# Patient Record
Sex: FEMALE | Race: WHITE | Employment: UNEMPLOYED | URBAN - METROPOLITAN AREA
[De-identification: names, ages, dates, MRNs, and addresses within clinical notes are randomized per-mention and may not be internally consistent; named-entity substitution may affect disease eponyms.]

---

## 2017-01-02 ENCOUNTER — APPOINTMENT (OUTPATIENT)
Dept: GENERAL RADIOLOGY | Age: 57
DRG: 492 | End: 2017-01-02
Attending: PHYSICIAN ASSISTANT
Payer: COMMERCIAL

## 2017-01-02 ENCOUNTER — APPOINTMENT (OUTPATIENT)
Dept: GENERAL RADIOLOGY | Age: 57
DRG: 492 | End: 2017-01-02
Attending: NURSE PRACTITIONER
Payer: COMMERCIAL

## 2017-01-02 ENCOUNTER — ANESTHESIA EVENT (OUTPATIENT)
Dept: SURGERY | Age: 57
DRG: 492 | End: 2017-01-02
Payer: COMMERCIAL

## 2017-01-02 ENCOUNTER — HOSPITAL ENCOUNTER (INPATIENT)
Age: 57
LOS: 22 days | Discharge: HOME HEALTH | DRG: 492 | End: 2017-01-24
Attending: EMERGENCY MEDICINE | Admitting: ORTHOPAEDIC SURGERY
Payer: COMMERCIAL

## 2017-01-02 ENCOUNTER — ANESTHESIA (OUTPATIENT)
Dept: SURGERY | Age: 57
DRG: 492 | End: 2017-01-02
Payer: COMMERCIAL

## 2017-01-02 DIAGNOSIS — S82.851A TRIMALLEOLAR FRACTURE, RIGHT, CLOSED, INITIAL ENCOUNTER: Primary | ICD-10-CM

## 2017-01-02 LAB
ALBUMIN SERPL BCP-MCNC: 3.5 G/DL (ref 3.5–5)
ALBUMIN/GLOB SERPL: 1.2 {RATIO} (ref 1.1–2.2)
ALP SERPL-CCNC: 92 U/L (ref 45–117)
ALT SERPL-CCNC: 23 U/L (ref 12–78)
ANION GAP BLD CALC-SCNC: 8 MMOL/L (ref 5–15)
AST SERPL W P-5'-P-CCNC: 13 U/L (ref 15–37)
BASOPHILS # BLD AUTO: 0 K/UL (ref 0–0.1)
BASOPHILS # BLD: 0 % (ref 0–1)
BILIRUB SERPL-MCNC: 1 MG/DL (ref 0.2–1)
BUN SERPL-MCNC: 7 MG/DL (ref 6–20)
BUN/CREAT SERPL: 9 (ref 12–20)
CALCIUM SERPL-MCNC: 8.2 MG/DL (ref 8.5–10.1)
CHLORIDE SERPL-SCNC: 108 MMOL/L (ref 97–108)
CO2 SERPL-SCNC: 26 MMOL/L (ref 21–32)
CREAT SERPL-MCNC: 0.74 MG/DL (ref 0.55–1.02)
EOSINOPHIL # BLD: 0 K/UL (ref 0–0.4)
EOSINOPHIL NFR BLD: 0 % (ref 0–7)
ERYTHROCYTE [DISTWIDTH] IN BLOOD BY AUTOMATED COUNT: 12.9 % (ref 11.5–14.5)
GLOBULIN SER CALC-MCNC: 2.9 G/DL (ref 2–4)
GLUCOSE SERPL-MCNC: 112 MG/DL (ref 65–100)
HCT VFR BLD AUTO: 39.3 % (ref 35–47)
HGB BLD-MCNC: 13.2 G/DL (ref 11.5–16)
INR PPP: 1 (ref 0.9–1.1)
LYMPHOCYTES # BLD AUTO: 18 % (ref 12–49)
LYMPHOCYTES # BLD: 1.3 K/UL (ref 0.8–3.5)
MCH RBC QN AUTO: 30.8 PG (ref 26–34)
MCHC RBC AUTO-ENTMCNC: 33.6 G/DL (ref 30–36.5)
MCV RBC AUTO: 91.6 FL (ref 80–99)
MONOCYTES # BLD: 0.4 K/UL (ref 0–1)
MONOCYTES NFR BLD AUTO: 6 % (ref 5–13)
NEUTS SEG # BLD: 5.6 K/UL (ref 1.8–8)
NEUTS SEG NFR BLD AUTO: 76 % (ref 32–75)
PLATELET # BLD AUTO: 234 K/UL (ref 150–400)
POTASSIUM SERPL-SCNC: 3.8 MMOL/L (ref 3.5–5.1)
PROT SERPL-MCNC: 6.4 G/DL (ref 6.4–8.2)
PROTHROMBIN TIME: 10.4 SEC (ref 9–11.1)
RBC # BLD AUTO: 4.29 M/UL (ref 3.8–5.2)
SODIUM SERPL-SCNC: 142 MMOL/L (ref 136–145)
WBC # BLD AUTO: 7.3 K/UL (ref 3.6–11)

## 2017-01-02 PROCEDURE — 0QSJXZZ REPOSITION RIGHT FIBULA, EXTERNAL APPROACH: ICD-10-PCS | Performed by: EMERGENCY MEDICINE

## 2017-01-02 PROCEDURE — 96374 THER/PROPH/DIAG INJ IV PUSH: CPT

## 2017-01-02 PROCEDURE — 0QSJXZZ REPOSITION RIGHT FIBULA, EXTERNAL APPROACH: ICD-10-PCS | Performed by: ORTHOPAEDIC SURGERY

## 2017-01-02 PROCEDURE — 77030020753 HC CUF TRNQT 1BLA STRY -B: Performed by: ORTHOPAEDIC SURGERY

## 2017-01-02 PROCEDURE — 99285 EMERGENCY DEPT VISIT HI MDM: CPT

## 2017-01-02 PROCEDURE — 74011250636 HC RX REV CODE- 250/636: Performed by: EMERGENCY MEDICINE

## 2017-01-02 PROCEDURE — 77030020061 HC IV BLD WRMR ADMIN SET 3M -B: Performed by: ANESTHESIOLOGY

## 2017-01-02 PROCEDURE — 76210000006 HC OR PH I REC 0.5 TO 1 HR: Performed by: ORTHOPAEDIC SURGERY

## 2017-01-02 PROCEDURE — 74011250636 HC RX REV CODE- 250/636: Performed by: ANESTHESIOLOGY

## 2017-01-02 PROCEDURE — 99152 MOD SED SAME PHYS/QHP 5/>YRS: CPT

## 2017-01-02 PROCEDURE — 0QSGXZZ REPOSITION RIGHT TIBIA, EXTERNAL APPROACH: ICD-10-PCS | Performed by: EMERGENCY MEDICINE

## 2017-01-02 PROCEDURE — 96376 TX/PRO/DX INJ SAME DRUG ADON: CPT

## 2017-01-02 PROCEDURE — 36415 COLL VENOUS BLD VENIPUNCTURE: CPT | Performed by: NURSE PRACTITIONER

## 2017-01-02 PROCEDURE — 74011250636 HC RX REV CODE- 250/636

## 2017-01-02 PROCEDURE — 77030016920 HC CLMP EXT FIX4 SYNT -F: Performed by: ORTHOPAEDIC SURGERY

## 2017-01-02 PROCEDURE — 71010 XR CHEST SNGL V: CPT

## 2017-01-02 PROCEDURE — 77030013079 HC BLNKT BAIR HGGR 3M -A: Performed by: ANESTHESIOLOGY

## 2017-01-02 PROCEDURE — 85025 COMPLETE CBC W/AUTO DIFF WBC: CPT | Performed by: NURSE PRACTITIONER

## 2017-01-02 PROCEDURE — 77030026438 HC STYL ET INTUB CARD -A: Performed by: ANESTHESIOLOGY

## 2017-01-02 PROCEDURE — 74011000250 HC RX REV CODE- 250

## 2017-01-02 PROCEDURE — 77030020274 HC MISC IMPL ORTHOPEDIC: Performed by: ORTHOPAEDIC SURGERY

## 2017-01-02 PROCEDURE — 73600 X-RAY EXAM OF ANKLE: CPT

## 2017-01-02 PROCEDURE — 76010000138 HC OR TIME 0.5 TO 1 HR: Performed by: ORTHOPAEDIC SURGERY

## 2017-01-02 PROCEDURE — 76060000032 HC ANESTHESIA 0.5 TO 1 HR: Performed by: ORTHOPAEDIC SURGERY

## 2017-01-02 PROCEDURE — 77030011640 HC PAD GRND REM COVD -A: Performed by: ORTHOPAEDIC SURGERY

## 2017-01-02 PROCEDURE — 77030025245 HC ROD SPN EXT FIX 1 SYNT -D: Performed by: ORTHOPAEDIC SURGERY

## 2017-01-02 PROCEDURE — 80053 COMPREHEN METABOLIC PANEL: CPT | Performed by: NURSE PRACTITIONER

## 2017-01-02 PROCEDURE — 77030032490 HC SLV COMPR SCD KNE COVD -B

## 2017-01-02 PROCEDURE — 77030000027 HC PIN TRNSFX SYNT -C: Performed by: ORTHOPAEDIC SURGERY

## 2017-01-02 PROCEDURE — 74011250636 HC RX REV CODE- 250/636: Performed by: PHYSICIAN ASSISTANT

## 2017-01-02 PROCEDURE — 77030000024 HC CLMP EXT FIX SYNT -F: Performed by: ORTHOPAEDIC SURGERY

## 2017-01-02 PROCEDURE — 77030008684 HC TU ET CUF COVD -B: Performed by: ANESTHESIOLOGY

## 2017-01-02 PROCEDURE — 85610 PROTHROMBIN TIME: CPT | Performed by: NURSE PRACTITIONER

## 2017-01-02 PROCEDURE — 2W6QX0Z TRACTION OF RIGHT LOWER LEG USING TRACTION APPARATUS: ICD-10-PCS | Performed by: ORTHOPAEDIC SURGERY

## 2017-01-02 PROCEDURE — 77030019908 HC STETH ESOPH SIMS -A: Performed by: ANESTHESIOLOGY

## 2017-01-02 PROCEDURE — 74011250637 HC RX REV CODE- 250/637: Performed by: NURSE PRACTITIONER

## 2017-01-02 PROCEDURE — 65270000029 HC RM PRIVATE

## 2017-01-02 PROCEDURE — 75810000301 HC ER LEVEL 1 CLOSED TREATMNT FRACTURE/DISLOCATION

## 2017-01-02 PROCEDURE — 86900 BLOOD TYPING SEROLOGIC ABO: CPT | Performed by: NURSE PRACTITIONER

## 2017-01-02 PROCEDURE — 73610 X-RAY EXAM OF ANKLE: CPT

## 2017-01-02 PROCEDURE — C1713 ANCHOR/SCREW BN/BN,TIS/BN: HCPCS | Performed by: ORTHOPAEDIC SURGERY

## 2017-01-02 PROCEDURE — 93005 ELECTROCARDIOGRAM TRACING: CPT

## 2017-01-02 PROCEDURE — 77010033678 HC OXYGEN DAILY

## 2017-01-02 DEVICE — SCREW EXT FIX L150MM DIA5MM THRD L150MM S STL SELF DRL MR
Type: IMPLANTABLE DEVICE | Site: ANKLE | Status: NON-FUNCTIONAL
Removed: 2017-01-03

## 2017-01-02 DEVICE — PIN FIX L225MM DIA6MM S STL FOR L EXT FIX SET
Type: IMPLANTABLE DEVICE | Site: ANKLE | Status: NON-FUNCTIONAL
Removed: 2017-01-03

## 2017-01-02 RX ORDER — ATORVASTATIN CALCIUM 10 MG/1
TABLET, FILM COATED ORAL DAILY
COMMUNITY
End: 2017-05-08 | Stop reason: SDUPTHER

## 2017-01-02 RX ORDER — SODIUM CHLORIDE 9 MG/ML
100 INJECTION, SOLUTION INTRAVENOUS CONTINUOUS
Status: DISCONTINUED | OUTPATIENT
Start: 2017-01-02 | End: 2017-01-03

## 2017-01-02 RX ORDER — SODIUM CHLORIDE 0.9 % (FLUSH) 0.9 %
5-10 SYRINGE (ML) INJECTION AS NEEDED
Status: DISCONTINUED | OUTPATIENT
Start: 2017-01-02 | End: 2017-01-02 | Stop reason: HOSPADM

## 2017-01-02 RX ORDER — MORPHINE SULFATE 10 MG/ML
2 INJECTION, SOLUTION INTRAMUSCULAR; INTRAVENOUS
Status: DISCONTINUED | OUTPATIENT
Start: 2017-01-02 | End: 2017-01-02 | Stop reason: HOSPADM

## 2017-01-02 RX ORDER — DIPHENHYDRAMINE HYDROCHLORIDE 50 MG/ML
12.5 INJECTION, SOLUTION INTRAMUSCULAR; INTRAVENOUS AS NEEDED
Status: DISCONTINUED | OUTPATIENT
Start: 2017-01-02 | End: 2017-01-02 | Stop reason: HOSPADM

## 2017-01-02 RX ORDER — FERROUS SULFATE, DRIED 160(50) MG
1 TABLET, EXTENDED RELEASE ORAL 2 TIMES DAILY WITH MEALS
COMMUNITY
End: 2017-05-08

## 2017-01-02 RX ORDER — PROPOFOL 10 MG/ML
200 INJECTION, EMULSION INTRAVENOUS
Status: COMPLETED | OUTPATIENT
Start: 2017-01-02 | End: 2017-01-02

## 2017-01-02 RX ORDER — NALOXONE HYDROCHLORIDE 0.4 MG/ML
0.4 INJECTION, SOLUTION INTRAMUSCULAR; INTRAVENOUS; SUBCUTANEOUS AS NEEDED
Status: DISCONTINUED | OUTPATIENT
Start: 2017-01-02 | End: 2017-01-03

## 2017-01-02 RX ORDER — LIDOCAINE HYDROCHLORIDE 20 MG/ML
INJECTION, SOLUTION EPIDURAL; INFILTRATION; INTRACAUDAL; PERINEURAL AS NEEDED
Status: DISCONTINUED | OUTPATIENT
Start: 2017-01-02 | End: 2017-01-02 | Stop reason: HOSPADM

## 2017-01-02 RX ORDER — MIDAZOLAM HYDROCHLORIDE 1 MG/ML
0.5 INJECTION, SOLUTION INTRAMUSCULAR; INTRAVENOUS
Status: DISCONTINUED | OUTPATIENT
Start: 2017-01-02 | End: 2017-01-02 | Stop reason: HOSPADM

## 2017-01-02 RX ORDER — CLINDAMYCIN PHOSPHATE 600 MG/50ML
INJECTION INTRAVENOUS AS NEEDED
Status: DISCONTINUED | OUTPATIENT
Start: 2017-01-02 | End: 2017-01-02 | Stop reason: HOSPADM

## 2017-01-02 RX ORDER — ASPIRIN 81 MG/1
81 TABLET ORAL DAILY
COMMUNITY
End: 2017-01-24

## 2017-01-02 RX ORDER — PROPOFOL 10 MG/ML
100 INJECTION, EMULSION INTRAVENOUS
Status: COMPLETED | OUTPATIENT
Start: 2017-01-02 | End: 2017-01-02

## 2017-01-02 RX ORDER — ONDANSETRON 2 MG/ML
4 INJECTION INTRAMUSCULAR; INTRAVENOUS
Status: DISCONTINUED | OUTPATIENT
Start: 2017-01-02 | End: 2017-01-03

## 2017-01-02 RX ORDER — SODIUM CHLORIDE 0.9 % (FLUSH) 0.9 %
5-10 SYRINGE (ML) INJECTION AS NEEDED
Status: DISCONTINUED | OUTPATIENT
Start: 2017-01-02 | End: 2017-01-03

## 2017-01-02 RX ORDER — OXYCODONE HYDROCHLORIDE 5 MG/1
10 TABLET ORAL
Status: DISCONTINUED | OUTPATIENT
Start: 2017-01-02 | End: 2017-01-03

## 2017-01-02 RX ORDER — SUCCINYLCHOLINE CHLORIDE 20 MG/ML
INJECTION INTRAMUSCULAR; INTRAVENOUS AS NEEDED
Status: DISCONTINUED | OUTPATIENT
Start: 2017-01-02 | End: 2017-01-02 | Stop reason: HOSPADM

## 2017-01-02 RX ORDER — DIPHENHYDRAMINE HCL 25 MG
25 CAPSULE ORAL
Status: DISCONTINUED | OUTPATIENT
Start: 2017-01-02 | End: 2017-01-03

## 2017-01-02 RX ORDER — HYDROMORPHONE HYDROCHLORIDE 2 MG/1
4 TABLET ORAL
Status: DISCONTINUED | OUTPATIENT
Start: 2017-01-02 | End: 2017-01-03

## 2017-01-02 RX ORDER — GLYCOPYRROLATE 0.2 MG/ML
INJECTION INTRAMUSCULAR; INTRAVENOUS AS NEEDED
Status: DISCONTINUED | OUTPATIENT
Start: 2017-01-02 | End: 2017-01-02 | Stop reason: HOSPADM

## 2017-01-02 RX ORDER — ROCURONIUM BROMIDE 10 MG/ML
INJECTION, SOLUTION INTRAVENOUS AS NEEDED
Status: DISCONTINUED | OUTPATIENT
Start: 2017-01-02 | End: 2017-01-02 | Stop reason: HOSPADM

## 2017-01-02 RX ORDER — ONDANSETRON 2 MG/ML
INJECTION INTRAMUSCULAR; INTRAVENOUS AS NEEDED
Status: DISCONTINUED | OUTPATIENT
Start: 2017-01-02 | End: 2017-01-02 | Stop reason: HOSPADM

## 2017-01-02 RX ORDER — FENTANYL CITRATE 50 UG/ML
25 INJECTION, SOLUTION INTRAMUSCULAR; INTRAVENOUS
Status: DISCONTINUED | OUTPATIENT
Start: 2017-01-02 | End: 2017-01-02 | Stop reason: HOSPADM

## 2017-01-02 RX ORDER — OXYCODONE AND ACETAMINOPHEN 5; 325 MG/1; MG/1
2 TABLET ORAL
Status: DISCONTINUED | OUTPATIENT
Start: 2017-01-02 | End: 2017-01-03

## 2017-01-02 RX ORDER — SODIUM CHLORIDE, SODIUM LACTATE, POTASSIUM CHLORIDE, CALCIUM CHLORIDE 600; 310; 30; 20 MG/100ML; MG/100ML; MG/100ML; MG/100ML
INJECTION, SOLUTION INTRAVENOUS
Status: DISCONTINUED | OUTPATIENT
Start: 2017-01-02 | End: 2017-01-02 | Stop reason: HOSPADM

## 2017-01-02 RX ORDER — LIDOCAINE HYDROCHLORIDE 10 MG/ML
0.1 INJECTION, SOLUTION EPIDURAL; INFILTRATION; INTRACAUDAL; PERINEURAL AS NEEDED
Status: DISCONTINUED | OUTPATIENT
Start: 2017-01-02 | End: 2017-01-02 | Stop reason: HOSPADM

## 2017-01-02 RX ORDER — PROPOFOL 10 MG/ML
INJECTION, EMULSION INTRAVENOUS AS NEEDED
Status: DISCONTINUED | OUTPATIENT
Start: 2017-01-02 | End: 2017-01-02 | Stop reason: HOSPADM

## 2017-01-02 RX ORDER — HYDROMORPHONE HYDROCHLORIDE 1 MG/ML
1 INJECTION, SOLUTION INTRAMUSCULAR; INTRAVENOUS; SUBCUTANEOUS
Status: DISCONTINUED | OUTPATIENT
Start: 2017-01-02 | End: 2017-01-03

## 2017-01-02 RX ORDER — ADHESIVE BANDAGE
30 BANDAGE TOPICAL DAILY PRN
Status: DISCONTINUED | OUTPATIENT
Start: 2017-01-02 | End: 2017-01-03

## 2017-01-02 RX ORDER — DEXTROAMPHETAMINE SACCHARATE, AMPHETAMINE ASPARTATE, DEXTROAMPHETAMINE SULFATE AND AMPHETAMINE SULFATE 2.5; 2.5; 2.5; 2.5 MG/1; MG/1; MG/1; MG/1
10 TABLET ORAL
COMMUNITY
End: 2017-01-24

## 2017-01-02 RX ORDER — MIDAZOLAM HYDROCHLORIDE 1 MG/ML
INJECTION, SOLUTION INTRAMUSCULAR; INTRAVENOUS AS NEEDED
Status: DISCONTINUED | OUTPATIENT
Start: 2017-01-02 | End: 2017-01-02 | Stop reason: HOSPADM

## 2017-01-02 RX ORDER — ATORVASTATIN CALCIUM 10 MG/1
10 TABLET, FILM COATED ORAL DAILY
Status: DISCONTINUED | OUTPATIENT
Start: 2017-01-03 | End: 2017-01-03

## 2017-01-02 RX ORDER — AMOXICILLIN 250 MG
2 CAPSULE ORAL 2 TIMES DAILY
Status: DISCONTINUED | OUTPATIENT
Start: 2017-01-02 | End: 2017-01-03

## 2017-01-02 RX ORDER — PHENYLEPHRINE HCL IN 0.9% NACL 0.4MG/10ML
SYRINGE (ML) INTRAVENOUS AS NEEDED
Status: DISCONTINUED | OUTPATIENT
Start: 2017-01-02 | End: 2017-01-02 | Stop reason: HOSPADM

## 2017-01-02 RX ORDER — DESVENLAFAXINE 100 MG/1
50 TABLET, EXTENDED RELEASE ORAL
COMMUNITY
End: 2017-05-08 | Stop reason: SDUPTHER

## 2017-01-02 RX ORDER — OXYCODONE AND ACETAMINOPHEN 5; 325 MG/1; MG/1
1 TABLET ORAL
Status: DISCONTINUED | OUTPATIENT
Start: 2017-01-02 | End: 2017-01-03

## 2017-01-02 RX ORDER — HYDROMORPHONE HYDROCHLORIDE 1 MG/ML
1 INJECTION, SOLUTION INTRAMUSCULAR; INTRAVENOUS; SUBCUTANEOUS
Status: COMPLETED | OUTPATIENT
Start: 2017-01-02 | End: 2017-01-02

## 2017-01-02 RX ORDER — ACETAMINOPHEN 325 MG/1
650 TABLET ORAL
Status: DISCONTINUED | OUTPATIENT
Start: 2017-01-02 | End: 2017-01-03

## 2017-01-02 RX ORDER — HYDROMORPHONE HYDROCHLORIDE 1 MG/ML
0.5 INJECTION, SOLUTION INTRAMUSCULAR; INTRAVENOUS; SUBCUTANEOUS
Status: DISCONTINUED | OUTPATIENT
Start: 2017-01-02 | End: 2017-01-03

## 2017-01-02 RX ORDER — FENTANYL CITRATE 50 UG/ML
50 INJECTION, SOLUTION INTRAMUSCULAR; INTRAVENOUS AS NEEDED
Status: DISCONTINUED | OUTPATIENT
Start: 2017-01-02 | End: 2017-01-02 | Stop reason: HOSPADM

## 2017-01-02 RX ORDER — OXYCODONE HYDROCHLORIDE 5 MG/1
5 TABLET ORAL
Status: DISCONTINUED | OUTPATIENT
Start: 2017-01-02 | End: 2017-01-03

## 2017-01-02 RX ORDER — DESVENLAFAXINE SUCCINATE 50 MG/1
100 TABLET, EXTENDED RELEASE ORAL DAILY
Status: DISCONTINUED | OUTPATIENT
Start: 2017-01-03 | End: 2017-01-03

## 2017-01-02 RX ORDER — DEXTROAMPHETAMINE SACCHARATE, AMPHETAMINE ASPARTATE, DEXTROAMPHETAMINE SULFATE AND AMPHETAMINE SULFATE 2.5; 2.5; 2.5; 2.5 MG/1; MG/1; MG/1; MG/1
10 TABLET ORAL DAILY
Status: DISCONTINUED | OUTPATIENT
Start: 2017-01-03 | End: 2017-01-03 | Stop reason: SDUPTHER

## 2017-01-02 RX ORDER — SODIUM CHLORIDE, SODIUM LACTATE, POTASSIUM CHLORIDE, CALCIUM CHLORIDE 600; 310; 30; 20 MG/100ML; MG/100ML; MG/100ML; MG/100ML
25 INJECTION, SOLUTION INTRAVENOUS CONTINUOUS
Status: DISCONTINUED | OUTPATIENT
Start: 2017-01-02 | End: 2017-01-02 | Stop reason: HOSPADM

## 2017-01-02 RX ORDER — NEOSTIGMINE METHYLSULFATE 1 MG/ML
INJECTION INTRAVENOUS AS NEEDED
Status: DISCONTINUED | OUTPATIENT
Start: 2017-01-02 | End: 2017-01-02 | Stop reason: HOSPADM

## 2017-01-02 RX ORDER — SODIUM CHLORIDE 0.9 % (FLUSH) 0.9 %
5-10 SYRINGE (ML) INJECTION EVERY 8 HOURS
Status: DISCONTINUED | OUTPATIENT
Start: 2017-01-02 | End: 2017-01-03

## 2017-01-02 RX ORDER — ACETAMINOPHEN 325 MG/1
650 TABLET ORAL EVERY 6 HOURS
Status: DISCONTINUED | OUTPATIENT
Start: 2017-01-02 | End: 2017-01-03

## 2017-01-02 RX ORDER — DEXAMETHASONE SODIUM PHOSPHATE 4 MG/ML
INJECTION, SOLUTION INTRA-ARTICULAR; INTRALESIONAL; INTRAMUSCULAR; INTRAVENOUS; SOFT TISSUE AS NEEDED
Status: DISCONTINUED | OUTPATIENT
Start: 2017-01-02 | End: 2017-01-02 | Stop reason: HOSPADM

## 2017-01-02 RX ORDER — HYDROMORPHONE HYDROCHLORIDE 1 MG/ML
.2-.5 INJECTION, SOLUTION INTRAMUSCULAR; INTRAVENOUS; SUBCUTANEOUS
Status: DISCONTINUED | OUTPATIENT
Start: 2017-01-02 | End: 2017-01-02 | Stop reason: HOSPADM

## 2017-01-02 RX ORDER — FENTANYL CITRATE 50 UG/ML
INJECTION, SOLUTION INTRAMUSCULAR; INTRAVENOUS AS NEEDED
Status: DISCONTINUED | OUTPATIENT
Start: 2017-01-02 | End: 2017-01-02 | Stop reason: HOSPADM

## 2017-01-02 RX ADMIN — ACETAMINOPHEN 650 MG: 325 TABLET, FILM COATED ORAL at 23:21

## 2017-01-02 RX ADMIN — PROPOFOL 120 MG: 10 INJECTION, EMULSION INTRAVENOUS at 15:42

## 2017-01-02 RX ADMIN — HYDROMORPHONE HYDROCHLORIDE 0.5 MG: 1 INJECTION, SOLUTION INTRAMUSCULAR; INTRAVENOUS; SUBCUTANEOUS at 21:50

## 2017-01-02 RX ADMIN — SODIUM CHLORIDE, SODIUM LACTATE, POTASSIUM CHLORIDE, CALCIUM CHLORIDE: 600; 310; 30; 20 INJECTION, SOLUTION INTRAVENOUS at 20:05

## 2017-01-02 RX ADMIN — HYDROMORPHONE HYDROCHLORIDE 1 MG: 1 INJECTION, SOLUTION INTRAMUSCULAR; INTRAVENOUS; SUBCUTANEOUS at 14:11

## 2017-01-02 RX ADMIN — FENTANYL CITRATE 25 MCG: 50 INJECTION, SOLUTION INTRAMUSCULAR; INTRAVENOUS at 21:50

## 2017-01-02 RX ADMIN — ONDANSETRON 4 MG: 2 INJECTION INTRAMUSCULAR; INTRAVENOUS at 20:36

## 2017-01-02 RX ADMIN — SUCCINYLCHOLINE CHLORIDE 140 MG: 20 INJECTION INTRAMUSCULAR; INTRAVENOUS at 20:15

## 2017-01-02 RX ADMIN — PROPOFOL 120 MG: 10 INJECTION, EMULSION INTRAVENOUS at 14:25

## 2017-01-02 RX ADMIN — Medication 40 MCG: at 20:37

## 2017-01-02 RX ADMIN — PROPOFOL 170 MG: 10 INJECTION, EMULSION INTRAVENOUS at 20:15

## 2017-01-02 RX ADMIN — SODIUM CHLORIDE, SODIUM LACTATE, POTASSIUM CHLORIDE, CALCIUM CHLORIDE: 600; 310; 30; 20 INJECTION, SOLUTION INTRAVENOUS at 20:57

## 2017-01-02 RX ADMIN — SODIUM CHLORIDE 100 ML/HR: 900 INJECTION, SOLUTION INTRAVENOUS at 21:20

## 2017-01-02 RX ADMIN — OXYCODONE HYDROCHLORIDE 10 MG: 5 TABLET ORAL at 17:41

## 2017-01-02 RX ADMIN — NEOSTIGMINE METHYLSULFATE 3 MG: 1 INJECTION INTRAVENOUS at 20:51

## 2017-01-02 RX ADMIN — FENTANYL CITRATE 25 MCG: 50 INJECTION, SOLUTION INTRAMUSCULAR; INTRAVENOUS at 21:35

## 2017-01-02 RX ADMIN — ROCURONIUM BROMIDE 10 MG: 10 INJECTION, SOLUTION INTRAVENOUS at 20:15

## 2017-01-02 RX ADMIN — HYDROMORPHONE HYDROCHLORIDE 1 MG: 1 INJECTION, SOLUTION INTRAMUSCULAR; INTRAVENOUS; SUBCUTANEOUS at 13:02

## 2017-01-02 RX ADMIN — MIDAZOLAM HYDROCHLORIDE 5 MG: 1 INJECTION, SOLUTION INTRAMUSCULAR; INTRAVENOUS at 21:05

## 2017-01-02 RX ADMIN — Medication 10 ML: at 21:21

## 2017-01-02 RX ADMIN — FENTANYL CITRATE 25 MCG: 50 INJECTION, SOLUTION INTRAMUSCULAR; INTRAVENOUS at 21:40

## 2017-01-02 RX ADMIN — FENTANYL CITRATE 25 MCG: 50 INJECTION, SOLUTION INTRAMUSCULAR; INTRAVENOUS at 21:45

## 2017-01-02 RX ADMIN — CLINDAMYCIN PHOSPHATE 600 MG: 600 INJECTION INTRAVENOUS at 20:23

## 2017-01-02 RX ADMIN — DEXAMETHASONE SODIUM PHOSPHATE 8 MG: 4 INJECTION, SOLUTION INTRA-ARTICULAR; INTRALESIONAL; INTRAMUSCULAR; INTRAVENOUS; SOFT TISSUE at 20:34

## 2017-01-02 RX ADMIN — OXYCODONE HYDROCHLORIDE 10 MG: 5 TABLET ORAL at 23:20

## 2017-01-02 RX ADMIN — PROPOFOL 150 MG: 10 INJECTION, EMULSION INTRAVENOUS at 18:00

## 2017-01-02 RX ADMIN — FENTANYL CITRATE 100 MCG: 50 INJECTION, SOLUTION INTRAMUSCULAR; INTRAVENOUS at 20:14

## 2017-01-02 RX ADMIN — FENTANYL CITRATE 50 MCG: 50 INJECTION, SOLUTION INTRAMUSCULAR; INTRAVENOUS at 20:09

## 2017-01-02 RX ADMIN — GLYCOPYRROLATE 0.5 MG: 0.2 INJECTION INTRAMUSCULAR; INTRAVENOUS at 20:51

## 2017-01-02 RX ADMIN — LIDOCAINE HYDROCHLORIDE 80 MG: 20 INJECTION, SOLUTION EPIDURAL; INFILTRATION; INTRACAUDAL; PERINEURAL at 20:15

## 2017-01-02 RX ADMIN — HYDROMORPHONE HYDROCHLORIDE 0.5 MG: 1 INJECTION, SOLUTION INTRAMUSCULAR; INTRAVENOUS; SUBCUTANEOUS at 21:35

## 2017-01-02 RX ADMIN — ROCURONIUM BROMIDE 30 MG: 10 INJECTION, SOLUTION INTRAVENOUS at 20:23

## 2017-01-02 RX ADMIN — MIDAZOLAM HYDROCHLORIDE 2 MG: 1 INJECTION, SOLUTION INTRAMUSCULAR; INTRAVENOUS at 20:07

## 2017-01-02 NOTE — ED NOTES
Bedside and Verbal shift change report received from Milagros Collier RN (offgoing nurse) given to Curt Richard RN (oncoming nurse). Report included the following information SBAR, Kardex, ED Summary, [facility] and Recent Results.

## 2017-01-02 NOTE — IP AVS SNAPSHOT
[address] [facility] 83. [phone] Patient: Mayank Askew MRN: [id number] WLA:[id number] You are allergic to the following Allergen Reactions Pcn (Penicillins) Rash Recent Documentation Height Weight BMI OB Status Smoking Status 1.626 m 78.9 kg 29.86 kg/m2 Postmenopausal Current Every Day Smoker Emergency Contacts  (Rel.) Home Phone Work Phone Mobile Phone Ray,Duane (Spouse) -- -- [phone] About your hospitalization You were admitted on:  January 2, 2017 You last received care in the:  [facility] 1 MEDICAL ONCOLOGY You were discharged on:  January 24, 2017 Why you were hospitalized Your primary diagnosis was:  Not on File Your diagnoses also included:  Closed Trimalleolar Fracture Of Right Ankle, Closed Right Trimalleolar Fracture Providers Seen During Your Hospitalizations Provider Role Specialty Primary office phone Kannan Griffiths MD Attending Provider Emergency Medicine [phone] Bhumika Gupta MD Attending Provider Orthopedic Surgery [phone] Edi Alicia MD Attending Provider Orthopedic Surgery [phone] Kei Devries MD Attending Provider Internal Medicine [phone] Barbra Mcrae MD Attending Provider Internal Medicine [phone] Your Primary Care Physician (PCP) Primary Care Physician Office Phone Office Fax OTHER, PHYS ** None ** ** None ** Follow-up Information Follow up With Details Comments Contact Info Edi Alicia MD Schedule an appointment as soon as possible for a visit in 1 month Call as soon as you can for postoperative appointment, cast removal and X-rays [phone] [facility] [address] [place] [address] 83. [phone] Current Discharge Medication List  
  
ASK your doctor about these medications Dose & Instructions Dispensing Information Comments Morning Noon Evening Bedtime ADDERALL 10 mg tablet Generic drug:  dextroamphetamine-amphetamine Your next dose is: Today, Tomorrow Other:  _________ Dose:  10 mg Take 10 mg by mouth. Refills:  0  
     
   
   
   
  
 aspirin delayed-release 81 mg tablet Your next dose is: Today, Tomorrow Other:  _________ Dose:  81 mg Take 81 mg by mouth daily. Refills:  0  
     
   
   
   
  
 calcium-vitamin D 500 mg(1,250mg) -200 unit per tablet Commonly known as:  OYSTER SHELL Your next dose is: Today, Tomorrow Other:  _________ Dose:  1 Tab Take 1 Tab by mouth two (2) times daily (with meals). Refills:  0 LIPITOR 10 mg tablet Generic drug:  atorvastatin Your next dose is: Today, Tomorrow Other:  _________ Take  by mouth daily. Refills:  0 PRISTIQ 100 mg Tb24 Generic drug:  Desvenlafaxine Your next dose is: Today, Tomorrow Other:  _________ Dose:  50 mg Take 50 mg by mouth. Refills:  0 Discharge Instructions ACTIVITY: Absolutely No weight bearing on Right foot. Use crutches, walker or knee scooter for mobilization. FOLLOW UP: Follow up with Dr. Sanjay Herrera on or near February 21, 2017 for cast removal, X-rays and exam. 
 
Discharge Orders None Introducing hospitals & HEALTH SERVICES! Qasim Sanchez introduces [employer] patient portal. Now you can access parts of your medical record, email your doctor's office, and request medication refills online. 1. In your internet browser, go to [email or web address] 2. Click on the First Time User? Click Here link in the Sign In box. You will see the New Member Sign Up page. 3. Enter your [employer] Access Code exactly as it appears below.  You will not need to use this code after youve completed the sign-up process. If you do not sign up before the expiration date, you must request a new code. · [employer] Access Code: [email or web address] Expires: 4/2/2017  2:22 PM 
 
4. Enter the last four digits of your Social Security Number (xxxx) and Date of Birth (mm/dd/yyyy) as indicated and click Submit. You will be taken to the next sign-up page. 5. Create a [employer]Table ID. This will be your [employer] login ID and cannot be changed, so think of one that is secure and easy to remember. 6. Create a [employer]Table password. You can change your password at any time. 7. Enter your Password Reset Question and Answer. This can be used at a later time if you forget your password. 8. Enter your e-mail address. You will receive e-mail notification when new information is available in [address] 9. Click Sign Up. You can now view and download portions of your medical record. 10. Click the Download Summary menu link to download a portable copy of your medical information. If you have questions, please visit the Frequently Asked Questions section of the LightTable website. Remember, LightTable is NOT to be used for urgent needs. For medical emergencies, dial 911. Now available from your iPhone and Android! General Information Please provide this summary of care documentation to your next provider. Patient Signature:  ____________________________________________________________ Date:  ____________________________________________________________  
  
Becky Alena Provider Signature:  ____________________________________________________________ Date:  ____________________________________________________________

## 2017-01-02 NOTE — PROGRESS NOTES
Ortho  Cannot keep ankle reduced, will plan on Ex Fix tonight before definitive fixation later.   [facility]

## 2017-01-02 NOTE — ED NOTES
EDWARD Jensen confirmed displacement by xray still; surgeon will be in to assist with relocation procedure again.

## 2017-01-02 NOTE — ED NOTES
Consent obtained for procedure and placed on chart. MD Dianna Peterson and EDWARD Jensen at bedside to assist with procedure. Airway cart, code cart at bedside. Pt placed on O2 via NC 2L with CO2 monitor. Pre procedure documentation complete.

## 2017-01-02 NOTE — ED NOTES
Procedure started, MD Silvestre Ulises to give an additional 40 mg Diprovan IV, as patient still partially awake.

## 2017-01-02 NOTE — ED NOTES
Bedside shift change report given to Lissa Ordonez and Marley Griffin  (oncoming nurse) by Kirsten Uriarte (offgoing nurse). Report included the following information ED Summary and MAR.

## 2017-01-02 NOTE — ED NOTES
Placed in room 29 dilaudid dose given; positioned for comfort with leg elevated and ice bags. Warm blanket provided.

## 2017-01-02 NOTE — ED PROVIDER NOTES
HPI Comments: Tj Dave is a 49 y.o. female who presents ambulatory to [facility] ED with cc of right ankle pain s/p a fall earlier today. Pt reports that she was moving objects with her  when she missed the 2nd step from the bottom causing her to fall. She describes that if felt like her body rolled on her ankle. She denies any significant PMHx. She denies any sx of LOC and has no other complaints at this time. States that she didn't take anything for the pain and her pain is better with immobilization and worse with ROM. PCP: No primary care provider on file. There are no other complaints, changes or physical findings at this time. Written by RAND Cruz, as dictated by Chari Shields MD.            The history is provided by the patient. No  was used. No past medical history on file. No past surgical history on file. No family history on file. Social History     Social History    Marital status: N/A     Spouse name: N/A    Number of children: N/A    Years of education: N/A     Occupational History    Not on file. Social History Main Topics    Smoking status: Not on file    Smokeless tobacco: Not on file    Alcohol use Not on file    Drug use: Not on file    Sexual activity: Not on file     Other Topics Concern    Not on file     Social History Narrative         ALLERGIES: Pcn [penicillins]    Review of Systems   Constitutional: Negative for chills and fever. HENT: Negative for congestion, rhinorrhea, sneezing and sore throat. Eyes: Negative for redness and visual disturbance. Respiratory: Negative for shortness of breath. Cardiovascular: Negative for leg swelling. Gastrointestinal: Negative for abdominal pain, nausea and vomiting. Genitourinary: Negative for difficulty urinating and frequency. Musculoskeletal: Positive for arthralgias. Negative for back pain, myalgias and neck stiffness.    Skin: Negative for rash.   Neurological: Negative for dizziness, syncope, weakness and headaches. Hematological: Negative for adenopathy. All other systems reviewed and are negative. Patient Vitals for the past 12 hrs:   Temp Pulse Resp BP SpO2   01/02/17 1605 - 80 16 113/71 99 %   01/02/17 1600 - 76 12 107/69 99 %   01/02/17 1555 - 78 11 107/73 99 %   01/02/17 1550 98.9 °F (37.2 °C) 88 13 103/69 98 %   01/02/17 1545 98.5 °F (36.9 °C) 87 18 104/73 99 %   01/02/17 1530 98.9 °F (37.2 °C) 84 14 118/70 97 %   01/02/17 1515 - 82 16 119/69 98 %   01/02/17 1500 - 78 14 121/86 96 %   01/02/17 1447 - 86 15 128/73 97 %   01/02/17 1430 - 86 17 114/64 98 %   01/02/17 1429 - 81 8 - 98 %   01/02/17 1425 - 90 24 103/77 96 %   01/02/17 1420 - 84 12 101/71 94 %   01/02/17 1417 - 85 12 - 91 %   01/02/17 1415 - 88 14 132/68 99 %   01/02/17 1413 - 87 11 136/87 99 %   01/02/17 1408 - 89 17 119/66 98 %   01/02/17 1309 - - - 133/87 97 %     Physical Exam   Constitutional: She is oriented to person, place, and time. She appears well-developed and well-nourished. HENT:   Head: Normocephalic and atraumatic. Mouth/Throat: Oropharynx is clear and moist.   Eyes: Conjunctivae and EOM are normal.   Neck: Normal range of motion and full passive range of motion without pain. Neck supple. Cardiovascular: Regular rhythm, S1 normal, S2 normal, normal heart sounds, intact distal pulses and normal pulses. Tachycardia present. No murmur heard. 1+ pedal pulses at the right ankle    Pulmonary/Chest: Effort normal and breath sounds normal. No respiratory distress. She has no wheezes. Abdominal: Soft. Normal appearance and bowel sounds are normal. She exhibits no distension. There is no tenderness. There is no rebound. Musculoskeletal: Normal range of motion. Obvious deformity to the RLE at her ankle  TTP to the right ankle  Normal ROM of the right toes     Neurological: She is alert and oriented to person, place, and time. She has normal strength. Sensation is intact    Skin: Skin is warm, dry and intact. No rash noted. Psychiatric: She has a normal mood and affect. Her speech is normal and behavior is normal. Judgment and thought content normal.   Nursing note and vitals reviewed. MDM  Number of Diagnoses or Management Options  Trimalleolar fracture, right, closed, initial encounter:   Diagnosis management comments: DDx: sprain, strain, fracture, dislocation        Amount and/or Complexity of Data Reviewed  Clinical lab tests: ordered and reviewed  Tests in the radiology section of CPT®: ordered and reviewed  Tests in the medicine section of CPT®: ordered and reviewed  Review and summarize past medical records: yes  Discuss the patient with other providers: yes (Orthopedics )  Independent visualization of images, tracings, or specimens: yes    Patient Progress  Patient progress: stable    ED Course       Procedures  CONSULT NOTE:  1:29 PM  Jorden Camp MD spoke with Farideh Uribe NP,  Specialty: Orthopedics   Discussed pt's hx, disposition, and available diagnostic and imaging results. Reviewed care plans. Consultant agrees to evaluate the pt, and states she will come by around 1400. Written by Libby Rivera ED Scribalen, as dictated by Jorden Camp MD.    Procedure Note - Procedural Sedation:   2:05 PM  Procedure performed by: Jorden Camp MD  Assistants: Farideh Uribe NP  Procedural sedation has been advised for this patient associated with right ankle fracture/ dislocation. PLAN FOR SEDATION:  The patients sedation plan includes a total of 120mg propofol/DIPRIVAN and 1mg hydromorphone/DILAUDID. Reversal agents and resuscitation equipment will be at the bedside should they be needed. The risks, benefits, and alternatives have been explained to the patient and She consents to the sedation. The ASA score is ASA 2 - Mild systemic disease.  The patient is having all vital signs monitored by an attending RN as well as pulse oximetry. Mallampati Score Reference: The patient has a patent airway with a Mallampati Classification Score of I (soft palate, uvula, fauces, tonsillar pillars visible). IMMEDIATE REASSESSMENT PRIOR TO PROCEDURE:  TIME: 1410   Immediately prior to the procedure, the patient was re-evaluated and found suitable for the planned procedure and any planned medications. ____________________________________________________________________________________________________________________________________________    Post Procedure Anaesthesia/Sedation Assessment #1  TIME: 1838  Post Procedure assessment performed by: Elizabeth Conroy MD  Assistants: Nona Wolfe NP  The patient was sedated with a total of 120mg propofol/DIPRIVAN and 1mg hydromorphone/DILAUDID. Reversal agents and resuscitation equipment were at the bedside. The right ankle reduction was done and the patient tolerated the procedure well with no complications. Reversal agents were not used. HR:84     Rhythm:Normal Sinus   RR: 12                 SpO2:94 %  Airway patent?: yes  BP:101/ 71  Hydration Status:with normal limits   Mental Status:awake and alert  Pain Level:2/10    Intraprocedure/postprocedure Complications: none  EBL: none  The procedure took 1-15 minutes, and pt tolerated well. During this post sedation period, in addition to the recovering RN, there has been at least one other staff member in the unit. This additional staff member was able to directly hear a call for assistance in the case of an emergency. Written by RAND Clark, as dictated by Elizabeth Conroy MD.   ---------------------------------------------------------------------------------------------------------------------      Procedure Note - Reduction:    2:31 PM  Performed by Nona Wolfe NP.       Immediately prior to the procedure, the patient was reevaluated and found suitable for the planned procedure and any planned medications. Immediately prior to the procedure a time out was called to verify the correct patient, procedure, equipment, staff, and marking as appropriate. Prior to the procedure, neurovascular exam was intact. Analgesia was obtained with procedural sedation - see record. To achieve reduction of the patient's right ankle joint, logitudinal traction manipulation was utilized. The joint was successfully reduced. Neurovascular exam was intact following the procedure. Post reduction x-ray ordered. The procedure took 1-15 minutes, and pt tolerated well. Written by Neftali Munoz ED Scribe, as dictated by Abbey Jimenez MD.       PROGRESS NOTE:  3:11 PM  Ortho agrees to admit the pt to the hospital. Ankle still dislocated and ortho would like to sedate her again to attempt to better reduce the fracture. Pt agrees for repeat sedation. Will re-sedate and attempt better reduction. Written by RAND Lopezibalen, as dictated by Abbey Jimenez MD.    Procedure Note - Procedural Sedation:   3:20 PM  Procedure performed by: Abbey Jimenez MD  Assistants: Chelsey Guerrero NP  Procedural sedation has been advised for this patient associated with right ankle fracture/ dislocation. PLAN FOR SEDATION:  The patients sedation plan includes a total of 120mg propofol/DIPRIVAN. Reversal agents and resuscitation equipment will be at the bedside should they be needed. The risks, benefits, and alternatives have been explained to the patient and She consents to the sedation. The ASA score is ASA 2 - Mild systemic disease. The patient is having all vital signs monitored by an attending RN as well as pulse oximetry. Mallampati Score Reference: The patient has a patent airway with a Mallampati Classification Score of I (soft palate, uvula, fauces, tonsillar pillars visible).     IMMEDIATE REASSESSMENT PRIOR TO PROCEDURE:  TIME: 1524 Immediately prior to the procedure, the patient was re-evaluated and found suitable for the planned procedure and any planned medications. ____________________________________________________________________________________________________________________________________________    Post Procedure Anaesthesia/Sedation Assessment #1  TIME: 3914  Post Procedure assessment performed by: Buck Matson MD  Assistants: Angelique Miranda NP  The patient was sedated with a total of 120mg propofol/DIPRIVAN and 1mg hydromorphone/DILAUDID. Reversal agents and resuscitation equipment were at the bedside. The right ankle reduction was done and the patient tolerated the procedure well with no complications. Reversal agents were not used. HR:84   Rhythm:Normal Sinus   SpO2:97 %  Airway patent?: yes  BP:118/70  Hydration Status:with normal limits   Mental Status:awake and alert  Pain Level:2/10    Intraprocedure/postprocedure Complications: none  EBL: none  The procedure took 1-15 minutes, and pt tolerated well. During this post sedation period, in addition to the recovering RN, there has been at least one other staff member in the unit. This additional staff member was able to directly hear a call for assistance in the case of an emergency. Written by Marco Antonio Baird ED Scribe, as dictated by Buck Matson MD.     Written by RAND Doshiibe, as dictated by Buck Matson MD.    Procedure Note - Reduction:    3:20 PM  Performed by Angelique Miranda NP. Immediately prior to the procedure, the patient was reevaluated and found suitable for the planned procedure and any planned medications. Immediately prior to the procedure a time out was called to verify the correct patient, procedure, equipment, staff, and marking as appropriate. Prior to the procedure, neurovascular exam was intact. Analgesia was obtained with procedural sedation - see record.    To achieve reduction of the patient's right ankle joint, logitudinal traction manipulation was utilized. The joint was successfully reduced. Neurovascular exam was intact following the procedure. Post reduction x-ray ordered. The procedure took 1-15 minutes, and pt tolerated well. Written by Justin Rubin ED Scribe, as dictated by Isaura Liu MD.       EKG interpretation: (Preliminary) 1511  Rhythm: normal sinus rhythm; and regular . Rate (approx.): 81; Axis: normal; P wave: normal; QRS interval: normal ; ST/T wave: normal;   Written by Justin Rubin ED Scribe, as dictated by Isaura Liu MD.    Review of ankle X-ray shows still disclocated (but better than before). Pt is being admitted by ortho for repair in the morning. Pt tolerating pain well at this time. LABORATORY TESTS:  Recent Results (from the past 12 hour(s))   EKG, 12 LEAD, INITIAL    Collection Time: 01/02/17  3:11 PM   Result Value Ref Range    Ventricular Rate 81 BPM    Atrial Rate 81 BPM    P-R Interval 150 ms    QRS Duration 72 ms    Q-T Interval 422 ms    QTC Calculation (Bezet) 490 ms    Calculated P Axis 74 degrees    Calculated R Axis 52 degrees    Calculated T Axis 72 degrees    Diagnosis       Normal sinus rhythm  Nonspecific ST and T wave abnormality  Prolonged QT  Abnormal ECG  No previous ECGs available     CBC WITH AUTOMATED DIFF    Collection Time: 01/02/17  3:31 PM   Result Value Ref Range    WBC 7.3 3.6 - 11.0 K/uL    RBC 4.29 3.80 - 5.20 M/uL    HGB 13.2 11.5 - 16.0 g/dL    HCT 39.3 35.0 - 47.0 %    MCV 91.6 80.0 - 99.0 FL    MCH 30.8 26.0 - 34.0 PG    MCHC 33.6 30.0 - 36.5 g/dL    RDW 12.9 11.5 - 14.5 %    PLATELET 466 852 - 469 K/uL    NEUTROPHILS 76 (H) 32 - 75 %    LYMPHOCYTES 18 12 - 49 %    MONOCYTES 6 5 - 13 %    EOSINOPHILS 0 0 - 7 %    BASOPHILS 0 0 - 1 %    ABS. NEUTROPHILS 5.6 1.8 - 8.0 K/UL    ABS. LYMPHOCYTES 1.3 0.8 - 3.5 K/UL    ABS. MONOCYTES 0.4 0.0 - 1.0 K/UL    ABS. EOSINOPHILS 0.0 0.0 - 0.4 K/UL    ABS.  BASOPHILS 0.0 0.0 - 0.1 K/UL   PROTHROMBIN TIME + INR    Collection Time: 01/02/17  3:31 PM   Result Value Ref Range    INR 1.0 0.9 - 1.1      Prothrombin time 10.4 9.0 - 11.1 sec       IMAGING RESULTS:      Study Result      EXAM: XR ANKLE RT MIN 3 V     INDICATION: fall.     COMPARISON: None.     FINDINGS: Three views of the right ankle demonstrate displaced fractures of the  distal fibula, medial malleolus, and posterior lip of the tibia, with  posterolateral dislocation of the talus.      IMPRESSION  IMPRESSION: Trimalleolar ankle fracture-dislocation.          MEDICATIONS GIVEN:  Medications   0.9% sodium chloride infusion (not administered)   sodium chloride (NS) flush 5-10 mL (not administered)   sodium chloride (NS) flush 5-10 mL (not administered)   acetaminophen (TYLENOL) tablet 650 mg (not administered)   oxyCODONE IR (ROXICODONE) tablet 10 mg (not administered)   oxyCODONE IR (ROXICODONE) tablet 5 mg (not administered)   naloxone (NARCAN) injection 0.4 mg (not administered)   ondansetron (ZOFRAN) injection 4 mg (not administered)   senna-docusate (PERICOLACE) 8.6-50 mg per tablet 2 Tab (not administered)   HYDROmorphone (PF) (DILAUDID) injection 1 mg (1 mg IntraVENous Given 1/2/17 1302)   HYDROmorphone (PF) (DILAUDID) injection 1 mg (1 mg IntraVENous Given 1/2/17 1411)   propofol (DIPRIVAN) 10 mg/mL injection 200 mg (120 mg IntraVENous Given 1/2/17 1425)       IMPRESSION:  1. Trimalleolar fracture, right, closed, initial encounter        PLAN:  1. Admit to Hospital  Admit Note:  3:12 PM  Patient is being admitted to the hospital. The results of their tests and reasons for their admission have been discussed with the patient and/or available family. They convey their agreement and understanding for the need to be admitted and for their admission diagnosis. Written by Abelardo Lynn ED Scribe, as dictated by Jerome Peres MD.    Attestation:     This is note is prepared by Abelardo Lynn, acting as Scribe for Abbey Jimenez MD.    Abbey Jimenez MD The scribe's documentation has been prepared under my direction and personally reviewed by me in its entirety. I confirm that the note above accurately reflects all work, treatment, procedures, and medical decision making performed by me.

## 2017-01-02 NOTE — CONSULTS
Pt seen in ED with closed reduction of R trimal fx as noted below. See H&P for full plan of care and assessment       PROCEDURE NOTE - FRACTURE REDUCTION: The patient was induced with propofol for procedrual sedation via the emergency department MD. After it was confirmed that appropriate sedation had been reached, a longitudinal traction in conjunction with re-creation of the injury maneuver was applied reducing the fracture. Subsequently a sugar tong splint was applied. The patient was aroused from anesthesia and tolerated the procedure well. Post-reduction plain films reviewed with confirmation of a satisfactory reduction of the fracture. The extremity was neurovascularly intact post reduction and splint placement.  X2

## 2017-01-02 NOTE — H&P
ORTHOPAEDIC CONSULT NOTE    Subjective:     Date of Consultation:  January 2, 2017      Rony Biggs is a 49 y.o. female who is being seen for R ankle pain/deformity s/p injury while moving furniture today. Work up has reveled a displaced trimal fx. Pt just moved to the area from [place], normally independent with ADLs and working. Hx of 1/2 PPD smoker, high cholesterol, restless leg syndrome. There are no active problems to display for this patient. No family history on file. Social History   Substance Use Topics    Smoking status: Not on file    Smokeless tobacco: Not on file    Alcohol use Not on file     No past medical history on file. No past surgical history on file. Prior to Admission medications    Not on File     Current Facility-Administered Medications   Medication Dose Route Frequency    HYDROmorphone (PF) (DILAUDID) injection 1 mg  1 mg IntraVENous NOW    propofol (DIPRIVAN) 10 mg/mL injection 200 mg  200 mg IntraVENous NOW     No current outpatient prescriptions on file. Allergies   Allergen Reactions    Pcn [Penicillins] Rash        Review of Systems:  A comprehensive review of systems was negative except for that written in the HPI. Mental Status: no dementia    Objective:     No data found. No data recorded. Gen: Well-developed,  in no acute distress   HEENT: Pink conjunctivae, PERRL, hearing intact to voice, moist mucous membranes   Musc: RLE - + deformity, NVI   FROM of LLE and bilat UE  Skin: No skin breakdown noted. Skin warm, pink, dry  Neuro: Cranial nerves are grossly intact, no focal motor weakness, follows commands appropriately   Psych: Good insight, oriented to person, place and time, alert    Imaging Review: images reviewed      Labs: No results found for this or any previous visit (from the past 24 hour(s)). Impression:   There are no active problems to display for this patient. Active Problems:    * No active hospital problems. *      Plan:   -  Trimal Fx of R ankle reduced in ED and admitted with plan for ORIF Tuesday evening with Dr. Dk Sibley   [facility] consult for pre op clearance   -  IVF, NPO after midnight, pain management, elevate RLE on less than 3- pillows  with ice       Dr. Holden Walton and Dr. Dk Sibley  aware and agrees with plan as above.         Luis Miguel Rosa, NP  Orthopedic Nurse Practitioner   [place]

## 2017-01-02 NOTE — ED NOTES
Procedure Note - Procedural Sedation:   6:05 PM  Procedure performed by: Los Gilbert DO  Assistants: Liliana Damon NP  Procedural sedation has been advised for this patient associated with right ankle fracture/ dislocation. Closed reduction attempted by Dr. Lynn Jenkins for trimalleolar fracture.     PLAN FOR SEDATION:  The patients sedation plan includes a total of 150mg propofol/DIPRIVAN. Reversal agents and resuscitation equipment will be at the bedside should they be needed.     The risks, benefits, and alternatives have been explained to the patient and She consents to the sedation.     The ASA score is ASA 2 - Mild systemic disease. The patient is having all vital signs monitored by an attending RN as well as pulse oximetry.     Mallampati Score Reference:                 The patient has a patent airway with a Mallampati Classification Score of I (soft palate, uvula, fauces, tonsillar pillars visible).     IMMEDIATE REASSESSMENT PRIOR TO PROCEDURE:  TIME: 1805  Immediately prior to the procedure, the patient was re-evaluated and found suitable for the planned procedure and any planned medications. ____________________________________________________________________________________________________________________________________________     Post Procedure Anaesthesia/Sedation Assessment #1  TIME: 5026  Post Procedure assessment performed by: Los Gilbert DO  Assistants: Liliana Damon NP  The patient was sedated with a total of 150mg propofol/DIPRIVAN. Reversal agents and resuscitation equipment were at the bedside. The right ankle reduction was done by Dr. Lynn Jenkins and the patient tolerated the procedure well with no complications. Reversal agents were not used.   HR:84 Rhythm:Normal Sinus   Airway patent?: yes  Hydration Status:with normal limits   Mental Status:awake and alert     Intraprocedure/postprocedure Complications: none  EBL: none  The procedure took 1-15 minutes, and pt tolerated well.     During this post sedation period, in addition to the recovering RN, there has been at least one other staff member in the unit. This additional staff member was able to directly hear a call for assistance in the case of an emergency. Written by RAND Casey, as dictated by Amanuel De DO.     Procedure Note - Reduction:   6:05 PM  Performed by Dr. Lamberto Hernandez.      Immediately prior to the procedure, the patient was reevaluated and found suitable for the planned procedure and any planned medications.     Immediately prior to the procedure a time out was called to verify the correct patient, procedure, equipment, staff, and marking as appropriate.     Prior to the procedure, neurovascular exam was intact. Analgesia was obtained with procedural sedation - see record. To achieve reduction of the patient's right ankle joint, logitudinal traction manipulation was utilized. The joint was successfully reduced. Neurovascular exam was intact following the procedure. Post reduction x-ray ordered. The procedure took 1-15 minutes, and pt tolerated well.    Written by RAND Caseye, as dictated by Amanuel De DO.      Pt stable throughout the procedure, no complications, Amanuel De DO

## 2017-01-03 ENCOUNTER — APPOINTMENT (OUTPATIENT)
Dept: GENERAL RADIOLOGY | Age: 57
DRG: 492 | End: 2017-01-03
Attending: ORTHOPAEDIC SURGERY
Payer: COMMERCIAL

## 2017-01-03 ENCOUNTER — ANESTHESIA EVENT (OUTPATIENT)
Dept: SURGERY | Age: 57
DRG: 492 | End: 2017-01-03
Payer: COMMERCIAL

## 2017-01-03 ENCOUNTER — ANESTHESIA (OUTPATIENT)
Dept: SURGERY | Age: 57
DRG: 492 | End: 2017-01-03
Payer: COMMERCIAL

## 2017-01-03 LAB
ABO + RH BLD: NORMAL
APPEARANCE UR: CLEAR
ATRIAL RATE: 81 BPM
BACTERIA URNS QL MICRO: NEGATIVE /HPF
BILIRUB UR QL: NEGATIVE
BLOOD GROUP ANTIBODIES SERPL: NORMAL
CALCULATED P AXIS, ECG09: 74 DEGREES
CALCULATED R AXIS, ECG10: 52 DEGREES
CALCULATED T AXIS, ECG11: 72 DEGREES
COLOR UR: ABNORMAL
DIAGNOSIS, 93000: NORMAL
EPITH CASTS URNS QL MICRO: ABNORMAL /LPF
GLUCOSE UR STRIP.AUTO-MCNC: 100 MG/DL
HGB UR QL STRIP: ABNORMAL
HYALINE CASTS URNS QL MICRO: ABNORMAL /LPF (ref 0–5)
KETONES UR QL STRIP.AUTO: NEGATIVE MG/DL
LEUKOCYTE ESTERASE UR QL STRIP.AUTO: NEGATIVE
NITRITE UR QL STRIP.AUTO: NEGATIVE
P-R INTERVAL, ECG05: 150 MS
PH UR STRIP: 5.5 [PH] (ref 5–8)
PROT UR STRIP-MCNC: NEGATIVE MG/DL
Q-T INTERVAL, ECG07: 422 MS
QRS DURATION, ECG06: 72 MS
QTC CALCULATION (BEZET), ECG08: 490 MS
RBC #/AREA URNS HPF: ABNORMAL /HPF (ref 0–5)
SP GR UR REFRACTOMETRY: 1.01 (ref 1–1.03)
SPECIMEN EXP DATE BLD: NORMAL
UA: UC IF INDICATED,UAUC: ABNORMAL
UROBILINOGEN UR QL STRIP.AUTO: 0.2 EU/DL (ref 0.2–1)
VENTRICULAR RATE, ECG03: 81 BPM
WBC URNS QL MICRO: ABNORMAL /HPF (ref 0–4)

## 2017-01-03 PROCEDURE — 77030008684 HC TU ET CUF COVD -B: Performed by: NURSE ANESTHETIST, CERTIFIED REGISTERED

## 2017-01-03 PROCEDURE — 36415 COLL VENOUS BLD VENIPUNCTURE: CPT | Performed by: EMERGENCY MEDICINE

## 2017-01-03 PROCEDURE — 74011250636 HC RX REV CODE- 250/636: Performed by: ANESTHESIOLOGY

## 2017-01-03 PROCEDURE — 0LSN0ZZ REPOSITION RIGHT LOWER LEG TENDON, OPEN APPROACH: ICD-10-PCS | Performed by: ORTHOPAEDIC SURGERY

## 2017-01-03 PROCEDURE — 77030032490 HC SLV COMPR SCD KNE COVD -B: Performed by: ORTHOPAEDIC SURGERY

## 2017-01-03 PROCEDURE — C1713 ANCHOR/SCREW BN/BN,TIS/BN: HCPCS | Performed by: ORTHOPAEDIC SURGERY

## 2017-01-03 PROCEDURE — 76000 FLUOROSCOPY <1 HR PHYS/QHP: CPT

## 2017-01-03 PROCEDURE — 73610 X-RAY EXAM OF ANKLE: CPT

## 2017-01-03 PROCEDURE — 77030020275 HC MISC ORTHOPEDIC: Performed by: ORTHOPAEDIC SURGERY

## 2017-01-03 PROCEDURE — 81001 URINALYSIS AUTO W/SCOPE: CPT | Performed by: NURSE PRACTITIONER

## 2017-01-03 PROCEDURE — 77030019908 HC STETH ESOPH SIMS -A: Performed by: NURSE ANESTHETIST, CERTIFIED REGISTERED

## 2017-01-03 PROCEDURE — 0QSG04Z REPOSITION RIGHT TIBIA WITH INTERNAL FIXATION DEVICE, OPEN APPROACH: ICD-10-PCS | Performed by: ORTHOPAEDIC SURGERY

## 2017-01-03 PROCEDURE — 77030027138 HC INCENT SPIROMETER -A

## 2017-01-03 PROCEDURE — 77030013079 HC BLNKT BAIR HGGR 3M -A: Performed by: NURSE ANESTHETIST, CERTIFIED REGISTERED

## 2017-01-03 PROCEDURE — 77030020061 HC IV BLD WRMR ADMIN SET 3M -B: Performed by: NURSE ANESTHETIST, CERTIFIED REGISTERED

## 2017-01-03 PROCEDURE — 77030002916 HC SUT ETHLN J&J -A: Performed by: ORTHOPAEDIC SURGERY

## 2017-01-03 PROCEDURE — 76210000016 HC OR PH I REC 1 TO 1.5 HR: Performed by: ORTHOPAEDIC SURGERY

## 2017-01-03 PROCEDURE — 65270000015 HC RM PRIVATE ONCOLOGY

## 2017-01-03 PROCEDURE — 77030002933 HC SUT MCRYL J&J -A: Performed by: ORTHOPAEDIC SURGERY

## 2017-01-03 PROCEDURE — 77030002986 HC SUT PROL J&J -A: Performed by: ORTHOPAEDIC SURGERY

## 2017-01-03 PROCEDURE — 74011250636 HC RX REV CODE- 250/636: Performed by: ORTHOPAEDIC SURGERY

## 2017-01-03 PROCEDURE — 74011000250 HC RX REV CODE- 250

## 2017-01-03 PROCEDURE — 77030011640 HC PAD GRND REM COVD -A: Performed by: ORTHOPAEDIC SURGERY

## 2017-01-03 PROCEDURE — 77030003914: Performed by: ORTHOPAEDIC SURGERY

## 2017-01-03 PROCEDURE — 76010000137 HC OR TIME 5 TO 5.5 HR: Performed by: ORTHOPAEDIC SURGERY

## 2017-01-03 PROCEDURE — 74011250636 HC RX REV CODE- 250/636

## 2017-01-03 PROCEDURE — 77030021122 HC SPLNT MAT FST BSNM -A: Performed by: ORTHOPAEDIC SURGERY

## 2017-01-03 PROCEDURE — 77030033551: Performed by: ORTHOPAEDIC SURGERY

## 2017-01-03 PROCEDURE — 77030018846 HC SOL IRR STRL H20 ICUM -A: Performed by: ORTHOPAEDIC SURGERY

## 2017-01-03 PROCEDURE — 77030015712 HC BIT DRL QC CANN DISP ZIMM -F: Performed by: ORTHOPAEDIC SURGERY

## 2017-01-03 PROCEDURE — 77030026438 HC STYL ET INTUB CARD -A: Performed by: NURSE ANESTHETIST, CERTIFIED REGISTERED

## 2017-01-03 PROCEDURE — 77030010423: Performed by: ORTHOPAEDIC SURGERY

## 2017-01-03 PROCEDURE — 74011250637 HC RX REV CODE- 250/637: Performed by: PHYSICIAN ASSISTANT

## 2017-01-03 PROCEDURE — 74011250637 HC RX REV CODE- 250/637: Performed by: NURSE PRACTITIONER

## 2017-01-03 PROCEDURE — 77030031139 HC SUT VCRL2 J&J -A: Performed by: ORTHOPAEDIC SURGERY

## 2017-01-03 PROCEDURE — 74011250636 HC RX REV CODE- 250/636: Performed by: NURSE PRACTITIONER

## 2017-01-03 PROCEDURE — 77030020753 HC CUF TRNQT 1BLA STRY -B: Performed by: ORTHOPAEDIC SURGERY

## 2017-01-03 PROCEDURE — 77030018836 HC SOL IRR NACL ICUM -A: Performed by: ORTHOPAEDIC SURGERY

## 2017-01-03 PROCEDURE — 77030003029 HC SUT VCRL J&J -B: Performed by: ORTHOPAEDIC SURGERY

## 2017-01-03 PROCEDURE — 0SPFX5Z REMOVAL OF EXTERNAL FIXATION DEVICE FROM RIGHT ANKLE JOINT, EXTERNAL APPROACH: ICD-10-PCS | Performed by: ORTHOPAEDIC SURGERY

## 2017-01-03 PROCEDURE — 77030020274 HC MISC IMPL ORTHOPEDIC: Performed by: ORTHOPAEDIC SURGERY

## 2017-01-03 PROCEDURE — 77030004156: Performed by: ORTHOPAEDIC SURGERY

## 2017-01-03 PROCEDURE — 76060000041 HC ANESTHESIA 5 TO 5.5 HR: Performed by: ORTHOPAEDIC SURGERY

## 2017-01-03 DEVICE — PLATE BNE L106MM 6 H R LAT DST PERIARTC FIBULAR S STL LOK: Type: IMPLANTABLE DEVICE | Site: ANKLE | Status: FUNCTIONAL

## 2017-01-03 DEVICE — SCREW BNE L14MM DIA3.5MM HD DIA2.7MM CORT PERIARTC S STL ST: Type: IMPLANTABLE DEVICE | Site: ANKLE | Status: FUNCTIONAL

## 2017-01-03 DEVICE — SCREW BNE L20MM DIA2.7MM HEX HD DIA2.5MM CANC BIODUR 108C: Type: IMPLANTABLE DEVICE | Site: ANKLE | Status: FUNCTIONAL

## 2017-01-03 DEVICE — SCREW BNE L12MM DIA3.5MM HD DIA2.7MM CORT PERIARTC S STL ST: Type: IMPLANTABLE DEVICE | Site: ANKLE | Status: FUNCTIONAL

## 2017-01-03 DEVICE — SCREW BNE L18MM DIA2.7MM HEX HD DIA2.5MM CANC BIODUR 108C: Type: IMPLANTABLE DEVICE | Site: ANKLE | Status: FUNCTIONAL

## 2017-01-03 RX ORDER — DIPHENHYDRAMINE HYDROCHLORIDE 50 MG/ML
12.5 INJECTION, SOLUTION INTRAMUSCULAR; INTRAVENOUS AS NEEDED
Status: DISCONTINUED | OUTPATIENT
Start: 2017-01-03 | End: 2017-01-03 | Stop reason: HOSPADM

## 2017-01-03 RX ORDER — NEOSTIGMINE METHYLSULFATE 1 MG/ML
INJECTION INTRAVENOUS AS NEEDED
Status: DISCONTINUED | OUTPATIENT
Start: 2017-01-03 | End: 2017-01-03 | Stop reason: HOSPADM

## 2017-01-03 RX ORDER — ENOXAPARIN SODIUM 100 MG/ML
40 INJECTION SUBCUTANEOUS EVERY 24 HOURS
Status: DISCONTINUED | OUTPATIENT
Start: 2017-01-04 | End: 2017-01-24 | Stop reason: HOSPADM

## 2017-01-03 RX ORDER — CLINDAMYCIN PHOSPHATE 600 MG/50ML
600 INJECTION INTRAVENOUS EVERY 8 HOURS
Status: COMPLETED | OUTPATIENT
Start: 2017-01-04 | End: 2017-01-06

## 2017-01-03 RX ORDER — SODIUM CHLORIDE 0.9 % (FLUSH) 0.9 %
5-10 SYRINGE (ML) INJECTION EVERY 8 HOURS
Status: DISCONTINUED | OUTPATIENT
Start: 2017-01-03 | End: 2017-01-03 | Stop reason: HOSPADM

## 2017-01-03 RX ORDER — GLYCOPYRROLATE 0.2 MG/ML
INJECTION INTRAMUSCULAR; INTRAVENOUS AS NEEDED
Status: DISCONTINUED | OUTPATIENT
Start: 2017-01-03 | End: 2017-01-03 | Stop reason: HOSPADM

## 2017-01-03 RX ORDER — HYDROMORPHONE HYDROCHLORIDE 1 MG/ML
0.2 INJECTION, SOLUTION INTRAMUSCULAR; INTRAVENOUS; SUBCUTANEOUS
Status: DISCONTINUED | OUTPATIENT
Start: 2017-01-03 | End: 2017-01-03 | Stop reason: HOSPADM

## 2017-01-03 RX ORDER — SODIUM CHLORIDE, SODIUM LACTATE, POTASSIUM CHLORIDE, CALCIUM CHLORIDE 600; 310; 30; 20 MG/100ML; MG/100ML; MG/100ML; MG/100ML
25 INJECTION, SOLUTION INTRAVENOUS CONTINUOUS
Status: DISCONTINUED | OUTPATIENT
Start: 2017-01-03 | End: 2017-01-03 | Stop reason: HOSPADM

## 2017-01-03 RX ORDER — CLINDAMYCIN PHOSPHATE 900 MG/50ML
900 INJECTION INTRAVENOUS
Status: COMPLETED | OUTPATIENT
Start: 2017-01-03 | End: 2017-01-03

## 2017-01-03 RX ORDER — SODIUM CHLORIDE 9 MG/ML
75 INJECTION, SOLUTION INTRAVENOUS CONTINUOUS
Status: DISCONTINUED | OUTPATIENT
Start: 2017-01-03 | End: 2017-01-06

## 2017-01-03 RX ORDER — SODIUM CHLORIDE 0.9 % (FLUSH) 0.9 %
5-10 SYRINGE (ML) INJECTION AS NEEDED
Status: DISCONTINUED | OUTPATIENT
Start: 2017-01-03 | End: 2017-01-03 | Stop reason: HOSPADM

## 2017-01-03 RX ORDER — ONDANSETRON 2 MG/ML
INJECTION INTRAMUSCULAR; INTRAVENOUS AS NEEDED
Status: DISCONTINUED | OUTPATIENT
Start: 2017-01-03 | End: 2017-01-03 | Stop reason: HOSPADM

## 2017-01-03 RX ORDER — LIDOCAINE HYDROCHLORIDE 20 MG/ML
INJECTION, SOLUTION EPIDURAL; INFILTRATION; INTRACAUDAL; PERINEURAL AS NEEDED
Status: DISCONTINUED | OUTPATIENT
Start: 2017-01-03 | End: 2017-01-03 | Stop reason: HOSPADM

## 2017-01-03 RX ORDER — MIDAZOLAM HYDROCHLORIDE 1 MG/ML
INJECTION, SOLUTION INTRAMUSCULAR; INTRAVENOUS AS NEEDED
Status: DISCONTINUED | OUTPATIENT
Start: 2017-01-03 | End: 2017-01-03 | Stop reason: HOSPADM

## 2017-01-03 RX ORDER — PROPOFOL 10 MG/ML
INJECTION, EMULSION INTRAVENOUS AS NEEDED
Status: DISCONTINUED | OUTPATIENT
Start: 2017-01-03 | End: 2017-01-03 | Stop reason: HOSPADM

## 2017-01-03 RX ORDER — SODIUM CHLORIDE 0.9 % (FLUSH) 0.9 %
5-10 SYRINGE (ML) INJECTION AS NEEDED
Status: DISCONTINUED | OUTPATIENT
Start: 2017-01-03 | End: 2017-01-24 | Stop reason: HOSPADM

## 2017-01-03 RX ORDER — PHENYLEPHRINE HCL IN 0.9% NACL 0.4MG/10ML
SYRINGE (ML) INTRAVENOUS AS NEEDED
Status: DISCONTINUED | OUTPATIENT
Start: 2017-01-03 | End: 2017-01-03 | Stop reason: HOSPADM

## 2017-01-03 RX ORDER — HYDROMORPHONE HYDROCHLORIDE 2 MG/ML
INJECTION, SOLUTION INTRAMUSCULAR; INTRAVENOUS; SUBCUTANEOUS AS NEEDED
Status: DISCONTINUED | OUTPATIENT
Start: 2017-01-03 | End: 2017-01-03 | Stop reason: HOSPADM

## 2017-01-03 RX ORDER — FENTANYL CITRATE 50 UG/ML
INJECTION, SOLUTION INTRAMUSCULAR; INTRAVENOUS AS NEEDED
Status: DISCONTINUED | OUTPATIENT
Start: 2017-01-03 | End: 2017-01-03 | Stop reason: HOSPADM

## 2017-01-03 RX ORDER — SODIUM CHLORIDE 0.9 % (FLUSH) 0.9 %
5-10 SYRINGE (ML) INJECTION EVERY 8 HOURS
Status: DISCONTINUED | OUTPATIENT
Start: 2017-01-04 | End: 2017-01-13 | Stop reason: SDUPTHER

## 2017-01-03 RX ORDER — DEXAMETHASONE SODIUM PHOSPHATE 4 MG/ML
INJECTION, SOLUTION INTRA-ARTICULAR; INTRALESIONAL; INTRAMUSCULAR; INTRAVENOUS; SOFT TISSUE AS NEEDED
Status: DISCONTINUED | OUTPATIENT
Start: 2017-01-03 | End: 2017-01-03 | Stop reason: HOSPADM

## 2017-01-03 RX ORDER — ROCURONIUM BROMIDE 10 MG/ML
INJECTION, SOLUTION INTRAVENOUS AS NEEDED
Status: DISCONTINUED | OUTPATIENT
Start: 2017-01-03 | End: 2017-01-03 | Stop reason: HOSPADM

## 2017-01-03 RX ORDER — ONDANSETRON 2 MG/ML
4 INJECTION INTRAMUSCULAR; INTRAVENOUS
Status: DISCONTINUED | OUTPATIENT
Start: 2017-01-03 | End: 2017-01-09

## 2017-01-03 RX ORDER — FENTANYL CITRATE 50 UG/ML
25 INJECTION, SOLUTION INTRAMUSCULAR; INTRAVENOUS
Status: DISCONTINUED | OUTPATIENT
Start: 2017-01-03 | End: 2017-01-03 | Stop reason: HOSPADM

## 2017-01-03 RX ORDER — HYDROMORPHONE HYDROCHLORIDE 1 MG/ML
1 INJECTION, SOLUTION INTRAMUSCULAR; INTRAVENOUS; SUBCUTANEOUS
Status: DISCONTINUED | OUTPATIENT
Start: 2017-01-03 | End: 2017-01-04

## 2017-01-03 RX ORDER — OXYCODONE HYDROCHLORIDE 5 MG/1
5-10 TABLET ORAL
Status: DISCONTINUED | OUTPATIENT
Start: 2017-01-03 | End: 2017-01-04

## 2017-01-03 RX ORDER — ACETAMINOPHEN 325 MG/1
650 TABLET ORAL
Status: DISCONTINUED | OUTPATIENT
Start: 2017-01-03 | End: 2017-01-12

## 2017-01-03 RX ORDER — SUCCINYLCHOLINE CHLORIDE 20 MG/ML
INJECTION INTRAMUSCULAR; INTRAVENOUS AS NEEDED
Status: DISCONTINUED | OUTPATIENT
Start: 2017-01-03 | End: 2017-01-03 | Stop reason: HOSPADM

## 2017-01-03 RX ORDER — DEXTROAMPHETAMINE SACCHARATE, AMPHETAMINE ASPARTATE, DEXTROAMPHETAMINE SULFATE AND AMPHETAMINE SULFATE 5; 5; 5; 5 MG/1; MG/1; MG/1; MG/1
20 TABLET ORAL DAILY
Status: DISCONTINUED | OUTPATIENT
Start: 2017-01-04 | End: 2017-01-03

## 2017-01-03 RX ORDER — DEXTROAMPHETAMINE SACCHARATE, AMPHETAMINE ASPARTATE, DEXTROAMPHETAMINE SULFATE AND AMPHETAMINE SULFATE 2.5; 2.5; 2.5; 2.5 MG/1; MG/1; MG/1; MG/1
20 TABLET ORAL DAILY
Status: DISCONTINUED | OUTPATIENT
Start: 2017-01-04 | End: 2017-01-03

## 2017-01-03 RX ORDER — LIDOCAINE HYDROCHLORIDE 10 MG/ML
0.1 INJECTION, SOLUTION EPIDURAL; INFILTRATION; INTRACAUDAL; PERINEURAL AS NEEDED
Status: DISCONTINUED | OUTPATIENT
Start: 2017-01-03 | End: 2017-01-03 | Stop reason: HOSPADM

## 2017-01-03 RX ADMIN — ACETAMINOPHEN 650 MG: 325 TABLET, FILM COATED ORAL at 09:38

## 2017-01-03 RX ADMIN — FENTANYL CITRATE 25 MCG: 50 INJECTION, SOLUTION INTRAMUSCULAR; INTRAVENOUS at 23:15

## 2017-01-03 RX ADMIN — ROCURONIUM BROMIDE 10 MG: 10 INJECTION, SOLUTION INTRAVENOUS at 19:23

## 2017-01-03 RX ADMIN — ONDANSETRON 4 MG: 2 INJECTION INTRAMUSCULAR; INTRAVENOUS at 20:57

## 2017-01-03 RX ADMIN — SODIUM CHLORIDE, POTASSIUM CHLORIDE, SODIUM LACTATE AND CALCIUM CHLORIDE: 600; 310; 30; 20 INJECTION, SOLUTION INTRAVENOUS at 16:45

## 2017-01-03 RX ADMIN — GLYCOPYRROLATE 0.4 MG: 0.2 INJECTION INTRAMUSCULAR; INTRAVENOUS at 20:58

## 2017-01-03 RX ADMIN — SODIUM CHLORIDE 100 ML/HR: 900 INJECTION, SOLUTION INTRAVENOUS at 06:12

## 2017-01-03 RX ADMIN — ROCURONIUM BROMIDE 20 MG: 10 INJECTION, SOLUTION INTRAVENOUS at 18:11

## 2017-01-03 RX ADMIN — DESVENLAFAXINE SUCCINATE 100 MG: 50 TABLET, EXTENDED RELEASE ORAL at 09:39

## 2017-01-03 RX ADMIN — HYDROMORPHONE HYDROCHLORIDE 1 MG: 1 INJECTION, SOLUTION INTRAMUSCULAR; INTRAVENOUS; SUBCUTANEOUS at 10:43

## 2017-01-03 RX ADMIN — DEXTROAMPHETAMINE SACCHARATE, AMPHETAMINE ASPARTATE MONOHYDRATE, DEXTROAMPHETAMINE SULFATE AND AMPHETAMINE SULFATE 10 MG: 2.5; 2.5; 2.5; 2.5 TABLET ORAL at 09:00

## 2017-01-03 RX ADMIN — CLINDAMYCIN PHOSPHATE 900 MG: 18 INJECTION, SOLUTION INTRAVENOUS at 17:17

## 2017-01-03 RX ADMIN — Medication 120 MCG: at 17:51

## 2017-01-03 RX ADMIN — ATORVASTATIN CALCIUM 10 MG: 10 TABLET, FILM COATED ORAL at 09:39

## 2017-01-03 RX ADMIN — LIDOCAINE HYDROCHLORIDE 70 MG: 20 INJECTION, SOLUTION EPIDURAL; INFILTRATION; INTRACAUDAL; PERINEURAL at 17:11

## 2017-01-03 RX ADMIN — FENTANYL CITRATE 25 MCG: 50 INJECTION, SOLUTION INTRAMUSCULAR; INTRAVENOUS at 23:18

## 2017-01-03 RX ADMIN — Medication 40 MCG: at 19:24

## 2017-01-03 RX ADMIN — OXYCODONE HYDROCHLORIDE 10 MG: 5 TABLET ORAL at 03:38

## 2017-01-03 RX ADMIN — OXYCODONE HYDROCHLORIDE 10 MG: 5 TABLET ORAL at 09:37

## 2017-01-03 RX ADMIN — NEOSTIGMINE METHYLSULFATE 3 MG: 1 INJECTION INTRAVENOUS at 20:58

## 2017-01-03 RX ADMIN — MIDAZOLAM HYDROCHLORIDE 2 MG: 1 INJECTION, SOLUTION INTRAMUSCULAR; INTRAVENOUS at 17:00

## 2017-01-03 RX ADMIN — ROCURONIUM BROMIDE 5 MG: 10 INJECTION, SOLUTION INTRAVENOUS at 17:11

## 2017-01-03 RX ADMIN — FENTANYL CITRATE 100 MCG: 50 INJECTION, SOLUTION INTRAMUSCULAR; INTRAVENOUS at 19:50

## 2017-01-03 RX ADMIN — SUCCINYLCHOLINE CHLORIDE 120 MG: 20 INJECTION INTRAMUSCULAR; INTRAVENOUS at 17:11

## 2017-01-03 RX ADMIN — Medication 120 MCG: at 17:22

## 2017-01-03 RX ADMIN — PROPOFOL 150 MG: 10 INJECTION, EMULSION INTRAVENOUS at 17:11

## 2017-01-03 RX ADMIN — DEXAMETHASONE SODIUM PHOSPHATE 8 MG: 4 INJECTION, SOLUTION INTRA-ARTICULAR; INTRALESIONAL; INTRAMUSCULAR; INTRAVENOUS; SOFT TISSUE at 17:27

## 2017-01-03 RX ADMIN — DOCUSATE SODIUM AND SENNOSIDES 2 TABLET: 8.6; 5 TABLET, FILM COATED ORAL at 09:38

## 2017-01-03 RX ADMIN — PROPOFOL 50 MG: 10 INJECTION, EMULSION INTRAVENOUS at 21:40

## 2017-01-03 RX ADMIN — FENTANYL CITRATE 100 MCG: 50 INJECTION, SOLUTION INTRAMUSCULAR; INTRAVENOUS at 17:11

## 2017-01-03 RX ADMIN — ROCURONIUM BROMIDE 10 MG: 10 INJECTION, SOLUTION INTRAVENOUS at 18:35

## 2017-01-03 RX ADMIN — Medication 120 MCG: at 18:17

## 2017-01-03 RX ADMIN — HYDROMORPHONE HYDROCHLORIDE 1 MG: 1 INJECTION, SOLUTION INTRAMUSCULAR; INTRAVENOUS; SUBCUTANEOUS at 06:19

## 2017-01-03 RX ADMIN — HYDROMORPHONE HYDROCHLORIDE 0.5 MG: 2 INJECTION, SOLUTION INTRAMUSCULAR; INTRAVENOUS; SUBCUTANEOUS at 18:50

## 2017-01-03 RX ADMIN — ROCURONIUM BROMIDE 10 MG: 10 INJECTION, SOLUTION INTRAVENOUS at 18:58

## 2017-01-03 RX ADMIN — PROPOFOL 50 MG: 10 INJECTION, EMULSION INTRAVENOUS at 22:01

## 2017-01-03 RX ADMIN — SODIUM CHLORIDE 75 ML/HR: 900 INJECTION, SOLUTION INTRAVENOUS at 22:49

## 2017-01-03 RX ADMIN — HYDROMORPHONE HYDROCHLORIDE 0.5 MG: 2 INJECTION, SOLUTION INTRAMUSCULAR; INTRAVENOUS; SUBCUTANEOUS at 19:42

## 2017-01-03 RX ADMIN — SODIUM CHLORIDE, POTASSIUM CHLORIDE, SODIUM LACTATE AND CALCIUM CHLORIDE: 600; 310; 30; 20 INJECTION, SOLUTION INTRAVENOUS at 18:00

## 2017-01-03 RX ADMIN — HYDROMORPHONE HYDROCHLORIDE 1 MG: 1 INJECTION, SOLUTION INTRAMUSCULAR; INTRAVENOUS; SUBCUTANEOUS at 15:45

## 2017-01-03 RX ADMIN — HYDROMORPHONE HYDROCHLORIDE 0.5 MG: 2 INJECTION, SOLUTION INTRAMUSCULAR; INTRAVENOUS; SUBCUTANEOUS at 18:47

## 2017-01-03 RX ADMIN — ROCURONIUM BROMIDE 25 MG: 10 INJECTION, SOLUTION INTRAVENOUS at 17:23

## 2017-01-03 RX ADMIN — HYDROMORPHONE HYDROCHLORIDE 0.5 MG: 2 INJECTION, SOLUTION INTRAMUSCULAR; INTRAVENOUS; SUBCUTANEOUS at 19:28

## 2017-01-03 RX ADMIN — ROCURONIUM BROMIDE 20 MG: 10 INJECTION, SOLUTION INTRAVENOUS at 19:59

## 2017-01-03 RX ADMIN — Medication 10 ML: at 05:42

## 2017-01-03 NOTE — ROUTINE PROCESS
Patient became upset today because she could not go outside and smoke and because she did not receive a cough drop. The patient has been agitated all day. I spoke with the patient to see if there was anything we could do to help the situation but she was very upset and said no and threw the CHG wipes on the floor. I notified my supervisor of the incident.

## 2017-01-03 NOTE — PROGRESS NOTES
Bedside and Verbal shift change report given to JAYLEN Alberto (oncoming nurse) by Ziyad Lee (offgoing nurse). Report included the following information SBAR, Kardex, ED Summary, OR Summary, Procedure Summary, Intake/Output, MAR, Recent Results and Med Rec Status.

## 2017-01-03 NOTE — PROGRESS NOTES
S: Patient resting comfortably.      O:  Visit Vitals    /62    Pulse 83    Temp 97.8 °F (36.6 °C)    Resp 18    Wt 74.8 kg (165 lb)    SpO2 97%       right lower extremity:  Ex-fix in place    Toes: pink with brisk cap refill    +DP    + EHL/FHL    SILT dorsum and plantar toes    Minimal soft tissue edema      A/P: R unstable trimalleolar ankle fx/dislocation  - Ok for breakfast. NPO after breakfast however  - SCDs  - hold chemical DVT ppx  - plan on definitive fixation this afternoon

## 2017-01-03 NOTE — H&P
PRE- OP History and Physical                             Subjective:     Patient is a 49 y.o. female presented with a history of R ankle pain/deformity s/p injury while moving furniture today. Pain located anterior and medial ankle, noticeable pain and swelling. Inability to bear weight due to pain. Work up in the ED has reveled a displaced trimal fx. Pt just moved to the area from [place], normally independent with ADLs and working. Hx of 1/2 PPD smoker, high cholesterol, restless leg syndrome. Multiple attempts at reduction were made without lasting success. The patient's condition has been resistant to non-operative treatment and is being admitted for surgical management of this condition. Patient Active Problem List    Diagnosis Date Noted    Closed trimalleolar fracture of right ankle 01/02/2017     History reviewed. No pertinent past medical history. History reviewed. No pertinent past surgical history. Prior to Admission medications    Medication Sig Start Date End Date Taking? Authorizing Provider   dextroamphetamine-amphetamine (ADDERALL) 10 mg tablet Take 10 mg by mouth. Yes Cece Corley MD   Desvenlafaxine (PRISTIQ) 100 mg Tb24 Take 50 mg by mouth. Yes Cece Corley MD   aspirin delayed-release 81 mg tablet Take 81 mg by mouth daily. Yes Cece Corley MD   calcium-vitamin D (OYSTER SHELL) 500 mg(1,250mg) -200 unit per tablet Take 1 Tab by mouth two (2) times daily (with meals). Yes Cece Corley MD   atorvastatin (LIPITOR) 10 mg tablet Take  by mouth daily. Yes Cece Corley MD     Allergies   Allergen Reactions    Pcn [Penicillins] Rash      Social History   Substance Use Topics    Smoking status: Not on file    Smokeless tobacco: Not on file    Alcohol use Not on file      History reviewed. No pertinent family history. Review of Systems  A comprehensive review of systems was negative except for that written in the HPI.     Objective: Patient Vitals for the past 8 hrs:   BP Temp Pulse Resp SpO2 Weight   01/02/17 1930 134/79 - 85 16 97 % -   01/02/17 1845 141/88 - 96 22 97 % -   01/02/17 1820 109/63 - 87 14 97 % 74.8 kg (165 lb)   01/02/17 1815 116/78 - 90 15 97 % -   01/02/17 1810 109/66 - 89 (!) 34 94 % -   01/02/17 1805 140/76 98.5 °F (36.9 °C) 88 (!) 33 94 % -   01/02/17 1800 140/76 98.9 °F (37.2 °C) 91 19 96 % -   01/02/17 1710 128/80 - 72 9 98 % -   01/02/17 1705 112/74 - 89 19 97 % -   01/02/17 1635 125/82 - 74 10 98 % -   01/02/17 1605 113/71 - 80 16 99 % -   01/02/17 1600 107/69 - 76 12 99 % -   01/02/17 1555 107/73 - 78 11 99 % -   01/02/17 1550 103/69 98.9 °F (37.2 °C) 88 13 98 % -   01/02/17 1545 104/73 98.5 °F (36.9 °C) 87 18 99 % -   01/02/17 1530 118/70 98.9 °F (37.2 °C) 84 14 97 % -   01/02/17 1515 119/69 - 82 16 98 % -   01/02/17 1500 121/86 - 78 14 96 % -   01/02/17 1447 128/73 - 86 15 97 % -   01/02/17 1430 114/64 - 86 17 98 % -   01/02/17 1429 - - 81 8 98 % -   01/02/17 1425 103/77 - 90 24 96 % -   01/02/17 1420 101/71 - 84 12 94 % -   01/02/17 1417 - - 85 12 91 % -   01/02/17 1415 132/68 - 88 14 99 % -   01/02/17 1413 136/87 - 87 11 99 % -   01/02/17 1408 119/66 - 89 17 98 % -   01/02/17 1309 133/87 - - - 97 % -     Visit Vitals    /79    Pulse 85    Temp 98.5 °F (36.9 °C)    Resp 16    Wt 74.8 kg (165 lb)    SpO2 97%     General:  Alert, cooperative, no distress, appears stated age. Head:  Normocephalic, without obvious abnormality, atraumatic. Eyes:  Conjunctivae/corneas clear. PERRL, EOMs intact. Ears:  Normal TMs and external ear canals both ears. Nose: Nares normal. Septum midline. Mucosa normal. No drainage or sinus tenderness. Throat: Lips, mucosa, and tongue normal. Teeth and gums normal.   Neck: Supple, symmetrical, trachea midline, no adenopathy, thyroid: no enlargement/tenderness/nodules, no carotid bruit and no JVD. Back:   Symmetric, no curvature. ROM normal. No CVA tenderness. Lungs:   Clear to auscultation bilaterally. Chest wall:  No tenderness or deformity. Heart:  Regular rate and rhythm, S1, S2, no murmur, click, rub or gallop. Abdomen:   Soft, non-tender. Bowel sounds normal. No masses,  No organomegaly. Extremities: Extremities normal except RLE - + deformity, NVI FROM of LLE and bilat UE, atraumatic, no cyanosis or edema. Pulses: 2+ and symmetric all extremities. Skin: Skin color, texture, turgor normal. No rashes or lesions   Lymph nodes: Cervical, supraclavicular, and axillary nodes normal.   Neurologic: CNII-XII intact. Neurovascular exam intact in distal extremities        Imaging Review  X-ray: Three views of the right ankle demonstrate displaced fractures of the  distal fibula, medial malleolus, and posterior lip of the tibia, with  posterolateral dislocation of the talus. Assessment:     Active Problems:    Closed trimalleolar fracture of right ankle (1/2/2017)        Plan:     Operative and non-operative treatments have been discussed with the patient including risks and benefits of each. Surgical options to include reduction tonight with external fixation device and plan for ORIF with Dr. Janie Smith tomorrow. After consideration of risks, benefits limitations to the consented procedures and alternative options for treatment, the patient has consented to surgical interventions. Questions were answered and Pre-op teaching was completed.       Carita Scheuermann, PA

## 2017-01-03 NOTE — BRIEF OP NOTE
BRIEF OPERATIVE NOTE    Date of Procedure: 1/2/2017   Preoperative Diagnosis: RIGHT ANKLE FRACTURE  Postoperative Diagnosis: RIGHT ANKLE FRACTURE    Procedure(s):  CLOSED REDUCTION EXTERNAL FIXATION RIGHT ANKLE FRACTURE  Surgeon(s) and Role:      * Staci Whitley MD - Primary        Rylan Madera PA-C - Assist    Surgical Staff:  Circ-1: Chepe Banks RN  Circ-2: Elvia Asencio RN  Scrub Tech-1: Liz Murillo  Surg Asst-1: Mervin Bolden RN  Event Time In   Incision Start 2022   Incision Close 2049     Anesthesia: General   Estimated Blood Loss: 1cc  Specimens: * No specimens in log *   Findings: see above   Complications: none  Implants: * No implants in log *

## 2017-01-03 NOTE — PROGRESS NOTES
This is a 49 yr old  female who has been admitted with a R ankle Fx who is now S/P a closed reduction external fix of the R ankle. Prior to fall patient reports independence. Will follow for therapy eval for anticipate patient will need DME and home health services. Patient stated she just moved to [address] [place] [address] from [place] and needs to set up a PCP. Kemi Rivera PCP list  Provided. Care Management Interventions  PCP Verified by CM: No (Patient just moved from [place] and needs a new PCP)  Current Support Network: Lives with Spouse  Plan discussed with Pt/Family/Caregiver:  Yes

## 2017-01-03 NOTE — ANESTHESIA POSTPROCEDURE EVALUATION
Post-Anesthesia Evaluation and Assessment    Patient: Leeann Reyes MRN: [id number]  SSN: [id number]    YOB: 1960  Age: 49 y.o. Sex: female       Cardiovascular Function/Vital Signs  Visit Vitals    /64 (BP 1 Location: Right arm, BP Patient Position: At rest)    Pulse 78    Temp 36.5 °C (97.7 °F)    Resp 20    Wt 74.8 kg (165 lb)    SpO2 96%       Patient is status post general anesthesia for Procedure(s):  CLOSED REDUCTION EXTERNAL FIXATION RIGHT ANKLE FRACTURE. Nausea/Vomiting: None    Postoperative hydration reviewed and adequate. Pain:  Pain Scale 1: Numeric (0 - 10) (01/02/17 2140)  Pain Intensity 1: 7 (01/02/17 2140)   Managed    Neurological Status:   Neuro (WDL): Within Defined Limits (01/02/17 2130)  Neuro  LUE Motor Response: Purposeful (01/02/17 2130)  LLE Motor Response: Purposeful (01/02/17 2130)  RUE Motor Response: Purposeful (01/02/17 2130)  RLE Motor Response: Purposeful (01/02/17 2130)   At baseline    Mental Status and Level of Consciousness: Arousable    Pulmonary Status:   O2 Device: Nasal cannula (01/02/17 2130)   Adequate oxygenation and airway patent    Complications related to anesthesia: None    Post-anesthesia assessment completed.  No concerns    Signed By: Imtiaz Morton MD     January 2, 2017

## 2017-01-03 NOTE — PERIOP NOTES
Handoff Report from Operating Room to PACU    Report received from RADHA Ball RN  and Thelma Palomares CRNA  regarding Carmela Latif. Surgeon(s):  Charis Jeffrey MD  And Procedure(s) (LRB):  CLOSED REDUCTION EXTERNAL FIXATION RIGHT ANKLE FRACTURE (Right)  confirmed   with allergies and dressings discussed. Anesthesia type, drugs, patient history, complications, estimated blood loss, vital signs, intake and output, and last pain medication and reversal medications were reviewed.

## 2017-01-03 NOTE — ROUTINE PROCESS
Bedside and Verbal shift change report given to Rose Marie Joseph RN (oncoming nurse) by Clara Nunez RN (offgoing nurse). Report included the following information SBAR, Kardex, Intake/Output, MAR and Med Rec Status.

## 2017-01-03 NOTE — ANESTHESIA PREPROCEDURE EVALUATION
Anesthetic History   No history of anesthetic complications            Review of Systems / Medical History  Patient summary reviewed, nursing notes reviewed and pertinent labs reviewed    Pulmonary          Smoker         Neuro/Psych   Within defined limits           Cardiovascular              Hyperlipidemia    Exercise tolerance: >4 METS     GI/Hepatic/Renal  Within defined limits              Endo/Other  Within defined limits           Other Findings   Comments: RIGHT ANKLE FRACTURE  ADD         Physical Exam    Airway  Mallampati: I    Neck ROM: normal range of motion        Cardiovascular  Regular rate and rhythm,  S1 and S2 normal,  no murmur, click, rub, or gallop             Dental    Dentition: Full lower dentures and Full upper dentures     Pulmonary  Breath sounds clear to auscultation               Abdominal  GI exam deferred       Other Findings            Anesthetic Plan    ASA: 2  Anesthesia type: general          Induction: Intravenous  Anesthetic plan and risks discussed with: Patient

## 2017-01-03 NOTE — PROGRESS NOTES
CHG wipes to Right leg and foot. Dentures removed, will pack clothes up and have them ready to move to oncology unit post-op.     Germania Saunders RN

## 2017-01-03 NOTE — PERIOP NOTES
TRANSFER - IN REPORT:    Verbal report received from JAYLEN Bishop(name) on David Yuri  being received from 3221(unit) for ordered procedure      Report consisted of patients Situation, Background, Assessment and   Recommendations(SBAR). Information from the following report(s) SBAR, Kardex and Intake/Output was reviewed with the receiving nurse. Opportunity for questions and clarification was provided.

## 2017-01-04 LAB
BUN SERPL-MCNC: 6 MG/DL (ref 6–20)
ERYTHROCYTE [DISTWIDTH] IN BLOOD BY AUTOMATED COUNT: 13.4 % (ref 11.5–14.5)
HCT VFR BLD AUTO: 33.6 % (ref 35–47)
HGB BLD-MCNC: 10.8 G/DL (ref 11.5–16)
MCH RBC QN AUTO: 31.3 PG (ref 26–34)
MCHC RBC AUTO-ENTMCNC: 32.1 G/DL (ref 30–36.5)
MCV RBC AUTO: 97.4 FL (ref 80–99)
PLATELET # BLD AUTO: 162 K/UL (ref 150–400)
RBC # BLD AUTO: 3.45 M/UL (ref 3.8–5.2)
WBC # BLD AUTO: 11.2 K/UL (ref 3.6–11)

## 2017-01-04 PROCEDURE — 84520 ASSAY OF UREA NITROGEN: CPT | Performed by: ORTHOPAEDIC SURGERY

## 2017-01-04 PROCEDURE — 74011250636 HC RX REV CODE- 250/636: Performed by: NURSE PRACTITIONER

## 2017-01-04 PROCEDURE — 74011250636 HC RX REV CODE- 250/636: Performed by: ORTHOPAEDIC SURGERY

## 2017-01-04 PROCEDURE — 97116 GAIT TRAINING THERAPY: CPT

## 2017-01-04 PROCEDURE — 74011250637 HC RX REV CODE- 250/637: Performed by: ORTHOPAEDIC SURGERY

## 2017-01-04 PROCEDURE — 65270000015 HC RM PRIVATE ONCOLOGY

## 2017-01-04 PROCEDURE — 36415 COLL VENOUS BLD VENIPUNCTURE: CPT | Performed by: ORTHOPAEDIC SURGERY

## 2017-01-04 PROCEDURE — 74011250637 HC RX REV CODE- 250/637: Performed by: NURSE PRACTITIONER

## 2017-01-04 PROCEDURE — 51798 US URINE CAPACITY MEASURE: CPT

## 2017-01-04 PROCEDURE — 97161 PT EVAL LOW COMPLEX 20 MIN: CPT

## 2017-01-04 PROCEDURE — 85027 COMPLETE CBC AUTOMATED: CPT | Performed by: ORTHOPAEDIC SURGERY

## 2017-01-04 RX ORDER — OXYCODONE HYDROCHLORIDE 5 MG/1
5-15 TABLET ORAL
Status: DISCONTINUED | OUTPATIENT
Start: 2017-01-04 | End: 2017-01-19

## 2017-01-04 RX ORDER — DESVENLAFAXINE SUCCINATE 50 MG/1
100 TABLET, EXTENDED RELEASE ORAL DAILY
Status: DISCONTINUED | OUTPATIENT
Start: 2017-01-04 | End: 2017-01-08

## 2017-01-04 RX ORDER — DEXTROAMPHETAMINE SACCHARATE, AMPHETAMINE ASPARTATE, DEXTROAMPHETAMINE SULFATE AND AMPHETAMINE SULFATE 2.5; 2.5; 2.5; 2.5 MG/1; MG/1; MG/1; MG/1
20 TABLET ORAL DAILY
Status: DISCONTINUED | OUTPATIENT
Start: 2017-01-04 | End: 2017-01-08

## 2017-01-04 RX ORDER — HYDROMORPHONE HYDROCHLORIDE 1 MG/ML
1 INJECTION, SOLUTION INTRAMUSCULAR; INTRAVENOUS; SUBCUTANEOUS
Status: DISCONTINUED | OUTPATIENT
Start: 2017-01-04 | End: 2017-01-05

## 2017-01-04 RX ORDER — DIAZEPAM 5 MG/1
10 TABLET ORAL
Status: DISCONTINUED | OUTPATIENT
Start: 2017-01-04 | End: 2017-01-05

## 2017-01-04 RX ORDER — DIAZEPAM 5 MG/1
20 TABLET ORAL
Status: DISCONTINUED | OUTPATIENT
Start: 2017-01-04 | End: 2017-01-05

## 2017-01-04 RX ADMIN — Medication 10 ML: at 00:04

## 2017-01-04 RX ADMIN — HYDROMORPHONE HYDROCHLORIDE 1 MG: 1 INJECTION, SOLUTION INTRAMUSCULAR; INTRAVENOUS; SUBCUTANEOUS at 12:10

## 2017-01-04 RX ADMIN — HYDROMORPHONE HYDROCHLORIDE 1 MG: 1 INJECTION, SOLUTION INTRAMUSCULAR; INTRAVENOUS; SUBCUTANEOUS at 20:13

## 2017-01-04 RX ADMIN — OXYCODONE HYDROCHLORIDE 10 MG: 5 TABLET ORAL at 01:17

## 2017-01-04 RX ADMIN — OXYCODONE HYDROCHLORIDE 10 MG: 5 TABLET ORAL at 08:56

## 2017-01-04 RX ADMIN — DIAZEPAM 20 MG: 5 TABLET ORAL at 12:10

## 2017-01-04 RX ADMIN — CLINDAMYCIN PHOSPHATE 600 MG: 12 INJECTION, SOLUTION INTRAVENOUS at 00:11

## 2017-01-04 RX ADMIN — CLINDAMYCIN PHOSPHATE 600 MG: 12 INJECTION, SOLUTION INTRAVENOUS at 05:14

## 2017-01-04 RX ADMIN — DESVENLAFAXINE SUCCINATE 100 MG: 50 TABLET, EXTENDED RELEASE ORAL at 12:21

## 2017-01-04 RX ADMIN — DIAZEPAM 10 MG: 5 TABLET ORAL at 22:42

## 2017-01-04 RX ADMIN — Medication 10 ML: at 13:44

## 2017-01-04 RX ADMIN — Medication 10 ML: at 05:14

## 2017-01-04 RX ADMIN — HYDROMORPHONE HYDROCHLORIDE 1 MG: 1 INJECTION, SOLUTION INTRAMUSCULAR; INTRAVENOUS; SUBCUTANEOUS at 00:05

## 2017-01-04 RX ADMIN — HYDROMORPHONE HYDROCHLORIDE 1 MG: 1 INJECTION, SOLUTION INTRAMUSCULAR; INTRAVENOUS; SUBCUTANEOUS at 03:28

## 2017-01-04 RX ADMIN — HYDROMORPHONE HYDROCHLORIDE 1 MG: 1 INJECTION, SOLUTION INTRAMUSCULAR; INTRAVENOUS; SUBCUTANEOUS at 07:57

## 2017-01-04 RX ADMIN — OXYCODONE HYDROCHLORIDE 10 MG: 5 TABLET ORAL at 15:04

## 2017-01-04 RX ADMIN — SODIUM CHLORIDE 75 ML/HR: 900 INJECTION, SOLUTION INTRAVENOUS at 12:27

## 2017-01-04 RX ADMIN — Medication 10 ML: at 20:14

## 2017-01-04 RX ADMIN — OXYCODONE HYDROCHLORIDE 10 MG: 5 TABLET ORAL at 05:14

## 2017-01-04 RX ADMIN — ENOXAPARIN SODIUM 40 MG: 40 INJECTION, SOLUTION INTRAVENOUS; SUBCUTANEOUS at 08:54

## 2017-01-04 RX ADMIN — CLINDAMYCIN PHOSPHATE 600 MG: 12 INJECTION, SOLUTION INTRAVENOUS at 13:51

## 2017-01-04 RX ADMIN — OXYCODONE HYDROCHLORIDE 10 MG: 5 TABLET ORAL at 22:37

## 2017-01-04 RX ADMIN — HYDROMORPHONE HYDROCHLORIDE 1 MG: 1 INJECTION, SOLUTION INTRAMUSCULAR; INTRAVENOUS; SUBCUTANEOUS at 16:45

## 2017-01-04 NOTE — BRIEF OP NOTE
BRIEF OPERATIVE NOTE    Date of Procedure: 1/3/2017   Preoperative Diagnosis: Right Ankle Fracture  Postoperative Diagnosis: Right Ankle Fracture    Procedure(s):  REMOVAL EXTERNAL FIXATOR AND RIGHT ANKLE OPEN REDUCTION INTERNAL FIXATION, GASTROCNEMIUS RECESSION  Surgeon(s) and Role:     * Edi Morrell MD - Primary            Surgical Staff:  Circ-1: Abby Perez RN  Circ-Relief: Eleazar Pruitt RN  Scrub Tech-1: Sarahy Miramontes  Scrub Tech-Relief: Dave Kaela  Surg Asst-1: Lowell Crutch  Surg Asst-Relief: Kee ALANIZ'Meamary  Event Time In   Incision Start 1750   Incision Close 2209     Anesthesia: General   Estimated Blood Loss: 50 cc  Specimens: * No specimens in log *   Findings: see op note   Complications: none  Implants:   Implant Name Type Inv. Item Serial No.  Lot No. LRB No. Used Action   RIA ATTACHMENT   N/A SYNTHES [place] N/A Right 1 Explanted   6 PIN MULTIPOSITION CLAMP   N/A SYNTHES [place] N/A Right 1 Explanted   COMBI CLAMPS   N/A SYNTHES [place] N/A Right 2 Explanted   11MM CARBON FIBER RODS   N/A SYNTHES [place] N/A Right 2 Explanted   PIN TRANSFXTN [id number] --  - SN/A  PIN TRANSFXTN [id number] --  N/A SYNTHES [place] N/A Right 1 Explanted   SCR SLF DRL 5X60 THRD 150MM --  - SN/A  SCR SLF DRL 5X60 THRD 150MM --  N/A SYNTHES [place] N/A Right 2 Explanted   3.5 X 22. MM CORTICAL SCREW   -022-01 [employer]  Right 2 Implanted   SMALL WASHER   -754-02 [employer]  Right 2 Implanted   3.5 X 24MM CORTICAL SCREW   -024-01 [employer]  Right 1 Implanted   SCR BNE CANC LCK PT 2.7X18MM --  - [id number]  SCR BNE CANC LCK PT 2.7X18MM --  -018-02 [employer]  Right 3 Implanted   SCR BNE CANC LCK 2.7X20MM --  - [id number]  SCR BNE CANC LCK 2.7X20MM --  -020-02 [employer]  Right 1 Implanted   SCR BNE ST PERIARTC 3.5X14MM --  - [id number]  SCR BNE ST PERIARTC 3.5X14MM --  -579-35 [facility]  Right 3 Implanted   SCR BNE ST 2.7MM HD 3.5X12MM --  - [id number] SCR BNE ST 2.7MM HD 3.5X12MM --  -737-35 [employer]  Right 1 Implanted   PLATE BNE FIB LCK 6H 106 --  - [id number]  PLATE BNE FIB LCK 6H 106 --  -971-06 [employer]  Right 1 Implanted   1/3 TUBULAR 5 HOLE PLATE   -827-95 [employer]  Right 1 Implanted   3.5 X 30 CORTICAL SCREW   -970-01 [employer]  Right 1 Implanted   3.5 X 45MM CORTICAL SCREW   -432-01 [employer]  Right 1 Implanted   3.5 X 60 FULL HEAD KAREEM SCREW   -646-66 [employer]  Right 1 Implanted   3.5 X 54 MM CORTICAL SCREW 2.7 HEAD     -195-35 [employer]   Right 1 Implanted

## 2017-01-04 NOTE — PROGRESS NOTES
Problem: Mobility Impaired (Adult and Pediatric)  Goal: *Acute Goals and Plan of Care (Insert Text)  Physical Therapy Goals  Initiated 1/4/2017  1. Patient will move from supine to sit and sit to supine in bed with independence within 7 day(s). 2. Patient will transfer from bed to chair and chair to bed with supervision/set-up using the least restrictive device within 7 day(s). 3. Patient will perform sit to stand with supervision/set-up within 7 day(s). 4. Patient will ambulate with supervision/set-up for 100 feet with the least restrictive device within 7 day(s). 5. Patient will ascend/descend 8 stairs with B handrail(s) with supervision/set-up within 7 day(s). PHYSICAL THERAPY EVALUATION  Patient: Rufino Porras (54 y.o. female)  Date: 1/4/2017  Primary Diagnosis: RIGHT ANKLE FRACTURE  Closed right trimalleolar fracture, initial encounter  Right Ankle Fracture  Procedure(s) (LRB):  REMOVAL EXTERNAL FIXATOR AND RIGHT ANKLE OPEN REDUCTION INTERNAL FIXATION, GASTROCNEMIUS RECESSION (Req Large C-Arm, Ti Mini and Small Frag and Tong Clamps) (Right) 1 Day Post-Op   Precautions:   NWB, Fall      ASSESSMENT :  Based on the objective data described below, the patient presents with decreased activity tolerance, NWB status of RLE, increased pain, impaired balance, increased impulsivity, decreased safety awareness and altered gait. PTA pt was independent with mobility and living with her . Per chart pt appeared to have a history of substance and ETOH abuse. She was received in supine and cleared by nursing to mobilize. Noted some drowsiness during session while asking subjective questions and home situation. She is very impulsive and unsafe with most mobility. Bed mobility was performed with min A to come to EOB with HOB elevated and assistance for RLE. Sit<>stand was performed with CGA to come to full stand while maintain NWB.  She was able to utilize the RW and hop to the bathroom with min A, unsafe with walk even with verbal cues provided. She is able to recall safety cues but continued to not follow. Having the walker too far in front of her of rushing through all tasks. Attempted sit and have a BM, no luck. She then ambulated into the knight and continued to be very impulsive and unsafe with RW. She was left sitting up in the chair at the end of the session. Concerned about pt attempting stair training and returning home. Will see pt BID during hospital stay and continue to assess proper placement. At this time she would need rehab at discharge due to safety concerns unless pt improve during next few sessions. Scored 8/28 on tinetti indicating high fall risk. Patient will benefit from skilled intervention to address the above impairments. Patients rehabilitation potential is considered to be Fair  Factors which may influence rehabilitation potential include:   [ ]         None noted  [ ]         Mental ability/status  [ ]         Medical condition  [X]         Home/family situation and support systems  [X]         Safety awareness  [X]         Pain tolerance/management  [X]         Other: substance abuse       PLAN :  Recommendations and Planned Interventions:  [X]           Bed Mobility Training             [ ]    Neuromuscular Re-Education  [X]           Transfer Training                   [ ]    Orthotic/Prosthetic Training  [X]           Gait Training                         [ ]    Modalities  [X]           Therapeutic Exercises           [ ]    Edema Management/Control  [X]           Therapeutic Activities            [X]    Patient and Family Training/Education  [ ]           Other (comment):     Frequency/Duration: Patient will be followed by physical therapy  twice daily to address goals. Discharge Recommendations: Rehab  Further Equipment Recommendations for Discharge: rolling walker       SUBJECTIVE:   Patient stated I live with my  and wife and. Shayna Guadarrama  pt dozing off and trailing off as attempting to has home situation      OBJECTIVE DATA SUMMARY:   HISTORY:    Past Medical History   Diagnosis Date    Hypertension      Ill-defined condition         high cholesterol    Psychiatric disorder         depression, ADHD     Past Surgical History   Procedure Laterality Date    Hx other surgical           colonoscopy    Hx gyn           D&C X2     Prior Level of Function/Home Situation: independent PTA and living with    Personal factors and/or comorbidities impacting plan of care:      Home Situation  Home Environment: Private residence  # Steps to Enter: 8  Rails to Enter: Yes  Hand Rails : Bilateral (wide)  One/Two Story Residence: Other (Comment)  # of Interior Steps: 7  Living Alone: No  Support Systems: Spouse/Significant Other/Partner  Patient Expects to be Discharged to[de-identified] Private residence  Current DME Used/Available at Home: None     EXAMINATION/PRESENTATION/DECISION MAKING:   Critical Behavior:  Neurologic State: Irritable, Restless, Agitated, Eyes open spontaneously  Orientation Level: Oriented X4 (periodic confusion overnight)  Cognition: Impulsive, Follows commands     Skin:  Ace wrap in place  Strength:    Strength:  Within functional limits                    Tone & Sensation:   Tone: Normal              Sensation: Intact               Range Of Motion:  AROM: Generally decreased, functional           PROM: Generally decreased, functional           Coordination:  Coordination: Within functional limits     Functional Mobility:  Bed Mobility:  Rolling: Minimum assistance  Supine to Sit: Minimum assistance (assist with RLE)     Scooting: Stand-by asssistance  Transfers:  Sit to Stand: Contact guard assistance  Stand to Sit: Contact guard assistance        Bed to Chair: Minimum assistance              Balance:   Sitting: Intact  Standing: Impaired  Standing - Static: Fair;Constant support  Standing - Dynamic : Fair  Ambulation/Gait Training:  Distance (ft): 50 Feet (ft)  Assistive Device: Gait belt;Walker, rolling  Ambulation - Level of Assistance: Minimal assistance  Gait Abnormalities: Antalgic;Decreased step clearance; Path deviations (hop method)  Right Side Weight Bearing: Non-weight bearing  Base of Support: Shift to left  Step Length: Left shortened                 Functional Measure:  Tinetti test:      Sitting Balance: 1  Arises: 1  Attempts to Rise: 1  Immediate Standing Balance: 0  Standing Balance: 0  Nudged: 0  Eyes Closed: 0  Turn 360 Degrees - Continuous/Discontinuous: 0  Turn 360 Degrees - Steady/Unsteady: 0  Sitting Down: 1  Balance Score: 4  Indication of Gait: 0  R Step Length/Height: 0  L Step Length/Height: 1  R Foot Clearance: 1  L Foot Clearance: 1  Step Symmetry: 0  Step Continuity: 0  Path: 1  Trunk: 0  Walking Time: 0  Gait Score: 4  Total Score: 8         Tinetti Test and G-code impairment scale:  Percentage of Impairment CH     0%    CI     1-19% CJ     20-39% CK     40-59% CL     60-79% CM     80-99% CN      100%   Tinetti  Score 0-28 28 23-27 17-22 12-16 6-11 1-5 0          Tinetti Tool Score Risk of Falls  <19 = High Fall Risk  19-24 = Moderate Fall Risk  25-28 = Low Fall Risk  Tinetti ME. Performance-Oriented Assessment of Mobility Problems in Elderly Patients. Davila 66; [id number]. (Scoring Description: PT Bulletin Feb. 10, 1993)     Older adults: Jeremiah Schwab et al, 2009; n = 1000 [facility] elderly evaluated with ABC, EVELIA, ADL, and IADL)  · Mean EVELIA score for males aged 69-68 years = 26.21(3.40)  · Mean EVELIA score for females age 69-68 years = 25.16(4.30)  · Mean EVELIA score for males over 80 years = 23.29(6.02)  · Mean EVELIA score for females over 80 years = 17.20(8.32)            G codes: In compliance with CMSs Claims Based Outcome Reporting, the following G-code set was chosen for this patient based on their primary functional limitation being treated:      The outcome measure chosen to determine the severity of the functional limitation was the tinetti with a score of 8/28 which was correlated with the impairment scale. · Mobility - Walking and Moving Around:               - CURRENT STATUS:    CL - 60%-79% impaired, limited or restricted               - GOAL STATUS:           CK - 40%-59% impaired, limited or restricted               - D/C STATUS:                       ---------------To be determined---------------      Physical Therapy Evaluation Charge Determination   History Examination Presentation Decision-Making   HIGH Complexity :3+ comorbidities / personal factors will impact the outcome/ POC  MEDIUM Complexity : 3 Standardized tests and measures addressing body structure, function, activity limitation and / or participation in recreation  LOW Complexity : Stable, uncomplicated  HIGH Complexity : FOTO score of 1- 25       Based on the above components, the patient evaluation is determined to be of the following complexity level: LOW      Pain:  Pain Scale 1: Numeric (0 - 10)  Pain Intensity 1: 7  Pain Location 1: Ankle  Pain Orientation 1: Right  Pain Description 1: Aching  Pain Intervention(s) 1: Medication (see MAR)  Activity Tolerance:   Painful, impulsive  Please refer to the flowsheet for vital signs taken during this treatment. After treatment:   [X]         Patient left in no apparent distress sitting up in chair  [ ]         Patient left in no apparent distress in bed  [X]         Call bell left within reach  [X]         Nursing notified  [ ]         Caregiver present  [X]         Bed alarm activated      COMMUNICATION/EDUCATION:   The patients plan of care was discussed with: Registered Nurse.  [X]         Fall prevention education was provided and the patient/caregiver indicated understanding. [ ]         Patient/family have participated as able in goal setting and plan of care. [X]         Patient/family agree to work toward stated goals and plan of care.   [ ]         Patient understands intent and goals of therapy, but is neutral about his/her participation. [ ]         Patient is unable to participate in goal setting and plan of care.      Thank you for this referral.  Poonam Dexter, PT, DPT   Time Calculation: 25 mins

## 2017-01-04 NOTE — OP NOTES
[address]   [facility], [address]   OP NOTE       Name:  Emani Crowe   MR#:  [id number]   :  1960   Account #:  [de-identified]    Surgery Date:  2017   Date of Adm:  2017       PREOPERATIVE DIAGNOSIS: Irreducible trimalleolar ankle fracture   dislocation with gastrocnemius spasticity. POSTOPERATIVE DIAGNOSIS: Irreducible trimalleolar ankle fracture   dislocation with gastrocnemius spasticity. PROCEDURES PERFORMED: Closed reduction and external fixation   of right trimalleolar ankle fracture. ANESTHESIA: General anesthesia. SURGEON: Roman Salgado. Cielo Mobley MD.     ASSISTANT: OSKAR Zamudio.     COMPLICATIONS: None. ESTIMATED BLOOD LOSS: None. SPECIMENS REMOVED: None. INDICATIONS: This woman fell down the steps and suffered a   trimalleolar fracture dislocation of her ankle. She underwent 3   attempted closed reductions, but had persistent posterior redislocation   on post-reduction x-ray, so she was brought into the operating room for   external fixation as temporary reduction maneuver pending surgical   repair by the foot and ankle specialist.     DESCRIPTION OF PROCEDURE: The patient was brought to the   operating theater and given general anesthesia and IV antibiotics,   clindamycin, even after anesthesia, it was noted that she had 4 beats   of clonus and gastroc spasticity, so we prepped and draped her,   elevated, exsanguinated using Esmarch, inflated tourniquet to 275   mmHg and performed a closed reduction and then placed an A-frame   by putting a calcaneal pin and 2 pins in the tibia and creating an A-  frame, tightening the bolts and holding it on AP and lateral fluoro views   in reduced fashion and applied dressings, let down the tourniquet. Took her to the recovery room in stable condition.         Luciano Natarajan MD TD / Mila Giraldo   D:  2017   08:12   T:  2017   08:31   Job #:  [id number]

## 2017-01-04 NOTE — PERIOP NOTES
TRANSFER - OUT REPORT:    Verbal report given to Hitesh Ron RN(name) on Kamar Farr  being transferred to 1115(unit) for routine progression of care       Report consisted of patients Situation, Background, Assessment and   Recommendations(SBAR). Information from the following report(s) OR Summary, Procedure Summary, Intake/Output and MAR was reviewed with the receiving nurse. Opportunity for questions and clarification was provided.       Patient transported with:   O2 @ 3 liters  Registered Nurse

## 2017-01-04 NOTE — ROUTINE PROCESS
TRANSFER - IN REPORT:    Verbal report received from LATA Jorgensen RN(name) on [employer]  being received from OR(unit) for routine post - op      Report consisted of patients Situation, Background, Assessment and   Recommendations(SBAR). Information from the following report(s) OR Summary was reviewed with the receiving nurse. Opportunity for questions and clarification was provided. Assessment completed upon patients arrival to unit and care assumed.

## 2017-01-04 NOTE — PROGRESS NOTES
12:50 PM pt yelling/talking on phone. Explained we have patients trying to recover and to please try and keep noise level to a minimum. Patient verbalized understanding. 3:43 PM Ortho CCL into check pt and orders. Pt ok.

## 2017-01-04 NOTE — PROGRESS NOTES
Oncology Nursing Communication Tool      8:00 AM  1/4/2017     Bedside and Verbal shift change report given to Ela Reid RN (incoming nurse) by James Castaneda RN (outgoing nurse) on Alon Jara a 49 y.o. female who was admitted on 1/2/2017 12:49 PM. Report included the following information SBAR, Kardex, Procedure Summary, Intake/Output, MAR, Accordion, Recent Results and Med Rec Status.          Significant changes during shift: confused post surgery, better this am      Issues for physician to address: d/c plan            Code Status: Full Code     Infections: No current active infections     Allergies: Pcn [penicillins]     Current diet: DIET REGULAR       Pain Controlled [] yes [] no   Bowel Movement [] yes [] no   Last Bowel Movement (date)     1/2/2017              Vital Signs:   Patient Vitals for the past 12 hrs:   Temp Pulse Resp BP SpO2   01/04/17 0542 98 °F (36.7 °C) 92 18 108/68 93 %   01/04/17 0118 97.6 °F (36.4 °C) 100 18 138/74 93 %   01/04/17 0035 97.5 °F (36.4 °C) 100 17 131/75 94 %   01/03/17 2353 97.4 °F (36.3 °C) (!) 102 14 130/73 93 %   01/03/17 2330 - 95 12 114/71 93 %   01/03/17 2315 98.2 °F (36.8 °C) (!) 104 17 103/79 95 %   01/03/17 2300 - 100 14 121/87 94 %   01/03/17 2245 - (!) 106 15 124/70 91 %   01/03/17 2240 - (!) 104 16 121/74 90 %   01/03/17 2235 - (!) 105 13 126/77 90 %   01/03/17 2230 - (!) 109 19 133/76 90 %   01/03/17 2225 - (!) 112 13 108/81 91 %   01/03/17 2221 98.1 °F (36.7 °C) 92 18 126/65 96 %      Intake & Output:     Intake/Output Summary (Last 24 hours) at 01/04/17 0800  Last data filed at 01/04/17 0313   Gross per 24 hour   Intake             3010 ml   Output              875 ml   Net             2135 ml      Laboratory Results:     Recent Results (from the past 12 hour(s))   BUN    Collection Time: 01/04/17  4:13 AM   Result Value Ref Range    BUN 6 6 - 20 MG/DL   CBC W/O DIFF    Collection Time: 01/04/17  4:13 AM   Result Value Ref Range    WBC 11. 2 (H) 3.6 - 11.0 K/uL    RBC 3.45 (L) 3.80 - 5.20 M/uL    HGB 10.8 (L) 11.5 - 16.0 g/dL    HCT 33.6 (L) 35.0 - 47.0 %    MCV 97.4 80.0 - 99.0 FL    MCH 31.3 26.0 - 34.0 PG    MCHC 32.1 30.0 - 36.5 g/dL    RDW 13.4 11.5 - 14.5 %    PLATELET 545 799 - 410 K/uL              Opportunity for questions and clarifications were given to the incoming nurse. Patient's bed is in low position, side rails x2, door open PRN, call bell within reach and patient not in distress.       Perla Costa RN

## 2017-01-04 NOTE — PROGRESS NOTES
Ortho/ NeuroSurgery NP Note    POD# 1  s/p REMOVAL EXTERNAL FIXATOR AND RIGHT ANKLE OPEN REDUCTION INTERNAL FIXATION, GASTROCNEMIUS RECESSION (Req Large C-Arm, Ti Mini and Small Frag and Tong Clamps)   Pt seen with Charlotte Garzon NP and Dr. Emerson Navas is on his way to assess patient. Pt resting in bed. Extreme pain. She has been maximized on her oral and IV pain meds. Patient describes tight feeling and pain in the lateral and medial malleoli. VSS Afebrile. Villarreal in place. Discussed patient's social history with her and found she is not using any pain medication or any prescription medications other than those prescribed to her. History of marijuana and \"speed\" use but these she reports are remote. Also the patient reports to consuming a bottle of wine nightly. Labs  Lab Results   Component Value Date/Time    HGB 10.8 01/04/2017 04:13 AM      Lab Results   Component Value Date/Time    INR 1.0 01/02/2017 03:31 PM     Lovenox for DVT prophylaxis. Dressing with bloody saturation in a few areas. ACE wrap removed and reapplied. This relieved the pain slightly. Calves soft and supple; No pain with passive stretch  Sensation and motor intact  SCDs for mechanical DVT proph while in bed     PLAN:  1) PT BID starting today. 2) Agitation- thought to be withdrawal from ETOH and patient did not receive her Adderall this am. Patient will start on CIWA protocol modified for every four hour dosing of diazepam based on scoring. 3) Severe pain- increased oral pain med to Oxycodone 5-15 mg and increased the frequency of IV pain med to every two hours. 4) Plan d/c home when pain controlled and patient mobilized.     Phyllis Lake NP

## 2017-01-04 NOTE — PROGRESS NOTES
Patient arrived to unit very confused,  at bedside. Bed alarm placed on bed for safety. Patient requested pain medicine and received iv dilaudid per [facility] 0007  Patient requested additional pain medicine and received Roxicodone per Mar 0100. Patient only alert to slef and frequently ask where she is.      left bedside to return home at 0200    0400 Update, patient seems to be back at baseline, has tolerated romain crackers and gingerale well

## 2017-01-04 NOTE — WOUND CARE
Wound Care consulted to see this patient for \"lesion\" on the labia minora. This was noted when the urinary catheter was inserted during her surgery today. In speaking with the patient, her GYN physician knows about this area and has been watching it. Assessment: There is a purple discolored area on the right side of the labia minora that looks like pinched skin or an abrasion. Patient showed an area on the left side that appears to be a small cyst that has been drained by her physician. None of these look infected and they are not painful to the patient. There is no swelling, ecchymosis, redness or open wounds present in this area. Recommend to follow up as scheduled with her GYN physician. This should not effect her hospital course for the ankle surgery.    Christelle Troncoso RN, BSN,CWON

## 2017-01-04 NOTE — ROUTINE PROCESS
TRANSFER - IN REPORT:    Verbal report received from Jamie(name) on Angela Marie  being received from [facility]) for routine progression of care      Report consisted of patients Situation, Background, Assessment and   Recommendations(SBAR). Information from the following report(s) SBAR, Kardex, ED Summary, OR Summary, Procedure Summary, Intake/Output, MAR, Accordion, Recent Results and Med Rec Status was reviewed with the receiving nurse. Opportunity for questions and clarification was provided. Assessment completed upon patients arrival to unit and care assumed.      Primary Nurse Manuel Mackey RN and Jennifer Sigala RN performed a dual skin assessment on this patient Impairment noted- see wound doc flow sheet  Sukhi score is 18    Surgery to right ankle

## 2017-01-04 NOTE — PROGRESS NOTES
Order received and chart reviewed. Need clarification for WB status prior to evaluation/gait training. Thank you.   Perry Santos, PT, DPT

## 2017-01-04 NOTE — PROGRESS NOTES
Pt. Anxious and restless this morning, PRN pain medications given will continue to monitor. 1045: Pt. Becoming increasingly restless, yelling out for the nurse, pt. Stated she was going to be a \"bad patient today\", pt. Kadi Flores she feels like the cast is too tight and there is swelling on the right side, MD paged, MD CORTEZ stated he is in a meeting, nurse asked to have him paged or whoever is on call paged, pt. Shifting and moving around the bed, pushing and pulling her leg from side to side, pt. Complaining of pain with little relief from the pain medications, pt. Began crying out in pain after the nurse informed her that the MD was paged and the nurse is waiting to hear back, nurse attempted to comfort the pt., pt asking for ice to place on the leg, nurse informed the pt. She would ask the MD when he returns her call about other pain relieving remedies, will continue to monitor. 1115: Ortho NP on the unit and informed about the situation, NP restarted pt. Medications and nurse called ortho unit to determine where the pt. Home medication, adderall, is, the medications was not sent down with the pt., not placed in the  or the pyxis, Dennard Drain and ALISTAIR Aviles informed about the situation, will continue to monitor. 1140: Complete CIWA scale for the pt. NP on the unit informed and placing order, Bull Muñiz RN on Ortho called to inform which room number the pt. Was transferred from [phone], she was in a room at this time and stated she would check and return the call. 1150: Nurse Keira Navarrete RN and Hyacinth Turk RN counted adderall Rx found in pt. Belongings 60 20 mg pills in the [facility] container, pt. States \"there is another bottle with only like 17 in it somewhere that is my full bottle\", pt.  Agrees to have the new bottle sent to the pharmacy for a label, nurse called pharmacy and Janneth Sullivan searched and found that the medication count on the ortho unit was accurate and he is going to call the pharmacist on the ortho unit and call the nurse back, nurse asked what she should do with the full Rx that the pt. Had in her room, Hazel Espinoza stated to have the pt.  take it home with him today, nurse returned the Rx to the pt. Bag which is out of reach of the pt. Anam Moran RN watched as the medication was returned to the pt. Belongings by Kathi Alvarez RN      8411: Ice bags applied to the pt. RLE.     1245: CIWA scale entered post valium 4, pt. adderall on the unit and counted Jania Lozano and [facility] tech 17.5 tabs on the bottle, unable to enter 17.5 in the pyxis [place] stated he would enter 17, green sheet filled out for verify count, will continue to monitor. 1430: Pt. Up in the chair with therapy less than 15 minutes before the pt. Yelled out that she needed to get back to bed, pt. Unwilling to sit in the chair any longer even after the nurse explained the importance of staying active and sitting up throughout the day. Pt. Villarreal removed at this time pt. Informed she will need to void before 8:30 pm, will continue to monitor. 1800: Pt. Stating she heard a \"dog barking\" in the computer corner, nurse asked what kind of sound she heard and determined the pt. Was hearing the  the closet being opened and slid in and out by repeating the action, pt. Stated \"yeah that's it\", pt. Attempt to urinate, pt. Denies the feeling she needs to void but asked to try, pt. Placed on the bedside commode for 10 minutes with no urine voided, pt. Abdomen soft, pt encouraged to drink plenty of water and call out when she needs to try again, will continue to monitor.                       Oncology Nursing Communication Tool      6:48 PM  1/4/2017     Bedside and Verbal shift change report given to Lynae Favre, RN (incoming nurse) by Benito Roman RN (outgoing nurse) on Christelle Arellano a 49 y.o. female who was admitted on 1/2/2017 12:49 PM. Report included the following information SBAR, Kardex, Intake/Output, MAR, Accordion, Recent Results and Med Rec Status. Significant changes during shift: Pt. Pain finally under control, CIWA scale below 8 since the first assessment this morning, small amount of red drainage visible on the bottom of her splint      Issues for physician to address: Discharge planning, Pt. Up in chair for less than 15 minutes today see note, PT            Code Status: Full Code     Infections: No current active infections     Allergies: Pcn [penicillins]     Current diet: DIET REGULAR       Pain Controlled [x] yes [] no   Bowel Movement [] yes [x] no   Last Bowel Movement (date) 1/2/17            Vital Signs:   Patient Vitals for the past 12 hrs:   Temp Pulse Resp BP SpO2   01/04/17 1331 98.3 °F (36.8 °C) 96 18 115/74 92 %      Intake & Output:     Intake/Output Summary (Last 24 hours) at 01/04/17 1848  Last data filed at 01/04/17 0313   Gross per 24 hour   Intake             1450 ml   Output              875 ml   Net              575 ml      Laboratory Results:   No results found for this or any previous visit (from the past 12 hour(s)). Opportunity for questions and clarifications were given to the incoming nurse. Patient's bed is in low position, side rails x2, door open PRN, call bell within reach and patient not in distress.       Jaycob Pedraza RN

## 2017-01-04 NOTE — ANESTHESIA POSTPROCEDURE EVALUATION
Post-Anesthesia Evaluation and Assessment    Patient: Mayank Askew MRN: [id number]  SSN: [id number]    YOB: 1960  Age: 49 y.o. Sex: female       Cardiovascular Function/Vital Signs  Visit Vitals    /79    Pulse (!) 104    Temp 36.7 °C (98.1 °F)    Resp 17    Ht 5' 4\" (1.626 m)    Wt 74.8 kg (165 lb)    SpO2 95%    BMI 28.32 kg/m2       Patient is status post general anesthesia for Procedure(s):  REMOVAL EXTERNAL FIXATOR AND RIGHT ANKLE OPEN REDUCTION INTERNAL FIXATION, GASTROCNEMIUS RECESSION (Req Large C-Arm, Ti Mini and Small Frag and Tong Clamps). Nausea/Vomiting: None    Postoperative hydration reviewed and adequate. Pain:  Pain Scale 1: Numeric (0 - 10) (01/03/17 2318)  Pain Intensity 1: 4 (01/03/17 2318)   Managed    Neurological Status:   Neuro (WDL): Exceptions to WDL (01/03/17 2221)  Neuro  Neurologic State: Confused; Eyes open spontaneously; Restless;Sleeping (01/03/17 2221)  Orientation Level: Disoriented to place; Disoriented to situation;Oriented to person (01/03/17 2221)  Cognition: Decreased command following (01/03/17 2221)  Speech: Clear (01/03/17 2221)  LUE Motor Response: Purposeful (01/03/17 2221)  LLE Motor Response: Purposeful (01/03/17 2221)  RUE Motor Response: Purposeful (01/03/17 2221)  RLE Motor Response: Purposeful;Weak (01/03/17 2221)   At baseline    Mental Status and Level of Consciousness: Arousable    Pulmonary Status:   O2 Device: Nasal cannula (01/03/17 2307)   Adequate oxygenation and airway patent    Complications related to anesthesia: None    Post-anesthesia assessment completed.  No concerns    Signed By: Rik Davis MD     January 3, 2017

## 2017-01-05 ENCOUNTER — APPOINTMENT (OUTPATIENT)
Dept: GENERAL RADIOLOGY | Age: 57
DRG: 492 | End: 2017-01-05
Attending: HOSPITALIST
Payer: COMMERCIAL

## 2017-01-05 LAB
ALBUMIN SERPL BCP-MCNC: 2.9 G/DL (ref 3.5–5)
ALBUMIN/GLOB SERPL: 1 {RATIO} (ref 1.1–2.2)
ALP SERPL-CCNC: 64 U/L (ref 45–117)
ALT SERPL-CCNC: 26 U/L (ref 12–78)
AMMONIA PLAS-SCNC: 29 UMOL/L
ANION GAP BLD CALC-SCNC: 5 MMOL/L (ref 5–15)
AST SERPL W P-5'-P-CCNC: 22 U/L (ref 15–37)
BASOPHILS # BLD AUTO: 0 K/UL (ref 0–0.1)
BASOPHILS # BLD: 0 % (ref 0–1)
BILIRUB SERPL-MCNC: 1.3 MG/DL (ref 0.2–1)
BUN SERPL-MCNC: 5 MG/DL (ref 6–20)
BUN/CREAT SERPL: 8 (ref 12–20)
CALCIUM SERPL-MCNC: 8 MG/DL (ref 8.5–10.1)
CHLORIDE SERPL-SCNC: 106 MMOL/L (ref 97–108)
CO2 SERPL-SCNC: 30 MMOL/L (ref 21–32)
CREAT SERPL-MCNC: 0.64 MG/DL (ref 0.55–1.02)
EOSINOPHIL # BLD: 0 K/UL (ref 0–0.4)
EOSINOPHIL NFR BLD: 0 % (ref 0–7)
ERYTHROCYTE [DISTWIDTH] IN BLOOD BY AUTOMATED COUNT: 13.3 % (ref 11.5–14.5)
GLOBULIN SER CALC-MCNC: 2.9 G/DL (ref 2–4)
GLUCOSE SERPL-MCNC: 101 MG/DL (ref 65–100)
HCT VFR BLD AUTO: 29.3 % (ref 35–47)
HGB BLD-MCNC: 9.6 G/DL (ref 11.5–16)
LYMPHOCYTES # BLD AUTO: 19 % (ref 12–49)
LYMPHOCYTES # BLD: 1.4 K/UL (ref 0.8–3.5)
MAGNESIUM SERPL-MCNC: 2.1 MG/DL (ref 1.6–2.4)
MCH RBC QN AUTO: 30.5 PG (ref 26–34)
MCHC RBC AUTO-ENTMCNC: 32.8 G/DL (ref 30–36.5)
MCV RBC AUTO: 93 FL (ref 80–99)
MONOCYTES # BLD: 0.9 K/UL (ref 0–1)
MONOCYTES NFR BLD AUTO: 12 % (ref 5–13)
NEUTS SEG # BLD: 5.1 K/UL (ref 1.8–8)
NEUTS SEG NFR BLD AUTO: 69 % (ref 32–75)
PHOSPHATE SERPL-MCNC: 2.4 MG/DL (ref 2.6–4.7)
PLATELET # BLD AUTO: 188 K/UL (ref 150–400)
POTASSIUM SERPL-SCNC: 3.5 MMOL/L (ref 3.5–5.1)
PROT SERPL-MCNC: 5.8 G/DL (ref 6.4–8.2)
RBC # BLD AUTO: 3.15 M/UL (ref 3.8–5.2)
SODIUM SERPL-SCNC: 141 MMOL/L (ref 136–145)
WBC # BLD AUTO: 7.4 K/UL (ref 3.6–11)

## 2017-01-05 PROCEDURE — 74011250636 HC RX REV CODE- 250/636: Performed by: NURSE PRACTITIONER

## 2017-01-05 PROCEDURE — 74011250636 HC RX REV CODE- 250/636: Performed by: HOSPITALIST

## 2017-01-05 PROCEDURE — 65660000000 HC RM CCU STEPDOWN

## 2017-01-05 PROCEDURE — HZ2ZZZZ DETOXIFICATION SERVICES FOR SUBSTANCE ABUSE TREATMENT: ICD-10-PCS | Performed by: HOSPITALIST

## 2017-01-05 PROCEDURE — 74011250637 HC RX REV CODE- 250/637: Performed by: NURSE PRACTITIONER

## 2017-01-05 PROCEDURE — 71010 XR CHEST PORT: CPT

## 2017-01-05 PROCEDURE — 80053 COMPREHEN METABOLIC PANEL: CPT | Performed by: HOSPITALIST

## 2017-01-05 PROCEDURE — 82140 ASSAY OF AMMONIA: CPT | Performed by: HOSPITALIST

## 2017-01-05 PROCEDURE — 77010033678 HC OXYGEN DAILY

## 2017-01-05 PROCEDURE — 84100 ASSAY OF PHOSPHORUS: CPT | Performed by: HOSPITALIST

## 2017-01-05 PROCEDURE — 74011250637 HC RX REV CODE- 250/637: Performed by: HOSPITALIST

## 2017-01-05 PROCEDURE — 74011000258 HC RX REV CODE- 258: Performed by: HOSPITALIST

## 2017-01-05 PROCEDURE — 83735 ASSAY OF MAGNESIUM: CPT | Performed by: HOSPITALIST

## 2017-01-05 PROCEDURE — 36415 COLL VENOUS BLD VENIPUNCTURE: CPT | Performed by: HOSPITALIST

## 2017-01-05 PROCEDURE — 74011250636 HC RX REV CODE- 250/636: Performed by: ORTHOPAEDIC SURGERY

## 2017-01-05 PROCEDURE — 85025 COMPLETE CBC W/AUTO DIFF WBC: CPT | Performed by: HOSPITALIST

## 2017-01-05 RX ORDER — DIAZEPAM 10 MG/2ML
20 INJECTION INTRAMUSCULAR
Status: DISCONTINUED | OUTPATIENT
Start: 2017-01-05 | End: 2017-01-05

## 2017-01-05 RX ORDER — FOLIC ACID 1 MG/1
1 TABLET ORAL DAILY
Status: DISCONTINUED | OUTPATIENT
Start: 2017-01-05 | End: 2017-01-06

## 2017-01-05 RX ORDER — DIAZEPAM 5 MG/1
20 TABLET ORAL
Status: DISCONTINUED | OUTPATIENT
Start: 2017-01-05 | End: 2017-01-05

## 2017-01-05 RX ORDER — DIAZEPAM 10 MG/2ML
20 INJECTION INTRAMUSCULAR
Status: DISCONTINUED | OUTPATIENT
Start: 2017-01-05 | End: 2017-01-06

## 2017-01-05 RX ORDER — CHLORDIAZEPOXIDE HYDROCHLORIDE 25 MG/1
25 CAPSULE, GELATIN COATED ORAL EVERY 6 HOURS
Status: DISCONTINUED | OUTPATIENT
Start: 2017-01-05 | End: 2017-01-06

## 2017-01-05 RX ORDER — THIAMINE HYDROCHLORIDE 100 MG/ML
100 INJECTION, SOLUTION INTRAMUSCULAR; INTRAVENOUS
Status: DISCONTINUED | OUTPATIENT
Start: 2017-01-05 | End: 2017-01-05 | Stop reason: SDUPTHER

## 2017-01-05 RX ORDER — CHLORDIAZEPOXIDE HYDROCHLORIDE 25 MG/1
25 CAPSULE, GELATIN COATED ORAL EVERY 6 HOURS
Status: DISCONTINUED | OUTPATIENT
Start: 2017-01-05 | End: 2017-01-05

## 2017-01-05 RX ORDER — DIAZEPAM 10 MG/2ML
10 INJECTION INTRAMUSCULAR
Status: DISCONTINUED | OUTPATIENT
Start: 2017-01-05 | End: 2017-01-06

## 2017-01-05 RX ORDER — DIAZEPAM 5 MG/1
10 TABLET ORAL
Status: DISCONTINUED | OUTPATIENT
Start: 2017-01-05 | End: 2017-01-05

## 2017-01-05 RX ORDER — LANOLIN ALCOHOL/MO/W.PET/CERES
100 CREAM (GRAM) TOPICAL DAILY
Status: DISCONTINUED | OUTPATIENT
Start: 2017-01-05 | End: 2017-01-06

## 2017-01-05 RX ORDER — HYDROMORPHONE HYDROCHLORIDE 1 MG/ML
1 INJECTION, SOLUTION INTRAMUSCULAR; INTRAVENOUS; SUBCUTANEOUS
Status: DISCONTINUED | OUTPATIENT
Start: 2017-01-05 | End: 2017-01-18

## 2017-01-05 RX ADMIN — DIAZEPAM 10 MG: 5 INJECTION, SOLUTION INTRAMUSCULAR; INTRAVENOUS at 23:42

## 2017-01-05 RX ADMIN — ENOXAPARIN SODIUM 40 MG: 40 INJECTION, SOLUTION INTRAVENOUS; SUBCUTANEOUS at 09:00

## 2017-01-05 RX ADMIN — DESVENLAFAXINE SUCCINATE 100 MG: 50 TABLET, EXTENDED RELEASE ORAL at 09:01

## 2017-01-05 RX ADMIN — Medication 5 ML: at 16:07

## 2017-01-05 RX ADMIN — Medication 10 ML: at 23:40

## 2017-01-05 RX ADMIN — DIAZEPAM 20 MG: 5 INJECTION, SOLUTION INTRAMUSCULAR; INTRAVENOUS at 18:32

## 2017-01-05 RX ADMIN — OXYCODONE HYDROCHLORIDE 10 MG: 5 TABLET ORAL at 06:24

## 2017-01-05 RX ADMIN — HYDROMORPHONE HYDROCHLORIDE 1 MG: 1 INJECTION, SOLUTION INTRAMUSCULAR; INTRAVENOUS; SUBCUTANEOUS at 03:03

## 2017-01-05 RX ADMIN — HYDROMORPHONE HYDROCHLORIDE 1 MG: 1 INJECTION, SOLUTION INTRAMUSCULAR; INTRAVENOUS; SUBCUTANEOUS at 09:00

## 2017-01-05 RX ADMIN — MULTIPLE VITAMINS W/ MINERALS TAB 1 TABLET: TAB at 10:21

## 2017-01-05 RX ADMIN — CHLORDIAZEPOXIDE HYDROCHLORIDE 25 MG: 25 CAPSULE ORAL at 17:43

## 2017-01-05 RX ADMIN — Medication 100 MG: at 10:21

## 2017-01-05 RX ADMIN — HYDROMORPHONE HYDROCHLORIDE 1 MG: 1 INJECTION, SOLUTION INTRAMUSCULAR; INTRAVENOUS; SUBCUTANEOUS at 12:22

## 2017-01-05 RX ADMIN — DEXTROAMPHETAMINE SACCHARATE, AMPHETAMINE ASPARTATE, DEXTROAMPHETAMINE SULFATE AND AMPHETAMINE SULFATE 20 MG: 2.5; 2.5; 2.5; 2.5 TABLET ORAL at 09:15

## 2017-01-05 RX ADMIN — DIBASIC SODIUM PHOSPHATE, MONOBASIC POTASSIUM PHOSPHATE AND MONOBASIC SODIUM PHOSPHATE 2 TABLET: 852; 155; 130 TABLET ORAL at 17:43

## 2017-01-05 RX ADMIN — HYDROMORPHONE HYDROCHLORIDE 1 MG: 1 INJECTION, SOLUTION INTRAMUSCULAR; INTRAVENOUS; SUBCUTANEOUS at 20:57

## 2017-01-05 RX ADMIN — DIAZEPAM 10 MG: 5 INJECTION, SOLUTION INTRAMUSCULAR; INTRAVENOUS at 20:14

## 2017-01-05 RX ADMIN — FOLIC ACID 1 MG: 1 TABLET ORAL at 10:21

## 2017-01-05 RX ADMIN — DIAZEPAM 10 MG: 5 INJECTION, SOLUTION INTRAMUSCULAR; INTRAVENOUS at 16:26

## 2017-01-05 RX ADMIN — CHLORDIAZEPOXIDE HYDROCHLORIDE 25 MG: 25 CAPSULE ORAL at 11:59

## 2017-01-05 RX ADMIN — DIAZEPAM 10 MG: 5 TABLET ORAL at 06:41

## 2017-01-05 RX ADMIN — SODIUM CHLORIDE 75 ML/HR: 900 INJECTION, SOLUTION INTRAVENOUS at 18:24

## 2017-01-05 RX ADMIN — DIAZEPAM 10 MG: 5 INJECTION, SOLUTION INTRAMUSCULAR; INTRAVENOUS at 11:04

## 2017-01-05 RX ADMIN — DIAZEPAM 10 MG: 5 INJECTION, SOLUTION INTRAMUSCULAR; INTRAVENOUS at 13:33

## 2017-01-05 RX ADMIN — HYDROMORPHONE HYDROCHLORIDE 1 MG: 1 INJECTION, SOLUTION INTRAMUSCULAR; INTRAVENOUS; SUBCUTANEOUS at 00:37

## 2017-01-05 RX ADMIN — THIAMINE HYDROCHLORIDE 100 MG: 100 INJECTION, SOLUTION INTRAMUSCULAR; INTRAVENOUS at 10:16

## 2017-01-05 NOTE — PROGRESS NOTES
12:06 PM pt transported to PCU with writer on monitor x3. All pt's belongings transported with pt including iphone, ipad and , purse and clothing. Pt's home medications sent with patient and given to receiving nurse. Pt's  returned call, was given pt's room number and direct phone number.

## 2017-01-05 NOTE — PROGRESS NOTES
Verbal order from Dr. Yamilet Hyatt for funk insertion if urine over 450 ml on bladder scanner. Bladder scanner showed 702 ml.  Will alert day shift

## 2017-01-05 NOTE — PROGRESS NOTES
Late Entry from 1-5-17 at 0845am. Peterson Tuttle NP for ortho in this AM and is aware of small amt of bloody pink drainage noted on back of Rt leg dsg. Peterson Tuttle stated that this is normal. No new orders at this time. Instructed the patient to keep leg on a pillow,verbalizes understanding.

## 2017-01-05 NOTE — PROGRESS NOTES
1/4 @ 2103: Villarreal taken out at 2pm. Was suppose to void by 8pm. Bladder scanned pt, only 147ml of urine present. Will continue to monitor. Educated patient on need to drink fluids and to alert me if she feels uncomfortable. 1/4 @ 2312: Bladder scanned patient again. 307 ml presently. Paged Doctor on call for Erika Wong at [phone]. A little before midnight she was straight cathed and 405ml of urine was measured. 1/5 @0610: Patient decided to take out her IV. Understand that she will need another one.

## 2017-01-05 NOTE — PROGRESS NOTES
Note pt s/p closed reduction external fix of right ankle on 1/2/17, now with alcohol withdrawal.  Con't to follow pts progress and assist with any d/c needs-? HH and/or DME?       Elizabeth Pérez, MSW  [id number]

## 2017-01-05 NOTE — ADT AUTH CERT NOTES
Musculoskeletal Surgery or Procedure [facility] - Care Day 4 (1/5/2017) by Bird Longoria RN        Review Status Review Entered       Completed 1/5/2017       Details              Care Day: 4 Care Date: 1/5/2017 Level of Care: Inpatient Floor       Guideline Day 3        Level Of Care       ( ) * Activity level acceptable       ( ) * Complete discharge planning              Clinical Status       (X) * Operative site and other wounds acceptable       (X) * Temperature status acceptable       (X) * No infection, or status acceptable       (X) * No blood loss, or problem resolved       (X) * Pain and nausea absent or adequately managed       (X) * Vascular, soft tissue, and wound status acceptable       (X) * Fracture or injury absent or status acceptable       (X) * No spinal surgery, or status acceptable       (X) * No bone harvest, or donor site acceptable       ( ) * General Discharge Criteria met              Interventions       (X) * Intake acceptable       1/5/2017 3:18 PM EST by Silvia Castelan         REGULAR DIET              ( ) * No inpatient interventions needed              1/5/2017 3:22 PM EST by Silvia Castelan       Subject: Additional Clinical Information       MEDS: PO LIBRIUM Q 6HRS, PO PRISTIQ DAILY, PO ADDERALL DAILY, INJ VALIUM 10 MG  IV Q 1 HRS AS NEEDED X1, INJ VALIUM IV 20MG Q1 HRS AS NEEDED X1, PO FOLIC ACID DAILY, INJ DILAUDID IV Q 3 HRS AS NEEDED X1, PO MULTIVITAMIN DAILY, PO ROXICOONE Q 4 HRS AS NEEDED X1, PO THIAMINE DAILY              1/5/2017 3:19 PM EST by Silvia Castelan       Subject: Additional Clinical Information       LABS: HGB 9.6, HCT 29.3, , BUN 5, BUCR 8, CA 8.0, PHOS 2.4, TBILI 1.3, TP 5.8, ALB 2.9, AGRAT 1.0              1/5/2017 3:19 PM EST by Silvia Castelan       Subject: Additional Clinical Information       CXR: No acute abnormality.              1/5/2017 3:18 PM EST by Silvia Castelan       Subject: Additional Clinical Information       VS: T 98.7, P 89, R 18, /84, SPO2 91% 2 LPM VIA NC              1/5/2017 3:17 PM EST by Chelle Caal       Subject: Additional Clinical Information                    MD  Physician  Addendum  Orthopedic Surgery  Progress Notes  Date of Service: 01/05/17 0920                      Expand All Collapse All                           Hide copied text      Hover for attribution information            Copied by Hudson Deleon NP at 01/05/17 0920 from Progress Notes by Hudson Deleon NP at 01/04/17 1130  Added by Hudson Deleon NP at 01/05/17 0920  Template added by Hudson Deleon NP at 01/05/17 0920  Added by Hudson Deleon NP at 01/05/17 1218  Template added by Hudson Deleon NP at 01/05/17 1218  Added by Gideon Sanchez. Justin Corley MD at 01/05/17 1404          Ortho/ NeuroSurgery NP Note      POD# 2   s/p REMOVAL EXTERNAL FIXATOR AND RIGHT ANKLE OPEN REDUCTION INTERNAL FIXATION, GASTROCNEMIUS RECESSION (Req Large C-Arm, Ti Mini and Small Frag and Tong Clamps)       Call received that patient's \"DT's are worsening and the nurse manager wants her transferred to higher level of care\". Went to see patient and new orders placed.       Pt restless in the bed. Moving all extremities. Dressing on the RLL frayed and unraveling. Patient reports pain but appears more comfortable than yesterday. Does not specify where pain is. She is answering questions and following simple commands but very restless and distracted. CIWA scores this am were 10 at 0730 and 11 at 0830.    VSS Afebrile. Villarreal removed yesterday. Patient is voiding.         PLAN:  1) PT deferred this morning due to mental status changes and restlessness. Patient participated yesterday and maintained her NWB status but had poor safety awareness. 2) Agitation- worsening since yesterday. Hospitalist consulted and patient will be transferred to PCU. Nurse aware and will call for a bed. Increased PRN CIWA medications to every 2 hours. Will await hospitalist input for any other changes.    3) Severe pain- improving since yesterday. Will back off slightly on the IV dilaudid in hopes that we can transition to oral pain med. 4) Plan d/c when medically stable.                                               1/5/2017 3:16 PM EST by Silvia Castelan       Subject: Additional Clinical Information             Hospitalist Consultation NoteAssessment &   Plan:          Acute delirium, not POA   c/w with alcohol withdrawal  --patient report drinking 1 bottle of alcohol daily and has hx of alcohol withdrawal.   Denies any auditory or visual hallucinations.   Per nurse, periods of agitation, talking about her cats.   On eval, patient oriented to self, place, month.   Not oriented to year or situation. This is third hospital day.   No focal motor deficit or facial droop. --thiamine 100mg IV x 1 now and then thiamine, folic acid, MVI by mouth  --agree with plan to transfer to stepdown.   Was going to load with IV valium 20mg q1h x 3 doses but patient also given dilaudid 1mg at 9pm and now seems adequately sedated.   Instead, will start scheduled librium 25mg PO q6h along with q1h prn IV valium.   --check ammonia, CBC, CMP, mag and phos.      Acute urinary retention postop, not poa  --seems to be resolving.   Bladder scan overnight with residual >400ml x 2 and in/out cath x 2.   This morning voided 650ml with residual 47ml on bladder scan.         Hx HTN  --not on antiHTN med PTA and BP currently normal.   Resume aspirin 81mg daily when ok with ortho.      Depression  ADHD  --on Pristiq and adderall      Right trimalleolar ankle fx, POA, s/p closed reduction and ORIF on 1/2/17  --management per ortho      Dr. Stella Marcano will follow patient with you.                                           * Milestone                  Musculoskeletal Surgery or Procedure GRG - Care Day 2 (1/3/2017) by Bird Longoria RN        Review Status Review Entered       Completed 1/5/2017       Details              Care Day: 2 Care Date: 1/3/2017 Level of Care: Inpatient Floor       Guideline Day 2        Clinical Status       (X) * No ICU or intermediate care needs              Interventions       (X) Inpatient interventions continue              1/5/2017 3:15 PM EST by Raymundo Umanzor       Subject: Additional Clinical Information              S: Patient resting comfortably.       O:                    Visit Vitals          â¢      BP      115/62          â¢      Pulse      83          â¢      Temp      97.8  °F (36.6  °C)          â¢      Resp      18          â¢      Wt      74.8 kg (165 lb)          â¢      SpO2      97%                  right lower extremity:  Ex-fix in place      Toes: pink with brisk cap refill      +DP      + EHL/FHL      SILT dorsum and plantar toes      Minimal soft tissue edema          A/P: R unstable trimalleolar ankle fx/dislocation  - Ok for breakfast. NPO after breakfast however  - SCDs  - hold chemical DVT ppx  - plan on definitive fixation this afternoon              1/5/2017 3:14 PM EST by Raymundo Umanzor       Subject: Additional Clinical Information              BRIEF OPERATIVE NOTE      Date of Procedure: 1/3/2017   Preoperative Diagnosis: Right Ankle Fracture  Postoperative Diagnosis: Right Ankle Fracture     Procedure(s):  REMOVAL EXTERNAL FIXATOR AND RIGHT ANKLE OPEN REDUCTION INTERNAL FIXATION, GASTROCNEMIUS RECESSION  Anesthesia: General   Estimated Blood Loss: 50 cc  Specimens: * No specimens in log *   Findings: see op note   Complications: none  INDICATIONS: This woman fell down the steps and suffered a   trimalleolar fracture dislocation of her ankle.  She underwent 3   attempted closed reductions, but had persistent posterior redislocation   on post-reduction x-ray, so she was brought into the operating room for   external fixation as temporary reduction maneuver pending surgical   repair by the foot and ankle specialist.                                          * Milestone

## 2017-01-05 NOTE — PROGRESS NOTES
Attempted to see pt for PT tx. Discussed with Sony Cazares NP and pt going through DTs and likely transfer to another unit (PCU?). NP states to hold therapy at this time. Will defer today and continue to follow when appropriate. Thank you.   Mateo Reyes, PT, DPT

## 2017-01-05 NOTE — PROGRESS NOTES
Dr Pacheco No office notified of patient's condition. S/W Derrell Call. Awaiting call back. Patient very confused , agitated with a ciwa sore of 11.

## 2017-01-05 NOTE — CONSULTS
Hospitalist Consultation Note    NAME: Tj Dave   :  1960   MRN:  [id number]     Date/Time:  2017 9:40 AM    Patient PCP: Carmelita Corley MD    I have been asked to see this patient by the attending, Dr. Chucky Woodard , for advice/opinion re: alcohol withdrawal.  My advice is:  ________________________________________________________________________   Assessment &  Plan:  Acute delirium, not POA   c/w with alcohol withdrawal  --patient report drinking 1 bottle of alcohol daily and has hx of alcohol withdrawal.  Denies any auditory or visual hallucinations. Per nurse, periods of agitation, talking about her cats. On eval, patient oriented to self, place, month. Not oriented to year or situation. This is third hospital day. No focal motor deficit or facial droop. --thiamine 100mg IV x 1 now and then thiamine, folic acid, MVI by mouth  --agree with plan to transfer to stepdown. Was going to load with IV valium 20mg q1h x 3 doses but patient also given dilaudid 1mg at 9pm and now seems adequately sedated. Instead, will start scheduled librium 25mg PO q6h along with q1h prn IV valium. --check ammonia, CBC, CMP, mag and phos. Acute urinary retention postop, not poa  --seems to be resolving. Bladder scan overnight with residual >400ml x 2 and in/out cath x 2. This morning voided 650ml with residual 47ml on bladder scan. Hx HTN  --not on antiHTN med PTA and BP currently normal.  Resume aspirin 81mg daily when ok with ortho. Depression  ADHD  --on Pristiq and adderall    Right trimalleolar ankle fx, POA, s/p closed reduction and ORIF on 17  --management per ortho    Dr. Rissa Barone will follow patient with you. Subjective:   HISTORY OF PRESENT ILLNESS:     Tj Dave is a 49 y.o.  female with hx of depression, HTN, hyperlipidemia admitted to orthopedic service on 17 for right trimalleolar ankle fracture s/p fall.   Failed several attempts at closed reduction bedside and underwent closed reduction and ORIF on 1/2 and subsequent had REMOVAL EXTERNAL FIXATOR AND RIGHT ANKLE OPEN REDUCTION INTERNAL FIXATION, GASTROCNEMIUS RECESSION on 1/3. Began to develop confusion post on early morning 1/4 when returned from surgery very confused, frequently asking where she is. Given pain med,seemed to return to baseline but later on 1/4 became agitated and restless. Started on CIWA scale. Reported to ortho NP history of marijuana and \"speed\" use remotely and drinking 1 bottle wine/day. Villarreal removed at 2pm on 1/4 and last night noted to have urinary retention with bladder scan >400ml x2. However, this morning has void 650ml when assisted to bedside commode and post void bladder scan 47ml. Increased agitation and pulling out IV on oncology unit. CIWA score 11 so hospitalist service consulted. On eval, patient awake, confused, doesn't know why she is here, what's going on but answer questions and interacts appropriately, current calm, slightly drowsy. Denies CP, SOB, headache, cough, abdominal pain, nausea, vomiting. Report some pain in right leg. Past Medical History   Diagnosis Date    Hypertension     Ill-defined condition      high cholesterol    Psychiatric disorder      depression, ADHD      Past Surgical History   Procedure Laterality Date    Hx other surgical       colonoscopy    Hx gyn       D&C X2     Social History   Substance Use Topics    Smoking status: Current Every Day Smoker     Packs/day: 0.50    Smokeless tobacco: Never Used    Alcohol use 14.4 oz/week     24 Glasses of wine per week      Family History   Problem Relation Age of Onset    Diabetes Father     Heart Disease Father      Allergies   Allergen Reactions    Pcn [Penicillins] Rash        Prior to Admission medications    Medication Sig Start Date End Date Taking? Authorizing Provider   dextroamphetamine-amphetamine (ADDERALL) 10 mg tablet Take 10 mg by mouth.    Yes Cece Corley MD   Desvenlafaxine (PRISTIQ) 100 mg Tb24 Take 50 mg by mouth. Yes Cece Corley MD   aspirin delayed-release 81 mg tablet Take 81 mg by mouth daily. Yes Cece Corley MD   calcium-vitamin D (OYSTER SHELL) 500 mg(1,250mg) -200 unit per tablet Take 1 Tab by mouth two (2) times daily (with meals). Yes Cece Corley MD   atorvastatin (LIPITOR) 10 mg tablet Take  by mouth daily.    Yes Cece Corley MD     Current Facility-Administered Medications   Medication Dose Route Frequency    HYDROmorphone (PF) (DILAUDID) injection 1 mg  1 mg IntraVENous Q3H PRN    diazePAM (VALIUM) injection 10 mg  10 mg IntraVENous Q1H PRN    diazePAM (VALIUM) injection 20 mg  20 mg IntraVENous U3P PRN    folic acid (FOLVITE) tablet 1 mg  1 mg Oral DAILY    thiamine (B-1) tablet 100 mg  100 mg Oral DAILY    multivitamin, tx-iron-ca-min (THERA-M w/ IRON) tablet 1 Tab  1 Tab Oral DAILY    thiamine (B-1) 100 mg in 0.9% sodium chloride 50 mL IVPB  100 mg IntraVENous ONCE    chlordiazePOXIDE (LIBRIUM) capsule 25 mg  25 mg Oral Q6H    dextroamphetamine-amphetamine (ADDERALL) tablet 20 mg  20 mg Oral DAILY    Desvenlafaxine SR (PRISTIQ) tablet 100 mg  100 mg Oral DAILY    oxyCODONE IR (ROXICODONE) tablet 5-15 mg  5-15 mg Oral Q4H PRN    sodium chloride (NS) flush 5-10 mL  5-10 mL IntraVENous Q8H    sodium chloride (NS) flush 5-10 mL  5-10 mL IntraVENous PRN    acetaminophen (TYLENOL) tablet 650 mg  650 mg Oral Q4H PRN    ondansetron (ZOFRAN) injection 4 mg  4 mg IntraVENous Q4H PRN    enoxaparin (LOVENOX) injection 40 mg  40 mg SubCUTAneous Q24H    0.9% sodium chloride infusion  75 mL/hr IntraVENous CONTINUOUS      REVIEW OF SYSTEMS:  BOLD = POSITIVE; negative = normal text  General:  fever, chills, sweats, weakness, weight loss/gain  Eyes: blurred vision, eye pain, loss of vision, diplopia  Ear Nose and Throat: rhinorrhea, pharyngitis, otalgia, tinnitus, speech or swallowing difficulties  Respiratory: cough, sputum production, SOB, wheezing, FUENTES, pleuritic pain  Cardiology:   chest pain, palpitations, orthopnea, PND, edema, syncope   Gastrointestinal: abdominal pain, N/V, dysphagia, change in bowel habits, bleeding  Genitourinary: frequency, urgency, dysuria, hematuria, incontinence  Muskuloskeletal : arthralgia, myalgia  Hematology:  easy bruising, bleeding, lymphadenopathy  Dermatological: rash, ulceration, mole change, new lesion  Endocrine: hot flashes or polydipsia  Neurological:  headache, dizziness, confusion, focal weakness, paresthesia, memory loss, gait disturbance  Psychological:  anxiety, depression, agitation    Objective:   VITALS:    Visit Vitals    /84    Pulse 89    Temp 98.7 °F (37.1 °C)    Resp 18    Ht 5' 4\" (1.626 m)    Wt 74.8 kg (165 lb)    SpO2 91%    BMI 28.32 kg/m2     Temp (24hrs), Av.4 °F (36.9 °C), Min:98.1 °F (36.7 °C), Max:98.7 °F (37.1 °C)    PHYSICAL EXAM:    General:    Awake, disheveled hair, confused but cooperative, no respiratory distress,appears stated age. HEENT: Atraumatic, anicteric sclerae, pink conjunctivae     No oral ulcers, mucosa moist, throat clear. Hearing intact. Neck:  Supple, symmetrical,  thyroid: non tender  Lungs:   Bilateral crackles 1/2 up. No Wheezing or Rhonchi. Chest wall:  No tenderness  No Accessory muscle use. Heart:   Regular  rhythm,  No  murmur   No gallop. No edema  Abdomen:   Soft, non-tender. Not distended. Bowel sounds normal. No masses  Extremities: No cyanosis. No clubbing. Right foot and lower leg in ACE wrap, bloody in posterior proximal calf  Skin:     Not pale Not Jaundiced  No rashes   Psych:  Good insight. Not depressed. Not anxious or agitated. Neurologic: EOMs intact. No facial asymmetry. No aphasia or slurred speech. Symmetrical strength, Alert and oriented X self, place, month. Says year is .  Confused saying she doesn't know what is going on.    ________________________________________________________________________  Care Plan discussed with:    Comments   Patient x    Family      RN x    Care Manager                    Consultant:  toby Merida Cadiz ortho NP   ________________________________________________________________________  TOTAL TIME:  40 minutes  ________________________________________________________________________  Dimitris Potts MD      Procedures: see electronic medical records for all procedures/Xrays and details which were not copied into this note but were reviewed prior to creation of Plan. LAB DATA REVIEWED:    No results found for this or any previous visit (from the past 24 hour(s)).

## 2017-01-05 NOTE — PROGRESS NOTES
Oncology Nursing Communication Tool      7:53 AM  1/5/2017     Bedside and Verbal shift change report given to Katelin Mijares RN (incoming nurse) by Ray Guzman (outgoing nurse) on Guero Mauricio a 49 y.o. female who was admitted on 1/2/2017 12:49 PM. Report included the following information SBAR, Kardex, Procedure Summary, Intake/Output, MAR and Recent Results. Significant changes during shift: Patient was retaining urine. See previous notes. Patient ended up urinating 650 at shift change. Pulled out IV. Was quite confused      Issues for physician to address:          Code Status: Full Code     Infections: No current active infections     Allergies: Pcn [penicillins]     Current diet: DIET REGULAR       Pain Controlled [x] yes [] no   Bowel Movement [] yes [x] no   Last Bowel Movement (date)              Vital Signs:   Patient Vitals for the past 12 hrs:   Temp Pulse Resp BP SpO2   01/05/17 0633 98.7 °F (37.1 °C) 89 18 139/84 91 %   01/04/17 2126 98.1 °F (36.7 °C) 95 18 109/71 93 %      Intake & Output:     Intake/Output Summary (Last 24 hours) at 01/05/17 0753  Last data filed at 01/05/17 0743   Gross per 24 hour   Intake                0 ml   Output             1100 ml   Net            -1100 ml      Laboratory Results:   No results found for this or any previous visit (from the past 12 hour(s)). Opportunity for questions and clarifications were given to the incoming nurse. Patient's bed is in low position, side rails x2, door open PRN, call bell within reach and patient not in distress.       Ray Guzman

## 2017-01-05 NOTE — PROGRESS NOTES
Ortho/ NeuroSurgery NP Note    POD# 2  s/p REMOVAL EXTERNAL FIXATOR AND RIGHT ANKLE OPEN REDUCTION INTERNAL FIXATION, GASTROCNEMIUS RECESSION (Req Large C-Arm, Ti Mini and Small Frag and Tong Clamps)     Call received that patient's \"DT's are worsening and the nurse manager wants her transferred to higher level of care\". Went to see patient and new orders placed. Pt restless in the bed. Moving all extremities. Dressing on the RLL frayed and unraveling. Patient reports pain but appears more comfortable than yesterday. Does not specify where pain is. She is answering questions and following simple commands but very restless and distracted. CIWA scores this am were 10 at 0730 and 11 at 0830. VSS Afebrile. Villarreal removed yesterday. Patient is voiding. Discussed patient's social history with her yesterday and found she is not using any pain medication or any prescription medications other than those prescribed to her. History of marijuana and \"speed\" use but these she reports are remote. Also the patient reports to consuming a bottle of wine nightly. Lovenox for DVT prophylaxis. Dressing with bloody saturation in a few areas. Dr. Leonila Chacon aware. ACE is unraveled at the top where it appears patient has been pulling on it. This was adjusted but likely she will pull on it again. Calves soft and supple; No pain with passive stretch  Sensation and motor intact  SCDs for mechanical DVT proph while in bed     PLAN:  1) PT deferred this morning due to mental status changes and restlessness. Patient participated yesterday and maintained her NWB status but had poor safety awareness. 2) Agitation- worsening since yesterday. Hospitalist consulted and patient will be transferred to PCU. Nurse aware and will call for a bed. Increased PRN CIWA medications to every 2 hours. Will await hospitalist input for any other changes. 3) Severe pain- improving since yesterday.  Will back off slightly on the IV dilaudid in hopes that we can transition to oral pain med. 4) Plan d/c when medically stable. Ivet Anna NP    Called by nursing who reports patient increasingly combative, code atlas was called. They were still awaiting transfer to PCU. The nurse was going to call hospitalist as well. No new orders given.      Ivet Anna NP     Agree with above  Juani Dominique MD

## 2017-01-06 ENCOUNTER — APPOINTMENT (OUTPATIENT)
Dept: GENERAL RADIOLOGY | Age: 57
DRG: 492 | End: 2017-01-06
Attending: INTERNAL MEDICINE
Payer: COMMERCIAL

## 2017-01-06 PROCEDURE — 74011250637 HC RX REV CODE- 250/637: Performed by: HOSPITALIST

## 2017-01-06 PROCEDURE — 74011250636 HC RX REV CODE- 250/636: Performed by: HOSPITALIST

## 2017-01-06 PROCEDURE — 74011000250 HC RX REV CODE- 250: Performed by: HOSPITALIST

## 2017-01-06 PROCEDURE — 74011000258 HC RX REV CODE- 258: Performed by: INTERNAL MEDICINE

## 2017-01-06 PROCEDURE — 74011250636 HC RX REV CODE- 250/636: Performed by: NURSE PRACTITIONER

## 2017-01-06 PROCEDURE — 77030032490 HC SLV COMPR SCD KNE COVD -B

## 2017-01-06 PROCEDURE — 71010 XR CHEST PORT: CPT

## 2017-01-06 PROCEDURE — 74011250636 HC RX REV CODE- 250/636: Performed by: ORTHOPAEDIC SURGERY

## 2017-01-06 PROCEDURE — 74011250636 HC RX REV CODE- 250/636: Performed by: INTERNAL MEDICINE

## 2017-01-06 PROCEDURE — 74011000250 HC RX REV CODE- 250: Performed by: INTERNAL MEDICINE

## 2017-01-06 PROCEDURE — 77010033678 HC OXYGEN DAILY

## 2017-01-06 PROCEDURE — 74011250636 HC RX REV CODE- 250/636: Performed by: EMERGENCY MEDICINE

## 2017-01-06 PROCEDURE — 74011250637 HC RX REV CODE- 250/637: Performed by: INTERNAL MEDICINE

## 2017-01-06 PROCEDURE — 65620000000 HC RM CCU GENERAL

## 2017-01-06 RX ORDER — LORAZEPAM 2 MG/ML
2 INJECTION INTRAMUSCULAR
Status: DISCONTINUED | OUTPATIENT
Start: 2017-01-06 | End: 2017-01-18

## 2017-01-06 RX ORDER — HALOPERIDOL 5 MG/ML
5 INJECTION INTRAMUSCULAR
Status: DISCONTINUED | OUTPATIENT
Start: 2017-01-06 | End: 2017-01-21

## 2017-01-06 RX ORDER — HALOPERIDOL 5 MG/ML
5 INJECTION INTRAMUSCULAR ONCE
Status: COMPLETED | OUTPATIENT
Start: 2017-01-06 | End: 2017-01-06

## 2017-01-06 RX ORDER — MUPIROCIN 20 MG/G
OINTMENT TOPICAL 2 TIMES DAILY
Status: COMPLETED | OUTPATIENT
Start: 2017-01-06 | End: 2017-01-11

## 2017-01-06 RX ORDER — SODIUM CHLORIDE 450 MG/100ML
100 INJECTION, SOLUTION INTRAVENOUS CONTINUOUS
Status: DISCONTINUED | OUTPATIENT
Start: 2017-01-06 | End: 2017-01-11

## 2017-01-06 RX ORDER — IBUPROFEN 200 MG
1 TABLET ORAL DAILY
Status: DISCONTINUED | OUTPATIENT
Start: 2017-01-07 | End: 2017-01-24 | Stop reason: HOSPADM

## 2017-01-06 RX ADMIN — DIAZEPAM 20 MG: 5 INJECTION, SOLUTION INTRAMUSCULAR; INTRAVENOUS at 02:11

## 2017-01-06 RX ADMIN — DIAZEPAM 20 MG: 5 INJECTION, SOLUTION INTRAMUSCULAR; INTRAVENOUS at 11:22

## 2017-01-06 RX ADMIN — DIAZEPAM 10 MG: 5 INJECTION, SOLUTION INTRAMUSCULAR; INTRAVENOUS at 03:49

## 2017-01-06 RX ADMIN — SODIUM CHLORIDE 0.5 MCG/KG/HR: 900 INJECTION, SOLUTION INTRAVENOUS at 17:38

## 2017-01-06 RX ADMIN — Medication 10 ML: at 20:37

## 2017-01-06 RX ADMIN — LORAZEPAM 10 MG/HR: 2 INJECTION INTRAMUSCULAR; INTRAVENOUS at 20:23

## 2017-01-06 RX ADMIN — SODIUM CHLORIDE 10 ML/HR: 450 INJECTION, SOLUTION INTRAVENOUS at 17:06

## 2017-01-06 RX ADMIN — DIAZEPAM 10 MG: 5 INJECTION, SOLUTION INTRAMUSCULAR; INTRAVENOUS at 12:07

## 2017-01-06 RX ADMIN — DIAZEPAM 10 MG: 5 INJECTION, SOLUTION INTRAMUSCULAR; INTRAVENOUS at 01:16

## 2017-01-06 RX ADMIN — Medication 10 ML: at 16:54

## 2017-01-06 RX ADMIN — CHLORDIAZEPOXIDE HYDROCHLORIDE 25 MG: 25 CAPSULE ORAL at 07:25

## 2017-01-06 RX ADMIN — DIAZEPAM 20 MG: 5 INJECTION, SOLUTION INTRAMUSCULAR; INTRAVENOUS at 07:24

## 2017-01-06 RX ADMIN — CHLORDIAZEPOXIDE HYDROCHLORIDE 25 MG: 25 CAPSULE ORAL at 11:22

## 2017-01-06 RX ADMIN — DIAZEPAM 20 MG: 5 INJECTION, SOLUTION INTRAMUSCULAR; INTRAVENOUS at 13:47

## 2017-01-06 RX ADMIN — HYDROMORPHONE HYDROCHLORIDE 1 MG: 1 INJECTION, SOLUTION INTRAMUSCULAR; INTRAVENOUS; SUBCUTANEOUS at 17:32

## 2017-01-06 RX ADMIN — Medication 100 MG: at 09:11

## 2017-01-06 RX ADMIN — ENOXAPARIN SODIUM 40 MG: 40 INJECTION, SOLUTION INTRAVENOUS; SUBCUTANEOUS at 09:11

## 2017-01-06 RX ADMIN — MULTIPLE VITAMINS W/ MINERALS TAB 1 TABLET: TAB at 09:11

## 2017-01-06 RX ADMIN — LORAZEPAM 2 MG: 2 INJECTION INTRAMUSCULAR; INTRAVENOUS at 16:35

## 2017-01-06 RX ADMIN — DEXTROAMPHETAMINE SACCHARATE, AMPHETAMINE ASPARTATE, DEXTROAMPHETAMINE SULFATE AND AMPHETAMINE SULFATE 20 MG: 2.5; 2.5; 2.5; 2.5 TABLET ORAL at 09:17

## 2017-01-06 RX ADMIN — FOLIC ACID 1 MG: 1 TABLET ORAL at 09:11

## 2017-01-06 RX ADMIN — HALOPERIDOL LACTATE 5 MG: 5 INJECTION, SOLUTION INTRAMUSCULAR at 02:11

## 2017-01-06 RX ADMIN — LORAZEPAM 1 MG/HR: 2 INJECTION INTRAMUSCULAR; INTRAVENOUS at 16:31

## 2017-01-06 RX ADMIN — Medication 10 ML: at 06:52

## 2017-01-06 RX ADMIN — SODIUM CHLORIDE 1 MCG/KG/HR: 900 INJECTION, SOLUTION INTRAVENOUS at 22:11

## 2017-01-06 RX ADMIN — FOLIC ACID: 5 INJECTION, SOLUTION INTRAMUSCULAR; INTRAVENOUS; SUBCUTANEOUS at 17:03

## 2017-01-06 RX ADMIN — CHLORDIAZEPOXIDE HYDROCHLORIDE 25 MG: 25 CAPSULE ORAL at 01:22

## 2017-01-06 RX ADMIN — HYDROMORPHONE HYDROCHLORIDE 1 MG: 1 INJECTION, SOLUTION INTRAMUSCULAR; INTRAVENOUS; SUBCUTANEOUS at 14:27

## 2017-01-06 RX ADMIN — DIBASIC SODIUM PHOSPHATE, MONOBASIC POTASSIUM PHOSPHATE AND MONOBASIC SODIUM PHOSPHATE 2 TABLET: 852; 155; 130 TABLET ORAL at 09:11

## 2017-01-06 RX ADMIN — MUPIROCIN: 20 OINTMENT TOPICAL at 17:13

## 2017-01-06 RX ADMIN — WATER 20 MG: 1 INJECTION INTRAMUSCULAR; INTRAVENOUS; SUBCUTANEOUS at 03:12

## 2017-01-06 RX ADMIN — HYDROMORPHONE HYDROCHLORIDE 1 MG: 1 INJECTION, SOLUTION INTRAMUSCULAR; INTRAVENOUS; SUBCUTANEOUS at 09:11

## 2017-01-06 RX ADMIN — HALOPERIDOL LACTATE 5 MG: 5 INJECTION, SOLUTION INTRAMUSCULAR at 12:31

## 2017-01-06 RX ADMIN — DIAZEPAM 10 MG: 5 INJECTION, SOLUTION INTRAMUSCULAR; INTRAVENOUS at 10:06

## 2017-01-06 NOTE — PROGRESS NOTES
Physical Therapy Note:  Attempted to see multiple times today with noted increasing agitation as the day goes on. Nsg reports that she is currently not following commands and is combative. Nsg transferring her to ICU to better control her ETOH withdrawal sx. Will follow up over the weekend.  Barrett Portillo, PT, DPT

## 2017-01-06 NOTE — PROGRESS NOTES
Bedside and Verbal shift change report given to [address] (oncoming nurse) by ZONIA Ruelas RN (offgoing nurse). Report given with SBAR, Kardex, Intake/Output, MAR and Recent Results.

## 2017-01-06 NOTE — PROGRESS NOTES
[facility]  Pulmonary, Critical Care, and Sleep Medicine    Name: Karyn Martino MRN: [id number]   : 1960 Hospital: [facility]    Date: 2017        Critical Care Initial Patient Consult    IMPRESSION:   · Encephalopathy, risk to herself due to ripping out IVs. May need wrist or mitt restraints. · Delirium Tremens; Acute alcohol withdrawal; had 100mg of valium in 24 hrs. · S/p Removal of external fixator and right ankle open reduction internal fixation, had Right Trimalleolar ankle fracture traci, gastrocnemius recession  · Smoker tobacco, THC  · ADHD on Adderall  · Typically drinks 1 bottle of wine per day  · Discussed with Dr. Criss Pozo, nurse, and Pts : Eleazar Stevenson [phone]  · Pt is critically ill, moderate to high risk of decompensation. 35 min CC, EOP. RECOMMENDATIONS:   · Thiamine, goody bag  · Ativan drip, at risk of need to be intubated if not able to maintain airway with sedation. · Monitor for any bleeding  · Will add Nicotine patch  · Pain control  · Monitor and replete Electrolytes  · DVT prophylaxis     Subjective/History: This patient has been seen and evaluated at the request of Dr. Sanjay Herrera for above. Patient is a 49 y.o. female who presented for above. While on the medical floor was noted to have acute alcohol withdrawal. Required over 100mg of valium over the last 24 hrs. The patient is critically ill and can not provide additional history due to Unable to speak. Past Medical History   Diagnosis Date    Hypertension     Ill-defined condition      high cholesterol    Psychiatric disorder      depression, ADHD      Past Surgical History   Procedure Laterality Date    Hx other surgical       colonoscopy    Hx gyn       D&C X2      Prior to Admission medications    Medication Sig Start Date End Date Taking? Authorizing Provider   dextroamphetamine-amphetamine (ADDERALL) 10 mg tablet Take 10 mg by mouth.    Yes Phys Other, MD   Desvenlafaxine (PRISTIQ) 100 mg Tb24 Take 50 mg by mouth. Yes Phys Other, MD   aspirin delayed-release 81 mg tablet Take 81 mg by mouth daily. Yes Phys Other, MD   calcium-vitamin D (OYSTER SHELL) 500 mg(1,250mg) -200 unit per tablet Take 1 Tab by mouth two (2) times daily (with meals). Yes Phys Other, MD   atorvastatin (LIPITOR) 10 mg tablet Take  by mouth daily. Yes Phys Other, MD     Current Facility-Administered Medications   Medication Dose Route Frequency    LORazepam (ATIVAN) 60 mg in 0.9% sodium chloride 60 mL infusion  0.1-4 mg/hr IntraVENous TITRATE    folic acid (FOLVITE) tablet 1 mg  1 mg Oral DAILY    thiamine (B-1) tablet 100 mg  100 mg Oral DAILY    multivitamin, tx-iron-ca-min (THERA-M w/ IRON) tablet 1 Tab  1 Tab Oral DAILY    phosphorus (K PHOS NEUTRAL) 250 mg tablet 2 Tab  2 Tab Oral BID    dextroamphetamine-amphetamine (ADDERALL) tablet 20 mg  20 mg Oral DAILY    Desvenlafaxine SR (PRISTIQ) tablet 100 mg  100 mg Oral DAILY    sodium chloride (NS) flush 5-10 mL  5-10 mL IntraVENous Q8H    enoxaparin (LOVENOX) injection 40 mg  40 mg SubCUTAneous Q24H    0.9% sodium chloride infusion  75 mL/hr IntraVENous CONTINUOUS     Allergies   Allergen Reactions    Pcn [Penicillins] Rash      Social History   Substance Use Topics    Smoking status: Current Every Day Smoker     Packs/day: 0.50    Smokeless tobacco: Never Used    Alcohol use 14.4 oz/week     24 Glasses of wine per week      Family History   Problem Relation Age of Onset    Diabetes Father     Heart Disease Father         Review of Systems:  Review of systems not obtained due to patient factors.     Objective:   Vital Signs:    Visit Vitals    /80 (BP 1 Location: Left arm, BP Patient Position: At rest)    Pulse 95    Temp 98.1 °F (36.7 °C)    Resp 20    Ht 5' 4\" (1.626 m)    Wt 74.8 kg (165 lb)    SpO2 96%    BMI 28.32 kg/m2       O2 Device: Nasal cannula   O2 Flow Rate (L/min): 1 l/min   Temp (24hrs), Av.6 °F (37 °C), Min:98.1 °F (36.7 °C), Max:99.3 °F (37.4 °C)       Intake/Output:   Last shift:      701 -  1900  In: 75 [I.V.:75]  Out: 1250 [Urine:1250]  Last 3 shifts: 1901 -  0700  In: 3981.3 [I.V.:3981.3]  Out: 1600 [Urine:1600]    Intake/Output Summary (Last 24 hours) at 17 1519  Last data filed at 17 1500   Gross per 24 hour   Intake          4056.25 ml   Output             1750 ml   Net          2306.25 ml     Physical Exam:    General:  Sleeping,  no distress, appears stated age. Head:  Normocephalic, without obvious abnormality, atraumatic. Eyes:  Conjunctivae/corneas clear. PERRL, EOMs intact. Nose: Nares normal. Septum midline. Mucosa normal. No drainage or sinus tenderness. Throat: Lips, mucosa, and tongue normal. Teeth and gums normal.   Neck: Supple, symmetrical, trachea midline, no adenopathy, thyroid: no enlargment/tenderness/nodules, no carotid bruit and no JVD. Back:   Symmetric, no curvature. ROM normal.   Lungs:   Clear to auscultation bilaterally. Chest wall:  No tenderness or deformity. Heart:  Regular rate and rhythm, S1, S2 normal, no murmur, click, rub or gallop. Abdomen:   Soft, non-tender. Bowel sounds normal. No masses,  No organomegaly. Extremities: Extremities normal, atraumatic, no cyanosis or edema. Pulses: 2+ and symmetric all extremities. Skin: Skin color, texture, turgor normal. No rashes or lesions   Lymph nodes: Cervical, supraclavicular, and axillary nodes normal.   Neurologic: Grossly nonfocal, lsleeping. Data:   No results found for this or any previous visit (from the past 24 hour(s)). Telemetry:normal sinus rhythm    Imaging:  I have personally reviewed the patients radiographs and have reviewed the reports:  17: CXR: COMPARISON: 2017.     FINDINGS:   A portable AP radiograph of the chest was obtained at 1443 hours. Lines and tubes: The patient is on a cardiac monitor.   Lungs: Lungs are clear except for a band of platelike atelectasis at the left  base. Pleura: There is no pneumothorax or pleural effusion. Mediastinum: The cardiac and mediastinal contours and pulmonary vascularity are  normal.  Bones and soft tissues: The bones and soft tissues are grossly within normal  limits.     IMPRESSION: No acute abnormality.         Total critical care time exclusive of procedures: 35 minutes  Junior Melendez MD

## 2017-01-06 NOTE — PROGRESS NOTES
Hospitalist Consult Progress Note    NAME: Shavon Lama   :  1960   MRN:  [id number]     Hospitalist: Madelyn Herman MD       Assessment / Plan:  Acute encephalopathy due to alcohol withdrawal - received ~100 mg valium in last day despite this continued to be agitated and anxious  -transferred to ICU  -started ativan drip, monitor for respiratory depression  -titrate per CIWA  -seizure precautions    Acute urinary retention post op - resolving, monitor UOP    HTN - not currently on medication  -monitor  -use IV pushes if needed    Depression  ADHD  -home meds    Right trimalleolar ankle fracture, poa now s/p closed reduction and ORIF 17  -ortho managing       Subjective:     Chief Complaint: agitation    Patient with significant agitation pulling at lines and IVs despite CIWA with valium  Transferred to ICU prior to my exam had just received pain medication so calm  But no answering any questions        Review of Systems:  Symptom Y/N Comments  Symptom Y/N Comments   Fever/Chills    Chest Pain     Poor Appetite    Edema     Cough    Abdominal Pain     Sputum    Joint Pain     SOB/FUENTES    Pruritis/Rash     Nausea/vomit    Tolerating PT/OT     Diarrhea    Tolerating Diet     Constipation    Other       Could NOT obtain due to: delirium     Objective:     VITALS:   Last 24hrs VS reviewed since prior progress note.  Most recent are:  Patient Vitals for the past 24 hrs:   Temp Pulse Resp BP SpO2   17 1506 - 95 20 127/80 -   17 1504 - 99 14 - -   17 1102 98.1 °F (36.7 °C) 99 19 (!) 132/106 96 %   17 0727 98.4 °F (36.9 °C) 82 20 (!) 146/94 98 %   17 0355 99.3 °F (37.4 °C) 80 18 116/57 93 %   17 2316 99 °F (37.2 °C) 98 16 111/72 97 %   17 - - - - 95 %   17 1941 98.9 °F (37.2 °C) 99 16 115/72 92 %   17 1539 98.1 °F (36.7 °C) 93 18 (!) 111/94 91 %       Intake/Output Summary (Last 24 hours) at 17 1531  Last data filed at 17 1500   Gross per 24 hour   Intake          4056.25 ml   Output             1750 ml   Net          2306.25 ml        PHYSICAL EXAM:  General: Wakes to voice, not agitated at time of exam  EENT:  EOMI. Anicteric sclerae. Mucous membranes moist  Resp:  CTA bilaterally, no wheezing or rales. No accessory muscle use  CV:  Regular rhythm, no edema  GI:  Soft, non distended, non tender. +Bowel sounds  Neurologic:  Altered, not answering questions, following some commands, hand  intact bilaterally  Psych:   Poor insight, not anxious  Skin:  No rashes, no jaundice    Reviewed most current lab test results and cultures  YES  Reviewed most current radiology test results   YES  Review and summation of old records today   YES  Reviewed patient's current orders and MAR    YES  PMH/SH reviewed - no change compared to H&P  ________________________________________________________________________  Care Plan discussed with:    Comments   Patient x    Family      RN x    Care Manager     Consultant  x intensivist                      Multidiciplinary team rounds were held today with , nursing, pharmacist and clinical coordinator. Patient's plan of care was discussed; medications were reviewed and discharge planning was addressed. ________________________________________________________________________  Total NON critical care TIME:  16   Minutes  ________________________________________________________________________  Win Lopes MD     Procedures: see electronic medical records for all procedures/Xrays and details which were not copied into this note but were reviewed prior to creation of Plan. LABS:  I reviewed today's most current labs and imaging studies.   Pertinent labs include:  Recent Labs      01/05/17   1030  01/04/17 0413   WBC  7.4  11.2*   HGB  9.6*  10.8*   HCT  29.3*  33.6*   PLT  188  162     Recent Labs      01/05/17   1030  01/04/17 0413   NA  141   --    K  3.5   --    CL  106   --    CO2  30   -- GLU  101*   --    BUN  5*  6   CREA  0.64   --    CA  8.0*   --    MG  2.1   --    PHOS  2.4*   --    ALB  2.9*   --    TBILI  1.3*   --    SGOT  22   --    ALT  26   --        Signed: Eve Garrido MD

## 2017-01-06 NOTE — PROGRESS NOTES
Hospitalist cross cover          Hospitalist Progress Note    NAME: German Desai   :  1960   MRN:  [id number]       Interim Hospital Summary: 49 y.o. female whom presented on 2017 with      Assessment / Plan:  Acute delirium/ encephalopathy due to severe alcohol withdrawal  Unable to control symptoms with valium alone. Valium 100 mg total in 24 hr   S/p dose of haldol IM ( pt ripped off IV)   Uncooperative and unable to reorient. --dose of Geodon 20 mg IM now to sedate. Need to establish IV.   -- low threshold for ICU transfer for ativan gt  --cont thiamine /MVT/folic acid   --cont scheduled librium + CIWA      Right trimalleolar ankle fx, POA, s/p closed reduction and ORIF on 17  --management per ortho                  Subjective:     Chief Complaint / Reason for Physician Visit: encephalopathy   Called by RN to evaluate pt with severe agitation. Pt is ripping off soft cast. Uncooperative. Discussed with RN events overnight. Review of Systems:  Symptom Y/N Comments  Symptom Y/N Comments   Fever/Chills    Chest Pain     Poor Appetite    Edema     Cough    Abdominal Pain     Sputum    Joint Pain     SOB/FUENTES    Pruritis/Rash     Nausea/vomit    Tolerating PT/OT     Diarrhea    Tolerating Diet     Constipation    Other       Could NOT obtain due to: Encephalopathy      Objective:     VITALS:   Last 24hrs VS reviewed since prior progress note.  Most recent are:  Patient Vitals for the past 24 hrs:   Temp Pulse Resp BP SpO2   17 2316 99 °F (37.2 °C) 98 16 111/72 97 %   17 - - - - 95 %   17 1941 98.9 °F (37.2 °C) 99 16 115/72 92 %   17 1539 98.1 °F (36.7 °C) 93 18 (!) 111/94 91 %   17 1336 - 71 - 138/75 94 %   17 1104 - 90 18 140/80 90 %   17 0633 98.7 °F (37.1 °C) 89 18 139/84 91 %       Intake/Output Summary (Last 24 hours) at 17 0257  Last data filed at 17 0042   Gross per 24 hour   Intake          3741.25 ml   Output 1150 ml   Net          2591.25 ml        PHYSICAL EXAM:  General: WD, WN. Alert, uncooperative, no acute distress    EENT:  EOMI. Anicteric sclerae. MMM  Resp:  CTA bilaterally, no wheezing or rales. No accessory muscle use  CV:  Regular  rhythm,  No edema  GI:  Soft, Non distended, Non tender.  +Bowel sounds  Neurologic:  Alert and oriented X 0, normal speech,   Psych:   Poor insight. Not anxious nor agitated  Skin:  No rashes. No jaundice  Extr:                 R LE soft cast - ripped off/ + blood stains     Reviewed most current lab test results and cultures  YES  Reviewed most current radiology test results   YES  Review and summation of old records today    NO  Reviewed patient's current orders and MAR    YES  PMH/SH reviewed - no change compared to H&P  ________________________________________________________________________  Care Plan discussed with:    Comments   Patient y    Family      RN y    Care Manager     Consultant                        Multidiciplinary team rounds were held today with , nursing, pharmacist and clinical coordinator. Patient's plan of care was discussed; medications were reviewed and discharge planning was addressed. ________________________________________________________________________  Total NON critical care TIME:   Minutes    Total CRITICAL CARE TIME Spent: 35 Minutes non procedure based  Patient is critically ill and at high risk for further deterioration. Complex decision making was performed which includes reviewing the patient's past medical records, current laboratory results, and actual Xray films. I was immediately available to the patient.           Comments   >50% of visit spent in counseling and coordination of care     ________________________________________________________________________  Kartik Tilley MD     Procedures: see electronic medical records for all procedures/Xrays and details which were not copied into this note but were reviewed prior to creation of Plan. LABS:  I reviewed today's most current labs and imaging studies.   Pertinent labs include:  Recent Labs      01/05/17   1030  01/04/17 0413   WBC  7.4  11.2*   HGB  9.6*  10.8*   HCT  29.3*  33.6*   PLT  188  162     Recent Labs      01/05/17   1030  01/04/17 0413   NA  141   --    K  3.5   --    CL  106   --    CO2  30   --    GLU  101*   --    BUN  5*  6   CREA  0.64   --    CA  8.0*   --    MG  2.1   --    PHOS  2.4*   --    ALB  2.9*   --    TBILI  1.3*   --    SGOT  22   --    ALT  26   --        Signed: Malika Reyes MD

## 2017-01-06 NOTE — PROGRESS NOTES
Pt not responding to medications given today, ciwa score remains high, as pt continues to be combative, hollering, pulling at lines, unable to reorient pt, brant roman, orders received to transfer pt to ccu .

## 2017-01-06 NOTE — PROGRESS NOTES
TSBAR, Kardex, ED Summary, OR Summary, Intake/Output, MAR and Cardiac Rhythm TRANSFER - IN REPORT:    Verbal report received from Malini(name) on [facility] Serve  being received from [place]) for urgent transfer      Report consisted of patients Situation, Background, Assessment and   Recommendations(SBAR). Information from the following report(nsrSBAR, Kardex, ED Summary, OR Summary, Intake/Output, MAR and Cardiac Rhythm nsr was reviewed with the receiving nurse. Opportunity for questions and clarification was provided. Assessment completed upon patients arrival to unit and care assumed. Received patient to unit via bed, assisted to bed in 2538 without difficulty. Bath with Chlorhex wipes given, gown changed. Dentures removed and mouth care given. Sitter at bedside. Cup for dentures obtained. Primary Nurse Kami Rivera RN and Jaky Ball RN performed a dual skin assessment on this patient No impairment noted  Sukhi score is 18.  1515  See assessment. [address]   in followed by Dr. Mckay Quigley. Orders noted. Numbers and pamphlet given for information to , belongings including phone and i pad given and taken home by . 1640  To insert 2nd IV site. Ativan 2mg given after starting gtt at 1mg/hr. Will titrate to safely sedate. 1700  Began kicking, screaming to go home and calling out for . Dr. Mckay Quigley came to bedside, Ativan increased. 1 dose of Diaudid given for comfort at 1732. Precedex also ordered due to yelling and requiring staff to hold patient in bed. 1758  Continues to yell and try to sit up despite ativan at 15mcs/hr and Precedex at 0.5mg.  1810  Was unable to obtain BP due to patient bending arm and uncooperative at 1800. Now resting more quietly without hands holding patient down. [address] applied to perineum for urine collection. Ativan decreased to 10mg/hour. Patient softly snoring and not fighting.   Allows movement of limbs without resistance. 1915  Report given to Opal in room.

## 2017-01-06 NOTE — PROGRESS NOTES
Ortho/ NeuroSurgery NP Note    POD# 3  s/p REMOVAL EXTERNAL FIXATOR AND RIGHT ANKLE OPEN REDUCTION INTERNAL FIXATION, GASTROCNEMIUS RECESSION (Req Large C-Arm, Ti Mini and Small Frag and Linn Landry)      [address] 2050 rounded on the patient and communicated the plan with me. He is aware of goals and plan of care. Pt restless in the bed. Moving all extremities. Dressing on the RLL has been reinforced with a new ACE. Patient reports pain but does not specify. She is answering questions and following simple commands but very restless and distracted. VSS Afebrile. Patient is incontinent of urine. Patient continues to display s/s of ETOH withdrawal.     Lovenox for DVT prophylaxis. Ace removed and fresh one applied to the RLL cast area. Calves soft and supple; No pain with passive stretch  Sensation and motor intact  SCDs for mechanical DVT proph while in bed     PLAN:  1) PT deferred today due to agitation and inability to follow commands. 2) ETOH withdrawal- Continues to worsen. Hospitalist transferring to CCU for closer management. 3) Severe pain- improved. No changes needed. 4) Plan d/c when medically stable.      Luis Eduardo Will NP        Agree with above  Cayla Victoria MD

## 2017-01-06 NOTE — PROGRESS NOTES
After receiving 60mg of iv valium in total thus far since 7am, pt continues to hallucinate, no command following, pulling at lines, attempting to get oob, and being combative, will administer 5mg im haldol and continue to monitor pt

## 2017-01-06 NOTE — PROGRESS NOTES
@ 0 - Spoke with Dr. Lorene Lewis - order received to give Haldol IM x 1 now. Order also received for Haldol 5 mg IM q4 PRN r/t agitation.

## 2017-01-06 NOTE — PROGRESS NOTES
01/06/17 0727   Vital Signs   Temp 98.4 °F (36.9 °C)   Temp Source Axillary   Pulse (Heart Rate) 82   Resp Rate 20   O2 Sat (%) 98 %   Level of Consciousness (!) Confused or Agitated   BP (!) 146/94   MAP (Monitor) 106   MEWS Score 3   Pain 1   Pain Scale 1 Numeric (0 - 10)   Pain Intensity 1 0   Patient Stated Pain Goal 0   Oxygen Therapy   Pulse via Oximetry 100 beats per minute   md aware

## 2017-01-07 LAB
ALBUMIN SERPL BCP-MCNC: 2.6 G/DL (ref 3.5–5)
ALBUMIN/GLOB SERPL: 0.8 {RATIO} (ref 1.1–2.2)
ALP SERPL-CCNC: 65 U/L (ref 45–117)
ALT SERPL-CCNC: 26 U/L (ref 12–78)
ANION GAP BLD CALC-SCNC: 10 MMOL/L (ref 5–15)
AST SERPL W P-5'-P-CCNC: 16 U/L (ref 15–37)
BASOPHILS # BLD AUTO: 0 K/UL (ref 0–0.1)
BASOPHILS # BLD: 0 % (ref 0–1)
BILIRUB SERPL-MCNC: 1.5 MG/DL (ref 0.2–1)
BUN SERPL-MCNC: 4 MG/DL (ref 6–20)
BUN/CREAT SERPL: 6 (ref 12–20)
CALCIUM SERPL-MCNC: 8.2 MG/DL (ref 8.5–10.1)
CHLORIDE SERPL-SCNC: 110 MMOL/L (ref 97–108)
CO2 SERPL-SCNC: 24 MMOL/L (ref 21–32)
CREAT SERPL-MCNC: 0.68 MG/DL (ref 0.55–1.02)
EOSINOPHIL # BLD: 0 K/UL (ref 0–0.4)
EOSINOPHIL NFR BLD: 0 % (ref 0–7)
ERYTHROCYTE [DISTWIDTH] IN BLOOD BY AUTOMATED COUNT: 13 % (ref 11.5–14.5)
GLOBULIN SER CALC-MCNC: 3.2 G/DL (ref 2–4)
GLUCOSE SERPL-MCNC: 127 MG/DL (ref 65–100)
HCT VFR BLD AUTO: 32.2 % (ref 35–47)
HGB BLD-MCNC: 10.6 G/DL (ref 11.5–16)
LYMPHOCYTES # BLD AUTO: 13 % (ref 12–49)
LYMPHOCYTES # BLD: 0.9 K/UL (ref 0.8–3.5)
MAGNESIUM SERPL-MCNC: 2.2 MG/DL (ref 1.6–2.4)
MCH RBC QN AUTO: 30.5 PG (ref 26–34)
MCHC RBC AUTO-ENTMCNC: 32.9 G/DL (ref 30–36.5)
MCV RBC AUTO: 92.5 FL (ref 80–99)
MONOCYTES # BLD: 0.8 K/UL (ref 0–1)
MONOCYTES NFR BLD AUTO: 12 % (ref 5–13)
NEUTS SEG # BLD: 5.2 K/UL (ref 1.8–8)
NEUTS SEG NFR BLD AUTO: 75 % (ref 32–75)
PHOSPHATE SERPL-MCNC: 2.5 MG/DL (ref 2.6–4.7)
PLATELET # BLD AUTO: 179 K/UL (ref 150–400)
POTASSIUM SERPL-SCNC: 3.5 MMOL/L (ref 3.5–5.1)
PROT SERPL-MCNC: 5.8 G/DL (ref 6.4–8.2)
RBC # BLD AUTO: 3.48 M/UL (ref 3.8–5.2)
SODIUM SERPL-SCNC: 144 MMOL/L (ref 136–145)
WBC # BLD AUTO: 6.9 K/UL (ref 3.6–11)

## 2017-01-07 PROCEDURE — 85025 COMPLETE CBC W/AUTO DIFF WBC: CPT | Performed by: INTERNAL MEDICINE

## 2017-01-07 PROCEDURE — 74011000258 HC RX REV CODE- 258: Performed by: INTERNAL MEDICINE

## 2017-01-07 PROCEDURE — 80053 COMPREHEN METABOLIC PANEL: CPT | Performed by: INTERNAL MEDICINE

## 2017-01-07 PROCEDURE — 83735 ASSAY OF MAGNESIUM: CPT | Performed by: INTERNAL MEDICINE

## 2017-01-07 PROCEDURE — 74011000250 HC RX REV CODE- 250: Performed by: INTERNAL MEDICINE

## 2017-01-07 PROCEDURE — 74011250636 HC RX REV CODE- 250/636: Performed by: NURSE PRACTITIONER

## 2017-01-07 PROCEDURE — 51798 US URINE CAPACITY MEASURE: CPT

## 2017-01-07 PROCEDURE — 36415 COLL VENOUS BLD VENIPUNCTURE: CPT | Performed by: INTERNAL MEDICINE

## 2017-01-07 PROCEDURE — 77010033678 HC OXYGEN DAILY

## 2017-01-07 PROCEDURE — 76937 US GUIDE VASCULAR ACCESS: CPT

## 2017-01-07 PROCEDURE — 36569 INSJ PICC 5 YR+ W/O IMAGING: CPT | Performed by: INTERNAL MEDICINE

## 2017-01-07 PROCEDURE — 77030034848

## 2017-01-07 PROCEDURE — 77030018786 HC NDL GD F/USND BARD -B

## 2017-01-07 PROCEDURE — 84100 ASSAY OF PHOSPHORUS: CPT | Performed by: INTERNAL MEDICINE

## 2017-01-07 PROCEDURE — 02HV33Z INSERTION OF INFUSION DEVICE INTO SUPERIOR VENA CAVA, PERCUTANEOUS APPROACH: ICD-10-PCS | Performed by: HOSPITALIST

## 2017-01-07 PROCEDURE — 74011250636 HC RX REV CODE- 250/636: Performed by: INTERNAL MEDICINE

## 2017-01-07 PROCEDURE — 65620000000 HC RM CCU GENERAL

## 2017-01-07 PROCEDURE — 77030018719 HC DRSG PTCH ANTIMIC J&J -A

## 2017-01-07 PROCEDURE — 74011250636 HC RX REV CODE- 250/636: Performed by: ORTHOPAEDIC SURGERY

## 2017-01-07 PROCEDURE — 74011250637 HC RX REV CODE- 250/637: Performed by: INTERNAL MEDICINE

## 2017-01-07 PROCEDURE — C1751 CATH, INF, PER/CENT/MIDLINE: HCPCS

## 2017-01-07 RX ORDER — HEPARIN 100 UNIT/ML
300 SYRINGE INTRAVENOUS AS NEEDED
Status: DISCONTINUED | OUTPATIENT
Start: 2017-01-07 | End: 2017-01-24 | Stop reason: HOSPADM

## 2017-01-07 RX ORDER — SODIUM CHLORIDE 0.9 % (FLUSH) 0.9 %
10-40 SYRINGE (ML) INJECTION EVERY 8 HOURS
Status: DISCONTINUED | OUTPATIENT
Start: 2017-01-07 | End: 2017-01-24 | Stop reason: HOSPADM

## 2017-01-07 RX ORDER — SUCCINYLCHOLINE CHLORIDE 20 MG/ML
INJECTION INTRAMUSCULAR; INTRAVENOUS
Status: DISCONTINUED
Start: 2017-01-07 | End: 2017-01-07

## 2017-01-07 RX ORDER — PROPOFOL 10 MG/ML
INJECTION, EMULSION INTRAVENOUS
Status: DISCONTINUED
Start: 2017-01-07 | End: 2017-01-07

## 2017-01-07 RX ORDER — SODIUM CHLORIDE 0.9 % (FLUSH) 0.9 %
10-30 SYRINGE (ML) INJECTION AS NEEDED
Status: DISCONTINUED | OUTPATIENT
Start: 2017-01-07 | End: 2017-01-24 | Stop reason: HOSPADM

## 2017-01-07 RX ORDER — SODIUM CHLORIDE 0.9 % (FLUSH) 0.9 %
10 SYRINGE (ML) INJECTION EVERY 24 HOURS
Status: DISCONTINUED | OUTPATIENT
Start: 2017-01-07 | End: 2017-01-24 | Stop reason: HOSPADM

## 2017-01-07 RX ORDER — SODIUM CHLORIDE 0.9 % (FLUSH) 0.9 %
10 SYRINGE (ML) INJECTION AS NEEDED
Status: DISCONTINUED | OUTPATIENT
Start: 2017-01-07 | End: 2017-01-24 | Stop reason: HOSPADM

## 2017-01-07 RX ADMIN — Medication 10 ML: at 21:35

## 2017-01-07 RX ADMIN — SODIUM CHLORIDE 100 ML/HR: 450 INJECTION, SOLUTION INTRAVENOUS at 11:42

## 2017-01-07 RX ADMIN — MUPIROCIN: 20 OINTMENT TOPICAL at 17:14

## 2017-01-07 RX ADMIN — FOLIC ACID: 5 INJECTION, SOLUTION INTRAMUSCULAR; INTRAVENOUS; SUBCUTANEOUS at 15:27

## 2017-01-07 RX ADMIN — SODIUM CHLORIDE 0.9 MCG/KG/HR: 900 INJECTION, SOLUTION INTRAVENOUS at 11:56

## 2017-01-07 RX ADMIN — Medication 10 ML: at 05:00

## 2017-01-07 RX ADMIN — HYDROMORPHONE HYDROCHLORIDE 1 MG: 1 INJECTION, SOLUTION INTRAMUSCULAR; INTRAVENOUS; SUBCUTANEOUS at 12:12

## 2017-01-07 RX ADMIN — SODIUM CHLORIDE 100 ML/HR: 450 INJECTION, SOLUTION INTRAVENOUS at 22:43

## 2017-01-07 RX ADMIN — LORAZEPAM 10 MG/HR: 2 INJECTION INTRAMUSCULAR; INTRAVENOUS at 04:55

## 2017-01-07 RX ADMIN — ENOXAPARIN SODIUM 40 MG: 40 INJECTION, SOLUTION INTRAVENOUS; SUBCUTANEOUS at 16:25

## 2017-01-07 RX ADMIN — SODIUM CHLORIDE 0.9 MCG/KG/HR: 900 INJECTION, SOLUTION INTRAVENOUS at 17:45

## 2017-01-07 RX ADMIN — LORAZEPAM 11 MG/HR: 2 INJECTION INTRAMUSCULAR; INTRAVENOUS at 17:20

## 2017-01-07 RX ADMIN — LORAZEPAM 10 MG/HR: 2 INJECTION INTRAMUSCULAR; INTRAVENOUS at 11:41

## 2017-01-07 RX ADMIN — MUPIROCIN: 20 OINTMENT TOPICAL at 08:11

## 2017-01-07 RX ADMIN — LORAZEPAM 11 MG/HR: 2 INJECTION INTRAMUSCULAR; INTRAVENOUS at 21:32

## 2017-01-07 RX ADMIN — HYDROMORPHONE HYDROCHLORIDE 1 MG: 1 INJECTION, SOLUTION INTRAMUSCULAR; INTRAVENOUS; SUBCUTANEOUS at 18:11

## 2017-01-07 RX ADMIN — SODIUM CHLORIDE 0.7 MCG/KG/HR: 900 INJECTION, SOLUTION INTRAVENOUS at 04:56

## 2017-01-07 NOTE — PROGRESS NOTES
1915  Received report from [place], [address] [facility]. Decreased Precedex to 1.0.  2000  Shift assessment completed. Patient is resting comfortably. Awakens to voice and stimulation. Withdraws from pain. Pupils pinpointed. R foot capillary refill < 3sec, toes are warm and pink. Sitter at bedside. Applied SCD to LLE and elevated RLE. 2034  Decreased Ativan to 6mg/hr  2324  Decreased Precedex to 0.7.  0000  Reassessment completed. Patient opens eyes to voice, expresses disgruntlement during mouth care. Goes back to sleep and is resting comfortably with current sedation. Sitter cancelled. 0310  Patient began yelling out, banging on bed rails, swinging broken leg out of the bed. Redirection not successful. Increased Precedex to 1.  0315  Patient continues to yell \"help\" loudly and pulling at lines despite attempts to redirect and calm. Increased Ativan to 10.  0358  Labs sent  0400  Reassessment completed. Patient has settled down, but still wakes and impulsively attempts to get up. 2+ pitting edema in bilateral upper extremities noted. 0440  Changed PureWick wand and cannister. 2311  No urine noted since 0400, PureWick wand dry, cannister empty, no urine on incontinence pad. Bladder scan shows >578mL of urine, paged hospitalist.  Lane Kent returned page, received orders for funk due to retention. 7723  Patient had a 9 beat run Vtach, will continue to monitor, relay to dayshift RN, and place strip on the chart  0615  Funk inserted on the first try with no difficulty. Lesions noted on labia prior to insertion. 0630  Immediate output: 550mL cloudy, malodorous urine. Post void bladder scan: 85mL. 0700  Bedside and Verbal shift change report given to [place] (oncoming nurse) by Esteban Moore (offgoing nurse). Report included the following information SBAR, Kardex, Intake/Output, MAR, Recent Results and Cardiac Rhythm NSR.

## 2017-01-07 NOTE — PROGRESS NOTES
[facility]  Pulmonary, Critical Care, and Sleep Medicine    Name: Peggy Musa MRN: [id number]   : 1960 Hospital: [facility]   Date: 2017        Critical Care Patient Consult    IMPRESSION:   · Encephalopathy, risk to herself due to ripping out IVs. May need wrist or mitt restraints. · Delirium Tremens; Acute alcohol withdrawal; had 100mg of valium in 24 hrs. Requires ongoing moderate to high level of sedation. · S/p Removal of external fixator and right ankle open reduction internal fixation, had Right Trimalleolar ankle fracture traci, gastrocnemius recession  · Smoker tobacco, THC  · ADHD on Adderall  · Typically drinks 1 bottle of wine per day  · Discussed with Dr. Danilo Cardenas, nurse, and Pts : Omar Jackson [phone]  · Discussed with nurse this am.   · Pt is critically ill, moderate to high risk of decompensation. 35 min CC, EOP. RECOMMENDATIONS:   · Thiamine, goody bag  · Ativan drip currently at 10, on precedex, at risk of need to be intubated if not able to maintain airway with sedation. Appears stable at present. · Monitor for any bleeding  · Follow and replete electrolytes  · Nicotine patch  · Pain control meds. · Monitor and replete Electrolytes  · DVT prophylaxis     Subjective/History:     17: Events last evening noted. Resting comfortably this am.     This patient has been seen and evaluated at the request of Dr. Guzman Daniel for above. Patient is a 49 y.o. female who presented for above. While on the medical floor was noted to have acute alcohol withdrawal. Required over 100mg of valium over the last 24 hrs. The patient is critically ill and can not provide additional history due to Unable to speak.      Past Medical History   Diagnosis Date    Hypertension     Ill-defined condition      high cholesterol    Psychiatric disorder      depression, ADHD      Past Surgical History   Procedure Laterality Date    Hx other surgical colonoscopy    Hx gyn       D&C X2      Prior to Admission medications    Medication Sig Start Date End Date Taking? Authorizing Provider   dextroamphetamine-amphetamine (ADDERALL) 10 mg tablet Take 10 mg by mouth. Yes Cece Corley MD   Desvenlafaxine (PRISTIQ) 100 mg Tb24 Take 50 mg by mouth. Yes Cece Corley MD   aspirin delayed-release 81 mg tablet Take 81 mg by mouth daily. Yes Cece Corley MD   calcium-vitamin D (OYSTER SHELL) 500 mg(1,250mg) -200 unit per tablet Take 1 Tab by mouth two (2) times daily (with meals). Yes Cece Corley MD   atorvastatin (LIPITOR) 10 mg tablet Take  by mouth daily. Yes Cece Corley MD     Current Facility-Administered Medications   Medication Dose Route Frequency    LORazepam (ATIVAN) 60 mg in 0.9% sodium chloride 60 mL infusion  1-15 mg/hr IntraVENous TITRATE    mupirocin (BACTROBAN) 2 % ointment   Both Nostrils BID    0.9% sodium chloride 1,000 mL with mvi, adult no. 4 with vit K 10 mL, thiamine 113 mg, folic acid 1 mg infusion   IntraVENous Q24H    nicotine (NICODERM CQ) 14 mg/24 hr patch 1 Patch  1 Patch TransDERmal DAILY    0.45% sodium chloride infusion  100 mL/hr IntraVENous CONTINUOUS    dexmedetomidine (PRECEDEX) 400 mcg in 0.9% sodium chloride 100 mL infusion  0.2-1.4 mcg/kg/hr IntraVENous TITRATE    dextroamphetamine-amphetamine (ADDERALL) tablet 20 mg  20 mg Oral DAILY    Desvenlafaxine SR (PRISTIQ) tablet 100 mg  100 mg Oral DAILY    sodium chloride (NS) flush 5-10 mL  5-10 mL IntraVENous Q8H    enoxaparin (LOVENOX) injection 40 mg  40 mg SubCUTAneous Q24H     Allergies   Allergen Reactions    Pcn [Penicillins] Rash      Social History   Substance Use Topics    Smoking status: Current Every Day Smoker     Packs/day: 0.50    Smokeless tobacco: Never Used    Alcohol use 14.4 oz/week     24 Glasses of wine per week      Family History   Problem Relation Age of Onset    Diabetes Father     Heart Disease Father         Review of Systems:  Review of systems not obtained due to patient factors. Objective:   Vital Signs:    Visit Vitals    /64 (BP 1 Location: Left arm, BP Patient Position: At rest)    Pulse 73    Temp 99.2 °F (37.3 °C)    Resp 25    Ht 5' 4\" (1.626 m)    Wt 78.9 kg (173 lb 15.1 oz)    SpO2 94%    BMI 29.86 kg/m2       O2 Device: Nasal cannula   O2 Flow Rate (L/min): 2 l/min   Temp (24hrs), Av.2 °F (36.8 °C), Min:97.2 °F (36.2 °C), Max:99.2 °F (37.3 °C)       Intake/Output:   Last shift:      701 -  190  In: 123.1 [I.V.:123.1]  Out: 145 [Urine:145]  Last 3 shifts:  190 -  07  In: 6037.7 [I.V.:6037.7]  Out: 2850 [Urine:2850]    Intake/Output Summary (Last 24 hours) at 17 0851  Last data filed at 17 0800   Gross per 24 hour   Intake          2179.59 ml   Output             2445 ml   Net          -265.41 ml     Physical Exam:    General:  Sleeping,  no distress, appears stated age. Head:  Normocephalic, without obvious abnormality, atraumatic. Eyes:  Conjunctivae/corneas clear. PERRL, EOMs intact. Nose: Nares normal. Septum midline. Mucosa normal. No drainage or sinus tenderness. Throat: Lips, mucosa, and tongue normal. Teeth and gums normal.   Neck: Supple, symmetrical, trachea midline, no adenopathy, thyroid: no enlargment/tenderness/nodules, no carotid bruit and no JVD. Back:   Symmetric, no curvature. ROM normal.   Lungs:   Clear to auscultation bilaterally. Chest wall:  No tenderness or deformity. Heart:  Regular rate and rhythm, S1, S2 normal, no murmur, click, rub or gallop. Abdomen:   Soft, non-tender. Bowel sounds normal. No masses,  No organomegaly. Extremities: Extremities normal, atraumatic, no cyanosis or edema. Pulses: 2+ and symmetric all extremities. Skin: Skin color, texture, turgor normal. No rashes or lesions   Lymph nodes: Cervical, supraclavicular, and axillary nodes normal.   Neurologic: Grossly nonfocal, sleeping.  Moves all extremities spontaneously. Data:     Recent Results (from the past 24 hour(s))   CBC WITH AUTOMATED DIFF    Collection Time: 01/07/17  3:55 AM   Result Value Ref Range    WBC 6.9 3.6 - 11.0 K/uL    RBC 3.48 (L) 3.80 - 5.20 M/uL    HGB 10.6 (L) 11.5 - 16.0 g/dL    HCT 32.2 (L) 35.0 - 47.0 %    MCV 92.5 80.0 - 99.0 FL    MCH 30.5 26.0 - 34.0 PG    MCHC 32.9 30.0 - 36.5 g/dL    RDW 13.0 11.5 - 14.5 %    PLATELET 958 566 - 566 K/uL    NEUTROPHILS 75 32 - 75 %    LYMPHOCYTES 13 12 - 49 %    MONOCYTES 12 5 - 13 %    EOSINOPHILS 0 0 - 7 %    BASOPHILS 0 0 - 1 %    ABS. NEUTROPHILS 5.2 1.8 - 8.0 K/UL    ABS. LYMPHOCYTES 0.9 0.8 - 3.5 K/UL    ABS. MONOCYTES 0.8 0.0 - 1.0 K/UL    ABS. EOSINOPHILS 0.0 0.0 - 0.4 K/UL    ABS. BASOPHILS 0.0 0.0 - 0.1 K/UL   METABOLIC PANEL, COMPREHENSIVE    Collection Time: 01/07/17  3:55 AM   Result Value Ref Range    Sodium 144 136 - 145 mmol/L    Potassium 3.5 3.5 - 5.1 mmol/L    Chloride 110 (H) 97 - 108 mmol/L    CO2 24 21 - 32 mmol/L    Anion gap 10 5 - 15 mmol/L    Glucose 127 (H) 65 - 100 mg/dL    BUN 4 (L) 6 - 20 MG/DL    Creatinine 0.68 0.55 - 1.02 MG/DL    BUN/Creatinine ratio 6 (L) 12 - 20      GFR est AA >60 >60 ml/min/1.73m2    GFR est non-AA >60 >60 ml/min/1.73m2    Calcium 8.2 (L) 8.5 - 10.1 MG/DL    Bilirubin, total 1.5 (H) 0.2 - 1.0 MG/DL    ALT 26 12 - 78 U/L    AST 16 15 - 37 U/L    Alk.  phosphatase 65 45 - 117 U/L    Protein, total 5.8 (L) 6.4 - 8.2 g/dL    Albumin 2.6 (L) 3.5 - 5.0 g/dL    Globulin 3.2 2.0 - 4.0 g/dL    A-G Ratio 0.8 (L) 1.1 - 2.2     MAGNESIUM    Collection Time: 01/07/17  3:55 AM   Result Value Ref Range    Magnesium 2.2 1.6 - 2.4 mg/dL   PHOSPHORUS    Collection Time: 01/07/17  3:55 AM   Result Value Ref Range    Phosphorus 2.5 (L) 2.6 - 4.7 MG/DL             Telemetry:normal sinus rhythm    Imaging:  I have personally reviewed the patients radiographs and have reviewed the reports:  1-5-17: CXR: COMPARISON: 1/2/2017.     FINDINGS:   A portable AP radiograph of the chest was obtained at 1443 hours. Lines and tubes: The patient is on a cardiac monitor. Lungs: Lungs are clear except for a band of platelike atelectasis at the left  base. Pleura: There is no pneumothorax or pleural effusion. Mediastinum: The cardiac and mediastinal contours and pulmonary vascularity are  normal.  Bones and soft tissues: The bones and soft tissues are grossly within normal  limits.     IMPRESSION: No acute abnormality.         Total critical care time exclusive of procedures: 35 minutes  Iwona Walls MD

## 2017-01-07 NOTE — PROGRESS NOTES
Hospitalist Consult Progress Note    NAME: Alon Jara   :  1960   MRN:  [id number]     Hospitalist: Terri Prado MD       Assessment / Plan:  Acute encephalopathy due to alcohol withdrawal - received ~100 mg (-) valium, despite this continued to be agitated and anxious  -transferred to ICU on   -wean ativan and precedex drip as tolerated monitor for respiratory depression  -titrate per CIWA  -seizure precautions  -plan for PICC    Acute urinary retention post op - monitor UOP    HTN - not currently on medication, normotensive here  -monitor  -use IV pushes if needed    Depression  ADHD  -home meds    Right trimalleolar ankle fracture, poa now s/p closed reduction and ORIF 17  -ortho managing       Subjective:     Chief Complaint: agitation    Some agitation this am but currently calm and sleeping  Difficult to wake up, no acute distress  No resp depression        Review of Systems:  Symptom Y/N Comments  Symptom Y/N Comments   Fever/Chills    Chest Pain     Poor Appetite    Edema     Cough    Abdominal Pain     Sputum    Joint Pain     SOB/FUENTES    Pruritis/Rash     Nausea/vomit    Tolerating PT/OT     Diarrhea    Tolerating Diet     Constipation    Other       Could NOT obtain due to: sedated     Objective:     VITALS:   Last 24hrs VS reviewed since prior progress note.  Most recent are:  Patient Vitals for the past 24 hrs:   Temp Pulse Resp BP SpO2   17 1440 - 67 20 149/86 94 %   17 1400 - 69 19 - 96 %   17 1300 - 69 18 115/67 95 %   17 1230 - 69 19 124/66 95 %   17 1215 - 74 25 118/71 96 %   17 1145 98.5 °F (36.9 °C) 68 22 - 94 %   17 1115 - 69 19 - 94 %   17 1100 - 69 22 122/72 94 %   17 1030 - 69 24 117/71 95 %   17 1000 - 71 22 113/63 94 %   17 0900 - 73 23 121/74 95 %   17 0800 99.2 °F (37.3 °C) 73 25 114/64 94 %   17 0700 - 75 27 110/69 93 %   17 0600 - 75 25 125/74 94 %   01/07/17 0500 - 76 23 115/67 93 %   01/07/17 0400 98.3 °F (36.8 °C) 77 24 121/68 94 %   01/07/17 0300 - 75 23 121/73 96 %   01/07/17 0200 - 73 24 120/72 99 %   01/07/17 0100 - 70 24 117/71 94 %   01/07/17 0000 97.2 °F (36.2 °C) 72 23 111/65 92 %   01/06/17 2300 - 73 22 108/63 93 %   01/06/17 2200 - 74 21 107/67 92 %   01/06/17 2100 - 75 20 104/65 92 %   01/06/17 2000 97.8 °F (36.6 °C) 76 19 111/64 95 %   01/06/17 1900 - 76 17 106/59 94 %   01/06/17 1850 - 77 18 - 95 %   01/06/17 1810 - 87 19 121/77 93 %   01/06/17 1800 - (!) 105 27 - -   01/06/17 1700 - 86 18 113/68 96 %   01/06/17 1645 - 87 16 - -   01/06/17 1630 - 88 16 - 94 %   01/06/17 1600 - 87 16 115/68 93 %   01/06/17 1558 - 88 16 - 93 %   01/06/17 1532 - 87 16 - 91 %   01/06/17 1506 98.6 °F (37 °C) 95 20 127/80 93 %   01/06/17 1504 - 99 14 - -       Intake/Output Summary (Last 24 hours) at 01/07/17 1447  Last data filed at 01/07/17 1435   Gross per 24 hour   Intake          2927.17 ml   Output             2425 ml   Net           502.17 ml        PHYSICAL EXAM:  General: Sleeping today, not waking up to voice  EENT:  EOMI. Anicteric sclerae. Mucous membranes moist  Resp:  CTA bilaterally, no wheezing or rales. No accessory muscle use, no resp depression  CV:  Regular rhythm, no edema  GI:  Soft, non distended, non tender.  +Bowel sounds  Neurologic:  Altered, not answering questions, sleeping  Psych:   Poor insight, not anxious  Skin:  No rashes, no jaundice    Reviewed most current lab test results and cultures  YES  Reviewed most current radiology test results   YES  Review and summation of old records today   NO  Reviewed patient's current orders and MAR    YES  PMH/SH reviewed - no change compared to H&P  ________________________________________________________________________  Care Plan discussed with:    Comments   Patient x    Family      RN     Care Manager     Consultant                         Multidiciplinary team rounds were held today with , nursing, pharmacist and clinical coordinator. Patient's plan of care was discussed; medications were reviewed and discharge planning was addressed. ________________________________________________________________________  Total NON critical care TIME:  16   Minutes  ________________________________________________________________________  Win Lopes MD     Procedures: see electronic medical records for all procedures/Xrays and details which were not copied into this note but were reviewed prior to creation of Plan. LABS:  I reviewed today's most current labs and imaging studies.   Pertinent labs include:  Recent Labs      01/07/17 0355 01/05/17   1030   WBC  6.9  7.4   HGB  10.6*  9.6*   HCT  32.2*  29.3*   PLT  179  188     Recent Labs      01/07/17 0355  01/05/17   1030   NA  144  141   K  3.5  3.5   CL  110*  106   CO2  24  30   GLU  127*  101*   BUN  4*  5*   CREA  0.68  0.64   CA  8.2*  8.0*   MG  2.2  2.1   PHOS  2.5*  2.4*   ALB  2.6*  2.9*   TBILI  1.5*  1.3*   SGOT  16  22   ALT  26  26       Signed: Win Lopes MD

## 2017-01-07 NOTE — PROGRESS NOTES
Orthopedic NP Progress Note  Post Op day: 4 Days Post-Op    January 7, 2017 8:21 AM     Alex Soto    Attending Physician: Treatment Team: Attending Provider: Linn Lira. Hector Allen MD; Consulting Provider: Frank Velasco MD     Vital Signs:    Patient Vitals for the past 8 hrs:   BP Temp Pulse Resp SpO2 Weight   01/07/17 0800 - 99.2 °F (37.3 °C) 73 25 94 % -   01/07/17 0700 110/69 - 75 27 93 % -   01/07/17 0600 125/74 - 75 25 94 % -   01/07/17 0500 115/67 - 76 23 93 % -   01/07/17 0400 121/68 98.3 °F (36.8 °C) 77 24 94 % -   01/07/17 0300 121/73 - 75 23 96 % -   01/07/17 0200 120/72 - 73 24 99 % -   01/07/17 0100 117/71 - 70 24 94 % 78.9 kg (173 lb 15.1 oz)          Intake/Output:  01/07 0701 - 01/07 1900  In: 123.1 [I.V.:123.1]  Out: 145 [Urine:145]  01/05 1901 - 01/07 0700  In: 6037.7 [I.V.:6037.7]  Out: 2850 [Urine:2850]    Pain Control:   Pain Assessment  Pain Scale 1: Adult Nonverbal Pain Scale  Pain Intensity 1: 0  Pain Location 1: Ankle  Pain Orientation 1: Right  Pain Description 1: Aching  Pain Intervention(s) 1: Medication (see MAR)    LAB:    Recent Labs      01/07/17   0355   HCT  32.2*   HGB  10.6*     Lab Results   Component Value Date/Time    Sodium 144 01/07/2017 03:55 AM    Potassium 3.5 01/07/2017 03:55 AM    Chloride 110 01/07/2017 03:55 AM    CO2 24 01/07/2017 03:55 AM    Glucose 127 01/07/2017 03:55 AM    BUN 4 01/07/2017 03:55 AM    Creatinine 0.68 01/07/2017 03:55 AM    Calcium 8.2 01/07/2017 03:55 AM       Subjective:  Alex Soto is a 49 y.o. female s/p a  Procedure(s):  REMOVAL EXTERNAL FIXATOR AND RIGHT ANKLE OPEN REDUCTION INTERNAL FIXATION, GASTROCNEMIUS RECESSION (Req Large C-Arm, Ti Mini and Small Frag and Tong Clamps)   Procedure(s):  REMOVAL EXTERNAL FIXATOR AND RIGHT ANKLE OPEN REDUCTION INTERNAL FIXATION, GASTROCNEMIUS RECESSION (Req Large C-Arm, Ti Mini and Small Frag and Tong Clamps). Tolerating diet.  Resting quietly at this time      Objective: General: alert, cooperative, no distress. Neuro/Vascular: CNS Intact. Sensation stable. Brisk cap refill, 2+ pulses UE/LE  Musculoskeletal:  +ROM UE/LLE with short leg splint to RLE. Benedict's sign negative in bilateral lower extremities. Calves soft, supple, non-tender upon palpation or with passive stretch. Skin: Incision - clean, dry and intact. No significant erythema or swelling.     Dressing: clean, dry, and intact    [place] - y  Drain - n       PT/OT:   Gait:  Gait  Base of Support: Shift to left  Step Length: Left shortened  Gait Abnormalities: Antalgic, Decreased step clearance, Path deviations (hop method)  Ambulation - Level of Assistance: Minimal assistance  Distance (ft): 50 Feet (ft)  Assistive Device: Gait belt, Walker, rolling                   Assessment:    s/p Procedure(s):  REMOVAL EXTERNAL FIXATOR AND RIGHT ANKLE OPEN REDUCTION INTERNAL FIXATION, GASTROCNEMIUS RECESSION (Req Large C-Arm, Ti Mini and Small Frag and Tong Clamps)    Active Problems:    Closed trimalleolar fracture of right ankle (1/2/2017)      Closed right trimalleolar fracture (1/2/2017)         Plan:     -  Continue PT/OT - NWB RLE, when able   -  Continue established methods of pain control  -  VTE Prophylaxes - TEDS &/or SCDs with lovenox   -  ETOH withdrawal/ encephalopathy  -  Management with Ativan drip as per medicine    -  Aware of nicotine patch, will monitor      Pt seen with Dr. Kenia Murphy By: Jhonny Alves NP    Orthopedic Nurse Practitioner

## 2017-01-07 NOTE — PROGRESS NOTES
Chart reviewed. Spoke with RN who states that pt is not appropriate for participation with therapy this date as pt is experiencing DTs/alcohol withdrawal and currently on high level of sedation. Will hold this date however continue to follow. Thank you    Bethanie Marinelli.  LEON HongT

## 2017-01-07 NOTE — OP NOTES
Preoperative Diagnosis:  Right lower extremity:  1. Trimalleolar Ankle fracture  2. Status post temporary multiplanar external fixator placement  3. Gastrocnemius equinus    Postoperative Diagnosis:  Same    Procedure:  1. Open reduction internal fixation trimalleolar ankle fracture (81067)  2. Gastrocnemius Recession (04916)  3. Removal of multiplanar external fixator under general anesthesia (75499)    Surgeon:   Dewayne Royal MD    Anesthesia:  General    EBL:  50 cc    Complications:  none    Specimen:  none    Implants:  Ti lateral distal fibular locking plate  It 1/3 tubular plate  Ti 3.5 mm screws     HPI/Indication:  50 yo F with h/o unstable trimalleolar ankle fracture/dislocation. Multiple closed reduction attempts were made unsuccesfully. This necessitated operative placement of a multiplanar external fixator by Dr. Shu Ochoa until definitive fixation could be safely performed. This is the second stage of a planned staged procedure for fixation of the patient's fracture. Procedure: The patient arrived at the surgical center. Consent was obtained for the surgical procedure, as well as the Anesthetic. The correct surgical limb was identified and marked by the patient and myself. The patient was then brought to the Operating suite, where a time-out was performed which identified the patient's identity, the surgical site, and the surgical procedure. The patient was prepped and draped in standard, sterile fashion. Once completely draped, another time-out was performed identifying all pertinent information. A thigh tourniquet was inflated to 250 mmHg. The fibula was identified and a longitudinal incision was made off the posterior aspect of the peroneals. A posterolateral approach was made to gain access to both the fibula and the posterior malleolus, working on either side of the peroneals.  Near the distal fibula, care was taken to not extend the incision past the distal tip of the fibula. The sural nerve was protected in the posterior flap. The fibular fracture was identified. Hematoma was debrided from the fracture. The fibula was found to have significant posterior comminution. Sequential use of point to point and lobster claw reduction clamps were used to gain direct cortical contact between the proximal and distal fracture fragments. K-wires were used to provisionally hold the fracture reduction. C-arm was then brought in to confirm anatomic reduction, looking at fibular length, rotation reestablishment of the radiographic tibiofibular Shenton's line and fracture apposition. Lag screws were placed across the fracture. A Ti distal fibular locking plate was then chosen and applied in standard fashion.     Attention was then turned to the posterior malleolar fracture. Due to the patients posterior displacement, the decision to fix the posterior malleolus was made. Lateral X-rays confirmed reduction of the rather sizeable posterior malleolar fracture. A provisional K wire was placed from posterior to anterior. A 1/3 tubular plate was cut at the distal hole to create a hook construct. The plate was then contoured and placed over the K wire in an antiglide manner. The plate was secured to the posterior tibia, obtaining anatomic reduction. Attention was then turned to the medial malleolar fracture. A longitudinal curvilinear incision was placed over the medial malleolus. Soft tissue was dissected to bone, protecting the saphenous structures anteriorly. The fracture was identified and interposed soft tissue was debrided. A fracture reduction pick was then used to hold the medial malleolar reduction. The fracture was then fixed with two 3.5 mm screws. Fluoroscopy verified placement of hardware and anatomic alignment. An intraoperative stress examination was performed (Cotton test), which demonstrated stability at the syndesmosis.  Thus, the placement of syndesmotic screws were not performed. The wounds were irrigated and closed in a layered fashion. The thigh tourniquet was deflated. A sterile dressing of xeroform, 4x4s and sterile webril was applied. Drapes were then removed and a plaster splint was applied.       Plan:  - Weightbearing: elevate foot and non-weight bearing operative lower extremity  - Keep dressing clean and dry  - Elevate extremity just above level of heart  - Pain control  - DVT prophylaxis  - Dispo: admit for pain control and placement
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
## 2017-01-07 NOTE — PROGRESS NOTES
PICC (Peripherally Inserted Central Catheter) line insertion  procedure note :     Procedure explained to patient's  along with risks and benefits  and patient's  agreed to proceed. Informed consent obtained from  Patient's . Pt is drowsy and has AMS. Patient teaching completed. Timeout completed. Pre-procedure assessment done. Maximum sterile barrier precautions observed throughout procedure. Rt arm is very edematous. So , attempted left arm . Lidocaine 1%  3.0    ml sq given prior to cannulation. Cannulated basilic  vein using ultrasound guidance and modified seldinger technique. Inserted 6  [place] triple  lumen PICC to left arm using Fatwire Tip Location System and  [address]. Pt has    sinus   rhythm. PICC tip location was confirmed by 3 CG tip positioning system, indicating tall P wave and no negative deflection before P wave which would indicate that the PICC tip is properly placed in the distal SVC or at the [place]. PICC tip location was  confirmed by 2 PICC nurses and 3CG printout placed on patient's chart. Blood return verified and flushed with 20 ml normal saline in each port. Sterile dressing applied with biopatch, statLock and occlusive dressing as per protocol. Curos caps applied to each port. Patient tolerated procedure well with minimal blood loss ( less than 5 ml.)   Patient education material provided. PICC procedure performed by  :  Micheline Cerna RN. PICC nurse  Assisted by : Shanice Blue RN  PICC nurse  Reason for access : / Poor vascular access / Vesicant IV medication infusion / hemodynamically unstable  Complications related to insertion  : none  X-Ray : not applicable  Notified primary nurse  Selina Bergeron RN  that  PICC line can be used.    Total Trimmed Length :  41   cm   External Length : 0  cm   PICC line site arm circumference:  29    cm   PICC catheter occupies  19   % of vein  Type of PICC: Bard Solo Power PICC Ref # : [id number]     Lot # :  [id number]   Expiration Date :    2018-02-28     Gato Buchanan RN. BSN. RASHAWN,CMSRN. Clinician IV .  PICC Nurse, Vascular Access Team.

## 2017-01-08 ENCOUNTER — APPOINTMENT (OUTPATIENT)
Dept: GENERAL RADIOLOGY | Age: 57
DRG: 492 | End: 2017-01-08
Attending: EMERGENCY MEDICINE
Payer: COMMERCIAL

## 2017-01-08 ENCOUNTER — APPOINTMENT (OUTPATIENT)
Dept: GENERAL RADIOLOGY | Age: 57
DRG: 492 | End: 2017-01-08
Attending: INTERNAL MEDICINE
Payer: COMMERCIAL

## 2017-01-08 LAB
ALBUMIN SERPL BCP-MCNC: 2.3 G/DL (ref 3.5–5)
ALBUMIN/GLOB SERPL: 0.8 {RATIO} (ref 1.1–2.2)
ALP SERPL-CCNC: 56 U/L (ref 45–117)
ALT SERPL-CCNC: 19 U/L (ref 12–78)
ANION GAP BLD CALC-SCNC: 9 MMOL/L (ref 5–15)
ARTERIAL PATENCY WRIST A: YES
ARTERIAL PATENCY WRIST A: YES
AST SERPL W P-5'-P-CCNC: 8 U/L (ref 15–37)
BASE DEFICIT BLDA-SCNC: 6.9 MMOL/L
BASE DEFICIT BLDA-SCNC: 9.6 MMOL/L
BASOPHILS # BLD AUTO: 0 K/UL (ref 0–0.1)
BASOPHILS # BLD: 0 % (ref 0–1)
BDY SITE: ABNORMAL
BDY SITE: ABNORMAL
BILIRUB SERPL-MCNC: 0.7 MG/DL (ref 0.2–1)
BUN SERPL-MCNC: 4 MG/DL (ref 6–20)
BUN/CREAT SERPL: 7 (ref 12–20)
CALCIUM SERPL-MCNC: 7.9 MG/DL (ref 8.5–10.1)
CHLORIDE SERPL-SCNC: 112 MMOL/L (ref 97–108)
CO2 SERPL-SCNC: 23 MMOL/L (ref 21–32)
CREAT SERPL-MCNC: 0.59 MG/DL (ref 0.55–1.02)
EOSINOPHIL # BLD: 0 K/UL (ref 0–0.4)
EOSINOPHIL NFR BLD: 0 % (ref 0–7)
EPAP/CPAP/PEEP, PAPEEP: 8
ERYTHROCYTE [DISTWIDTH] IN BLOOD BY AUTOMATED COUNT: 13 % (ref 11.5–14.5)
FIO2 ON VENT: 100 %
GAS FLOW.O2 O2 DELIVERY SYS: 2 L/MIN
GAS FLOW.O2 SETTING OXYMISER: 12 L/MIN
GLOBULIN SER CALC-MCNC: 2.8 G/DL (ref 2–4)
GLUCOSE SERPL-MCNC: 135 MG/DL (ref 65–100)
HCO3 BLDA-SCNC: 14 MMOL/L (ref 22–26)
HCO3 BLDA-SCNC: 16 MMOL/L (ref 22–26)
HCT VFR BLD AUTO: 28.6 % (ref 35–47)
HGB BLD-MCNC: 9.7 G/DL (ref 11.5–16)
LYMPHOCYTES # BLD AUTO: 20 % (ref 12–49)
LYMPHOCYTES # BLD: 1.1 K/UL (ref 0.8–3.5)
MAGNESIUM SERPL-MCNC: 2.1 MG/DL (ref 1.6–2.4)
MCH RBC QN AUTO: 31.3 PG (ref 26–34)
MCHC RBC AUTO-ENTMCNC: 33.9 G/DL (ref 30–36.5)
MCV RBC AUTO: 92.3 FL (ref 80–99)
MONOCYTES # BLD: 0.6 K/UL (ref 0–1)
MONOCYTES NFR BLD AUTO: 10 % (ref 5–13)
NEUTS SEG # BLD: 3.8 K/UL (ref 1.8–8)
NEUTS SEG NFR BLD AUTO: 70 % (ref 32–75)
PCO2 BLDA: 24 MMHG (ref 35–45)
PCO2 BLDA: 27 MMHG (ref 35–45)
PH BLDA: 7.37 [PH] (ref 7.35–7.45)
PH BLDA: 7.4 [PH] (ref 7.35–7.45)
PHOSPHATE SERPL-MCNC: 2.7 MG/DL (ref 2.6–4.7)
PLATELET # BLD AUTO: 197 K/UL (ref 150–400)
PO2 BLDA: 49 MMHG (ref 80–100)
PO2 BLDA: 59 MMHG (ref 80–100)
POTASSIUM SERPL-SCNC: 3.4 MMOL/L (ref 3.5–5.1)
PROT SERPL-MCNC: 5.1 G/DL (ref 6.4–8.2)
RBC # BLD AUTO: 3.1 M/UL (ref 3.8–5.2)
SAO2 % BLD: 85 % (ref 92–97)
SAO2 % BLD: 91 % (ref 92–97)
SAO2% DEVICE SAO2% SENSOR NAME: ABNORMAL
SAO2% DEVICE SAO2% SENSOR NAME: ABNORMAL
SERVICE CMNT-IMP: ABNORMAL
SODIUM SERPL-SCNC: 144 MMOL/L (ref 136–145)
SPECIMEN SITE: ABNORMAL
SPECIMEN SITE: ABNORMAL
VENTILATION MODE VENT: ABNORMAL
VT SETTING VENT: 450 ML
WBC # BLD AUTO: 5.4 K/UL (ref 3.6–11)

## 2017-01-08 PROCEDURE — 71010 XR CHEST PORT: CPT

## 2017-01-08 PROCEDURE — 94002 VENT MGMT INPAT INIT DAY: CPT

## 2017-01-08 PROCEDURE — 36600 WITHDRAWAL OF ARTERIAL BLOOD: CPT | Performed by: INTERNAL MEDICINE

## 2017-01-08 PROCEDURE — 74011250636 HC RX REV CODE- 250/636: Performed by: ORTHOPAEDIC SURGERY

## 2017-01-08 PROCEDURE — 74011000258 HC RX REV CODE- 258: Performed by: INTERNAL MEDICINE

## 2017-01-08 PROCEDURE — 84100 ASSAY OF PHOSPHORUS: CPT | Performed by: INTERNAL MEDICINE

## 2017-01-08 PROCEDURE — 80053 COMPREHEN METABOLIC PANEL: CPT | Performed by: INTERNAL MEDICINE

## 2017-01-08 PROCEDURE — 85025 COMPLETE CBC W/AUTO DIFF WBC: CPT | Performed by: INTERNAL MEDICINE

## 2017-01-08 PROCEDURE — 74011250636 HC RX REV CODE- 250/636: Performed by: INTERNAL MEDICINE

## 2017-01-08 PROCEDURE — 74011000250 HC RX REV CODE- 250: Performed by: EMERGENCY MEDICINE

## 2017-01-08 PROCEDURE — 74011000250 HC RX REV CODE- 250: Performed by: INTERNAL MEDICINE

## 2017-01-08 PROCEDURE — 83735 ASSAY OF MAGNESIUM: CPT | Performed by: INTERNAL MEDICINE

## 2017-01-08 PROCEDURE — 5A1955Z RESPIRATORY VENTILATION, GREATER THAN 96 CONSECUTIVE HOURS: ICD-10-PCS | Performed by: EMERGENCY MEDICINE

## 2017-01-08 PROCEDURE — 36592 COLLECT BLOOD FROM PICC: CPT

## 2017-01-08 PROCEDURE — 74011250637 HC RX REV CODE- 250/637: Performed by: INTERNAL MEDICINE

## 2017-01-08 PROCEDURE — 74011250636 HC RX REV CODE- 250/636: Performed by: NURSE PRACTITIONER

## 2017-01-08 PROCEDURE — 82803 BLOOD GASES ANY COMBINATION: CPT | Performed by: INTERNAL MEDICINE

## 2017-01-08 PROCEDURE — 74011000250 HC RX REV CODE- 250: Performed by: HOSPITALIST

## 2017-01-08 PROCEDURE — 0BH17EZ INSERTION OF ENDOTRACHEAL AIRWAY INTO TRACHEA, VIA NATURAL OR ARTIFICIAL OPENING: ICD-10-PCS | Performed by: EMERGENCY MEDICINE

## 2017-01-08 PROCEDURE — 36415 COLL VENOUS BLD VENIPUNCTURE: CPT | Performed by: INTERNAL MEDICINE

## 2017-01-08 PROCEDURE — 74011000250 HC RX REV CODE- 250

## 2017-01-08 PROCEDURE — 74011000258 HC RX REV CODE- 258: Performed by: HOSPITALIST

## 2017-01-08 PROCEDURE — 77030021678 HC GLIDESCP STAT DISP VERT -B

## 2017-01-08 PROCEDURE — 65620000000 HC RM CCU GENERAL

## 2017-01-08 PROCEDURE — 77030008683 HC TU ET CUF COVD -A

## 2017-01-08 PROCEDURE — 77010033678 HC OXYGEN DAILY

## 2017-01-08 RX ORDER — PROPOFOL 10 MG/ML
INJECTION, EMULSION INTRAVENOUS
Status: DISPENSED
Start: 2017-01-08 | End: 2017-01-09

## 2017-01-08 RX ORDER — VECURONIUM BROMIDE FOR INJECTION 1 MG/ML
INJECTION, POWDER, LYOPHILIZED, FOR SOLUTION INTRAVENOUS
Status: DISPENSED
Start: 2017-01-08 | End: 2017-01-09

## 2017-01-08 RX ORDER — ETOMIDATE 2 MG/ML
20 INJECTION INTRAVENOUS ONCE
Status: COMPLETED | OUTPATIENT
Start: 2017-01-08 | End: 2017-01-08

## 2017-01-08 RX ORDER — WATER FOR INJECTION,STERILE
VIAL (ML) INJECTION
Status: DISPENSED
Start: 2017-01-08 | End: 2017-01-09

## 2017-01-08 RX ORDER — VECURONIUM BROMIDE FOR INJECTION 1 MG/ML
8 INJECTION, POWDER, LYOPHILIZED, FOR SOLUTION INTRAVENOUS ONCE
Status: ACTIVE | OUTPATIENT
Start: 2017-01-08 | End: 2017-01-09

## 2017-01-08 RX ORDER — SUCCINYLCHOLINE CHLORIDE 20 MG/ML
INJECTION INTRAMUSCULAR; INTRAVENOUS
Status: DISPENSED
Start: 2017-01-08 | End: 2017-01-09

## 2017-01-08 RX ORDER — PROPOFOL 10 MG/ML
5-50 VIAL (ML) INTRAVENOUS
Status: DISCONTINUED | OUTPATIENT
Start: 2017-01-08 | End: 2017-01-18

## 2017-01-08 RX ORDER — ETOMIDATE 2 MG/ML
INJECTION INTRAVENOUS
Status: DISPENSED
Start: 2017-01-08 | End: 2017-01-09

## 2017-01-08 RX ORDER — POTASSIUM CHLORIDE 29.8 MG/ML
20 INJECTION INTRAVENOUS
Status: COMPLETED | OUTPATIENT
Start: 2017-01-08 | End: 2017-01-08

## 2017-01-08 RX ADMIN — LORAZEPAM 10 MG/HR: 2 INJECTION INTRAMUSCULAR; INTRAVENOUS at 12:45

## 2017-01-08 RX ADMIN — SODIUM CHLORIDE 0.9 MCG/KG/HR: 900 INJECTION, SOLUTION INTRAVENOUS at 06:34

## 2017-01-08 RX ADMIN — PROPOFOL 10 MCG/KG/MIN: 10 INJECTION, EMULSION INTRAVENOUS at 19:49

## 2017-01-08 RX ADMIN — POTASSIUM CHLORIDE 20 MEQ: 400 INJECTION, SOLUTION INTRAVENOUS at 10:58

## 2017-01-08 RX ADMIN — HYDROMORPHONE HYDROCHLORIDE 1 MG: 1 INJECTION, SOLUTION INTRAMUSCULAR; INTRAVENOUS; SUBCUTANEOUS at 02:37

## 2017-01-08 RX ADMIN — ENOXAPARIN SODIUM 40 MG: 40 INJECTION, SOLUTION INTRAVENOUS; SUBCUTANEOUS at 09:33

## 2017-01-08 RX ADMIN — LORAZEPAM 13 MG/HR: 2 INJECTION INTRAMUSCULAR; INTRAVENOUS at 01:55

## 2017-01-08 RX ADMIN — POTASSIUM CHLORIDE 20 MEQ: 400 INJECTION, SOLUTION INTRAVENOUS at 09:40

## 2017-01-08 RX ADMIN — HYDROMORPHONE HYDROCHLORIDE 1 MG: 1 INJECTION, SOLUTION INTRAMUSCULAR; INTRAVENOUS; SUBCUTANEOUS at 12:08

## 2017-01-08 RX ADMIN — PHENYLEPHRINE HYDROCHLORIDE 10 MCG/MIN: 10 INJECTION INTRAVENOUS at 19:35

## 2017-01-08 RX ADMIN — FOLIC ACID: 5 INJECTION, SOLUTION INTRAMUSCULAR; INTRAVENOUS; SUBCUTANEOUS at 16:08

## 2017-01-08 RX ADMIN — SODIUM CHLORIDE 0.7 MCG/KG/HR: 900 INJECTION, SOLUTION INTRAVENOUS at 14:16

## 2017-01-08 RX ADMIN — LORAZEPAM 13 MG/HR: 2 INJECTION INTRAMUSCULAR; INTRAVENOUS at 06:49

## 2017-01-08 RX ADMIN — SODIUM CHLORIDE 0.9 MCG/KG/HR: 900 INJECTION, SOLUTION INTRAVENOUS at 00:05

## 2017-01-08 RX ADMIN — Medication 10 ML: at 16:09

## 2017-01-08 RX ADMIN — Medication 10 ML: at 13:45

## 2017-01-08 RX ADMIN — PROPOFOL 20 MCG/KG/MIN: 10 INJECTION, EMULSION INTRAVENOUS at 19:55

## 2017-01-08 RX ADMIN — LORAZEPAM 13 MG/HR: 2 INJECTION INTRAMUSCULAR; INTRAVENOUS at 23:47

## 2017-01-08 RX ADMIN — Medication 10 ML: at 06:36

## 2017-01-08 RX ADMIN — LORAZEPAM 11 MG/HR: 2 INJECTION INTRAMUSCULAR; INTRAVENOUS at 19:23

## 2017-01-08 RX ADMIN — SODIUM CHLORIDE 100 ML/HR: 450 INJECTION, SOLUTION INTRAVENOUS at 13:44

## 2017-01-08 RX ADMIN — MUPIROCIN: 20 OINTMENT TOPICAL at 08:17

## 2017-01-08 RX ADMIN — ETOMIDATE 20 MG: 2 INJECTION, SOLUTION INTRAVENOUS at 18:04

## 2017-01-08 RX ADMIN — MUPIROCIN: 20 OINTMENT TOPICAL at 18:34

## 2017-01-08 RX ADMIN — SODIUM CHLORIDE 0.6 MCG/KG/HR: 900 INJECTION, SOLUTION INTRAVENOUS at 20:44

## 2017-01-08 NOTE — PROGRESS NOTES
1200 Patient is yelling, flailing arms, rising up out of bed. Not following commands at this time. Increased precedex infusion from 0.6 to 0.8 mcg/kg/hr. RN remains at bedside for safety. [id number] Patient clearly stated \"Hurt\". When asked if it was her leg she said \"Yes\". PRN pain medication provided for comfort. 1215 Primary RN, Anjelica Lui, notified.

## 2017-01-08 NOTE — PROGRESS NOTES
Patient not responding to verbal stimulation. Ativan decreased to 12mg/hr. 0750 precedex decreased to . 8mcg/kg/hr. 0900 Patient opens eyes to verbal stimuli, but not following any commands. Speech is jumbled. Patient sits up in bed and then lays right back down. 1300 Patient resting quietly. VSS.   1617 Patient coming out of the bed and swinging arms at nurses. Patient yelling   incomprehensible words/sounds. Ativan drip increased to 10mg/hr. 1618 Patient continues trying to get out of bed and swinging arms at nurses. Patient agitated and restless. Precedex increased to . 8mcg/kg/hr. 1630 Patient settling and calmer. 1725 Patient sitting up in bed with labored breathing and audible wheezes. Patient not alert and not following any commands, but able to sit up in the bed and flail arms at nurses while trying to kick with her left leg. O2 sats fluctuating from the 80's to the low 90's. Respiratory and 2 nurses in with patient. 1726 Patient restless and lungs remain with audible wheezes and wheezes noted on auscultation. [id number] 3075 Patient with decreasing respiratory rate. precedex and ativan both stopped at this time. O2 sat 85%, respiratory bagging patient with 100% O2.  1728 Patient with minimal respiratory effort and continuing to bag with 100% O2. Code called. 1730 Patient remained on monitor in NSR to sinus brittany with a pulse and patient began to resume her own respiratory rate. O2 sat 88-90%. Dr. Ant Waite from the ED in to evaluate patient and monitoring patients respiratory status. [phone] Patient continues to breathe on her own and placed back on nasal cannula. Patient holding sats in the 90's with RR 24-26.   1741 ABG's done and results reviewed by Dr. Ant Waite. Dr. Triny Hardy paged. 188 [facility] Sim Dr. Triny Hardy on the phone and talking with Dr. Ant Waite. Decision made to intubate patient to protect patients airway. 1806 Patient intubated by Dr. Ant Waite. 1807 xray called for portable chest xray.   1807 precedex and ativan drips both restarted at previous rate. 1823 Patient coughing some against ET tube and moving arms around. Wrist restraints applied at this time. Dr. Glory Graff in and ordered wrist restraints. 200 xray called again at this time for portable chest x-ray. 1850 xray in to do portable chest xray. 1910 report to Juan A Kimball RN.

## 2017-01-08 NOTE — PROGRESS NOTES
Spiritual Care Assessment/Progress Notes    [place] Ocean [id number]  [id number]    1960  49 y.o.  female    Patient Telephone Number: [phone] (home)   Zoroastrianism Affiliation: No Advent   Language: English   Extended Emergency Contact Information  Primary Emergency Contact: [address]  Mobile Phone: [phone]  Relation: Spouse   Patient Active Problem List    Diagnosis Date Noted    Closed trimalleolar fracture of right ankle 01/02/2017    Closed right trimalleolar fracture 01/02/2017        Date: 1/8/2017       Level of Zoroastrianism/Spiritual Activity:  []         Involved in shannan tradition/spiritual practice    []         Not involved in shannan tradition/spiritual practice  []         Spiritually oriented    []         Claims no spiritual orientation    []         seeking spiritual identity  []         Feels alienated from Temple practice/tradition  []         Feels angry about Temple practice/tradition  []         Spirituality/Temple tradition   a resource for coping at this time.   [x]         Not able to assess due to medical condition    Services Provided Today:  []         crisis intervention    []         reading Scriptures  []         spiritual assessment    []         prayer  []         empathic listening/emotional support  []         rites and rituals (cite in comments)  []         life review     []         Temple support  []         theological development   []         advocacy  []         ethical dialog     []         blessing  []         bereavement support    []         support to family  []         anticipatory grief support   []         help with AMD  []         spiritual guidance    []         meditation      Spiritual Care Needs  []         Emotional Support  []         Spiritual/Zoroastrianism Care  []         Loss/Adjustment  []         Advocacy/Referral                /Ethics  []         No needs expressed at               this time  [] Other: (note in               comments)  Spiritual Care Plan  []         Follow up visits with               pt/family  []         Provide materials  []         Schedule sacraments  []         Contact Community               Clergy  []         Follow up as needed  []         Other: (note in               comments)     Comments:  Responded to Code Blue in CCU. No family present per Eleazar Logan RN.  not needed at this time.   ABELARDO Magana, [facility], [address] [place] [address] [address]     Paging Service  [phone])

## 2017-01-08 NOTE — PROGRESS NOTES
Bedside report received from [facility] 12, Background, Procedure summary, Intake/Output, MAR, and recent results discussed. Care assumed. Patient lethargic, eyes open to pain and patient is not oriented. Pupils are 2mm and very sluggish. Patient makes only unintelligible noises and grunts when awoken. No command following but patient withdraws to pain in all four extremities. Tolerating 2L NC well and lungs sound coarse with expiratory wheezing bilaterally. Hypo bowel sounds. Skin is warm and dry with appropriate coloring for race. Pulses palpable and < 3 sec capillary refill in all extremities. +2 edema to RUE and RLE. Cast, cast padding, and elastic wrap to RLE incision in place with no drainage noted. CIWA 11 increased ativan to 13mg/hr. 6262  Patient awoke very agitated. Patient moaning and grunting loudly. No command following and poor safety awareness. Patient moving right foot/ leg around bed into bed rail and attempts to sit up move feet off bed. Attempted to verbally redirect patient without success. Adult nonverbal pain scale 7/10. 1mg dilaudid IV administered. 0300 Patient appears to be sleeping.     0700 Verbal report given to oncoming nurse Aurelia Mcmahan RN    Report consisted of patients Situation, Background, Assessment, and   Recommendations    Information was reviewed with the receiving nurse. Opportunity for questions and clarification was provided.       Vishnu Louis RN

## 2017-01-08 NOTE — ED NOTES
[facility]. The pt's pO2 was in the 40s upon an ABG and there is concern for her ability to oxygenate during alcohol withdrawal. Delgado Galvez DO will intubate. 1801 -- 8 ccs of Vecuronium and 20 mgs of etomidate given for sedation  1802 -- intubation procedure started with a 7.5 cm glidescope  1803 -- suction used to remove mucous in the oropharynx   1804 -- intubation unsuccessful, non-re breather used to return O2 saturation back to normal level  1806 -- intubation restarted with lubrication and suction, 23 mm tube places successfully at the gum  No sounds in the epigastrium, color change upon ntidal CO2     7:25 PM  Called to ICU to re-assess, because not O2 sats not maintaining well, Xray at [facility] initial film shows right mainstem intubation. ET tube pulled back 3cm, Repeat CXR shows good position.       Delgado Galvez DO

## 2017-01-08 NOTE — PROGRESS NOTES
[facility]  Pulmonary, Critical Care, and Sleep Medicine    Name: Anjali Smith MRN: [id number]   : 1960 Hospital: [facility]   Date: 2017        Critical Care Patient Consult    IMPRESSION:   · Encephalopathy, risk to herself due to ripping out IVs. May need wrist or mitt restraints. · Delirium Tremens; Acute alcohol withdrawal; had 100mg of valium in 24 hrs prior to coming to ICU. Requires ongoing moderate to high level of sedation. Decreasing as tolerated. · S/p Removal of external fixator and right ankle open reduction internal fixation, had Right Trimalleolar ankle fracture traci, gastrocnemius recession, Ortho is following. · Smoker tobacco, THC  · ADHD on Adderall  · Typically drinks 1 bottle of wine per day  · Discussed with Dr. Valentin Childs, nurse, and Pts : Yuliya Cadena [phone]  · Discussed with nurse this am.   · Pt is critically ill, moderate to high risk of decompensation  · Discussed with nurse this am.       RECOMMENDATIONS:   · Thiamine, goody bag  · Ativan drip currently  Infusing,  on precedex, at risk of need to be intubated if not able to maintain airway with sedation. Appears stable at present. · Monitor for any bleeding  · Follow and replete electrolytes  · Nicotine patch  · Pain control meds. · Monitor and replete Electrolytes  · DVT prophylaxis     Subjective/History:   17: Pt sleeping comfortably this am. Moving all extremities. Intermittently wakes up. 17: Events last evening noted. Resting comfortably this am.     This patient has been seen and evaluated at the request of Dr. Liliana Lemons for above. Patient is a 49 y.o. female who presented for above. While on the medical floor was noted to have acute alcohol withdrawal. Required over 100mg of valium over the last 24 hrs. The patient is critically ill and can not provide additional history due to Unable to speak.      Past Medical History   Diagnosis Date    Hypertension     Ill-defined condition      high cholesterol    Psychiatric disorder      depression, ADHD      Past Surgical History   Procedure Laterality Date    Hx other surgical       colonoscopy    Hx gyn       D&C X2      Prior to Admission medications    Medication Sig Start Date End Date Taking? Authorizing Provider   dextroamphetamine-amphetamine (ADDERALL) 10 mg tablet Take 10 mg by mouth. Yes Cece Corley MD   Desvenlafaxine (PRISTIQ) 100 mg Tb24 Take 50 mg by mouth. Yes Cece Corley MD   aspirin delayed-release 81 mg tablet Take 81 mg by mouth daily. Yes Cece Corley MD   calcium-vitamin D (OYSTER SHELL) 500 mg(1,250mg) -200 unit per tablet Take 1 Tab by mouth two (2) times daily (with meals). Yes Cece Corley MD   atorvastatin (LIPITOR) 10 mg tablet Take  by mouth daily. Yes Cece Corley MD     Current Facility-Administered Medications   Medication Dose Route Frequency    potassium chloride 20 mEq in 50 ml IVPB  20 mEq IntraVENous Q1H    sodium chloride (NS) flush 10 mL  10 mL InterCATHeter Q24H    sodium chloride (NS) flush 10-40 mL  10-40 mL InterCATHeter Q8H    LORazepam (ATIVAN) 60 mg in 0.9% sodium chloride 60 mL infusion  1-15 mg/hr IntraVENous TITRATE    mupirocin (BACTROBAN) 2 % ointment   Both Nostrils BID    0.9% sodium chloride 1,000 mL with mvi, adult no. 4 with vit K 10 mL, thiamine 101 mg, folic acid 1 mg infusion   IntraVENous Q24H    nicotine (NICODERM CQ) 14 mg/24 hr patch 1 Patch  1 Patch TransDERmal DAILY    0.45% sodium chloride infusion  100 mL/hr IntraVENous CONTINUOUS    dexmedetomidine (PRECEDEX) 400 mcg in 0.9% sodium chloride 100 mL infusion  0.2-1.4 mcg/kg/hr IntraVENous TITRATE    dextroamphetamine-amphetamine (ADDERALL) tablet 20 mg  20 mg Oral DAILY    Desvenlafaxine SR (PRISTIQ) tablet 100 mg  100 mg Oral DAILY    sodium chloride (NS) flush 5-10 mL  5-10 mL IntraVENous Q8H    enoxaparin (LOVENOX) injection 40 mg  40 mg SubCUTAneous Q24H     Allergies Allergen Reactions    Pcn [Penicillins] Rash      Social History   Substance Use Topics    Smoking status: Current Every Day Smoker     Packs/day: 0.50    Smokeless tobacco: Never Used    Alcohol use 14.4 oz/week     24 Glasses of wine per week      Family History   Problem Relation Age of Onset    Diabetes Father     Heart Disease Father         Review of Systems:  Review of systems not obtained due to patient factors. Objective:   Vital Signs:    Visit Vitals    /85    Pulse 75    Temp 98.2 °F (36.8 °C)    Resp 19    Ht 5' 4\" (1.626 m)    Wt 78.9 kg (173 lb 15.1 oz)    SpO2 96%    BMI 29.86 kg/m2       O2 Device: Nasal cannula   O2 Flow Rate (L/min): 2 l/min   Temp (24hrs), Av °F (36.7 °C), Min:97.4 °F (36.3 °C), Max:98.7 °F (37.1 °C)       Intake/Output:   Last shift:       07 -  1900  In: 601.8 [I.V.:601.8]  Out: 185 [Urine:185]  Last 3 shifts:  190 -  0700  In: 5138.4 [I.V.:5138.4]  Out: 3580 [Urine:3580]    Intake/Output Summary (Last 24 hours) at 17 1121  Last data filed at 17 1100   Gross per 24 hour   Intake          3402.73 ml   Output             1600 ml   Net          1802.73 ml     Physical Exam:    General:  Sleeping,  no distress, appears stated age. Head:  Normocephalic, without obvious abnormality, atraumatic. Eyes:  Conjunctivae/corneas clear. PERRL, EOMs intact. Nose: Nares normal. Septum midline. Mucosa normal. No drainage or sinus tenderness. Throat: Lips, mucosa, and tongue normal. Teeth and gums normal.   Neck: Supple, symmetrical, trachea midline, no adenopathy, thyroid: no enlargment/tenderness/nodules, no carotid bruit and no JVD. Back:   Symmetric, no curvature. ROM normal.   Lungs:   Clear to auscultation bilaterally. Chest wall:  No tenderness or deformity. Heart:  Regular rate and rhythm, S1, S2 normal, no murmur, click, rub or gallop. Abdomen:   Soft, non-tender.  Bowel sounds normal. No masses,  No organomegaly. Extremities: Extremities normal, atraumatic, no cyanosis or edema. Has dressing, immobilizer to the RLE. Pulses: 2+ and symmetric all extremities. Skin: Skin color, texture, turgor normal. No rashes or lesions   Lymph nodes: Cervical, supraclavicular, and axillary nodes normal.   Neurologic: Grossly nonfocal, sleeping. Moves all extremities spontaneously. Data:     Recent Results (from the past 24 hour(s))   CBC WITH AUTOMATED DIFF    Collection Time: 01/08/17  3:09 AM   Result Value Ref Range    WBC 5.4 3.6 - 11.0 K/uL    RBC 3.10 (L) 3.80 - 5.20 M/uL    HGB 9.7 (L) 11.5 - 16.0 g/dL    HCT 28.6 (L) 35.0 - 47.0 %    MCV 92.3 80.0 - 99.0 FL    MCH 31.3 26.0 - 34.0 PG    MCHC 33.9 30.0 - 36.5 g/dL    RDW 13.0 11.5 - 14.5 %    PLATELET 360 481 - 940 K/uL    NEUTROPHILS 70 32 - 75 %    LYMPHOCYTES 20 12 - 49 %    MONOCYTES 10 5 - 13 %    EOSINOPHILS 0 0 - 7 %    BASOPHILS 0 0 - 1 %    ABS. NEUTROPHILS 3.8 1.8 - 8.0 K/UL    ABS. LYMPHOCYTES 1.1 0.8 - 3.5 K/UL    ABS. MONOCYTES 0.6 0.0 - 1.0 K/UL    ABS. EOSINOPHILS 0.0 0.0 - 0.4 K/UL    ABS. BASOPHILS 0.0 0.0 - 0.1 K/UL   METABOLIC PANEL, COMPREHENSIVE    Collection Time: 01/08/17  3:09 AM   Result Value Ref Range    Sodium 144 136 - 145 mmol/L    Potassium 3.4 (L) 3.5 - 5.1 mmol/L    Chloride 112 (H) 97 - 108 mmol/L    CO2 23 21 - 32 mmol/L    Anion gap 9 5 - 15 mmol/L    Glucose 135 (H) 65 - 100 mg/dL    BUN 4 (L) 6 - 20 MG/DL    Creatinine 0.59 0.55 - 1.02 MG/DL    BUN/Creatinine ratio 7 (L) 12 - 20      GFR est AA >60 >60 ml/min/1.73m2    GFR est non-AA >60 >60 ml/min/1.73m2    Calcium 7.9 (L) 8.5 - 10.1 MG/DL    Bilirubin, total 0.7 0.2 - 1.0 MG/DL    ALT 19 12 - 78 U/L    AST 8 (L) 15 - 37 U/L    Alk.  phosphatase 56 45 - 117 U/L    Protein, total 5.1 (L) 6.4 - 8.2 g/dL    Albumin 2.3 (L) 3.5 - 5.0 g/dL    Globulin 2.8 2.0 - 4.0 g/dL    A-G Ratio 0.8 (L) 1.1 - 2.2     MAGNESIUM    Collection Time: 01/08/17  3:09 AM   Result Value Ref Range Magnesium 2.1 1.6 - 2.4 mg/dL   PHOSPHORUS    Collection Time: 01/08/17  3:09 AM   Result Value Ref Range    Phosphorus 2.7 2.6 - 4.7 MG/DL             Telemetry:normal sinus rhythm    Imaging:  I have personally reviewed the patients radiographs and have reviewed the reports:  1-5-17: CXR: COMPARISON: 1/2/2017.     FINDINGS:   A portable AP radiograph of the chest was obtained at 1443 hours. Lines and tubes: The patient is on a cardiac monitor. Lungs: Lungs are clear except for a band of platelike atelectasis at the left  base. Pleura: There is no pneumothorax or pleural effusion. Mediastinum: The cardiac and mediastinal contours and pulmonary vascularity are  normal.  Bones and soft tissues: The bones and soft tissues are grossly within normal  limits.     IMPRESSION: No acute abnormality.        Tomasz Mckoy MD

## 2017-01-08 NOTE — PROGRESS NOTES
Spoke with nursing and patient inappropriate for therapy session as is not reposnsive. Will follow up later today or tomorrow as able.

## 2017-01-08 NOTE — PROGRESS NOTES
Orthopedic NP Progress Note  Post Op day: 5 Days Post-Op    January 8, 2017 10:59 AM     Tj Dave    Attending Physician: Treatment Team: Attending Provider: Shaylee Dyer. Chucky Woodard MD; Consulting Provider: Christine Walker MD     Vital Signs:    Patient Vitals for the past 8 hrs:   BP Temp Pulse Resp SpO2   01/08/17 0900 (!) 148/92 - 60 16 95 %   01/08/17 0800 142/88 98.2 °F (36.8 °C) 61 18 95 %   01/08/17 0742 - - - - 94 %   01/08/17 0700 137/87 - 61 19 95 %   01/08/17 0600 142/83 - 61 20 95 %   01/08/17 0500 (!) 144/93 - 60 20 94 %   01/08/17 0400 142/82 97.5 °F (36.4 °C) 62 20 93 %   01/08/17 0300 155/90 - 63 17 95 %          Intake/Output:  01/08 0701 - 01/08 1900  In: 255.6 [I.V.:255.6]  Out: 110 [Urine:110]  01/06 1901 - 01/08 0700  In: 5138.4 [I.V.:5138.4]  Out: 3580 [Urine:3580]    Pain Control:   Pain Assessment  Pain Scale 1: Adult Nonverbal Pain Scale  Pain Intensity 1: 0  Pain Location 1: Ankle  Pain Orientation 1: Right  Pain Description 1: Aching  Pain Intervention(s) 1: Medication (see MAR), Elevation, Ice    LAB:    Recent Labs      01/08/17   0309   HCT  28.6*   HGB  9.7*     Lab Results   Component Value Date/Time    Sodium 144 01/08/2017 03:09 AM    Potassium 3.4 01/08/2017 03:09 AM    Chloride 112 01/08/2017 03:09 AM    CO2 23 01/08/2017 03:09 AM    Glucose 135 01/08/2017 03:09 AM    BUN 4 01/08/2017 03:09 AM    Creatinine 0.59 01/08/2017 03:09 AM    Calcium 7.9 01/08/2017 03:09 AM       Subjective: Tj Dave is a 49 y.o. female s/p a Procedure(s):  REMOVAL EXTERNAL FIXATOR AND RIGHT ANKLE OPEN REDUCTION INTERNAL FIXATION, GASTROCNEMIUS RECESSION (Req Large C-Arm, It Mini and Small Frag and Tong Clamps)  Procedure(s):  REMOVAL EXTERNAL FIXATOR AND RIGHT ANKLE OPEN REDUCTION INTERNAL FIXATION, GASTROCNEMIUS RECESSION (Req Large C-Arm, Ti Mini and Small Frag and Tong Clamps). Tolerating diet.  Resting quietly at this time on ativan drip    Objective: General: alert, cooperative, no distress. Neuro/Vascular: CNS Intact. Sensation stable. Brisk cap refill, 2+ pulses UE/LE  Musculoskeletal: +ROM UE/LLE with short leg splint to RLE. Benedict's sign negative in bilateral lower extremities. Calves soft, supple, non-tender upon palpation or with passive stretch. Skin: Incision - clean, dry and intact. No significant erythema or swelling.    Dressing: clean, dry, and intact     Villar[place] - y  Drain - n          PT/OT:   Gait:  Gait  Base of Support: Shift to left  Step Length: Left shortened  Gait Abnormalities: Antalgic, Decreased step clearance, Path deviations (hop method)  Ambulation - Level of Assistance: Minimal assistance  Distance (ft): 50 Feet (ft)  Assistive Device: Gait belt, Walker, rolling                   Assessment:    s/p Procedure(s):  REMOVAL EXTERNAL FIXATOR AND RIGHT ANKLE OPEN REDUCTION INTERNAL FIXATION, GASTROCNEMIUS RECESSION (Req Large C-Arm, Ti Mini and Small Frag and Tong Clamps)    Active Problems:    Closed trimalleolar fracture of right ankle (1/2/2017)      Closed right trimalleolar fracture (1/2/2017)         Plan:     - Continue PT/OT - NWB RLE, when able   - Continue established methods of pain control  - VTE Prophylaxes - TEDS &/or SCDs with lovenox   - ETOH withdrawal/ encephalopathy - Management with Ativan drip as per medicine        Pt seen with Dr. Jaylene Hedrick By: Ray Del Rio NP    Orthopedic Nurse Practitioner

## 2017-01-09 ENCOUNTER — APPOINTMENT (OUTPATIENT)
Dept: GENERAL RADIOLOGY | Age: 57
DRG: 492 | End: 2017-01-09
Attending: INTERNAL MEDICINE
Payer: COMMERCIAL

## 2017-01-09 LAB
ALBUMIN SERPL BCP-MCNC: 2.5 G/DL (ref 3.5–5)
ALBUMIN/GLOB SERPL: 0.8 {RATIO} (ref 1.1–2.2)
ALP SERPL-CCNC: 106 U/L (ref 45–117)
ALT SERPL-CCNC: 282 U/L (ref 12–78)
ANION GAP BLD CALC-SCNC: 12 MMOL/L (ref 5–15)
APPEARANCE UR: ABNORMAL
ARTERIAL PATENCY WRIST A: YES
AST SERPL W P-5'-P-CCNC: 565 U/L (ref 15–37)
BACTERIA URNS QL MICRO: ABNORMAL /HPF
BASE DEFICIT BLDA-SCNC: 6.1 MMOL/L
BDY SITE: ABNORMAL
BILIRUB SERPL-MCNC: 0.8 MG/DL (ref 0.2–1)
BILIRUB UR QL: NEGATIVE
BUN SERPL-MCNC: 7 MG/DL (ref 6–20)
BUN/CREAT SERPL: 6 (ref 12–20)
CALCIUM SERPL-MCNC: 7.9 MG/DL (ref 8.5–10.1)
CHLORIDE SERPL-SCNC: 115 MMOL/L (ref 97–108)
CO2 SERPL-SCNC: 19 MMOL/L (ref 21–32)
COLOR UR: ABNORMAL
CREAT SERPL-MCNC: 1.11 MG/DL (ref 0.55–1.02)
EPITH CASTS URNS QL MICRO: ABNORMAL /LPF
GLOBULIN SER CALC-MCNC: 3.3 G/DL (ref 2–4)
GLUCOSE SERPL-MCNC: 153 MG/DL (ref 65–100)
GLUCOSE UR STRIP.AUTO-MCNC: NEGATIVE MG/DL
HCO3 BLDA-SCNC: 16 MMOL/L (ref 22–26)
HGB UR QL STRIP: ABNORMAL
HYALINE CASTS URNS QL MICRO: ABNORMAL /LPF (ref 0–5)
KETONES UR QL STRIP.AUTO: NEGATIVE MG/DL
LACTATE SERPL-SCNC: 2 MMOL/L (ref 0.4–2)
LEUKOCYTE ESTERASE UR QL STRIP.AUTO: ABNORMAL
MAGNESIUM SERPL-MCNC: 2.2 MG/DL (ref 1.6–2.4)
NITRITE UR QL STRIP.AUTO: POSITIVE
PCO2 BLDA: 24 MMHG (ref 35–45)
PH BLDA: 7.44 [PH] (ref 7.35–7.45)
PH UR STRIP: 5.5 [PH] (ref 5–8)
PHOSPHATE SERPL-MCNC: 3.1 MG/DL (ref 2.6–4.7)
PO2 BLDA: 115 MMHG (ref 80–100)
POTASSIUM SERPL-SCNC: 3.8 MMOL/L (ref 3.5–5.1)
PROT SERPL-MCNC: 5.8 G/DL (ref 6.4–8.2)
PROT UR STRIP-MCNC: 100 MG/DL
RBC #/AREA URNS HPF: ABNORMAL /HPF (ref 0–5)
SAO2 % BLD: 98 % (ref 92–97)
SAO2% DEVICE SAO2% SENSOR NAME: ABNORMAL
SODIUM SERPL-SCNC: 146 MMOL/L (ref 136–145)
SP GR UR REFRACTOMETRY: 1.02 (ref 1–1.03)
SPECIMEN SITE: ABNORMAL
UA: UC IF INDICATED,UAUC: ABNORMAL
UROBILINOGEN UR QL STRIP.AUTO: 0.2 EU/DL (ref 0.2–1)
WBC URNS QL MICRO: >100 /HPF (ref 0–4)

## 2017-01-09 PROCEDURE — 94640 AIRWAY INHALATION TREATMENT: CPT

## 2017-01-09 PROCEDURE — 74011250636 HC RX REV CODE- 250/636: Performed by: ORTHOPAEDIC SURGERY

## 2017-01-09 PROCEDURE — 74000 XR ABD PORT  1 V: CPT

## 2017-01-09 PROCEDURE — 87086 URINE CULTURE/COLONY COUNT: CPT | Performed by: HOSPITALIST

## 2017-01-09 PROCEDURE — 87186 SC STD MICRODIL/AGAR DIL: CPT

## 2017-01-09 PROCEDURE — 36600 WITHDRAWAL OF ARTERIAL BLOOD: CPT | Performed by: INTERNAL MEDICINE

## 2017-01-09 PROCEDURE — 77030008683 HC TU ET CUF COVD -A

## 2017-01-09 PROCEDURE — 94003 VENT MGMT INPAT SUBQ DAY: CPT

## 2017-01-09 PROCEDURE — 87186 SC STD MICRODIL/AGAR DIL: CPT | Performed by: HOSPITALIST

## 2017-01-09 PROCEDURE — 84100 ASSAY OF PHOSPHORUS: CPT | Performed by: INTERNAL MEDICINE

## 2017-01-09 PROCEDURE — 74011000258 HC RX REV CODE- 258: Performed by: INTERNAL MEDICINE

## 2017-01-09 PROCEDURE — 82803 BLOOD GASES ANY COMBINATION: CPT | Performed by: INTERNAL MEDICINE

## 2017-01-09 PROCEDURE — 74011000250 HC RX REV CODE- 250: Performed by: HOSPITALIST

## 2017-01-09 PROCEDURE — 87070 CULTURE OTHR SPECIMN AEROBIC: CPT | Performed by: INTERNAL MEDICINE

## 2017-01-09 PROCEDURE — 74011250637 HC RX REV CODE- 250/637: Performed by: EMERGENCY MEDICINE

## 2017-01-09 PROCEDURE — 77030018846 HC SOL IRR STRL H20 ICUM -A

## 2017-01-09 PROCEDURE — 83735 ASSAY OF MAGNESIUM: CPT | Performed by: INTERNAL MEDICINE

## 2017-01-09 PROCEDURE — 74011250636 HC RX REV CODE- 250/636: Performed by: INTERNAL MEDICINE

## 2017-01-09 PROCEDURE — 74011000250 HC RX REV CODE- 250: Performed by: INTERNAL MEDICINE

## 2017-01-09 PROCEDURE — 87077 CULTURE AEROBIC IDENTIFY: CPT | Performed by: HOSPITALIST

## 2017-01-09 PROCEDURE — 74011000258 HC RX REV CODE- 258: Performed by: HOSPITALIST

## 2017-01-09 PROCEDURE — 87040 BLOOD CULTURE FOR BACTERIA: CPT

## 2017-01-09 PROCEDURE — 77030018729 HC ELECTRD DEFIB PAD CARD -B

## 2017-01-09 PROCEDURE — 74011250637 HC RX REV CODE- 250/637: Performed by: ORTHOPAEDIC SURGERY

## 2017-01-09 PROCEDURE — 65620000000 HC RM CCU GENERAL

## 2017-01-09 PROCEDURE — 74011250637 HC RX REV CODE- 250/637: Performed by: HOSPITALIST

## 2017-01-09 PROCEDURE — 80053 COMPREHEN METABOLIC PANEL: CPT | Performed by: INTERNAL MEDICINE

## 2017-01-09 PROCEDURE — 87077 CULTURE AEROBIC IDENTIFY: CPT

## 2017-01-09 PROCEDURE — 71010 XR CHEST PORT: CPT

## 2017-01-09 PROCEDURE — 77030018798 HC PMP KT ENTRL FED COVD -A

## 2017-01-09 PROCEDURE — 74011250636 HC RX REV CODE- 250/636: Performed by: EMERGENCY MEDICINE

## 2017-01-09 PROCEDURE — 81001 URINALYSIS AUTO W/SCOPE: CPT

## 2017-01-09 PROCEDURE — C9113 INJ PANTOPRAZOLE SODIUM, VIA: HCPCS | Performed by: INTERNAL MEDICINE

## 2017-01-09 PROCEDURE — 74011250637 HC RX REV CODE- 250/637: Performed by: INTERNAL MEDICINE

## 2017-01-09 PROCEDURE — 36415 COLL VENOUS BLD VENIPUNCTURE: CPT | Performed by: INTERNAL MEDICINE

## 2017-01-09 PROCEDURE — 83605 ASSAY OF LACTIC ACID: CPT

## 2017-01-09 RX ORDER — IPRATROPIUM BROMIDE AND ALBUTEROL SULFATE 2.5; .5 MG/3ML; MG/3ML
3 SOLUTION RESPIRATORY (INHALATION)
Status: DISCONTINUED | OUTPATIENT
Start: 2017-01-09 | End: 2017-01-19

## 2017-01-09 RX ORDER — CHLORHEXIDINE GLUCONATE 1.2 MG/ML
15 RINSE ORAL EVERY 12 HOURS
Status: DISCONTINUED | OUTPATIENT
Start: 2017-01-09 | End: 2017-01-19

## 2017-01-09 RX ORDER — FOLIC ACID 1 MG/1
1 TABLET ORAL DAILY
Status: DISCONTINUED | OUTPATIENT
Start: 2017-01-09 | End: 2017-01-19

## 2017-01-09 RX ORDER — LEVOFLOXACIN 5 MG/ML
750 INJECTION, SOLUTION INTRAVENOUS EVERY 24 HOURS
Status: DISCONTINUED | OUTPATIENT
Start: 2017-01-10 | End: 2017-01-15

## 2017-01-09 RX ORDER — ACETAMINOPHEN 650 MG/1
650 SUPPOSITORY RECTAL
Status: DISCONTINUED | OUTPATIENT
Start: 2017-01-09 | End: 2017-01-12

## 2017-01-09 RX ORDER — LANOLIN ALCOHOL/MO/W.PET/CERES
100 CREAM (GRAM) TOPICAL DAILY
Status: DISCONTINUED | OUTPATIENT
Start: 2017-01-09 | End: 2017-01-19

## 2017-01-09 RX ORDER — VANCOMYCIN 2 GRAM/500 ML IN 0.9 % SODIUM CHLORIDE INTRAVENOUS
2000
Status: COMPLETED | OUTPATIENT
Start: 2017-01-09 | End: 2017-01-09

## 2017-01-09 RX ORDER — LEVOFLOXACIN 5 MG/ML
750 INJECTION, SOLUTION INTRAVENOUS ONCE
Status: COMPLETED | OUTPATIENT
Start: 2017-01-09 | End: 2017-01-09

## 2017-01-09 RX ORDER — THERA TABS 400 MCG
1 TAB ORAL DAILY
Status: DISCONTINUED | OUTPATIENT
Start: 2017-01-09 | End: 2017-01-24 | Stop reason: HOSPADM

## 2017-01-09 RX ORDER — LEVOFLOXACIN 5 MG/ML
750 INJECTION, SOLUTION INTRAVENOUS EVERY 24 HOURS
Status: DISCONTINUED | OUTPATIENT
Start: 2017-01-09 | End: 2017-01-09

## 2017-01-09 RX ADMIN — SODIUM CHLORIDE 100 ML/HR: 450 INJECTION, SOLUTION INTRAVENOUS at 06:57

## 2017-01-09 RX ADMIN — Medication 10 ML: at 21:39

## 2017-01-09 RX ADMIN — CEFEPIME HYDROCHLORIDE 2 G: 2 INJECTION, POWDER, FOR SOLUTION INTRAVENOUS at 12:46

## 2017-01-09 RX ADMIN — PROPOFOL 15 MCG/KG/MIN: 10 INJECTION, EMULSION INTRAVENOUS at 04:10

## 2017-01-09 RX ADMIN — Medication 25 MCG/HR: at 10:51

## 2017-01-09 RX ADMIN — IPRATROPIUM BROMIDE AND ALBUTEROL SULFATE 3 ML: .5; 3 SOLUTION RESPIRATORY (INHALATION) at 08:09

## 2017-01-09 RX ADMIN — Medication 10 ML: at 05:56

## 2017-01-09 RX ADMIN — ENOXAPARIN SODIUM 40 MG: 40 INJECTION, SOLUTION INTRAVENOUS; SUBCUTANEOUS at 08:49

## 2017-01-09 RX ADMIN — Medication 10 ML: at 14:15

## 2017-01-09 RX ADMIN — LEVOFLOXACIN 750 MG: 5 INJECTION, SOLUTION INTRAVENOUS at 05:55

## 2017-01-09 RX ADMIN — Medication 10 ML: at 13:45

## 2017-01-09 RX ADMIN — PROPOFOL 30 MCG/KG/MIN: 10 INJECTION, EMULSION INTRAVENOUS at 19:12

## 2017-01-09 RX ADMIN — FOLIC ACID 1 MG: 1 TABLET ORAL at 13:44

## 2017-01-09 RX ADMIN — Medication 100 MG: at 13:44

## 2017-01-09 RX ADMIN — LORAZEPAM 13 MG/HR: 2 INJECTION INTRAMUSCULAR; INTRAVENOUS at 05:02

## 2017-01-09 RX ADMIN — PROPOFOL 25 MCG/KG/MIN: 10 INJECTION, EMULSION INTRAVENOUS at 12:25

## 2017-01-09 RX ADMIN — THERA TABS 1 TABLET: TAB at 13:44

## 2017-01-09 RX ADMIN — SODIUM CHLORIDE 40 MG: 9 INJECTION INTRAMUSCULAR; INTRAVENOUS; SUBCUTANEOUS at 12:47

## 2017-01-09 RX ADMIN — IPRATROPIUM BROMIDE AND ALBUTEROL SULFATE 3 ML: .5; 3 SOLUTION RESPIRATORY (INHALATION) at 20:36

## 2017-01-09 RX ADMIN — CHLORHEXIDINE GLUCONATE 15 ML: 1.2 RINSE ORAL at 20:22

## 2017-01-09 RX ADMIN — SODIUM CHLORIDE 100 ML/HR: 450 INJECTION, SOLUTION INTRAVENOUS at 20:22

## 2017-01-09 RX ADMIN — MUPIROCIN: 20 OINTMENT TOPICAL at 08:55

## 2017-01-09 RX ADMIN — MUPIROCIN: 20 OINTMENT TOPICAL at 17:37

## 2017-01-09 RX ADMIN — PHENYLEPHRINE HYDROCHLORIDE 50 MCG/MIN: 10 INJECTION INTRAVENOUS at 14:44

## 2017-01-09 RX ADMIN — ACETAMINOPHEN 650 MG: 650 SUPPOSITORY RECTAL at 05:32

## 2017-01-09 RX ADMIN — IPRATROPIUM BROMIDE AND ALBUTEROL SULFATE 3 ML: .5; 3 SOLUTION RESPIRATORY (INHALATION) at 16:26

## 2017-01-09 RX ADMIN — CHLORHEXIDINE GLUCONATE 15 ML: 1.2 RINSE ORAL at 09:45

## 2017-01-09 RX ADMIN — ACETAMINOPHEN 650 MG: 325 TABLET, FILM COATED ORAL at 17:02

## 2017-01-09 RX ADMIN — VANCOMYCIN HYDROCHLORIDE 2000 MG: 10 INJECTION, POWDER, LYOPHILIZED, FOR SOLUTION INTRAVENOUS at 07:47

## 2017-01-09 RX ADMIN — PHENYLEPHRINE HYDROCHLORIDE 50 MCG/MIN: 10 INJECTION INTRAVENOUS at 05:41

## 2017-01-09 RX ADMIN — Medication 10 ML: at 05:57

## 2017-01-09 RX ADMIN — IPRATROPIUM BROMIDE AND ALBUTEROL SULFATE 3 ML: .5; 3 SOLUTION RESPIRATORY (INHALATION) at 14:00

## 2017-01-09 NOTE — PROGRESS NOTES
Hospitalist Progress Note    NAME: Norm Ortiz   :  1960   MRN:  [id number]       Interim Hospital Summary: 49 y.o. female whom presented on 2017 with right ankle deformity s/p injury R unstable trimalleolar ankle fx/dislocatio  ADHD on Adderall  Typically drinks 1 bottle of wine per day    Initially admitted to ortho service-- multiple attempts at reduction were made with out success. On 1/3 s/p ORIF, on  consulted hospitalist for acute delirium ( with concern for alcohol withdrawal) on  transferred to ICU requiring ativan drip and precedex. Further deteriorated on  requiring intubation  Now with spiking fevers and shock liver  Ortho transferred the patient care to medicine service. Wilman Britton [phone]     Assessment / Plan:  Acute hypoxic respiratory failure secondary   To respiratory depression ( delirium tremens  Being on ativan and precedex)  With multiorgan failure ( shock liver, concern for hcap)    Vent support ( intensivist managing)  Empiric vanco, cefepime and levo d1  Chest xray:ETT in satisfactory position.   Interval improved aeration of the lungs with some regression of pulmonary edema.   Persistent bilateral small pleural effusions and probably some consolidation at  left base. Follow the blood, urine and sputum cultures  Platelets ok  ( consider checking ammonia and CT head  picc placed on     Hypertension    Depression ADHD    Right trimalleolar ankle fracture, poa now s/p closed reduction and ORIF 17  -ortho managing  Code status: full  Prophylaxis: lovenox  Recommended Disposition: tbd     Subjective:     Chief Complaint / Reason for Physician Visit  Follow up on respiratory failure, delirium tremens  intubated    Discussed with RN events overnight.      Review of Systems:  Symptom Y/N Comments  Symptom Y/N Comments   Fever/Chills    Chest Pain     Poor Appetite    Edema     Cough    Abdominal Pain     Sputum    Joint Pain     SOB/FUENTES    Pruritis/Rash Nausea/vomit    Tolerating PT/OT     Diarrhea    Tolerating Diet     Constipation    Other       Could NOT obtain due to: Intubated and sedated     Objective:     VITALS:   Last 24hrs VS reviewed since prior progress note.  Most recent are:  Patient Vitals for the past 24 hrs:   Temp Pulse Resp BP SpO2   01/09/17 1000 - 82 25 104/77 99 %   01/09/17 0900 - 87 26 103/77 98 %   01/09/17 0809 - - - - 100 %   01/09/17 0800 (!) 103 °F (39.4 °C) 75 27 92/70 100 %   01/09/17 0700 - 80 28 94/68 100 %   01/09/17 0645 - 77 26 93/68 100 %   01/09/17 0642 - 74 26 - 100 %   01/09/17 0630 - 75 25 93/68 100 %   01/09/17 0615 - 75 22 96/71 100 %   01/09/17 0600 - 74 24 100/75 -   01/09/17 0545 - 75 23 104/79 (!) 88 %   01/09/17 0530 - 75 19 108/81 -   01/09/17 0515 - 75 20 104/77 100 %   01/09/17 0500 - 76 25 - 100 %   01/09/17 0445 - 75 26 (!) 85/61 100 %   01/09/17 0430 - 76 21 98/73 100 %   01/09/17 0415 - 76 24 96/71 100 %   01/09/17 0410 (!) 103.7 °F (39.8 °C) - - - -   01/09/17 0400 (!) 101.1 °F (38.4 °C) 77 23 99/72 100 %   01/09/17 0345 - 80 28 100/75 100 %   01/09/17 0335 - 79 27 - 100 %   01/09/17 0330 - 80 27 100/75 100 %   01/09/17 0315 - 81 30 100/75 100 %   01/09/17 0300 - 81 29 102/74 100 %   01/09/17 0245 - 81 25 100/73 100 %   01/09/17 0230 - 83 25 96/68 100 %   01/09/17 0215 - 79 20 93/66 100 %   01/09/17 0200 - 79 22 90/65 100 %   01/09/17 0145 - 79 23 91/64 100 %   01/09/17 0130 - 80 23 95/71 100 %   01/09/17 0115 - 90 28 (!) 87/57 100 %   01/09/17 0100 - 79 23 103/79 100 %   01/09/17 0045 - 78 - 96/72 100 %   01/09/17 0030 - 77 20 98/76 100 %   01/09/17 0015 - 79 21 99/78 100 %   01/09/17 0000 99.4 °F (37.4 °C) 90 19 - 100 %   01/08/17 2353 - 88 21 - 100 %   01/08/17 2345 - 80 20 100/77 -   01/08/17 2330 - 76 21 95/72 -   01/08/17 2315 - 75 21 90/68 100 %   01/08/17 2300 - 76 20 91/68 100 %   01/08/17 2245 - 77 19 91/70 100 %   01/08/17 2230 - 81 21 93/71 100 %   01/08/17 2215 - 83 24 100/49 100 %   01/08/17 2200 - 81 17 (!) 86/65 97 %   01/08/17 2145 - 82 18 99/77 100 %   01/08/17 2130 - 81 19 96/72 100 %   01/08/17 2115 - 87 20 100/76 100 %   01/08/17 2100 - 80 20 94/72 100 %   01/08/17 2045 - 78 21 93/71 99 %   01/08/17 2030 - (!) 103 23 (!) 75/50 100 %   01/08/17 2015 - 94 20 100/76 100 %   01/08/17 2005 - 80 22 (!) 86/67 100 %   01/08/17 2000 - 80 18 (!) 74/56 100 %   01/08/17 1950 - 93 28 (!) 85/69 100 %   01/08/17 1938 - 76 24 - 99 %   01/08/17 1930 - 77 21 (!) 71/46 100 %   01/08/17 1919 - 93 28 (!) 88/53 99 %   01/08/17 1915 99 °F (37.2 °C) 90 26 (!) 89/40 99 %   01/08/17 1900 - (!) 109 26 123/83 99 %   01/08/17 1844 - (!) 109 (!) 33 122/83 91 %   01/08/17 1811 - 83 12 (!) 141/103 93 %   01/08/17 1800 - 93 20 129/76 94 %   01/08/17 1738 - 89 29 120/82 95 %   01/08/17 1726 - 66 (!) 32 - 92 %   01/08/17 1722 - 80 22 - 96 %   01/08/17 1719 - 72 23 - 95 %   01/08/17 1700 - 68 16 (!) 124/98 93 %   01/08/17 1600 98.6 °F (37 °C) 68 25 (!) 118/29 90 %   01/08/17 1500 - 66 17 136/83 94 %   01/08/17 1400 - 64 22 124/62 92 %   01/08/17 1300 - 65 19 132/63 92 %   01/08/17 1200 97.6 °F (36.4 °C) 74 16 (!) 122/96 97 %   01/08/17 1100 - 75 19 141/85 96 %       Intake/Output Summary (Last 24 hours) at 01/09/17 1053  Last data filed at 01/09/17 1000   Gross per 24 hour   Intake          4295.75 ml   Output             1097 ml   Net          3198.75 ml        PHYSICAL EXAM:  General: Intubated and sedated   EENT:  EOMI. Anicteric sclerae. Resp:  B/l decreased air entry. No accessory muscle use  CV:  Regular  rhythm,  No edema  GI:  Soft, Non distended, Non tender.  +Bowel sounds  Neurologic:  intubated,   Psych:   . Not anxious nor agitated  Skin:  No rashes.   No jaundice    Reviewed most current lab test results and cultures  YES  Reviewed most current radiology test results   YES  Review and summation of old records today    NO  Reviewed patient's current orders and MAR    YES  PMH/SH reviewed - no change compared to H&P  ________________________________________________________________________  Care Plan discussed with:    Comments   Patient     Family      RN y    Care Manager     Consultant                        Multidiciplinary team rounds were held today with , nursing, pharmacist and clinical coordinator. Patient's plan of care was discussed; medications were reviewed and discharge planning was addressed. ________________________________________________________________________  Total NON critical care TIME:  35   Minutes    Total CRITICAL CARE TIME Spent:   Minutes non procedure based      Comments   >50% of visit spent in counseling and coordination of care     ________________________________________________________________________  Kristi Jurado MD     Procedures: see electronic medical records for all procedures/Xrays and details which were not copied into this note but were reviewed prior to creation of Plan. LABS:  I reviewed today's most current labs and imaging studies.   Pertinent labs include:  Recent Labs      01/08/17   0309 01/07/17   0355   WBC  5.4  6.9   HGB  9.7*  10.6*   HCT  28.6*  32.2*   PLT  197  179     Recent Labs      01/09/17   0442  01/08/17   0309  01/07/17   0355   NA  146*  144  144   K  3.8  3.4*  3.5   CL  115*  112*  110*   CO2  19*  23  24   GLU  153*  135*  127*   BUN  7  4*  4*   CREA  1.11*  0.59  0.68   CA  7.9*  7.9*  8.2*   MG  2.2  2.1  2.2   PHOS  3.1  2.7  2.5*   ALB  2.5*  2.3*  2.6*   TBILI  0.8  0.7  1.5*   SGOT  565*  8*  16   ALT  282*  19  26       Signed: Kristi Jurado MD

## 2017-01-09 NOTE — PROGRESS NOTES
Nutrition Assessment:    RECOMMENDATIONS:   Initiate TF via gastric tube (once placed):   Start Osmolite 1.2 @ 10mL/h, advance rate 10mL q 8h as tolerated to Goal Rate of 55mL/h + 100mL H2O flush q 4h (provides 1584kcals/73gPro/1682mL)    DIETITIANS INTERVENTIONS/PLAN:   TF recommendations  Monitor plan of care    ASSESSMENT:   Pt admitted with ankle fracture. PMH: HTN, ETOH abuse. Chart reviewed, case discussed during CCU rounds. Pt is intubated and sedated on propofol @ 18.9mL/h which provides 499 kcals daily. Discussed with pulmonology plans to start TF, an orders for a gastric tube was placed. Labs reviewed. No BM noted since 1/2, will monitor need for a bowel regimen. She is on MVI, thiamin and folic acid for hx of ETOH abuse. See TF orders above. SUBJECTIVE/OBJECTIVE:   Pt intubated and sedated   Diet Order: NPO  % Eaten:  Patient Vitals for the past 72 hrs:   % Diet Eaten   01/07/17 0000 0 %   01/06/17 2000 0 %     Pertinent Medications:cefepime, folvite, levaquin, protonix, MVI, thiamin, vancomycin; [email or web address]); Drips: ativan, propofol, miriam. Chemistries:  Lab Results   Component Value Date/Time    Sodium 146 01/09/2017 04:42 AM    Potassium 3.8 01/09/2017 04:42 AM    Chloride 115 01/09/2017 04:42 AM    CO2 19 01/09/2017 04:42 AM    Anion gap 12 01/09/2017 04:42 AM    Glucose 153 01/09/2017 04:42 AM    BUN 7 01/09/2017 04:42 AM    Creatinine 1.11 01/09/2017 04:42 AM    BUN/Creatinine ratio 6 01/09/2017 04:42 AM    GFR est AA >60 01/09/2017 04:42 AM    GFR est non-AA 51 01/09/2017 04:42 AM    Calcium 7.9 01/09/2017 04:42 AM     01/09/2017 04:42 AM     01/09/2017 04:42 AM    Alk.  phosphatase 106 01/09/2017 04:42 AM    Protein, total 5.8 01/09/2017 04:42 AM    Albumin 2.5 01/09/2017 04:42 AM    Globulin 3.3 01/09/2017 04:42 AM    A-G Ratio 0.8 01/09/2017 04:42 AM      Anthropometrics: Height: 5' 4\" (162.6 cm) Weight: 78.9 kg (173 lb 15.1 oz)    IBW (%IBW):   ( ) UBW (%UBW): (  %)    BMI: Body mass index is 29.86 kg/(m^2). This BMI is indicative of:  []Underweight   []Normal   [x]Overweight   [] Obesity   [] Extreme Obesity (BMI>40)  Estimated Nutrition Needs (Based on): 2107 Kcals/day (PSU (MSJ 2332)) , 63 g (0.8gPro/kg) Protein  Carbohydrate: At Least 130 g/day  Fluids: 2000 mL/day    Last BM: 1/2   []Active     []Hyperactive  [x]Hypoactive       [] Absent   BS  Skin:    [] Intact   [x] Incision  [] Breakdown   [] DTI   [] Tears/Excoriation/Abrasion  [x]Edema(+1-BUE; +2-RLE) [] Other: Wt Readings from Last 30 Encounters:   01/07/17 78.9 kg (173 lb 15.1 oz)      NUTRITION DIAGNOSES:   Problem:  Inadequate protein-energy intake      Etiology: related to pt is intubated and NPO      Signs/Symptoms: as evidenced by NPO + propofol meets <25% kcal and 0% protein needs. NUTRITION INTERVENTIONS:    Enteral/Parenteral Nutrition: Initiate enteral nutrition                GOAL:   Pt will tolerate TF initiation with residuals <250mL in 2-4 days.      Cultural, Episcopal, or Ethnic Dietary Needs: None     LEARNING NEEDS (Diet, Food/Nutrient-Drug Interaction):    [x] None Identified   [] Identified and Education Provided/Documented   [] Identified and Pt declined/was not appropriate      [x] Interdisciplinary Care Plan Reviewed/Documented    [x] Participated in Discharge Planning:  UTD   [x] Interdisciplinary Rounds     NUTRITION RISK:    [x] High              [] Moderate           []  Low  []  Minimal/Uncompromised    PT SEEN FOR:    [x]  MD Consult: []Calorie Count      []Diabetic Diet Education        []Diet Education     []Electrolyte Management     []General Nutrition Management and Supplements     [x]Management of Tube Feeding     []TPN Recommendations    []  RN Referral:  []MST score >=2     []Enteral/Parenteral Nutrition PTA     []Pregnant: Gestational DM or Multigestation   []  Low BMI  []  DTR Referral   LISA Alfaro, [address] [place] [address]  Pager [phone]  Weekend Pager [phone]

## 2017-01-09 NOTE — PROGRESS NOTES
Hospitalist     Spoke with  about events, intubation  Events d/w him  Oxygenation improved after ET tube pulled back  Pulmonary aware of events, will manage the event    Verner Barrio, MD

## 2017-01-09 NOTE — PROGRESS NOTES
Pressure Ulcer Prevention In basket Alert Received for Sukhi < 14 (moderate risk).      Suggested Care Plan/Interventions for Nursing  1. Complete Sukhi Pressure Ulcer Risk Scale and use sub scores to identify appropriate interventions. 2. Perform Assessment: skin, changes in LOC, visual cues for pain, monitor skin under medical devices  3. Respond to Reduced Sensory Perception: changes in LOC, check visual cues for pain, float heels, suspension boots, pressure redistribution bed/mattress/chair cushion, turning and reposition approximately every 2 hours (pillows & wedges), pad between skin to skin, turn & reposition  4. Manage Moisture: absorbent under pads, internal / external urinary device, internal /  external fecal device, minimize layers, contain wound drainage, access need for specialty bed, limit adult briefs, maintain skin hydration (lotion/cream), moisture barrier, offer toileting every hour  5. Promote Activity: increase time out of bed, chair cushion, PT/OT evaluation, trapeze to reposition, pressure redistribution bed/mattress/chair  6. Address Reduced Mobility: float heels / suspension boot, HOB 30 degrees or less, pressure redistribution bed/mattress/cushion, PT / OT evaluation, turn and reposition approximately every 2 hours (pillows & wedges)  7. Promote Nutrition: document food / fluid / supplement intake, encourage/assist with meals as needed  8. Reduce Friction and Shear: transferring/repositioning devices (lift/draw sheet), lift team/ patient mobility team, feet elevated on foot rest, minimize layers, foam dressing / transparent film / skin sealants, protective barrier creams and emollients, transfer aides (board, Donya lift, ceiling lift, stand assist), HOB 30 degrees or less, trapeze to reposition.   Wound Care Team

## 2017-01-09 NOTE — PROGRESS NOTES
1900 Patient lethargic but arouses easily and immediately becomes agitated. Patient choking and gagging against ET tube and pulling against restraints in attempts to remove tube. Unable to reorient patient. No evidence of learning when attempted to educate patient on tube. CIWA 11. Obtained order for propofol and phenylephrine gtt. Propofol started at 20mcg/kg/min. Patient now tolerating ventilation. Bob gtt titrated to maintain a MAP of 65 or greater. 2000 Patient now lethargic eyes and open to pain. Pupils are 2mm and very sluggish. No command following but patient withdraws to pain in all four extremities. Lungs diminished and coarse with rhonchi bilaterally. Hypo bowel sounds. Skin is warm and dry with appropriate coloring for race. Pulses palpable and < 3 sec capillary refill in all extremities. +2 edema to RUE and RLE. Cast, cast padding, and elastic wrap to RLE incision in place with no drainage noted. Patient's  present in room. Discussed patient's current treatments such as medications, restraints, and  ventilator, and answered questions. 2100 Patient's skin hot and diaphoretic to the point of saturation of bed linens. CIWA 11 increased ativan to 13mg/hr.      0400 Patient unresponsive with no withdrawal to painful stimulation in any extremity, weak gag reflex, and pinpoint pupils. GCS score 3. Sedation decreased. Will reassess prior to calling [facility]. Axillary temp 101.1 Patient's skin very diaphoretic, cold, and clammy. Axillary temp 101.1. Rechecked temp via bladder probe 103.7. Dr. Arnav Arzate notified. Paired blood cultures, Lactic acid,  and  UA w/ reflex cx obtained; RC tylenol administered; and patient bathed and placed on a cooling blanket. Levaquin and vancomycin ordered. 0430 Patient does not open eye to stimulus but pupils are now equal,  3mm,  and sluggish. Patient withdraws from pain in all extremities. Sedation further decreased. 0542 Propofol and Precedex turned off. Patient opens eyes spontaneously. No command following but patient moves all extremities and withdraws from painful stimulation in all extremities. Patient coughing and gagging due to ET tube. Pulling against restraints and not tolerating ventilator. Propofol restarted. Precedex remains off.      5004 Patient continues to be able to open eyes spontaneously with no command following but patient moves all extremities but not pulling against restraints. Patient withdraws from painful stimulation in all extremities. Patient still coughing and gagging due to ET tube. Propofol increased. 0700 Verbal report given to oncoming nurse Winnie Mane RN  and [facility] RN    Report consisted of patients Situation, Background, Assessment, and   Recommendations    Information was reviewed with the receiving nurse. Opportunity for questions and clarification was provided.       Rick Liao RN

## 2017-01-09 NOTE — PROGRESS NOTES
[facility]  Pulmonary, Critical Care, and Sleep Medicine    Name: Venkatesh Ponce MRN: [id number]   : 1960 Hospital: [facility]   Date: 2017        IMPRESSION:   · Acute hypoxic respiratory failure  · Delirium tremens  · Shock   · Acute renal failure  · Shock liver  · S/P surgery for ankle fracture 17  · EtOH abuse - 1 bottle wine/day  · Tobacco/MJ use      PLAN:   · Ventilator support - recheck ABG and chest X-ray  · IVF - adjust  · Pressors  · Sedation as required - will try to reduce lorazepam in favor of propofol due to worsening metabolic acidosis  · Cultures/empiric antibiotics for fevers  · Thiamine/MVI/folate  · DVT/GI prophylaxis     Subjective/Interval History:   I have reviewed the flowsheet and previous days notes. The patient is unable to give any meaningful history or review of systems because the patient is:   Intubated/sedated - required intubation overnight for worsening oxygenation      The patient is critically ill on:      Mechanical ventilation/pressors     Review of Systems   Unable to perform ROS: Intubated     Objective:   Vital Signs:    Visit Vitals    BP 90/65    Pulse 79    Temp 99.4 °F (37.4 °C)    Resp 27    Ht 5' 4\" (1.626 m)    Wt 78.9 kg (173 lb 15.1 oz)    SpO2 100%    BMI 29.86 kg/m2       O2 Device: Ventilator, Endotracheal tube   O2 Flow Rate (L/min): 2 l/min   Temp (24hrs), Av.6 °F (37 °C), Min:97.6 °F (36.4 °C), Max:99.4 °F (37.4 °C)       Intake/Output:   Last shift:         Last 3 shifts:  1901 -  0700  In: 4559.7 [I.V.:4559.7]  Out: 8273 [Urine:1817]    Intake/Output Summary (Last 24 hours) at 17 07  Last data filed at 17 0200   Gross per 24 hour   Intake          2866.53 ml   Output              842 ml   Net          2024.53 ml     Hemodynamics:   PAP:   CO:     Wedge:   CI:     CVP:    SVR:       PVR:       Ventilator Settings:  Mode Rate Tidal Volume Pressure FiO2 PEEP   Assist control   450 ml    100 % 8 cm H20     Peak airway pressure: 21 cm H2O    Minute ventilation: 13.1 l/min       Physical Exam   Constitutional: She is sedated and intubated. HENT:   Head: Normocephalic and atraumatic. Mouth/Throat: No oropharyngeal exudate. Eyes: No scleral icterus. Cardiovascular: Normal rate and regular rhythm. Exam reveals no gallop. Pulmonary/Chest: She is intubated. No respiratory distress. She has no wheezes. She has no rales. Abdominal: Soft. Bowel sounds are normal. She exhibits no distension. There is no tenderness. Musculoskeletal: She exhibits edema. Skin: Skin is warm and dry. Data:     Current Facility-Administered Medications   Medication Dose Route Frequency    levoFLOXacin (LEVAQUIN) 750 mg in D5W IVPB  750 mg IntraVENous ONCE    vancomycin (VANCOCIN) 2000 mg in  ml infusion  2,000 mg IntraVENous NOW    dexmedetomidine (PRECEDEX) 400 mcg in 0.9% sodium chloride 100 mL infusion  0.2-1.4 mcg/kg/hr IntraVENous TITRATE    propofol (DIPRIVAN) infusion  5-50 mcg/kg/min IntraVENous TITRATE    PHENYLephrine (NEOSYNEPHRINE) 30,000 mcg in 0.9% sodium chloride 250 mL infusion   mcg/min IntraVENous TITRATE    sodium chloride (NS) flush 10 mL  10 mL InterCATHeter Q24H    sodium chloride (NS) flush 10-40 mL  10-40 mL InterCATHeter Q8H    LORazepam (ATIVAN) 60 mg in 0.9% sodium chloride 60 mL infusion  1-15 mg/hr IntraVENous TITRATE    mupirocin (BACTROBAN) 2 % ointment   Both Nostrils BID    0.9% sodium chloride 1,000 mL with mvi, adult no. 4 with vit K 10 mL, thiamine 910 mg, folic acid 1 mg infusion   IntraVENous Q24H    nicotine (NICODERM CQ) 14 mg/24 hr patch 1 Patch  1 Patch TransDERmal DAILY    0.45% sodium chloride infusion  100 mL/hr IntraVENous CONTINUOUS    sodium chloride (NS) flush 5-10 mL  5-10 mL IntraVENous Q8H    enoxaparin (LOVENOX) injection 40 mg  40 mg SubCUTAneous Q24H                Labs:  Recent Labs      01/08/17   1632 01/07/17   0355   WBC  5.4  6.9   HGB  9.7*  10.6*   HCT  28.6*  32.2*   PLT  197  179     Recent Labs      01/09/17   0442  01/08/17   0309  01/07/17   0355   NA  146*  144  144   K  3.8  3.4*  3.5   CL  115*  112*  110*   CO2  19*  23  24   GLU  153*  135*  127*   BUN  7  4*  4*   CREA  1.11*  0.59  0.68   CA  7.9*  7.9*  8.2*   MG  2.2  2.1  2.2   PHOS  3.1  2.7  2.5*   ALB  2.5*  2.3*  2.6*   TBILI  0.8  0.7  1.5*   SGOT  565*  8*  16   ALT  282*  19  26     Recent Labs      01/08/17   1845  01/08/17   1741   PH  7.40  7.37   PCO2  27*  24*   PO2  59*  49*   HCO3  16*  14*   FIO2  100   --      Imaging:  I have personally reviewed the patients radiographs and have reviewed the reports:  Mild edema - post-intubation chest X-rays are not available for viewing at this time        Total critical care time exclusive of procedures: 45 minutes  Kinga Redman MD

## 2017-01-09 NOTE — PROGRESS NOTES
01/09/17 0642   ABCDE Bundle   SBT Safety Screen Passed No   SBT Screen Reason for Failure FiO2 > 50%;PEEP > 7;Vasopressor use

## 2017-01-09 NOTE — PROGRESS NOTES
0700 Report received from [facility]. 5736 Assessment completed. Patient able to withdraw from pain in all extremities, pupils reactive and equal however does not open eyes spontaneously. Ativan IV drip infusing and being titrated, propofol IV hanging and being titrated to keep patient calm and sedated, Bob-Synephrine IV hanging and being titrated to keep systolic ML>23. Cast and wrap intact to RLE with no drainage noted. Temp via bladder probe 103.0, cooling blanket on patient and doctors aware of temperature. Tylenol previously given for temperature. Bilateral wrist restraints applied due to patient attempting to pull at ET tube and PICC line. Patient calm and resting at present. Will continue to monitor closely. 0930 Sputum culture sent per order. 1051 Fentanyl IV drip started per order at 25mcg/hr. Ativan IV drip being titrated down until patient if off completely. 1100 OG tube placed per doctors order, clamped at present. Placement verified. KUB ordered for placement verification. 1200 Assessment unchanged. Patient sedated, calm, withdraws in all extremities. Will closely monitor. 1313 Ativan IV drip stopped. Patient calm at present. 1544 Bob-Synephrine titrated down to 30mcg/min. /63 at present. Will continue to monitor. 1615 Patient assessment unchanged. No signs of pain or distress noted. 1702 Temp 101.1, Tylenol 650mg given through OG tube. Will monitor closely. 1735 /85, Bob-Synephrine titrated down to 15mcg/min. Will continue to monitor. 1740 Tube feedings initiated, Osmolite 1.5 started at 10mL/hr with a 100mL water flush given. Will continue to monitor. 1800 Temp 100.4 per bladder probe. Cooling blanket on patient. Patient calm with no signs of agitation noted at present. No other issues noted at this time. 1915 Report given to [facility].

## 2017-01-09 NOTE — PROGRESS NOTES
Chart reviewed. Weekend events noted and pt discussed during interdisciplinary rounds. Pt remains intubated and sedated and inappropriate for participation with therapy/mobilization, per RN. Will hold however continue to follow. Luis Williamson.  LEON SanchezT

## 2017-01-10 ENCOUNTER — APPOINTMENT (OUTPATIENT)
Dept: GENERAL RADIOLOGY | Age: 57
DRG: 492 | End: 2017-01-10
Attending: INTERNAL MEDICINE
Payer: COMMERCIAL

## 2017-01-10 LAB
ALBUMIN SERPL BCP-MCNC: 2.1 G/DL (ref 3.5–5)
ALBUMIN/GLOB SERPL: 0.7 {RATIO} (ref 1.1–2.2)
ALP SERPL-CCNC: 89 U/L (ref 45–117)
ALT SERPL-CCNC: 341 U/L (ref 12–78)
AMMONIA PLAS-SCNC: 30 UMOL/L
ANION GAP BLD CALC-SCNC: 11 MMOL/L (ref 5–15)
ARTERIAL PATENCY WRIST A: YES
AST SERPL W P-5'-P-CCNC: 312 U/L (ref 15–37)
BASE DEFICIT BLDA-SCNC: 2.9 MMOL/L
BASOPHILS # BLD AUTO: 0 K/UL (ref 0–0.1)
BASOPHILS # BLD: 0 % (ref 0–1)
BDY SITE: ABNORMAL
BILIRUB SERPL-MCNC: 1 MG/DL (ref 0.2–1)
BUN SERPL-MCNC: 5 MG/DL (ref 6–20)
BUN/CREAT SERPL: 7 (ref 12–20)
CALCIUM SERPL-MCNC: 7.5 MG/DL (ref 8.5–10.1)
CHLORIDE SERPL-SCNC: 112 MMOL/L (ref 97–108)
CO2 SERPL-SCNC: 21 MMOL/L (ref 21–32)
CREAT SERPL-MCNC: 0.71 MG/DL (ref 0.55–1.02)
EOSINOPHIL # BLD: 0 K/UL (ref 0–0.4)
EOSINOPHIL NFR BLD: 0 % (ref 0–7)
EPAP/CPAP/PEEP, PAPEEP: 5
ERYTHROCYTE [DISTWIDTH] IN BLOOD BY AUTOMATED COUNT: 13.6 % (ref 11.5–14.5)
FIO2 ON VENT: 70 %
GAS FLOW.O2 SETTING OXYMISER: 12 L/MIN
GLOBULIN SER CALC-MCNC: 3.2 G/DL (ref 2–4)
GLUCOSE SERPL-MCNC: 132 MG/DL (ref 65–100)
HCO3 BLDA-SCNC: 21 MMOL/L (ref 22–26)
HCT VFR BLD AUTO: 29.4 % (ref 35–47)
HGB BLD-MCNC: 9.7 G/DL (ref 11.5–16)
INR PPP: 1.3 (ref 0.9–1.1)
IPAP/PIP, IPAPIP: 10
LYMPHOCYTES # BLD AUTO: 11 % (ref 12–49)
LYMPHOCYTES # BLD: 0.8 K/UL (ref 0.8–3.5)
MAGNESIUM SERPL-MCNC: 2.1 MG/DL (ref 1.6–2.4)
MCH RBC QN AUTO: 30.4 PG (ref 26–34)
MCHC RBC AUTO-ENTMCNC: 33 G/DL (ref 30–36.5)
MCV RBC AUTO: 92.2 FL (ref 80–99)
MONOCYTES # BLD: 0.8 K/UL (ref 0–1)
MONOCYTES NFR BLD AUTO: 10 % (ref 5–13)
NEUTS SEG # BLD: 5.9 K/UL (ref 1.8–8)
NEUTS SEG NFR BLD AUTO: 79 % (ref 32–75)
PCO2 BLDA: 34 MMHG (ref 35–45)
PH BLDA: 7.41 [PH] (ref 7.35–7.45)
PHOSPHATE SERPL-MCNC: 2.3 MG/DL (ref 2.6–4.7)
PLATELET # BLD AUTO: 161 K/UL (ref 150–400)
PO2 BLDA: 56 MMHG (ref 80–100)
POTASSIUM SERPL-SCNC: 2.8 MMOL/L (ref 3.5–5.1)
PROT SERPL-MCNC: 5.3 G/DL (ref 6.4–8.2)
PROTHROMBIN TIME: 13.1 SEC (ref 9–11.1)
RBC # BLD AUTO: 3.19 M/UL (ref 3.8–5.2)
SAO2 % BLD: 90 % (ref 92–97)
SAO2% DEVICE SAO2% SENSOR NAME: ABNORMAL
SODIUM SERPL-SCNC: 144 MMOL/L (ref 136–145)
SPECIMEN SITE: ABNORMAL
VENTILATION MODE VENT: ABNORMAL
WBC # BLD AUTO: 7.5 K/UL (ref 3.6–11)

## 2017-01-10 PROCEDURE — 80053 COMPREHEN METABOLIC PANEL: CPT | Performed by: INTERNAL MEDICINE

## 2017-01-10 PROCEDURE — 36592 COLLECT BLOOD FROM PICC: CPT

## 2017-01-10 PROCEDURE — 74011250637 HC RX REV CODE- 250/637: Performed by: INTERNAL MEDICINE

## 2017-01-10 PROCEDURE — 82803 BLOOD GASES ANY COMBINATION: CPT | Performed by: INTERNAL MEDICINE

## 2017-01-10 PROCEDURE — 77030011256 HC DRSG MEPILEX <16IN NO BORD MOLN -A

## 2017-01-10 PROCEDURE — 74011000250 HC RX REV CODE- 250: Performed by: INTERNAL MEDICINE

## 2017-01-10 PROCEDURE — 74011250636 HC RX REV CODE- 250/636: Performed by: INTERNAL MEDICINE

## 2017-01-10 PROCEDURE — 74011250637 HC RX REV CODE- 250/637: Performed by: EMERGENCY MEDICINE

## 2017-01-10 PROCEDURE — 84100 ASSAY OF PHOSPHORUS: CPT | Performed by: INTERNAL MEDICINE

## 2017-01-10 PROCEDURE — 65620000000 HC RM CCU GENERAL

## 2017-01-10 PROCEDURE — 82140 ASSAY OF AMMONIA: CPT | Performed by: HOSPITALIST

## 2017-01-10 PROCEDURE — 85025 COMPLETE CBC W/AUTO DIFF WBC: CPT | Performed by: INTERNAL MEDICINE

## 2017-01-10 PROCEDURE — 77030033263 HC DRSG MEPILEX 16-48IN BORD MOLN -B

## 2017-01-10 PROCEDURE — 94003 VENT MGMT INPAT SUBQ DAY: CPT

## 2017-01-10 PROCEDURE — 74011000258 HC RX REV CODE- 258: Performed by: INTERNAL MEDICINE

## 2017-01-10 PROCEDURE — C9113 INJ PANTOPRAZOLE SODIUM, VIA: HCPCS | Performed by: INTERNAL MEDICINE

## 2017-01-10 PROCEDURE — 71010 XR CHEST PORT: CPT

## 2017-01-10 PROCEDURE — 83735 ASSAY OF MAGNESIUM: CPT | Performed by: INTERNAL MEDICINE

## 2017-01-10 PROCEDURE — 36415 COLL VENOUS BLD VENIPUNCTURE: CPT | Performed by: INTERNAL MEDICINE

## 2017-01-10 PROCEDURE — 85610 PROTHROMBIN TIME: CPT | Performed by: INTERNAL MEDICINE

## 2017-01-10 PROCEDURE — 74011250637 HC RX REV CODE- 250/637: Performed by: ORTHOPAEDIC SURGERY

## 2017-01-10 PROCEDURE — 74011250636 HC RX REV CODE- 250/636: Performed by: ORTHOPAEDIC SURGERY

## 2017-01-10 PROCEDURE — 77030013797 HC KT TRNSDUC PRSSR EDWD -A

## 2017-01-10 PROCEDURE — 94640 AIRWAY INHALATION TREATMENT: CPT

## 2017-01-10 PROCEDURE — 36600 WITHDRAWAL OF ARTERIAL BLOOD: CPT | Performed by: INTERNAL MEDICINE

## 2017-01-10 PROCEDURE — 74011250636 HC RX REV CODE- 250/636: Performed by: HOSPITALIST

## 2017-01-10 PROCEDURE — 93306 TTE W/DOPPLER COMPLETE: CPT

## 2017-01-10 RX ORDER — KETOROLAC TROMETHAMINE 30 MG/ML
30 INJECTION, SOLUTION INTRAMUSCULAR; INTRAVENOUS
Status: DISCONTINUED | OUTPATIENT
Start: 2017-01-10 | End: 2017-01-10

## 2017-01-10 RX ORDER — KETOROLAC TROMETHAMINE 30 MG/ML
30 INJECTION, SOLUTION INTRAMUSCULAR; INTRAVENOUS
Status: COMPLETED | OUTPATIENT
Start: 2017-01-10 | End: 2017-01-10

## 2017-01-10 RX ORDER — POTASSIUM CHLORIDE 29.8 MG/ML
20 INJECTION INTRAVENOUS
Status: COMPLETED | OUTPATIENT
Start: 2017-01-10 | End: 2017-01-12

## 2017-01-10 RX ORDER — POTASSIUM CHLORIDE 29.8 MG/ML
20 INJECTION INTRAVENOUS
Status: COMPLETED | OUTPATIENT
Start: 2017-01-10 | End: 2017-01-10

## 2017-01-10 RX ADMIN — Medication 10 ML: at 13:17

## 2017-01-10 RX ADMIN — POTASSIUM CHLORIDE 20 MEQ: 400 INJECTION, SOLUTION INTRAVENOUS at 17:43

## 2017-01-10 RX ADMIN — PROPOFOL 30 MCG/KG/MIN: 10 INJECTION, EMULSION INTRAVENOUS at 01:52

## 2017-01-10 RX ADMIN — CEFEPIME HYDROCHLORIDE 2 G: 2 INJECTION, POWDER, FOR SOLUTION INTRAVENOUS at 20:30

## 2017-01-10 RX ADMIN — ACETAMINOPHEN 650 MG: 325 TABLET, FILM COATED ORAL at 00:35

## 2017-01-10 RX ADMIN — CEFEPIME HYDROCHLORIDE 2 G: 2 INJECTION, POWDER, FOR SOLUTION INTRAVENOUS at 00:31

## 2017-01-10 RX ADMIN — IPRATROPIUM BROMIDE AND ALBUTEROL SULFATE 3 ML: .5; 3 SOLUTION RESPIRATORY (INHALATION) at 11:09

## 2017-01-10 RX ADMIN — Medication 150 MCG/HR: at 19:19

## 2017-01-10 RX ADMIN — CHLORHEXIDINE GLUCONATE 15 ML: 1.2 RINSE ORAL at 09:09

## 2017-01-10 RX ADMIN — Medication 10 ML: at 05:31

## 2017-01-10 RX ADMIN — PROPOFOL 50 MCG/KG/MIN: 10 INJECTION, EMULSION INTRAVENOUS at 23:09

## 2017-01-10 RX ADMIN — IPRATROPIUM BROMIDE AND ALBUTEROL SULFATE 3 ML: .5; 3 SOLUTION RESPIRATORY (INHALATION) at 07:15

## 2017-01-10 RX ADMIN — SODIUM CHLORIDE 40 MG: 9 INJECTION INTRAMUSCULAR; INTRAVENOUS; SUBCUTANEOUS at 09:05

## 2017-01-10 RX ADMIN — PROPOFOL 50 MCG/KG/MIN: 10 INJECTION, EMULSION INTRAVENOUS at 19:20

## 2017-01-10 RX ADMIN — PROPOFOL 30 MCG/KG/MIN: 10 INJECTION, EMULSION INTRAVENOUS at 08:56

## 2017-01-10 RX ADMIN — ACETAMINOPHEN 650 MG: 325 TABLET, FILM COATED ORAL at 15:48

## 2017-01-10 RX ADMIN — ENOXAPARIN SODIUM 40 MG: 40 INJECTION, SOLUTION INTRAVENOUS; SUBCUTANEOUS at 09:06

## 2017-01-10 RX ADMIN — ACETAMINOPHEN 650 MG: 325 TABLET, FILM COATED ORAL at 05:30

## 2017-01-10 RX ADMIN — FOLIC ACID 1 MG: 1 TABLET ORAL at 09:09

## 2017-01-10 RX ADMIN — POTASSIUM CHLORIDE 20 MEQ: 400 INJECTION, SOLUTION INTRAVENOUS at 15:27

## 2017-01-10 RX ADMIN — CEFEPIME HYDROCHLORIDE 2 G: 2 INJECTION, POWDER, FOR SOLUTION INTRAVENOUS at 11:42

## 2017-01-10 RX ADMIN — LORAZEPAM 2 MG: 2 INJECTION INTRAMUSCULAR; INTRAVENOUS at 20:37

## 2017-01-10 RX ADMIN — Medication 10 ML: at 15:31

## 2017-01-10 RX ADMIN — ACETAMINOPHEN 650 MG: 325 TABLET, FILM COATED ORAL at 11:41

## 2017-01-10 RX ADMIN — POTASSIUM CHLORIDE 20 MEQ: 400 INJECTION, SOLUTION INTRAVENOUS at 07:57

## 2017-01-10 RX ADMIN — MUPIROCIN: 20 OINTMENT TOPICAL at 17:44

## 2017-01-10 RX ADMIN — SODIUM CHLORIDE 100 ML/HR: 450 INJECTION, SOLUTION INTRAVENOUS at 09:12

## 2017-01-10 RX ADMIN — Medication 100 MCG/HR: at 11:46

## 2017-01-10 RX ADMIN — IPRATROPIUM BROMIDE AND ALBUTEROL SULFATE 3 ML: .5; 3 SOLUTION RESPIRATORY (INHALATION) at 16:17

## 2017-01-10 RX ADMIN — Medication 10 ML: at 23:05

## 2017-01-10 RX ADMIN — Medication 100 MG: at 09:09

## 2017-01-10 RX ADMIN — MUPIROCIN: 20 OINTMENT TOPICAL at 09:09

## 2017-01-10 RX ADMIN — KETOROLAC TROMETHAMINE 30 MG: 30 INJECTION, SOLUTION INTRAMUSCULAR at 23:08

## 2017-01-10 RX ADMIN — IPRATROPIUM BROMIDE AND ALBUTEROL SULFATE 3 ML: .5; 3 SOLUTION RESPIRATORY (INHALATION) at 20:32

## 2017-01-10 RX ADMIN — ACETAMINOPHEN 650 MG: 650 SUPPOSITORY RECTAL at 20:30

## 2017-01-10 RX ADMIN — PHENYLEPHRINE HYDROCHLORIDE 10 MCG/MIN: 10 INJECTION INTRAVENOUS at 20:13

## 2017-01-10 RX ADMIN — THERA TABS 1 TABLET: TAB at 09:09

## 2017-01-10 RX ADMIN — POTASSIUM CHLORIDE 20 MEQ: 400 INJECTION, SOLUTION INTRAVENOUS at 09:48

## 2017-01-10 RX ADMIN — PROPOFOL 30 MCG/KG/MIN: 10 INJECTION, EMULSION INTRAVENOUS at 13:18

## 2017-01-10 RX ADMIN — LEVOFLOXACIN 750 MG: 5 INJECTION, SOLUTION INTRAVENOUS at 05:31

## 2017-01-10 RX ADMIN — CHLORHEXIDINE GLUCONATE 15 ML: 1.2 RINSE ORAL at 20:13

## 2017-01-10 NOTE — PROGRESS NOTES
[facility]  Pulmonary, Critical Care, and Sleep Medicine    Name: Karyn Martino MRN: [id number]   : 1960 Hospital: [facility]   Date: 1/10/2017        IMPRESSION:   · Acute hypoxic respiratory failure  · Delirium tremens  · Severe sepsis with septic shock   · GNR bacteremia  · Acute renal failure  · Shock liver  · S/P surgery for ankle fracture 17  · EtOH abuse - 1 bottle wine/day  · Tobacco/MJ use      PLAN:   · Ventilator support - failed SBT due to high RR/low sats (occurred after RT documentation)  · Ideally would diurese, but pressor requirement will make this difficult - check echo  · Pressors   · Sedation  · Follow up cultures  · Empiric antibiotics pending culture data  · Thiamine/MVI/folate  · Replete electrolytes  · DVT/GI prophylaxis     Subjective/Interval History:   I have reviewed the flowsheet and previous days notes. The patient is unable to give any meaningful history or review of systems because the patient is: Intubated/sedated - required intubation overnight for worsening oxygenation      The patient is critically ill on:      Mechanical ventilation/pressors     Review of Systems   Unable to perform ROS: Intubated     Objective:   Vital Signs:    Visit Vitals    BP (!) 155/91    Pulse (!) 107  Comment: Simultaneous filing. User may not have seen previous data.  Temp 100.2 °F (37.9 °C)    Resp (!) 40  Comment: Simultaneous filing. User may not have seen previous data.  Ht 5' 4\" (1.626 m)    Wt 78.9 kg (173 lb 15.1 oz)    SpO2 97%  Comment: Simultaneous filing. User may not have seen previous data.     BMI 29.86 kg/m2       O2 Device: Endotracheal tube   O2 Flow Rate (L/min): 2 l/min   Temp (24hrs), Av.2 °F (38.4 °C), Min:100.2 °F (37.9 °C), Max:103 °F (39.4 °C)       Intake/Output:   Last shift:         Last 3 shifts:  1901 - 01/10 0700  In: 7915.7 [I.V.:6153.7]  Out: 2868 [Urine:3430]    Intake/Output Summary (Last 24 hours) at 01/10/17 0724  Last data filed at 01/10/17 0700   Gross per 24 hour   Intake             5778 ml   Output             2970 ml   Net             2808 ml     Hemodynamics:   PAP:   CO:     Wedge:   CI:     CVP:    SVR:       PVR:       Ventilator Settings:  Mode Rate Tidal Volume Pressure FiO2 PEEP   Assist control   450 ml  5 cm H2O 40 % 5 cm H20     Peak airway pressure: 12 cm H2O    Minute ventilation: 16.7 l/min       Physical Exam   Constitutional: She is sedated and intubated. HENT:   Head: Normocephalic and atraumatic. Mouth/Throat: No oropharyngeal exudate. Eyes: No scleral icterus. Cardiovascular: Normal rate and regular rhythm. Exam reveals no gallop. Pulmonary/Chest: She is intubated. No respiratory distress. She has no wheezes. She has rales. Abdominal: Soft. Bowel sounds are normal. She exhibits no distension. There is no tenderness. Musculoskeletal: She exhibits edema. Skin: Skin is warm and dry.      Data:     Current Facility-Administered Medications   Medication Dose Route Frequency    albuterol-ipratropium (DUO-NEB) 2.5 MG-0.5 MG/3 ML  3 mL Nebulization QID RT    fentaNYL (PF) 900 mcg/30 ml infusion soln   mcg/hr IntraVENous TITRATE    levoFLOXacin (LEVAQUIN) 750 mg in D5W IVPB  750 mg IntraVENous Q24H    chlorhexidine (PERIDEX) 0.12 % mouthwash 15 mL  15 mL Oral Q12H    pantoprazole (PROTONIX) 40 mg in sodium chloride 0.9 % 10 mL injection  40 mg IntraVENous DAILY    cefepime (MAXIPIME) 2 g in 0.9% sodium chloride (MBP/ADV) 100 mL  2 g IntraVENous Q12H    thiamine (B-1) tablet 100 mg  100 mg Per NG tube DAILY    folic acid (FOLVITE) tablet 1 mg  1 mg Per NG tube DAILY    therapeutic multivitamin (THERAGRAN) tablet 1 Tab  1 Tab Oral DAILY    propofol (DIPRIVAN) infusion  5-50 mcg/kg/min IntraVENous TITRATE    PHENYLephrine (NEOSYNEPHRINE) 30,000 mcg in 0.9% sodium chloride 250 mL infusion   mcg/min IntraVENous TITRATE    sodium chloride (NS) flush 10 mL  10 mL InterCATHeter Q24H    sodium chloride (NS) flush 10-40 mL  10-40 mL InterCATHeter Q8H    LORazepam (ATIVAN) 60 mg in 0.9% sodium chloride 60 mL infusion  1-10 mg/hr IntraVENous TITRATE    mupirocin (BACTROBAN) 2 % ointment   Both Nostrils BID    nicotine (NICODERM CQ) 14 mg/24 hr patch 1 Patch  1 Patch TransDERmal DAILY    0.45% sodium chloride infusion  100 mL/hr IntraVENous CONTINUOUS    sodium chloride (NS) flush 5-10 mL  5-10 mL IntraVENous Q8H    enoxaparin (LOVENOX) injection 40 mg  40 mg SubCUTAneous Q24H                Labs:  Recent Labs      01/10/17   0523  01/08/17   0309   WBC  7.5  5.4   HGB  9.7*  9.7*   HCT  29.4*  28.6*   PLT  161  197     Recent Labs      01/10/17   0523  01/09/17   0442  01/08/17   0309   NA  144  146*  144   K  2.8*  3.8  3.4*   CL  112*  115*  112*   CO2  21  19*  23   GLU  132*  153*  135*   BUN  5*  7  4*   CREA  0.71  1.11*  0.59   CA  7.5*  7.9*  7.9*   MG  2.1  2.2  2.1   PHOS  2.3*  3.1  2.7   ALB  2.1*  2.5*  2.3*   TBILI  1.0  0.8  0.7   SGOT  312*  565*  8*   ALT  341*  282*  19   INR  1.3*   --    --      Recent Labs      01/09/17   0730  01/08/17   1845  01/08/17   1741   PH  7.44  7.40  7.37   PCO2  24*  27*  24*   PO2  115*  59*  49*   HCO3  16*  16*  14*   FIO2   --   100   --      Imaging:  I have personally reviewed the patients radiographs and have reviewed the reports:  ETT high, increased edema/effusions        Total critical care time exclusive of procedures: 35 minutes  Sacha Grissom MD

## 2017-01-10 NOTE — PROGRESS NOTES
01/10/17 0635   Weaning Parameters   Spontaneous Breathing Trial Complete Yes   Resp Rate Observed 21   Ve 9.4      RSBI 43   Placed pt on SBT trial, tolerating well.

## 2017-01-10 NOTE — DIABETES MGMT
DTC Progress Note    Recommendations/ Comments: Pt discussed with rounding team and Dr. Greyson Denson. Declined to begin POC glucose monitoring at this time. Will continue to monitor fasting labs. Current fasting labs stable. 1/10/17  NURSING : HYPOGLYCEMIC RISK ASSESSMENT    Patient is at increased risk for hypoglycemia due to to the following conditions: shock liver    Noted glucose events:none    Please monitor BG levels and follow the hypoglycemia protocol for treatment. Chart reviewed on Anjali Smith during Multidisciplinary Rounds. A1c:   No results found for: HBA1C, HGBE8, [id number]        Recent Glucose Results:   Lab Results   Component Value Date/Time     (H) 01/10/2017 05:23 AM        Lab Results   Component Value Date/Time    Creatinine 0.71 01/10/2017 05:23 AM       Active Orders   There are no active orders of the following type(s): Diet. PO intake: No data found. Will continue to follow as needed. Thank you.   Rosetta Starr RN, [facility]

## 2017-01-10 NOTE — PROGRESS NOTES
01/10/17 0652   Weaning Parameters   Spontaneous Breathing Trial Complete Yes   Resp Rate Observed 28   Ve 12.6      RSBI 63

## 2017-01-10 NOTE — PROGRESS NOTES
Hospitalist Progress Note    NAME: Leeann Reyes   :  1960   MRN:  [id number]       Interim Hospital Summary: 49 y.o. female whom presented on 2017 with right ankle deformity s/p injury R unstable trimalleolar ankle fx/dislocatio  ADHD on Adderall  Typically drinks 1 bottle of wine per day    Initially admitted to ortho service-- multiple attempts at ankle reduction were made with out success. On 1/3 s/p ORIF, on  consulted hospitalist for acute delirium ( with concern for alcohol withdrawal) on  transferred to ICU requiring ativan drip and precedex. Further deteriorated on  requiring intubation  Now with spiking fevers and shock liver  Ortho transferred the patient care to medicine service. Maria Victoria Lester [phone] updated on 1/10     Assessment / Plan:  Acute hypoxic respiratory failure secondary   To respiratory depression ( delirium tremens  Being on ativan and precedex)  With multiorgan failure ( shock liver, concern for hcap)    Vent support ( intensivist managing)  Empiric vanco, cefepime and levo d2  Requiring pressors as well  Chest xray:ETT in satisfactory position.   Interval improved aeration of the lungs with some regression of pulmonary edema.   Persistent bilateral small pleural effusions and probably some consolidation at  left base. Follow the blood, urine and sputum cultures  Platelets ok  Ammonia normal, consider ct head  picc placed on   Follow the echo    Hypertension    Depression ADHD    Right trimalleolar ankle fracture, poa now s/p closed reduction and ORIF 17  -ortho managing  Code status: full  Prophylaxis: lovenox  Recommended Disposition: tbd     Subjective:     Chief Complaint / Reason for Physician Visit  Follow up on respiratory failure, delirium tremens  intubated    Discussed with RN events overnight.      Review of Systems:  Symptom Y/N Comments  Symptom Y/N Comments   Fever/Chills    Chest Pain     Poor Appetite    Edema     Cough    Abdominal Pain     Sputum    Joint Pain     SOB/FUENTES    Pruritis/Rash     Nausea/vomit    Tolerating PT/OT     Diarrhea    Tolerating Diet     Constipation    Other       Could NOT obtain due to: Intubated and sedated     Objective:     VITALS:   Last 24hrs VS reviewed since prior progress note.  Most recent are:  Patient Vitals for the past 24 hrs:   Temp Pulse Resp BP SpO2   01/10/17 1200 (!) 101.9 °F (38.8 °C) (!) 106 24 123/57 93 %   01/10/17 1111 - 87 21 - 96 %   01/10/17 1110 - - - - 98 %   01/10/17 1100 - 91 23 108/63 97 %   01/10/17 1000 - 88 21 102/60 96 %   01/10/17 0904 - 93 - 138/77 -   01/10/17 0900 - (!) 108 22 138/77 96 %   01/10/17 0800 100.4 °F (38 °C) 95 20 103/63 97 %   01/10/17 0717 - (!) 107 (!) 40 (!) 155/91 97 %   01/10/17 0716 - - - - 96 %   01/10/17 0715 - (!) 115 (!) 44 - (!) 87 %   01/10/17 0700 - 99 (!) 37 120/71 95 %   01/10/17 0652 - 91 27 - 96 %   01/10/17 0645 - 81 19 113/58 97 %   01/10/17 0635 - 84 21 - 97 %   01/10/17 0630 - 83 21 103/68 97 %   01/10/17 0615 - 83 22 95/53 97 %   01/10/17 0600 - 93 18 112/66 96 %   01/10/17 0530 - 90 29 120/69 -   01/10/17 0500 - 83 21 103/62 -   01/10/17 0441 - 80 22 - 100 %   01/10/17 0430 - 84 21 99/57 -   01/10/17 0400 100.2 °F (37.9 °C) 85 22 102/67 -   01/10/17 0330 - 90 24 103/67 99 %   01/10/17 0300 - 96 28 103/59 98 %   01/10/17 0230 - 99 28 113/72 99 %   01/10/17 0200 - 92 19 102/56 98 %   01/10/17 0130 - 95 23 105/63 100 %   01/10/17 0100 - 91 23 98/58 100 %   01/10/17 0030 - 93 23 105/65 100 %   01/10/17 0015 (!) 102.4 °F (39.1 °C) 94 23 111/66 100 %   01/10/17 0000 - (!) 103 28 122/75 100 %   01/09/17 2345 - (!) 102 27 125/79 -   01/09/17 2330 - (!) 104 26 121/76 -   01/09/17 2315 - 93 24 107/66 -   01/09/17 2300 - 93 23 102/64 100 %   01/09/17 2245 - (!) 102 (!) 32 111/67 99 %   01/09/17 2230 - 94 22 102/61 98 %   01/09/17 2215 - 98 22 102/74 -   01/09/17 2200 - (!) 102 (!) 32 110/59 -   01/09/17 2145 - (!) 110 (!) 42 127/76 -   01/09/17 2130 - (!) 105 27 118/58 -   01/09/17 2115 - (!) 108 (!) 36 120/71 -   01/09/17 2100 - 94 24 98/59 -   01/09/17 2045 - 86 22 100/64 -   01/09/17 2040 - 90 26 - 100 %   01/09/17 2036 - - - - 100 %   01/09/17 2030 - 84 23 95/54 -   01/09/17 2015 - 85 22 100/57 -   01/09/17 2000 - 90 (!) 7 112/68 99 %   01/09/17 1945 100.4 °F (38 °C) 89 24 111/72 98 %   01/09/17 1930 - 87 26 109/72 99 %   01/09/17 1915 - 82 22 97/65 98 %   01/09/17 1900 - 87 24 104/69 99 %   01/09/17 1800 100.4 °F (38 °C) 88 22 100/62 98 %   01/09/17 1700 - 100 (!) 31 123/85 98 %   01/09/17 1626 - - - - 97 %   01/09/17 1600 (!) 100.8 °F (38.2 °C) (!) 104 20 110/89 92 %   01/09/17 1544 - 93 - 119/83 -   01/09/17 1536 - (!) 104 (!) 36 - 95 %   01/09/17 1500 - 92 (!) 32 106/73 96 %   01/09/17 1401 - - - - 97 %   01/09/17 1400 - 85 27 107/77 97 %   01/09/17 1300 - 80 25 101/76 96 %       Intake/Output Summary (Last 24 hours) at 01/10/17 1223  Last data filed at 01/10/17 1200   Gross per 24 hour   Intake             5604 ml   Output             3295 ml   Net             2309 ml        PHYSICAL EXAM:  General: Intubated and sedated  EENT:  EOMI. Anicteric sclerae. Resp:  B/l decreased air entry. No wheezing No accessory muscle use  CV:  Regular  rhythm,  No edema  GI:  Soft, Non distended, Non tender.  +Bowel sounds  Neurologic:  intubated,   Psych:             Not anxious nor agitated  Skin:  No rashes.   No jaundice    Reviewed most current lab test results and cultures  YES  Reviewed most current radiology test results   YES  Review and summation of old records today    NO  Reviewed patient's current orders and MAR    YES  PMH/SH reviewed - no change compared to H&P  ________________________________________________________________________  Care Plan discussed with:    Comments   Patient     Family      RN y    Care Manager     Consultant                        Multidiciplinary team rounds were held today with , nursing, pharmacist and clinical coordinator. Patient's plan of care was discussed; medications were reviewed and discharge planning was addressed. ________________________________________________________________________  Total NON critical care TIME: 30   Minutes    Total CRITICAL CARE TIME Spent:   Minutes non procedure based      Comments   >50% of visit spent in counseling and coordination of care     ________________________________________________________________________  Kristi Jurado MD     Procedures: see electronic medical records for all procedures/Xrays and details which were not copied into this note but were reviewed prior to creation of Plan. LABS:  I reviewed today's most current labs and imaging studies.   Pertinent labs include:  Recent Labs      01/10/17   0523  01/08/17   0309   WBC  7.5  5.4   HGB  9.7*  9.7*   HCT  29.4*  28.6*   PLT  161  197     Recent Labs      01/10/17   0523 01/09/17   0442  01/08/17   0309   NA  144  146*  144   K  2.8*  3.8  3.4*   CL  112*  115*  112*   CO2  21  19*  23   GLU  132*  153*  135*   BUN  5*  7  4*   CREA  0.71  1.11*  0.59   CA  7.5*  7.9*  7.9*   MG  2.1  2.2  2.1   PHOS  2.3*  3.1  2.7   ALB  2.1*  2.5*  2.3*   TBILI  1.0  0.8  0.7   SGOT  312*  565*  8*   ALT  341*  282*  19   INR  1.3*   --    --        Signed: Kristi Jurado MD

## 2017-01-10 NOTE — PROGRESS NOTES
1900 Bedside report received from [facility] RN                Assessment, Background, Procedure summary, Intake/Output, MAR, and recent results discussed. Care assumed. Decrease propofol to 25mcg/kg/[place] obtain RASS of -1. Patient unable to tolerate ET tube and ventilator. Respirations increased to upper 40's  And patient coughing and gagging. Propofol increased back to 30mcg/kg/min. 2100 Hospitalist paged for critical lab results of gram negative rods in 1/4 bottles for paired blood cultures. 0630 Hospitalist paged for critical lab results of potassium of 2.8.     0625 Propofol and fentanyl gtt stopped for SAT. Patient passed SAT. SBT started. Patient   appears anxious. Encouraged patient to relax and take slow deep breaths.       0700 Verbal report given to oncoming nurse Anil Landry notified of low potassium and 1/4 bottles positive for gram negative rods. Report consisted of patients Situation, Background, Assessment, and   Recommendations    Information was reviewed with the receiving nurse. Opportunity for questions and clarification was provided.       Luciano Oropeza RN

## 2017-01-10 NOTE — PROGRESS NOTES
Chart reviewed. Spoke with RN who reports that pt remains inappropriate for participation with therapy as she remains intubated and sedated. Attempted SBT this AM however pt failed secondary to high RR/low sats. Pt also requiring pressor support. RN states that pt becomes increasingly agitated when sedation is decreased. Will sign off at this time at pt presentation remains unchanged over previous 5 days. PLEASE RE-CONSULT PT/OT SERVICES WHEN MEDICALLY STABLE/APPROPRIATE. Thank you,    Jhonny Sanchez.  Abhi Jay, DPT

## 2017-01-10 NOTE — PROGRESS NOTES
Interdisciplinary team rounds were held 1/10/2017 with the following team members:Care Management, Diabetes Treatment Specialist, Nursing, Nutrition, Pharmacy, Physician,  Respiratory therapy and Physical therapy. .    Plan of care discussed. Goal: Failed SAT/SBT, adjust medications, continue to monitor and support. See MD orders and progress notes for further  interventions and desired outcomes.

## 2017-01-10 NOTE — PROGRESS NOTES
0710 Report received from [facility]. 0740 Assessment completed. Patient back on Propofol and Fentanyl IV drips due to increased agitation, increased respiratory rate. Patient on ventilator, does not withdraw from any stimulus, pupils round and reactive at present. Bob-Synephrine infusing at 10mcg/min. Propofol and Fentanyl IV drips being titrated to keep patient calm, to keep respiratory and heart rates within normal range. OG tube intact and placement verified, Osmolite 1.5 infusing at current present at 20mL/hr. [place] intact draining large amounts hazy/mucous yellow urine. Patient turned every 2 hours for comfort. No signs of acute distress noted. 0157 Bob-Synephrine IV drip stopped. /77.  1100 Tube feeding increased to 30mL/hr. Tolerating well at present. 1125 Patient respirations increased, breathing labored, HR increased to 130. Patient suctioned and mouth care given. Patient continued to have labored respirations, Dr. Danitza Durham notified and evaluated patient. Dr. Danitza Durham made changes to Vent settings. Fentanyl IV drip increased to 100mcg/hr. Will monitor closely. 1141 Tylenol 650mg given through OG tube due to Temperature 102.1.   1318 Patient BP 87/53. Propofol decreased to 30mcg/kg/min. Temp 100.6 per bladder probe. Patient calm, , respirations nonlabored at present. Will continue to monitor. 1345 /58, . Respirations nonlabored and no signs of agitation noted. Will continue to monitor. 1548 Temp 103.2. Tylenol 650mg given through OG tube. Will continue to monitor. 1555 Patient's respirations labored, HR increased to 108, Dr. Janny Lorenzana notified and examined patient. Ventilator settings to remain the same. Fentanyl IV drip to be increased to 150mcg.hr. Will continue to monitor closely. 1610 Respiratory Therapist adjusted ventilator settings due to patient respirations labored, Dr. Janny Lorenzana and Dr. Danitza Durham aware and verified to do ventilator setting adjustments.   1700 Temp 101.5. Patient's respirations easy and nonlabored at present. No other issues noted. 1900 Propofol IV drip increased to 50mcg/kg/min due to patient having labored breathing and mild agitation. Tube feeding increased to 40mL/hr. Tolerating well at present. 1910 Report given to [employer].

## 2017-01-10 NOTE — PROGRESS NOTES
Pharmacy Automatic Renal Dosing Protocol - Antimicrobials    Indication for Antimicrobials: Sepsis (GNR in urine; GNR bacteremia)    Current Regimen of Each Antimicrobial (Start Day & Day of Therapy):  Levofloxacin 750 mg IV every 24 hours (Started 17; Day #2)  Cefepime 2 gm IV every 12 hours (Started 17; Day #2)    Significant Cultures:   17 Respiratory culture = Results pending  17 Urine culture = Greater than 100K CFU/mL GNR (Results pending)  17 Blood culture = No growth x 1 day (Results pending)  17 Blood culture = GNR in 2/2 (Results pending)    Recent Labs      01/10/17   0523  17   0442  17   0309   CREA  0.71  1.11*  0.59   BUN  5*  7  4*   WBC  7.5   --   5.4     Temp (24hrs), Av.9 °F (38.3 °C), Min:100.2 °F (37.9 °C), Max:102.4 °F (39.1 °C)    Creatinine Clearance: ~78 mL/min    Impression/Plan:   - Due to renal function improvement, cefepime dose adjusted to 2 gm IV every 8 hours. - Levofloxacin dosed appropriately based on renal function and indication. Continue current regimen. Pharmacy will follow daily and adjust as appropriate.     Thank you,  Katerine Nielsen, PHARMD

## 2017-01-11 ENCOUNTER — APPOINTMENT (OUTPATIENT)
Dept: GENERAL RADIOLOGY | Age: 57
DRG: 492 | End: 2017-01-11
Attending: INTERNAL MEDICINE
Payer: COMMERCIAL

## 2017-01-11 LAB
ALBUMIN SERPL BCP-MCNC: 2.2 G/DL (ref 3.5–5)
ALBUMIN/GLOB SERPL: 0.7 {RATIO} (ref 1.1–2.2)
ALP SERPL-CCNC: 92 U/L (ref 45–117)
ALT SERPL-CCNC: 740 U/L (ref 12–78)
ANION GAP BLD CALC-SCNC: 7 MMOL/L (ref 5–15)
ARTERIAL PATENCY WRIST A: ABNORMAL
ARTERIAL PATENCY WRIST A: YES
AST SERPL W P-5'-P-CCNC: 690 U/L (ref 15–37)
BACTERIA SPEC CULT: NORMAL
BASE DEFICIT BLDA-SCNC: 3 MMOL/L
BASE DEFICIT BLDA-SCNC: 4.1 MMOL/L
BASOPHILS # BLD AUTO: 0 K/UL
BASOPHILS # BLD: 0 %
BDY SITE: ABNORMAL
BDY SITE: ABNORMAL
BILIRUB SERPL-MCNC: 1 MG/DL (ref 0.2–1)
BNP SERPL-MCNC: 219 PG/ML (ref 0–100)
BNP SERPL-MCNC: ABNORMAL PG/ML (ref 0–125)
BUN SERPL-MCNC: 11 MG/DL (ref 6–20)
BUN/CREAT SERPL: 10 (ref 12–20)
CALCIUM SERPL-MCNC: 7.7 MG/DL (ref 8.5–10.1)
CHLORIDE SERPL-SCNC: 110 MMOL/L (ref 97–108)
CO2 SERPL-SCNC: 24 MMOL/L (ref 21–32)
CREAT SERPL-MCNC: 1.13 MG/DL (ref 0.55–1.02)
DIFFERENTIAL METHOD BLD: ABNORMAL
EOSINOPHIL # BLD: 0 K/UL
EOSINOPHIL NFR BLD: 0 %
EPAP/CPAP/PEEP, PAPEEP: 10
EPAP/CPAP/PEEP, PAPEEP: 5
ERYTHROCYTE [DISTWIDTH] IN BLOOD BY AUTOMATED COUNT: 14 % (ref 11.5–14.5)
FIO2 ON VENT: 100 %
FIO2 ON VENT: 70 %
GAS FLOW.O2 SETTING OXYMISER: 10 L/MIN
GAS FLOW.O2 SETTING OXYMISER: 12 L/MIN
GLOBULIN SER CALC-MCNC: 3.3 G/DL (ref 2–4)
GLUCOSE SERPL-MCNC: 103 MG/DL (ref 65–100)
GRAM STN SPEC: NORMAL
GRAM STN SPEC: NORMAL
HCO3 BLDA-SCNC: 22 MMOL/L (ref 22–26)
HCO3 BLDA-SCNC: 23 MMOL/L (ref 22–26)
HCT VFR BLD AUTO: 43.2 % (ref 35–47)
HGB BLD-MCNC: 14.1 G/DL (ref 11.5–16)
IPAP/PIP, IPAPIP: 12
LYMPHOCYTES # BLD AUTO: 11 %
LYMPHOCYTES # BLD: 1.1 K/UL
MAGNESIUM SERPL-MCNC: 2 MG/DL (ref 1.6–2.4)
MCH RBC QN AUTO: 30.7 PG (ref 26–34)
MCHC RBC AUTO-ENTMCNC: 32.6 G/DL (ref 30–36.5)
MCV RBC AUTO: 93.9 FL (ref 80–99)
MONOCYTES # BLD: 0.6 K/UL
MONOCYTES NFR BLD AUTO: 6 %
NEUTS BAND NFR BLD MANUAL: 10 %
NEUTS SEG # BLD: 8 K/UL
NEUTS SEG NFR BLD AUTO: 73 %
PCO2 BLDA: 43 MMHG (ref 35–45)
PCO2 BLDA: 45 MMHG (ref 35–45)
PH BLDA: 7.31 [PH] (ref 7.35–7.45)
PH BLDA: 7.34 [PH] (ref 7.35–7.45)
PHOSPHATE SERPL-MCNC: 3.8 MG/DL (ref 2.6–4.7)
PLATELET # BLD AUTO: 145 K/UL (ref 150–400)
PLATELET COMMENTS,PCOM: ABNORMAL
PO2 BLDA: 60 MMHG (ref 80–100)
PO2 BLDA: 78 MMHG (ref 80–100)
POTASSIUM SERPL-SCNC: 3.8 MMOL/L (ref 3.5–5.1)
PROT SERPL-MCNC: 5.5 G/DL (ref 6.4–8.2)
RBC # BLD AUTO: 4.6 M/UL (ref 3.8–5.2)
RBC MORPH BLD: ABNORMAL
SAO2 % BLD: 90 % (ref 92–97)
SAO2 % BLD: 95 % (ref 92–97)
SAO2% DEVICE SAO2% SENSOR NAME: ABNORMAL
SAO2% DEVICE SAO2% SENSOR NAME: ABNORMAL
SERVICE CMNT-IMP: NORMAL
SERVICE CMNT-IMP: NORMAL
SODIUM SERPL-SCNC: 141 MMOL/L (ref 136–145)
SPECIMEN SITE: ABNORMAL
SPECIMEN SITE: ABNORMAL
VENTILATION MODE VENT: ABNORMAL
VENTILATION MODE VENT: ABNORMAL
VT SETTING VENT: 450 ML
WBC # BLD AUTO: 9.7 K/UL (ref 3.6–11)
WBC MORPH BLD: ABNORMAL

## 2017-01-11 PROCEDURE — 74011250637 HC RX REV CODE- 250/637: Performed by: INTERNAL MEDICINE

## 2017-01-11 PROCEDURE — 74011000258 HC RX REV CODE- 258: Performed by: INTERNAL MEDICINE

## 2017-01-11 PROCEDURE — 94003 VENT MGMT INPAT SUBQ DAY: CPT

## 2017-01-11 PROCEDURE — 83880 ASSAY OF NATRIURETIC PEPTIDE: CPT | Performed by: INTERNAL MEDICINE

## 2017-01-11 PROCEDURE — 74011000250 HC RX REV CODE- 250: Performed by: INTERNAL MEDICINE

## 2017-01-11 PROCEDURE — 84100 ASSAY OF PHOSPHORUS: CPT | Performed by: INTERNAL MEDICINE

## 2017-01-11 PROCEDURE — 65620000000 HC RM CCU GENERAL

## 2017-01-11 PROCEDURE — L4396 STATIC OR DYNAMI AFO PRE CST: HCPCS

## 2017-01-11 PROCEDURE — 77030018798 HC PMP KT ENTRL FED COVD -A

## 2017-01-11 PROCEDURE — 36600 WITHDRAWAL OF ARTERIAL BLOOD: CPT | Performed by: INTERNAL MEDICINE

## 2017-01-11 PROCEDURE — 74011250637 HC RX REV CODE- 250/637: Performed by: EMERGENCY MEDICINE

## 2017-01-11 PROCEDURE — 74011250636 HC RX REV CODE- 250/636: Performed by: HOSPITALIST

## 2017-01-11 PROCEDURE — 74011250636 HC RX REV CODE- 250/636: Performed by: INTERNAL MEDICINE

## 2017-01-11 PROCEDURE — 80053 COMPREHEN METABOLIC PANEL: CPT | Performed by: INTERNAL MEDICINE

## 2017-01-11 PROCEDURE — 36415 COLL VENOUS BLD VENIPUNCTURE: CPT | Performed by: INTERNAL MEDICINE

## 2017-01-11 PROCEDURE — 94640 AIRWAY INHALATION TREATMENT: CPT

## 2017-01-11 PROCEDURE — 83735 ASSAY OF MAGNESIUM: CPT | Performed by: INTERNAL MEDICINE

## 2017-01-11 PROCEDURE — 85025 COMPLETE CBC W/AUTO DIFF WBC: CPT | Performed by: INTERNAL MEDICINE

## 2017-01-11 PROCEDURE — C9113 INJ PANTOPRAZOLE SODIUM, VIA: HCPCS | Performed by: INTERNAL MEDICINE

## 2017-01-11 PROCEDURE — 74011250637 HC RX REV CODE- 250/637: Performed by: HOSPITALIST

## 2017-01-11 PROCEDURE — 71010 XR CHEST PORT: CPT

## 2017-01-11 PROCEDURE — 74011250636 HC RX REV CODE- 250/636: Performed by: ORTHOPAEDIC SURGERY

## 2017-01-11 PROCEDURE — 82803 BLOOD GASES ANY COMBINATION: CPT | Performed by: INTERNAL MEDICINE

## 2017-01-11 RX ORDER — METOCLOPRAMIDE HYDROCHLORIDE 5 MG/ML
10 INJECTION INTRAMUSCULAR; INTRAVENOUS EVERY 6 HOURS
Status: DISCONTINUED | OUTPATIENT
Start: 2017-01-11 | End: 2017-01-18

## 2017-01-11 RX ORDER — POLYETHYLENE GLYCOL 3350 17 G/17G
17 POWDER, FOR SOLUTION ORAL ONCE
Status: COMPLETED | OUTPATIENT
Start: 2017-01-11 | End: 2017-01-11

## 2017-01-11 RX ORDER — BUMETANIDE 0.25 MG/ML
1 INJECTION INTRAMUSCULAR; INTRAVENOUS 2 TIMES DAILY
Status: DISCONTINUED | OUTPATIENT
Start: 2017-01-11 | End: 2017-01-18

## 2017-01-11 RX ORDER — BUMETANIDE 0.25 MG/ML
INJECTION INTRAMUSCULAR; INTRAVENOUS
Status: DISPENSED
Start: 2017-01-11 | End: 2017-01-11

## 2017-01-11 RX ADMIN — FOLIC ACID 1 MG: 1 TABLET ORAL at 09:13

## 2017-01-11 RX ADMIN — PHENYLEPHRINE HYDROCHLORIDE 120 MCG/MIN: 10 INJECTION INTRAVENOUS at 05:49

## 2017-01-11 RX ADMIN — CEFEPIME HYDROCHLORIDE 2 G: 2 INJECTION, POWDER, FOR SOLUTION INTRAVENOUS at 03:52

## 2017-01-11 RX ADMIN — ENOXAPARIN SODIUM 40 MG: 40 INJECTION, SOLUTION INTRAVENOUS; SUBCUTANEOUS at 09:13

## 2017-01-11 RX ADMIN — Medication 10 ML: at 05:55

## 2017-01-11 RX ADMIN — IPRATROPIUM BROMIDE AND ALBUTEROL SULFATE 3 ML: .5; 3 SOLUTION RESPIRATORY (INHALATION) at 07:56

## 2017-01-11 RX ADMIN — CEFEPIME HYDROCHLORIDE 2 G: 2 INJECTION, POWDER, FOR SOLUTION INTRAVENOUS at 23:20

## 2017-01-11 RX ADMIN — MUPIROCIN: 20 OINTMENT TOPICAL at 09:11

## 2017-01-11 RX ADMIN — POLYETHYLENE GLYCOL 3350 17 G: 17 POWDER, FOR SOLUTION ORAL at 11:57

## 2017-01-11 RX ADMIN — PHENYLEPHRINE HYDROCHLORIDE 50 MCG/MIN: 10 INJECTION INTRAVENOUS at 01:27

## 2017-01-11 RX ADMIN — CEFEPIME HYDROCHLORIDE 2 G: 2 INJECTION, POWDER, FOR SOLUTION INTRAVENOUS at 12:03

## 2017-01-11 RX ADMIN — BUMETANIDE 1 MG: 0.25 INJECTION, SOLUTION INTRAMUSCULAR; INTRAVENOUS at 09:12

## 2017-01-11 RX ADMIN — CHLORHEXIDINE GLUCONATE 15 ML: 1.2 RINSE ORAL at 09:12

## 2017-01-11 RX ADMIN — Medication 100 MCG/HR: at 07:00

## 2017-01-11 RX ADMIN — METOCLOPRAMIDE 10 MG: 5 INJECTION, SOLUTION INTRAMUSCULAR; INTRAVENOUS at 23:19

## 2017-01-11 RX ADMIN — PHENYLEPHRINE HYDROCHLORIDE 76 MCG/MIN: 10 INJECTION INTRAVENOUS at 23:26

## 2017-01-11 RX ADMIN — Medication 10 ML: at 15:31

## 2017-01-11 RX ADMIN — Medication 150 MCG/HR: at 01:01

## 2017-01-11 RX ADMIN — THERA TABS 1 TABLET: TAB at 09:13

## 2017-01-11 RX ADMIN — METOCLOPRAMIDE 10 MG: 5 INJECTION, SOLUTION INTRAMUSCULAR; INTRAVENOUS at 17:48

## 2017-01-11 RX ADMIN — IPRATROPIUM BROMIDE AND ALBUTEROL SULFATE 3 ML: .5; 3 SOLUTION RESPIRATORY (INHALATION) at 11:42

## 2017-01-11 RX ADMIN — Medication 10 ML: at 17:48

## 2017-01-11 RX ADMIN — IPRATROPIUM BROMIDE AND ALBUTEROL SULFATE 3 ML: .5; 3 SOLUTION RESPIRATORY (INHALATION) at 19:16

## 2017-01-11 RX ADMIN — SODIUM CHLORIDE 40 MG: 9 INJECTION INTRAMUSCULAR; INTRAVENOUS; SUBCUTANEOUS at 09:13

## 2017-01-11 RX ADMIN — LEVOFLOXACIN 750 MG: 5 INJECTION, SOLUTION INTRAVENOUS at 05:44

## 2017-01-11 RX ADMIN — IPRATROPIUM BROMIDE AND ALBUTEROL SULFATE 3 ML: .5; 3 SOLUTION RESPIRATORY (INHALATION) at 15:25

## 2017-01-11 RX ADMIN — PROPOFOL 50 MCG/KG/MIN: 10 INJECTION, EMULSION INTRAVENOUS at 02:03

## 2017-01-11 RX ADMIN — METOCLOPRAMIDE 10 MG: 5 INJECTION, SOLUTION INTRAMUSCULAR; INTRAVENOUS at 12:00

## 2017-01-11 RX ADMIN — BUMETANIDE 1 MG: 0.25 INJECTION, SOLUTION INTRAMUSCULAR; INTRAVENOUS at 17:48

## 2017-01-11 RX ADMIN — Medication 10 ML: at 21:19

## 2017-01-11 RX ADMIN — Medication 10 ML: at 15:28

## 2017-01-11 RX ADMIN — ACETAMINOPHEN 650 MG: 650 SUPPOSITORY RECTAL at 15:35

## 2017-01-11 RX ADMIN — CHLORHEXIDINE GLUCONATE 15 ML: 1.2 RINSE ORAL at 20:17

## 2017-01-11 RX ADMIN — Medication 100 MG: at 09:13

## 2017-01-11 RX ADMIN — Medication 100 MCG/HR: at 15:19

## 2017-01-11 RX ADMIN — Medication 10 ML: at 05:54

## 2017-01-11 RX ADMIN — BUMETANIDE 1 MG: 0.25 INJECTION, SOLUTION INTRAMUSCULAR; INTRAVENOUS at 05:02

## 2017-01-11 RX ADMIN — PROPOFOL 20 MCG/KG/MIN: 10 INJECTION, EMULSION INTRAVENOUS at 05:53

## 2017-01-11 RX ADMIN — PHENYLEPHRINE HYDROCHLORIDE 120 MCG/MIN: 10 INJECTION INTRAVENOUS at 03:47

## 2017-01-11 NOTE — PROGRESS NOTES
Pt has been desynchronous with the vent, anesthesia was called to see if there was a cuff leak. Advised no cuff leak, changed out ventilators to see if there was a leak in tubing etc.  Placed on Volume control and is better than was in pressure control. Discussed with nursing and advised she will discuss with Pulmonary.

## 2017-01-11 NOTE — PROGRESS NOTES
S: patient Intubated and sedated. O:  Visit Vitals    /61 (BP 1 Location: Right arm, BP Patient Position: At rest)    Pulse 88    Temp 100.3 °F (37.9 °C)    Resp 10    Ht 5' 4\" (1.626 m)    Wt 78.9 kg (173 lb 15.1 oz)    SpO2 96%    BMI 29.86 kg/m2        splint was removed from right lower extremity. - minimal postoperative swelling present. All wounds are healing nicely without any evidence of dehiscence. There is no drainage. No evidence of purulence or fluctuance. There is no erythema. - palpable dorsalis pedis pulse  - unable to assess motor and sensory due to intubation and sedation    A/P:  1 week status post open reduction internal fixation of a right ankle fracture and a gastrocnemius recession  - current sepsis with multiorgan failure. Infection does not appear to be from ankle given extremely benign physical examination. - continue treatment per primary team and critical care team  - plaster splint reapplied to right lower extremity with ankle and 90 degrees.

## 2017-01-11 NOTE — PROGRESS NOTES
Pharmacy Automatic Renal Dosing Protocol - Antimicrobials    Indication for Antimicrobials: Sepsis (GNR in urine; GNR bacteremia)    Current Regimen of Each Antimicrobial (Start Day & Day of Therapy):  Levofloxacin 750 mg IV every 24 hours (Started 17; Day #3)  Cefepime 2 gm IV every 8 hours (Started 17; Day #3)    Significant Cultures:   17 Respiratory culture = Light normal resp josh; Few yeast (FINAL)  17 Urine culture = Greater than 100K CFU/mL GNR (Results pending)  17 Blood culture = No growth x 2 days (Results pending)  17 Blood culture = Pan-sensitive Ecoli in 2/ (Results pending)    Recent Labs      17   0150  01/10/17   0523  17   0442   CREA  1.13*  0.71  1.11*   BUN  11  5*  7   WBC  9.7  7.5   --      Temp (24hrs), Av.4 °F (38.6 °C), Min:98.3 °F (36.8 °C), Max:104 °F (40 °C)    Creatinine Clearance: ~50 mL/min    Impression/Plan:   - Due to renal function decline, cefepime dose adjusted to 2 gm IV every 12 hours. - Levofloxacin dosed appropriately based on renal function and indication. Continue current regimen. Pharmacy will follow daily and adjust as appropriate.     Thank you,  Amrit Morocho, SULLYD

## 2017-01-11 NOTE — PROGRESS NOTES
Pt stacking breathes on vent, taking shallow breathes, shaking/shivering at times,Rt looked at vent alarms, RR 15-20, sats %  Ativan given, Tylenool supp given. Temp 102.4, sinus tach  Pt not withdrawing to painful stimuli, does not open eyes, pupils equal and reactive to light, breathes over vent, coughs/grimaces with suction/turning    2013  UO decreasing will monitor (along with BP), started miriam for SBP in 80s, MAP 55-60    GR 50 from TF    2200  Dr. Lissett Mitchell notified of temp 103.7 and shivering post Ativan, Tylenol, and despite being on cooling blanket  Vent settings given and said where ok, no changes; explained had to go up to 50% FIO2 due to decrease in sats to 88-89%   Abx covering gram neg bacteremia. One time dose toradol to be given. No other orders received. 2308  Toradol given. 2330  Pt sats 88-89 despite going up on FIO2, up to 70% now, temp 104.1,  Placed ice packs in groin, put new cable into cooling blanket, and opened window. TF turned off,   ABG, chest xray ordered. 2350  X ray completed    0000   Arterial Blood Gas result:  pO2 55.6; pCO2 33.9; pH 7.40;  HCO3 20.9, %O2 Sat 89.5. RT increased FIO2 to 100%  Temp decreasing, 103.   since 2000, CVP 8    102.5 temp, patient looks more comfortable, RR decreased, HR decreased, still taking some shallow breaths and setting off vent with low TV, but not as often. Less shivering. 0036  Notified X ray stat portable has not been read. Large influx of radiology images, call back if not read in about 20 minutes. 0100  Temp 100.4 F via bladder, patient sweaty from breaking fever. NSR 70s/80s. BP running low as well as UO. Flushed funk with 70, 70 returned, does nto seem to be blocked. 5658  RT x2 in to assess patient, RN reported stacking breathes, patient is doing a few shallow breathes with low TV and then will have a big breath with TV over 1000. RT thinks anesthesia should be called to replace ETT possible leak. 0335  Pro BNP ordered for an add on to get results sooner than BNP which is sent out to [facility] and will not be in until morning via lab.     0340  Dr. De La Torre Cea in to assess patient. 0350  Ventilator switched out. Switched settings A/C 12,  Vmax 60, 100%, 5 PEEP    0400  Tolerating the vent a little better but still stacking breathes, small shallow breathes and then large breathes with big TV. Sats decrease with shallow breathes to low 90s,     0415  Spoke with Dr. Reynold Stevens, we discussed pt breathing pattern and vent changes, BNP reported. Orders received. 1 mg Bumex given, about 400 mL out    Bedside and Verbal shift change report given to Bren YORK (oncoming nurse) by Christophe Zarco RN (offgoing nurse). Report included the following information SBAR, Kardex, Intake/Output, MAR, Recent Results, Med Rec Status and Cardiac Rhythm NSR.

## 2017-01-11 NOTE — PROGRESS NOTES
Called about potential ETT cuff leak. Ventilator displaying message that there is a circuit leak. Additional air added to ETT cuff which appears to be holding air just fine. Same message continued to be displayed on ventilator despite no apparent problem with ETT. A new ventilator was connected to the patient and no more warnings regarding circuit leaks were displayed.

## 2017-01-11 NOTE — PROGRESS NOTES
Hospitalist Progress Note    NAME: Mayank Askew   :  1960   MRN:  [id number]       Interim Hospital Summary: 49 y.o. female whom presented on 2017 with right ankle deformity s/p injury R unstable trimalleolar ankle fx/dislocatio  ADHD on Adderall  Typically drinks 1 bottle of wine per day    Initially admitted to ortho service-- multiple attempts at ankle reduction were made with out success. On 1/3 s/p ORIF, on  consulted hospitalist for acute delirium ( with concern for alcohol withdrawal) on  transferred to ICU requiring ativan drip and precedex. Further deteriorated on  requiring intubation  Now with spiking fevers and shock liver  Ortho transferred the patient care to medicine service on       Critically ill, with respiratory failure,  ARDS, delirium tremens  GNR sepsis with septic shock      Johnson Hoang [phone] updated on 1/10     Assessment / Plan:  Acute hypoxic respiratory failure secondary   To respiratory depression ( delirium tremens  Being on ativan and precedex)  With multiorgan failure ( shock liver, concern for hcap)    Vent support ( intensivist managing)  Empiric, cefepime and levo d3  Requiring pressors as well  Now on iv diuresis  Chest xray:ETT in satisfactory position. Interval improved aeration of the lungs with some regression of pulmonary edema. Persistent bilateral small pleural effusions and probably some consolidation atleft base. Follow the blood cultures with gram neg rods   urine cultures with gram neg rods  Platelets ok  Ammonia normal, consider ct head  picc placed on   Follow the echo Systolic function was normal. Ejection  fraction was estimated in the range of 55 % to 60 %. There were no  regional wall motion abnormalities. Wall thickness was normal. DOPPLER:  Left ventricular diastolic function parameters were normal for the  patient's age.     Hypertension    Depression ADHD    Right trimalleolar ankle fracture, poa now s/p closed reduction and ORIF 1/2/17  -ortho managing  Code status: full  Prophylaxis: lovenox  Recommended Disposition: tbd     Subjective:     Chief Complaint / Reason for Physician Visit  Follow up on respiratory failure, delirium tremens  intubated    Discussed with RN events overnight. Review of Systems:  Symptom Y/N Comments  Symptom Y/N Comments   Fever/Chills    Chest Pain     Poor Appetite    Edema     Cough    Abdominal Pain     Sputum    Joint Pain     SOB/FUENTES    Pruritis/Rash     Nausea/vomit    Tolerating PT/OT     Diarrhea    Tolerating Diet     Constipation    Other       Could NOT obtain due to: Intubated and sedated     Objective:     VITALS:   Last 24hrs VS reviewed since prior progress note.  Most recent are:  Patient Vitals for the past 24 hrs:   Temp Pulse Resp BP SpO2   01/11/17 1200 100.3 °F (37.9 °C) 88 10 105/61 96 %   01/11/17 1142 - 80 10 - 95 %   01/11/17 1115 - 81 10 101/54 95 %   01/11/17 1100 - 81 10 99/57 95 %   01/11/17 1045 - 81 10 103/65 94 %   01/11/17 1030 - 82 10 104/58 94 %   01/11/17 1015 - 80 11 100/59 93 %   01/11/17 1000 - 80 10 96/57 94 %   01/11/17 0946 - 81 - 105/57 -   01/11/17 0945 - 80 10 99/61 91 %   01/11/17 0930 - 79 11 105/57 92 %   01/11/17 0900 - 80 12 102/63 93 %   01/11/17 0845 - 77 10 109/61 94 %   01/11/17 0830 - 77 10 112/61 94 %   01/11/17 0815 - 79 10 102/54 93 %   01/11/17 0800 - 78 9 (!) 88/50 91 %   01/11/17 0758 98.3 °F (36.8 °C) 77 10 (!) 88/50 93 %   01/11/17 0757 - - - - 96 %   01/11/17 0750 - 85 13 - 97 %   01/11/17 0745 - 81 10 (!) 85/44 93 %   01/11/17 0741 - 79 - (!) 79/40 -   01/11/17 0738 - 79 9 (!) 79/40 90 %   01/11/17 0730 - 79 10 (!) 74/41 92 %   01/11/17 0715 - 76 16 104/63 93 %   01/11/17 0700 - 71 11 93/51 95 %   01/11/17 0644 - 70 17 - 96 %   01/11/17 0601 - 68 - 132/72 -   01/11/17 0600 - 67 11 132/72 94 %   01/11/17 0549 - 69 - 98/58 -   01/11/17 0530 - 68 11 104/59 94 %   01/11/17 0502 - 70 - 91/50 -   01/11/17 0500 - 69 11 91/50 92 %   01/11/17 0423 - 69 - 97/61 -   01/11/17 0400 - 69 14 98/54 91 %   01/11/17 0347 - 74 - (!) 85/42 -   01/11/17 0336 - 75 13 - 91 %   01/11/17 0330 98.9 °F (37.2 °C) 75 15 92/51 94 %   01/11/17 0300 - 76 12 (!) 87/49 94 %   01/11/17 0204 - 79 - (!) 85/44 -   01/11/17 0130 - 80 12 (!) 89/52 97 %   01/11/17 0127 - 81 - (!) 87/54 -   01/11/17 0100 100.4 °F (38 °C) 83 12 90/57 98 %   01/11/17 0045 - 82 - (!) 85/49 -   01/11/17 0000 (!) 102.6 °F (39.2 °C) 97 14 90/43 98 %   01/10/17 2351 - 99 14 - 95 %   01/10/17 2330 (!) 104 °F (40 °C) (!) 104 18 100/48 (!) 89 %   01/10/17 2304 - (!) 114 21 110/59 90 %   01/10/17 2230 - (!) 107 - (!) 89/53 -   01/10/17 2130 - (!) 110 17 95/55 93 %   01/10/17 2100 - (!) 110 21 93/58 92 %   01/10/17 2035 - (!) 102 17 - 94 %   01/10/17 2033 - (!) 104 - 102/47 -   01/10/17 2032 - - - - 94 %   01/10/17 2013 - (!) 101 - (!) 83/56 -   01/10/17 2000 (!) 102.4 °F (39.1 °C) 96 11 (!) 83/51 98 %   01/10/17 1900 - 98 16 98/49 98 %   01/10/17 1800 (!) 101.4 °F (38.6 °C) 98 14 108/64 99 %   01/10/17 1700 - (!) 105 14 102/59 99 %   01/10/17 1618 - (!) 109 17 - 98 %   01/10/17 1617 - - - - 98 %   01/10/17 1600 (!) 103.1 °F (39.5 °C) (!) 104 20 121/60 94 %   01/10/17 1500 - 100 23 115/57 94 %       Intake/Output Summary (Last 24 hours) at 01/11/17 1420  Last data filed at 01/11/17 1413   Gross per 24 hour   Intake           3112.6 ml   Output             2670 ml   Net            442.6 ml        PHYSICAL EXAM:  General: Intubated and sedated  EENT:  EOMI. Anicteric sclerae. Resp:  B/l decreased air entry. No wheezing, no rales. No accessory muscle use  CV:  Regular  rhythm,  No edema  GI:  Soft, Non distended, Non tender.  +Bowel sounds  Neurologic:  intubated,   Psych:             Not anxious nor agitated  Skin:  No rashes.   No jaundice    Reviewed most current lab test results and cultures  YES  Reviewed most current radiology test results   YES  Review and summation of old records today    NO  Reviewed patient's current orders and MAR    YES  PMH/SH reviewed - no change compared to H&P  ________________________________________________________________________  Care Plan discussed with:    Comments   Patient     Family      RN y    Care Manager     Consultant                        Multidiciplinary team rounds were held today with , nursing, pharmacist and clinical coordinator. Patient's plan of care was discussed; medications were reviewed and discharge planning was addressed. ________________________________________________________________________  Total NON critical care TIME:30   Minutes    Total CRITICAL CARE TIME Spent:   Minutes non procedure based      Comments   >50% of visit spent in counseling and coordination of care     ________________________________________________________________________  Ruba Moscoso MD     Procedures: see electronic medical records for all procedures/Xrays and details which were not copied into this note but were reviewed prior to creation of Plan. LABS:  I reviewed today's most current labs and imaging studies.   Pertinent labs include:  Recent Labs      01/11/17   0150  01/10/17   0523   WBC  9.7  7.5   HGB  14.1  9.7*   HCT  43.2  29.4*   PLT  145*  161     Recent Labs      01/11/17   0150  01/10/17   0523  01/09/17   0442   NA  141  144  146*   K  3.8  2.8*  3.8   CL  110*  112*  115*   CO2  24  21  19*   GLU  103*  132*  153*   BUN  11  5*  7   CREA  1.13*  0.71  1.11*   CA  7.7*  7.5*  7.9*   MG  2.0  2.1  2.2   PHOS  3.8  2.3*  3.1   ALB  2.2*  2.1*  2.5*   TBILI  1.0  1.0  0.8   SGOT  690*  312*  565*   ALT  740*  341*  282*   INR   --   1.3*   --        Signed: Ruba Moscoso MD

## 2017-01-11 NOTE — PROGRESS NOTES
[facility]  Pulmonary, Critical Care, and Sleep Medicine    Name: Garret Bustillos MRN: [id number]   : 1960 Hospital: [facility]   Date: 2017        IMPRESSION:   · Acute hypoxic respiratory failure  · Delirium tremens  · ARDS  · Severe GNR sepsis with septic shock   · Acute renal failure  · Shock liver  · S/P surgery for ankle fracture 17  · EtOH abuse - 1 bottle wine/day  · Tobacco/MJ use      PLAN:   · Ventilator support - difficult to achieve ARDS goals, significant ventilator dyssynchrony  · Pressors   · Sedation - titrate  · Follow up cultures  · Empiric antibiotics pending culture data  · Thiamine/MVI/folate  · Replete electrolytes  · DVT/GI prophylaxis  · Critically ill      Subjective/Interval History:   I have reviewed the flowsheet and previous days notes. The patient is unable to give any meaningful history or review of systems because the patient is:   Intubated/sedated - worsening hypoxia and ventilator dyssynchrony      The patient is critically ill on:      Mechanical ventilation/pressors     Review of Systems   Unable to perform ROS: Intubated     Objective:   Vital Signs:    Visit Vitals    BP 93/51    Pulse 71    Temp 98.9 °F (37.2 °C)    Resp 11    Ht 5' 4\" (1.626 m)    Wt 78.9 kg (173 lb 15.1 oz)    SpO2 95%    BMI 29.86 kg/m2       O2 Device: Ventilator   O2 Flow Rate (L/min): 2 l/min   Temp (24hrs), Av.7 °F (38.7 °C), Min:98.9 °F (37.2 °C), Max:104 °F (40 °C)       Intake/Output:   Last shift:         Last 3 shifts:  1901 -  07  In: 7094 [I.V.:4389]  Out: 3855 [Urine:3855]    Intake/Output Summary (Last 24 hours) at 17 0729  Last data filed at 17 0644   Gross per 24 hour   Intake          3695.47 ml   Output             1870 ml   Net          1825.47 ml     Hemodynamics:   PAP:   CO:     Wedge:   CI:     CVP:    SVR:       PVR:       Ventilator Settings:  Mode Rate Tidal Volume Pressure FiO2 PEEP Assist control   450 ml  5 cm H2O 100 % 5 cm H20     Peak airway pressure: 26 cm H2O    Minute ventilation: 8.42 l/min       Physical Exam   Constitutional: She is sedated and intubated. HENT:   Head: Normocephalic and atraumatic. Mouth/Throat: No oropharyngeal exudate. Eyes: No scleral icterus. Cardiovascular: Normal rate and regular rhythm. Exam reveals no gallop. Pulmonary/Chest: She is intubated. No respiratory distress. She has no wheezes. She has rales. Abdominal: Soft. Bowel sounds are normal. She exhibits no distension. There is no tenderness. Musculoskeletal: She exhibits edema. Skin: Skin is warm and dry.      Data:     Current Facility-Administered Medications   Medication Dose Route Frequency    bumetanide (BUMEX) injection 1 mg  1 mg IntraVENous BID    bumetanide (BUMEX) 0.25 mg/mL injection        PHENYLephrine (NEOSYNEPHRINE) 100,000 mcg in 0.9% sodium chloride 250 mL infusion   mcg/min IntraVENous TITRATE    cefepime (MAXIPIME) 2 g in 0.9% sodium chloride (MBP/ADV) 100 mL  2 g IntraVENous Q8H    albuterol-ipratropium (DUO-NEB) 2.5 MG-0.5 MG/3 ML  3 mL Nebulization QID RT    fentaNYL (PF) 900 mcg/30 ml infusion soln   mcg/hr IntraVENous TITRATE    levoFLOXacin (LEVAQUIN) 750 mg in D5W IVPB  750 mg IntraVENous Q24H    chlorhexidine (PERIDEX) 0.12 % mouthwash 15 mL  15 mL Oral Q12H    pantoprazole (PROTONIX) 40 mg in sodium chloride 0.9 % 10 mL injection  40 mg IntraVENous DAILY    thiamine (B-1) tablet 100 mg  100 mg Per NG tube DAILY    folic acid (FOLVITE) tablet 1 mg  1 mg Per NG tube DAILY    therapeutic multivitamin (THERAGRAN) tablet 1 Tab  1 Tab Oral DAILY    propofol (DIPRIVAN) infusion  5-50 mcg/kg/min IntraVENous TITRATE    sodium chloride (NS) flush 10 mL  10 mL InterCATHeter Q24H    sodium chloride (NS) flush 10-40 mL  10-40 mL InterCATHeter Q8H    mupirocin (BACTROBAN) 2 % ointment   Both Nostrils BID    nicotine (NICODERM CQ) 14 mg/24 hr patch 1 Patch  1 Patch TransDERmal DAILY    sodium chloride (NS) flush 5-10 mL  5-10 mL IntraVENous Q8H    enoxaparin (LOVENOX) injection 40 mg  40 mg SubCUTAneous Q24H                Labs:  Recent Labs      01/11/17   0150  01/10/17   0523   WBC  9.7  7.5   HGB  14.1  9.7*   HCT  43.2  29.4*   PLT  145*  161     Recent Labs      01/11/17   0150  01/10/17   0523  01/09/17   0442   NA  141  144  146*   K  3.8  2.8*  3.8   CL  110*  112*  115*   CO2  24  21  19*   GLU  103*  132*  153*   BUN  11  5*  7   CREA  1.13*  0.71  1.11*   CA  7.7*  7.5*  7.9*   MG  2.0  2.1  2.2   PHOS  3.8  2.3*  3.1   ALB  2.2*  2.1*  2.5*   TBILI  1.0  1.0  0.8   SGOT  690*  312*  565*   ALT  740*  341*  282*   INR   --   1.3*   --      Recent Labs      01/11/17   0652  01/10/17   2330  01/09/17   0730  01/08/17   1845   PH  7.31*  7.41  7.44  7.40   PCO2  45  34*  24*  27*   PO2  78*  56*  115*  59*   HCO3  22  21*  16*  16*   FIO2  100  70   --   100     Imaging:  I have personally reviewed the patients radiographs and have reviewed the reports:  No change in bilateral infiltrates        Total critical care time exclusive of procedures: 35 minutes  Kaley Pierce MD

## 2017-01-11 NOTE — PROGRESS NOTES
0730 Assumed care of patient. Blood pressure readings require an increase in Bob drip.   Dr. Rob Hagen made adjustments to vent settings, will monitor to see how patient tolerates    0950 Able to wean Bob down to 90 mcg    1030 Restarted tube feedings, OG placement verified, 10cc residual to start    1200 tolerating tube feeds well, oxygen sats now improving, remains on 90mcg or Bob,  in to visit    1330 Oxygen sats now 97%, Ortho in to see patient, removed cast, checked incisions and recasted,  Incisions intact without swelling or redness     1530 bladder temp 101.1, tylenol suppository given    1600 continue to wean Bob    1517 temp down to 99.6    1730 Patient tolerating tube feedings, residual at 20ml

## 2017-01-11 NOTE — PROGRESS NOTES
Noted to be more hypoxic  CXR demonstrates increasing pulmonary edema  --BNP sent and > 12,000  --will stop IVF  --begin diuresis with Bumex  --continue to wean Neosynephrine to keep MAPs > 60

## 2017-01-11 NOTE — PROGRESS NOTES
01/11/17 0406   ABCDE Bundle   SBT Safety Screen Passed No   SBT Screen Reason for Failure FiO2 > 50%

## 2017-01-11 NOTE — PROGRESS NOTES
GNR bacteremia from UTI onn good abx - await Speciation  T 103 despite Tylenol and cooling blanket  Will try ketorlac x 1 as Cr Cl is good  D/W RN

## 2017-01-12 ENCOUNTER — APPOINTMENT (OUTPATIENT)
Dept: GENERAL RADIOLOGY | Age: 57
DRG: 492 | End: 2017-01-12
Attending: INTERNAL MEDICINE
Payer: COMMERCIAL

## 2017-01-12 LAB
ALBUMIN SERPL BCP-MCNC: 1.8 G/DL (ref 3.5–5)
ALBUMIN/GLOB SERPL: 0.5 {RATIO} (ref 1.1–2.2)
ALP SERPL-CCNC: 95 U/L (ref 45–117)
ALT SERPL-CCNC: 497 U/L (ref 12–78)
ANION GAP BLD CALC-SCNC: 7 MMOL/L (ref 5–15)
ARTERIAL PATENCY WRIST A: ABNORMAL
AST SERPL W P-5'-P-CCNC: 190 U/L (ref 15–37)
BACTERIA SPEC CULT: NORMAL
BASE EXCESS BLDA CALC-SCNC: 1.9 MMOL/L
BASOPHILS # BLD AUTO: 0 K/UL (ref 0–0.1)
BASOPHILS # BLD: 0 % (ref 0–1)
BDY SITE: ABNORMAL
BILIRUB SERPL-MCNC: 0.7 MG/DL (ref 0.2–1)
BUN SERPL-MCNC: 16 MG/DL (ref 6–20)
BUN/CREAT SERPL: 18 (ref 12–20)
CALCIUM SERPL-MCNC: 7.9 MG/DL (ref 8.5–10.1)
CC UR VC: NORMAL
CHLORIDE SERPL-SCNC: 105 MMOL/L (ref 97–108)
CO2 SERPL-SCNC: 28 MMOL/L (ref 21–32)
CREAT SERPL-MCNC: 0.91 MG/DL (ref 0.55–1.02)
EOSINOPHIL # BLD: 0 K/UL (ref 0–0.4)
EOSINOPHIL NFR BLD: 0 % (ref 0–7)
EPAP/CPAP/PEEP, PAPEEP: 12
ERYTHROCYTE [DISTWIDTH] IN BLOOD BY AUTOMATED COUNT: 14.5 % (ref 11.5–14.5)
FIO2 ON VENT: 70 %
GAS FLOW.O2 SETTING OXYMISER: 10 L/MIN
GLOBULIN SER CALC-MCNC: 3.6 G/DL (ref 2–4)
GLUCOSE SERPL-MCNC: 181 MG/DL (ref 65–100)
HCO3 BLDA-SCNC: 27 MMOL/L (ref 22–26)
HCT VFR BLD AUTO: 29.2 % (ref 35–47)
HGB BLD-MCNC: 9.6 G/DL (ref 11.5–16)
INR PPP: 1.2 (ref 0.9–1.1)
IPAP/PIP, IPAPIP: 12
LYMPHOCYTES # BLD AUTO: 7 % (ref 12–49)
LYMPHOCYTES # BLD: 0.6 K/UL (ref 0.8–3.5)
MAGNESIUM SERPL-MCNC: 2.1 MG/DL (ref 1.6–2.4)
MCH RBC QN AUTO: 29.6 PG (ref 26–34)
MCHC RBC AUTO-ENTMCNC: 32.9 G/DL (ref 30–36.5)
MCV RBC AUTO: 90.1 FL (ref 80–99)
MONOCYTES # BLD: 0.7 K/UL (ref 0–1)
MONOCYTES NFR BLD AUTO: 8 % (ref 5–13)
NEUTS SEG # BLD: 7 K/UL (ref 1.8–8)
NEUTS SEG NFR BLD AUTO: 85 % (ref 32–75)
PCO2 BLDA: 45 MMHG (ref 35–45)
PH BLDA: 7.4 [PH] (ref 7.35–7.45)
PHOSPHATE SERPL-MCNC: 2.4 MG/DL (ref 2.6–4.7)
PLATELET # BLD AUTO: 184 K/UL (ref 150–400)
PO2 BLDA: 121 MMHG (ref 80–100)
POTASSIUM SERPL-SCNC: 3.4 MMOL/L (ref 3.5–5.1)
PROT SERPL-MCNC: 5.4 G/DL (ref 6.4–8.2)
PROTHROMBIN TIME: 12.1 SEC (ref 9–11.1)
RBC # BLD AUTO: 3.24 M/UL (ref 3.8–5.2)
SAO2 % BLD: 98 % (ref 92–97)
SAO2% DEVICE SAO2% SENSOR NAME: ABNORMAL
SERVICE CMNT-IMP: NORMAL
SODIUM SERPL-SCNC: 140 MMOL/L (ref 136–145)
SPECIMEN SITE: ABNORMAL
VENTILATION MODE VENT: ABNORMAL
WBC # BLD AUTO: 8.3 K/UL (ref 3.6–11)

## 2017-01-12 PROCEDURE — 77030018846 HC SOL IRR STRL H20 ICUM -A

## 2017-01-12 PROCEDURE — 74011250636 HC RX REV CODE- 250/636: Performed by: INTERNAL MEDICINE

## 2017-01-12 PROCEDURE — 94640 AIRWAY INHALATION TREATMENT: CPT

## 2017-01-12 PROCEDURE — 74011250637 HC RX REV CODE- 250/637: Performed by: EMERGENCY MEDICINE

## 2017-01-12 PROCEDURE — 74011250637 HC RX REV CODE- 250/637: Performed by: INTERNAL MEDICINE

## 2017-01-12 PROCEDURE — 74011250636 HC RX REV CODE- 250/636: Performed by: ORTHOPAEDIC SURGERY

## 2017-01-12 PROCEDURE — 85610 PROTHROMBIN TIME: CPT | Performed by: INTERNAL MEDICINE

## 2017-01-12 PROCEDURE — 74011000250 HC RX REV CODE- 250: Performed by: INTERNAL MEDICINE

## 2017-01-12 PROCEDURE — 85025 COMPLETE CBC W/AUTO DIFF WBC: CPT | Performed by: INTERNAL MEDICINE

## 2017-01-12 PROCEDURE — 87040 BLOOD CULTURE FOR BACTERIA: CPT | Performed by: INTERNAL MEDICINE

## 2017-01-12 PROCEDURE — 80053 COMPREHEN METABOLIC PANEL: CPT | Performed by: INTERNAL MEDICINE

## 2017-01-12 PROCEDURE — 71010 XR CHEST PORT: CPT

## 2017-01-12 PROCEDURE — 83735 ASSAY OF MAGNESIUM: CPT | Performed by: INTERNAL MEDICINE

## 2017-01-12 PROCEDURE — 74011250636 HC RX REV CODE- 250/636: Performed by: HOSPITALIST

## 2017-01-12 PROCEDURE — 36600 WITHDRAWAL OF ARTERIAL BLOOD: CPT | Performed by: INTERNAL MEDICINE

## 2017-01-12 PROCEDURE — 94003 VENT MGMT INPAT SUBQ DAY: CPT

## 2017-01-12 PROCEDURE — 77030018798 HC PMP KT ENTRL FED COVD -A

## 2017-01-12 PROCEDURE — 74011000258 HC RX REV CODE- 258: Performed by: INTERNAL MEDICINE

## 2017-01-12 PROCEDURE — 84100 ASSAY OF PHOSPHORUS: CPT | Performed by: INTERNAL MEDICINE

## 2017-01-12 PROCEDURE — 65620000000 HC RM CCU GENERAL

## 2017-01-12 PROCEDURE — 82803 BLOOD GASES ANY COMBINATION: CPT | Performed by: INTERNAL MEDICINE

## 2017-01-12 PROCEDURE — 36415 COLL VENOUS BLD VENIPUNCTURE: CPT | Performed by: INTERNAL MEDICINE

## 2017-01-12 PROCEDURE — C9113 INJ PANTOPRAZOLE SODIUM, VIA: HCPCS | Performed by: INTERNAL MEDICINE

## 2017-01-12 RX ORDER — POLYETHYLENE GLYCOL 3350 17 G/17G
17 POWDER, FOR SOLUTION ORAL DAILY
Status: DISCONTINUED | OUTPATIENT
Start: 2017-01-12 | End: 2017-01-24 | Stop reason: HOSPADM

## 2017-01-12 RX ORDER — FUROSEMIDE 10 MG/ML
60 INJECTION INTRAMUSCULAR; INTRAVENOUS EVERY 12 HOURS
Status: COMPLETED | OUTPATIENT
Start: 2017-01-12 | End: 2017-01-12

## 2017-01-12 RX ORDER — IPRATROPIUM BROMIDE AND ALBUTEROL SULFATE 2.5; .5 MG/3ML; MG/3ML
SOLUTION RESPIRATORY (INHALATION)
Status: DISPENSED
Start: 2017-01-12 | End: 2017-01-12

## 2017-01-12 RX ORDER — SUCCINYLCHOLINE CHLORIDE 20 MG/ML
INJECTION INTRAMUSCULAR; INTRAVENOUS
Status: DISPENSED
Start: 2017-01-12 | End: 2017-01-12

## 2017-01-12 RX ORDER — IPRATROPIUM BROMIDE AND ALBUTEROL SULFATE 2.5; .5 MG/3ML; MG/3ML
3 SOLUTION RESPIRATORY (INHALATION)
Status: DISCONTINUED | OUTPATIENT
Start: 2017-01-12 | End: 2017-01-24 | Stop reason: HOSPADM

## 2017-01-12 RX ORDER — PROPOFOL 10 MG/ML
INJECTION, EMULSION INTRAVENOUS
Status: DISPENSED
Start: 2017-01-12 | End: 2017-01-12

## 2017-01-12 RX ADMIN — IPRATROPIUM BROMIDE AND ALBUTEROL SULFATE 3 ML: .5; 3 SOLUTION RESPIRATORY (INHALATION) at 19:32

## 2017-01-12 RX ADMIN — LORAZEPAM 2 MG: 2 INJECTION INTRAMUSCULAR; INTRAVENOUS at 01:37

## 2017-01-12 RX ADMIN — PROPOFOL 25 MCG/KG/MIN: 10 INJECTION, EMULSION INTRAVENOUS at 16:20

## 2017-01-12 RX ADMIN — Medication 100 MCG/HR: at 09:30

## 2017-01-12 RX ADMIN — IPRATROPIUM BROMIDE AND ALBUTEROL SULFATE 3 ML: .5; 3 SOLUTION RESPIRATORY (INHALATION) at 11:56

## 2017-01-12 RX ADMIN — CHLORHEXIDINE GLUCONATE 15 ML: 1.2 RINSE ORAL at 08:48

## 2017-01-12 RX ADMIN — Medication 100 MG: at 08:43

## 2017-01-12 RX ADMIN — ENOXAPARIN SODIUM 40 MG: 40 INJECTION, SOLUTION INTRAVENOUS; SUBCUTANEOUS at 08:44

## 2017-01-12 RX ADMIN — METOCLOPRAMIDE 10 MG: 5 INJECTION, SOLUTION INTRAMUSCULAR; INTRAVENOUS at 17:01

## 2017-01-12 RX ADMIN — CEFEPIME HYDROCHLORIDE 2 G: 2 INJECTION, POWDER, FOR SOLUTION INTRAVENOUS at 23:48

## 2017-01-12 RX ADMIN — FUROSEMIDE 60 MG: 10 INJECTION, SOLUTION INTRAMUSCULAR; INTRAVENOUS at 08:46

## 2017-01-12 RX ADMIN — ACETAMINOPHEN 650 MG: 160 SOLUTION ORAL at 17:00

## 2017-01-12 RX ADMIN — IPRATROPIUM BROMIDE AND ALBUTEROL SULFATE 3 ML: .5; 3 SOLUTION RESPIRATORY (INHALATION) at 07:17

## 2017-01-12 RX ADMIN — Medication 10 ML: at 05:53

## 2017-01-12 RX ADMIN — Medication 10 ML: at 21:06

## 2017-01-12 RX ADMIN — METOCLOPRAMIDE 10 MG: 5 INJECTION, SOLUTION INTRAMUSCULAR; INTRAVENOUS at 12:19

## 2017-01-12 RX ADMIN — ACETAMINOPHEN 650 MG: 160 SOLUTION ORAL at 22:20

## 2017-01-12 RX ADMIN — IPRATROPIUM BROMIDE AND ALBUTEROL SULFATE 3 ML: .5; 3 SOLUTION RESPIRATORY (INHALATION) at 02:22

## 2017-01-12 RX ADMIN — POLYETHYLENE GLYCOL 3350 17 G: 17 POWDER, FOR SOLUTION ORAL at 10:39

## 2017-01-12 RX ADMIN — Medication 100 MCG/HR: at 00:37

## 2017-01-12 RX ADMIN — PROPOFOL 20 MCG/KG/MIN: 10 INJECTION, EMULSION INTRAVENOUS at 02:14

## 2017-01-12 RX ADMIN — ACETAMINOPHEN 650 MG: 160 SOLUTION ORAL at 10:34

## 2017-01-12 RX ADMIN — Medication 10 ML: at 17:00

## 2017-01-12 RX ADMIN — METOCLOPRAMIDE 10 MG: 5 INJECTION, SOLUTION INTRAMUSCULAR; INTRAVENOUS at 23:48

## 2017-01-12 RX ADMIN — CHLORHEXIDINE GLUCONATE 15 ML: 1.2 RINSE ORAL at 20:38

## 2017-01-12 RX ADMIN — PROPOFOL 30 MCG/KG/MIN: 10 INJECTION, EMULSION INTRAVENOUS at 20:33

## 2017-01-12 RX ADMIN — BUMETANIDE 1 MG: 0.25 INJECTION, SOLUTION INTRAMUSCULAR; INTRAVENOUS at 08:47

## 2017-01-12 RX ADMIN — IPRATROPIUM BROMIDE AND ALBUTEROL SULFATE 3 ML: .5; 3 SOLUTION RESPIRATORY (INHALATION) at 15:52

## 2017-01-12 RX ADMIN — FUROSEMIDE 60 MG: 10 INJECTION, SOLUTION INTRAMUSCULAR; INTRAVENOUS at 20:38

## 2017-01-12 RX ADMIN — CEFEPIME HYDROCHLORIDE 2 G: 2 INJECTION, POWDER, FOR SOLUTION INTRAVENOUS at 12:18

## 2017-01-12 RX ADMIN — FOLIC ACID 1 MG: 1 TABLET ORAL at 08:46

## 2017-01-12 RX ADMIN — BUMETANIDE 1 MG: 0.25 INJECTION, SOLUTION INTRAMUSCULAR; INTRAVENOUS at 17:00

## 2017-01-12 RX ADMIN — THERA TABS 1 TABLET: TAB at 08:43

## 2017-01-12 RX ADMIN — LEVOFLOXACIN 750 MG: 5 INJECTION, SOLUTION INTRAVENOUS at 05:49

## 2017-01-12 RX ADMIN — ACETAMINOPHEN 650 MG: 650 SUPPOSITORY RECTAL at 03:59

## 2017-01-12 RX ADMIN — SODIUM CHLORIDE 40 MG: 9 INJECTION INTRAMUSCULAR; INTRAVENOUS; SUBCUTANEOUS at 08:47

## 2017-01-12 RX ADMIN — PHENYLEPHRINE HYDROCHLORIDE 76 MCG/MIN: 10 INJECTION INTRAVENOUS at 21:01

## 2017-01-12 RX ADMIN — Medication 10 ML: at 21:05

## 2017-01-12 RX ADMIN — Medication 100 MCG/HR: at 19:19

## 2017-01-12 RX ADMIN — METOCLOPRAMIDE 10 MG: 5 INJECTION, SOLUTION INTRAMUSCULAR; INTRAVENOUS at 05:49

## 2017-01-12 NOTE — PROGRESS NOTES
[facility]  Pulmonary, Critical Care, and Sleep Medicine    Name: Ben Reyes MRN: [id number]   : 1960 Hospital: [facility]   Date: 2017        IMPRESSION:   · Acute hypoxic respiratory failure  · Delirium tremens  · ARDS  · Severe GNR sepsis with septic shock   · Acute renal failure  · Shock liver  · S/P surgery for ankle fracture 17  · EtOH abuse - 1 bottle wine/day  · Tobacco/MJ use      PLAN:   · Ventilator support - difficult to achieve ARDS goals, significant ventilator dyssynchrony  · Pressors   · Sedation - titrate  · Follow up cultures  · Empiric antibiotics pending culture data  · Thiamine/MVI/folate  · Replete electrolytes  · Diurese, with eye on creatinine  · DVT/GI prophylaxis  · Critically ill      Subjective/Interval History:   I have reviewed the flowsheet and previous days notes. The patient is unable to give any meaningful history or review of systems because the patient is:   Intubated/sedated       The patient is critically ill on:      Mechanical ventilation/pressors     Review of Systems   Unable to perform ROS: Intubated     Objective:   Vital Signs:    Visit Vitals    /64    Pulse 83    Temp (!) 101.5 °F (38.6 °C)    Resp 10    Ht 5' 4\" (1.626 m)    Wt 78.9 kg (173 lb 15.1 oz)    SpO2 100%    BMI 29.86 kg/m2       O2 Device: Endotracheal tube   O2 Flow Rate (L/min): 2 l/min   Temp (24hrs), Av.6 °F (38.1 °C), Min:98.3 °F (36.8 °C), Max:101.7 °F (38.7 °C)       Intake/Output:   Last shift:         Last 3 shifts: 01/10 1901 - 700  In: 3887.8 [I.V.:2107.8]  Out: 6538 [Urine:4675]    Intake/Output Summary (Last 24 hours) at 17  Last data filed at 17 07   Gross per 24 hour   Intake          2295.91 ml   Output             4095 ml   Net         -1799.09 ml     Hemodynamics:   PAP:   CO:     Wedge:   CI:     CVP:    SVR:       PVR:       Ventilator Settings:  Mode Rate Tidal Volume Pressure FiO2 PEEP   Assist control   450 ml  5 cm H2O 70 % 12 cm H20     Peak airway pressure: 24 cm H2O    Minute ventilation: 7.3 l/min       Physical Exam   Constitutional: She is sedated and intubated. HENT:   Head: Normocephalic and atraumatic. Mouth/Throat: No oropharyngeal exudate. Eyes: No scleral icterus. Cardiovascular: Normal rate and regular rhythm. Exam reveals no gallop. Pulmonary/Chest: She is intubated. No respiratory distress. She has no wheezes. She has rales. Abdominal: Soft. Bowel sounds are normal. She exhibits no distension. There is no tenderness. Musculoskeletal: She exhibits edema. Skin: Skin is warm and dry.      Data:     Current Facility-Administered Medications   Medication Dose Route Frequency    albuterol-ipratropium (DUO-NEB) 2.5 mg-0.5 mg/3 ml nebulizer solution        propofol (DIPRIVAN) 10 mg/mL injection        succinylcholine (ANECTINE) 20 mg/mL injection        bumetanide (BUMEX) injection 1 mg  1 mg IntraVENous BID    PHENYLephrine (NEOSYNEPHRINE) 100,000 mcg in 0.9% sodium chloride 250 mL infusion   mcg/min IntraVENous TITRATE    metoclopramide HCl (REGLAN) injection 10 mg  10 mg IntraVENous Q6H    cefepime (MAXIPIME) 2 g in 0.9% sodium chloride (MBP/ADV) 100 mL  2 g IntraVENous Q12H    albuterol-ipratropium (DUO-NEB) 2.5 MG-0.5 MG/3 ML  3 mL Nebulization QID RT    fentaNYL (PF) 900 mcg/30 ml infusion soln   mcg/hr IntraVENous TITRATE    levoFLOXacin (LEVAQUIN) 750 mg in D5W IVPB  750 mg IntraVENous Q24H    chlorhexidine (PERIDEX) 0.12 % mouthwash 15 mL  15 mL Oral Q12H    pantoprazole (PROTONIX) 40 mg in sodium chloride 0.9 % 10 mL injection  40 mg IntraVENous DAILY    thiamine (B-1) tablet 100 mg  100 mg Per NG tube DAILY    folic acid (FOLVITE) tablet 1 mg  1 mg Per NG tube DAILY    therapeutic multivitamin (THERAGRAN) tablet 1 Tab  1 Tab Oral DAILY    propofol (DIPRIVAN) infusion  5-50 mcg/kg/min IntraVENous TITRATE    sodium chloride (NS) flush 10 mL  10 mL InterCATHeter Q24H    sodium chloride (NS) flush 10-40 mL  10-40 mL InterCATHeter Q8H    nicotine (NICODERM CQ) 14 mg/24 hr patch 1 Patch  1 Patch TransDERmal DAILY    sodium chloride (NS) flush 5-10 mL  5-10 mL IntraVENous Q8H    enoxaparin (LOVENOX) injection 40 mg  40 mg SubCUTAneous Q24H                Labs:  Recent Labs      01/12/17   0419  01/11/17   0150  01/10/17   0523   WBC  8.3  9.7  7.5   HGB  9.6*  14.1  9.7*   HCT  29.2*  43.2  29.4*   PLT  184  145*  161     Recent Labs      01/12/17   0419  01/11/17   0150  01/10/17   0523   NA  140  141  144   K  3.4*  3.8  2.8*   CL  105  110*  112*   CO2  28  24  21   GLU  181*  103*  132*   BUN  16  11  5*   CREA  0.91  1.13*  0.71   CA  7.9*  7.7*  7.5*   MG  2.1  2.0  2.1   PHOS  2.4*  3.8  2.3*   ALB  1.8*  2.2*  2.1*   TBILI  0.7  1.0  1.0   SGOT  190*  690*  312*   ALT  497*  740*  341*   INR  1.2*   --   1.3*     Recent Labs      01/12/17   0623  01/11/17   0930  01/11/17   0652   PH  7.40  7.34*  7.31*   PCO2  45  43  45   PO2  121*  60*  78*   HCO3  27*  23  22   FIO2  70  70  100     Imaging:  I have personally reviewed the patients radiographs and have reviewed the reports:  No change in bilateral infiltrates        Total critical care time exclusive of procedures: 35 minutes  Olena Cline MD

## 2017-01-12 NOTE — PROGRESS NOTES
Interdisciplinary team rounds were held  1/12/2017  with the following team members:Care Management, Diabetes Treatment Specialist, Nursing, Nutrition, Pharmacy, Physical Therapy, Physician and Respiratory Therapy. Plan of care discussed. Goal: Re-cultures pending, continue to monitor and support. See MD orders and progress notes for further  interventions and desired outcomes.

## 2017-01-12 NOTE — PROGRESS NOTES
1910  Received bedside report from [facility]. Assumed care of patient. Pt. Not moving extremities, but does open eyes tonight to stimulus. VSS. Low grade temp . NSR. SBP 90s/ low 100s, Lungs still sound coarse. Skin more jaundiced. 2000  Increased TF to 50 ml/hr     2330  D/C restraints. Resolved Care plan. Placed prevalon boot on LLE to prevent foot drop. Pt. Withdrew BLE.     0137  Pt vent sounding, RR 30s, HR to 120s Pt asynchronous against vent (stacking breathes, small TV then to 1000s), Breathing pattern strange, pt shivering/ quivering at lips, and drawing extremities to her core; prn ativan given, suctioned patient (very thick/ tenacious sputum yellow/tan in color, hard to suction out), cooling blanket placed on monitor (temp 100s)     After ativan pt looks more comfortable, not shaking/ quivering, RR 11-15  TV are anywhere from 400s-900s    0200  Patient began again with RR 30s-40s, HR 120s-130s, strange breathing pattern, asynchronous with vent, RT called to room, propofol started, pt sounds coarse and wheezy, pt suctioned/lavaged (thick sputum obtained)     Duo neb every 4 hours prn ordered by Dr. Emy byrne telling him SBAR of patient and current status. Pt. Now resting on vent. RR 10-12, HR 80s, breathing with vent. Propofol decreased to 10. O2 sats did not drop during episodes remained above 90%. 0300  Patient less wheezy after breathing treatment. Temp 101.7, cooling blanket turned back on    0400   Reassessment, changes as follows: Pt a little more lethargic. Not withdrawing, but still opening eyes to stimulus, has cough gag and pupils equal and reactive. NSR. Febrile 101.4. Looks more comfortable. 3722  Patient bathed. Temp slowly coming down. Pt more responsive. Grimacing and coughing at times. TF at goal 55.     0730  Bedside and Verbal shift change report given to [address] [place] [address] (oncoming nurse) by Mason Brunner RN (offgoing nurse).  Report included the following information SBAR, Kardex, Intake/Output, MAR, Recent Results, Med Rec Status and Cardiac Rhythm NSR.

## 2017-01-12 NOTE — PROGRESS NOTES
Nutrition Assessment:    RECOMMENDATIONS:   Continue TF at goal rate   Continue with bowel regimen (no BM since 1/2)     ASSESSMENT:   Chart reviewed, case discussed CCU rounds. Pt remains intubated, needs re-estimated. She is off of propofol. TF running at goal rate and tolerated well with residuals <100mL. Noted no BM since 1/2, pt received miralax yesterday, to receive it again today and is also on reglan. K+ and phos being repleted. Bob drip. TF meets 86% kcal and 100% protein needs. Dietitians Intervention(s)/Plan(s): Continue TF at goal rate, bowel regimen  SUBJECTIVE/OBJECTIVE:   Pt intubated   Diet Order: NPO, Other (comment) (TF via OGT: Osm @ 55mL/h + 100mL flush q 4h (provides 1584kcals/73gPro/1682mL))  % Eaten:  No data found. Osmolite 1.2 at 55 mL/hr flush with 100 mL  Q4H  via OG Tube   Residuals: 90 mL    Pertinent Medications:cefepime, folvite, levquin, protonix, MVI, thiamin, lasix, reglan, bumex, miralax; Drips: bob, fentanyl. Chemistries:  Lab Results   Component Value Date/Time    Sodium 140 01/12/2017 04:19 AM    Potassium 3.4 01/12/2017 04:19 AM    Chloride 105 01/12/2017 04:19 AM    CO2 28 01/12/2017 04:19 AM    Anion gap 7 01/12/2017 04:19 AM    Glucose 181 01/12/2017 04:19 AM    BUN 16 01/12/2017 04:19 AM    Creatinine 0.91 01/12/2017 04:19 AM    BUN/Creatinine ratio 18 01/12/2017 04:19 AM    GFR est AA >60 01/12/2017 04:19 AM    GFR est non-AA >60 01/12/2017 04:19 AM    Calcium 7.9 01/12/2017 04:19 AM    Albumin 1.8 01/12/2017 04:19 AM      Anthropometrics: Height: 5' 4\" (162.6 cm) Weight: 78.9 kg (173 lb 15.1 oz)    IBW (%IBW):   ( ) UBW (%UBW):   (  %)    BMI: Body mass index is 29.86 kg/(m^2). This BMI is indicative of:  []Underweight   []Normal   [x]Overweight   [] Obesity   [] Extreme Obesity (BMI>40)  Estimated Nutrition Needs (Based on): 1843 Kcals/day (PSU (MSJ 1374)) , 63 g (0.8gPro/kg) Protein  Carbohydrate:  At Least 130 g/day  Fluids: 1800 mL/day    Last BM: 1/2   [x]Active     []Hyperactive  []Hypoactive       [] Absent   BS  Skin:    [] Intact   [x] Incision  [] Breakdown   [] DTI   [] Tears/Excoriation/Abrasion  [x]Edema(+2-BUE; +1-RLE) [x] Other:jaundice   Wt Readings from Last 30 Encounters:   01/07/17 78.9 kg (173 lb 15.1 oz)      NUTRITION DIAGNOSES:   Problem:  Inadequate protein-energy intake      Etiology: related to pt is intubated and NPO      Signs/Symptoms: as evidenced by NPO + propofol meets <25% kcal and 0% protein needs. Previous dx re: inadequate intake resolved, TF meets majority of kcal and protein needs. NUTRITION INTERVENTIONS:    Enteral/Parenteral Nutrition: Other (Continue TF at goal rate)     Nutrition-Related Medication Mgmt: Other (comment)          GOAL:   Pt will tolerate TF at goal rate with residuals <250mL and a BM in 1-4 days.      NUTRITION MONITORING AND EVALUATION   Previous Goal: Pt will tolerate TF initiation with residuals <250mL in 2-4 days  Previous Goal Met: Yes   Previous Recommendations Implemented: Yes   Cultural, Spiritism, or Ethnic Dietary Needs: None   LEARNING NEEDS (Diet, Food/Nutrient-Drug Interaction):    [x] None Identified   [] Identified and Education Provided/Documented   [] Identified and Pt declined/was not appropriate      [x] Interdisciplinary Care Plan Reviewed/Documented    [x] Participated in Discharge Planning: UTD   [x] Interdisciplinary Rounds     NUTRITION RISK:    [x] High              [] Moderate           []  Low  []  Minimal/Uncompromised      Ankush Gordon RD  Pager [phone]  Weekend Pager [phone]

## 2017-01-12 NOTE — PROGRESS NOTES
Hospitalist Progress Note    NAME: Gio Castaneda   :  1960   MRN:  [id number]       Interim Hospital Summary: 49 y.o. female whom presented on 2017 with right ankle deformity s/p injury R unstable trimalleolar ankle fx/dislocatio  ADHD on Adderall  Typically drinks 1 bottle of wine per day    Initially admitted to ortho service-- multiple attempts at ankle reduction were made with out success. On 1/3 s/p ORIF, on  consulted hospitalist for acute delirium ( with concern for alcohol withdrawal) on  transferred to ICU requiring ativan drip and precedex. Further deteriorated on  requiring intubation  Now with spiking fevers and shock liver  Ortho transferred the patient care to medicine service on       Critically ill, with respiratory failure,  ARDS, delirium tremens  GNR sepsis with septic shock      [facility] [phone] updated on 1/10     Assessment / Plan:  Critically ill  Very high risk of further deterioration       Acute hypoxic respiratory failure secondary   To respiratory depression ( delirium tremens  Being on ativan and precedex)  With multiorgan failure ( shock liver, concern for hcap)    Vent support ( intensivist managing)  Empiric, cefepime and levo d4  Requiring pressors as well  Now on iv diuresis  Chest xray:ETT in satisfactory position. Interval improved aeration of the lungs with some regression of pulmonary edema. Persistent bilateral small pleural effusions and probably some consolidation atleft base. Follow the blood cultures with gram neg rods   urine cultures with gram neg rods  Platelets ok  Ammonia normal, consider ct head  picc placed on   Follow the echo Systolic function was normal. Ejection  fraction was estimated in the range of 55 % to 60 %. There were no  regional wall motion abnormalities. Wall thickness was normal. DOPPLER:  Left ventricular diastolic function parameters were normal for the  patient's age.     Hypertension    Depression ADHD    Right trimalleolar ankle fracture, poa now s/p closed reduction and ORIF 1/2/17  -ortho managing  Code status: full  Prophylaxis: lovenox  Recommended Disposition: tbd     Subjective:     Chief Complaint / Reason for Physician Visit  Follow up on respiratory failure, delirium tremens  intubated    Discussed with RN events overnight. Review of Systems:  Symptom Y/N Comments  Symptom Y/N Comments   Fever/Chills    Chest Pain     Poor Appetite    Edema     Cough    Abdominal Pain     Sputum    Joint Pain     SOB/FUENTES    Pruritis/Rash     Nausea/vomit    Tolerating PT/OT     Diarrhea    Tolerating Diet     Constipation    Other       Could NOT obtain due to: Intubated and sedated     Objective:     VITALS:   Last 24hrs VS reviewed since prior progress note.  Most recent are:  Patient Vitals for the past 24 hrs:   Temp Pulse Resp BP SpO2   01/12/17 1157 - 92 12 - 96 %   01/12/17 1030 - (!) 103 13 111/72 97 %   01/12/17 1000 - (!) 110 27 115/75 99 %   01/12/17 0930 - 95 12 97/58 100 %   01/12/17 0900 - 91 13 107/65 100 %   01/12/17 0830 99.8 °F (37.7 °C) 95 11 102/63 98 %   01/12/17 0800 - (!) 101 12 107/67 99 %   01/12/17 0730 - 92 14 104/63 100 %   01/12/17 0718 - 83 10 - 100 %   01/12/17 0700 - 82 15 110/64 100 %   01/12/17 0610 - 85 - - -   01/12/17 0600 - 92 16 116/59 100 %   01/12/17 0500 (!) 101.5 °F (38.6 °C) 85 11 103/58 99 %   01/12/17 0400 - 88 10 107/67 100 %   01/12/17 0335 - 94 11 - 100 %   01/12/17 0300 (!) 101.7 °F (38.7 °C) 100 10 96/55 100 %   01/12/17 0222 - - - - 100 %   01/12/17 0200 - (!) 114 26 137/89 100 %   01/12/17 0100 - 84 11 114/75 100 %   01/12/17 0000 - 82 10 98/54 100 %   01/11/17 2345 - 84 10 - 98 %   01/11/17 2300 (!) 100.7 °F (38.2 °C) 83 10 98/56 98 %   01/11/17 2154 - 83 - 101/65 -   01/11/17 2100 - 77 10 97/64 99 %   01/11/17 2020 - 80 - 99/60 -   01/11/17 2000 - 83 10 101/66 99 %   01/11/17 1916 - 82 10 - 98 %   01/11/17 1900 99.7 °F (37.6 °C) 78 8 102/62 97 %   01/11/17 1800 - 84 10 96/58 97 %   01/11/17 1700 - 85 10 99/55 95 %   01/11/17 1605 (!) 100.9 °F (38.3 °C) - - - -   01/11/17 1600 (!) 100.9 °F (38.3 °C) 92 11 111/60 97 %   01/11/17 1526 (!) 101.1 °F (38.4 °C) 80 11 - 97 %       Intake/Output Summary (Last 24 hours) at 01/12/17 1503  Last data filed at 01/12/17 1030   Gross per 24 hour   Intake          1622.66 ml   Output             3545 ml   Net         -1922.34 ml        PHYSICAL EXAM:  General: Intubated and sedated  EENT:  EOMI. Anicteric sclerae. Resp:  B/l decreased air entry. No wheezing, no rales. No accessory muscle use  CV:  Regular  rhythm,  No edema  GI:  Soft, Non distended, Non tender.  +Bowel sounds  Neurologic:  intubated,   Psych:             Not anxious nor agitated  Skin:  No rashes. No jaundice    Reviewed most current lab test results and cultures  YES  Reviewed most current radiology test results   YES  Review and summation of old records today    NO  Reviewed patient's current orders and MAR    YES  PMH/ reviewed - no change compared to H&P  ________________________________________________________________________  Care Plan discussed with:    Comments   Patient     Family      RN y    Care Manager     Consultant                        Multidiciplinary team rounds were held today with , nursing, pharmacist and clinical coordinator. Patient's plan of care was discussed; medications were reviewed and discharge planning was addressed.      ________________________________________________________________________  Total NON critical care TIME:30   Minutes    Total CRITICAL CARE TIME Spent:   Minutes non procedure based      Comments   >50% of visit spent in counseling and coordination of care     ________________________________________________________________________  Leander Powell MD     Procedures: see electronic medical records for all procedures/Xrays and details which were not copied into this note but were reviewed prior to creation of Plan. LABS:  I reviewed today's most current labs and imaging studies.   Pertinent labs include:  Recent Labs      01/12/17 0419 01/11/17   0150  01/10/17   0523   WBC  8.3  9.7  7.5   HGB  9.6*  14.1  9.7*   HCT  29.2*  43.2  29.4*   PLT  184  145*  161     Recent Labs      01/12/17 0419 01/11/17   0150  01/10/17   0523   NA  140  141  144   K  3.4*  3.8  2.8*   CL  105  110*  112*   CO2  28  24  21   GLU  181*  103*  132*   BUN  16  11  5*   CREA  0.91  1.13*  0.71   CA  7.9*  7.7*  7.5*   MG  2.1  2.0  2.1   PHOS  2.4*  3.8  2.3*   ALB  1.8*  2.2*  2.1*   TBILI  0.7  1.0  1.0   SGOT  190*  690*  312*   ALT  497*  740*  341*   INR  1.2*   --   1.3*       Signed: Deshaun Espinoza MD

## 2017-01-13 ENCOUNTER — APPOINTMENT (OUTPATIENT)
Dept: GENERAL RADIOLOGY | Age: 57
DRG: 492 | End: 2017-01-13
Attending: INTERNAL MEDICINE
Payer: COMMERCIAL

## 2017-01-13 LAB
ALBUMIN SERPL BCP-MCNC: 1.8 G/DL (ref 3.5–5)
ALBUMIN/GLOB SERPL: 0.4 {RATIO} (ref 1.1–2.2)
ALP SERPL-CCNC: 124 U/L (ref 45–117)
ALT SERPL-CCNC: 311 U/L (ref 12–78)
ANION GAP BLD CALC-SCNC: 10 MMOL/L (ref 5–15)
AST SERPL W P-5'-P-CCNC: 90 U/L (ref 15–37)
BILIRUB SERPL-MCNC: 0.8 MG/DL (ref 0.2–1)
BUN SERPL-MCNC: 16 MG/DL (ref 6–20)
BUN/CREAT SERPL: 20 (ref 12–20)
CALCIUM SERPL-MCNC: 8.1 MG/DL (ref 8.5–10.1)
CHLORIDE SERPL-SCNC: 97 MMOL/L (ref 97–108)
CO2 SERPL-SCNC: 33 MMOL/L (ref 21–32)
CREAT SERPL-MCNC: 0.8 MG/DL (ref 0.55–1.02)
ERYTHROCYTE [DISTWIDTH] IN BLOOD BY AUTOMATED COUNT: 14.4 % (ref 11.5–14.5)
GLOBULIN SER CALC-MCNC: 4.2 G/DL (ref 2–4)
GLUCOSE SERPL-MCNC: 182 MG/DL (ref 65–100)
HCT VFR BLD AUTO: 30.5 % (ref 35–47)
HGB BLD-MCNC: 10 G/DL (ref 11.5–16)
MAGNESIUM SERPL-MCNC: 2.1 MG/DL (ref 1.6–2.4)
MCH RBC QN AUTO: 30.2 PG (ref 26–34)
MCHC RBC AUTO-ENTMCNC: 32.8 G/DL (ref 30–36.5)
MCV RBC AUTO: 92.1 FL (ref 80–99)
PHOSPHATE SERPL-MCNC: 2.6 MG/DL (ref 2.6–4.7)
PLATELET # BLD AUTO: 238 K/UL (ref 150–400)
POTASSIUM SERPL-SCNC: 2.9 MMOL/L (ref 3.5–5.1)
PROT SERPL-MCNC: 6 G/DL (ref 6.4–8.2)
RBC # BLD AUTO: 3.31 M/UL (ref 3.8–5.2)
SODIUM SERPL-SCNC: 140 MMOL/L (ref 136–145)
WBC # BLD AUTO: 12.2 K/UL (ref 3.6–11)

## 2017-01-13 PROCEDURE — 74011000250 HC RX REV CODE- 250: Performed by: INTERNAL MEDICINE

## 2017-01-13 PROCEDURE — 74011000258 HC RX REV CODE- 258: Performed by: INTERNAL MEDICINE

## 2017-01-13 PROCEDURE — 80053 COMPREHEN METABOLIC PANEL: CPT | Performed by: INTERNAL MEDICINE

## 2017-01-13 PROCEDURE — 74011250637 HC RX REV CODE- 250/637: Performed by: INTERNAL MEDICINE

## 2017-01-13 PROCEDURE — 85027 COMPLETE CBC AUTOMATED: CPT | Performed by: INTERNAL MEDICINE

## 2017-01-13 PROCEDURE — C9113 INJ PANTOPRAZOLE SODIUM, VIA: HCPCS | Performed by: INTERNAL MEDICINE

## 2017-01-13 PROCEDURE — 94003 VENT MGMT INPAT SUBQ DAY: CPT

## 2017-01-13 PROCEDURE — 65620000000 HC RM CCU GENERAL

## 2017-01-13 PROCEDURE — 74011250636 HC RX REV CODE- 250/636: Performed by: HOSPITALIST

## 2017-01-13 PROCEDURE — 74011250636 HC RX REV CODE- 250/636: Performed by: INTERNAL MEDICINE

## 2017-01-13 PROCEDURE — 83735 ASSAY OF MAGNESIUM: CPT | Performed by: INTERNAL MEDICINE

## 2017-01-13 PROCEDURE — 36415 COLL VENOUS BLD VENIPUNCTURE: CPT | Performed by: INTERNAL MEDICINE

## 2017-01-13 PROCEDURE — 74011250636 HC RX REV CODE- 250/636: Performed by: ORTHOPAEDIC SURGERY

## 2017-01-13 PROCEDURE — 84100 ASSAY OF PHOSPHORUS: CPT | Performed by: INTERNAL MEDICINE

## 2017-01-13 PROCEDURE — 71010 XR CHEST PORT: CPT

## 2017-01-13 PROCEDURE — 94640 AIRWAY INHALATION TREATMENT: CPT

## 2017-01-13 RX ORDER — FACIAL-BODY WIPES
10 EACH TOPICAL ONCE
Status: COMPLETED | OUTPATIENT
Start: 2017-01-13 | End: 2017-01-13

## 2017-01-13 RX ORDER — VANCOMYCIN 2 GRAM/500 ML IN 0.9 % SODIUM CHLORIDE INTRAVENOUS
2000 ONCE
Status: COMPLETED | OUTPATIENT
Start: 2017-01-13 | End: 2017-01-22

## 2017-01-13 RX ORDER — VANCOMYCIN HYDROCHLORIDE
1250 EVERY 12 HOURS
Status: DISCONTINUED | OUTPATIENT
Start: 2017-01-14 | End: 2017-01-15

## 2017-01-13 RX ORDER — POTASSIUM CHLORIDE 29.8 MG/ML
20 INJECTION INTRAVENOUS
Status: COMPLETED | OUTPATIENT
Start: 2017-01-13 | End: 2017-01-18

## 2017-01-13 RX ORDER — FUROSEMIDE 10 MG/ML
60 INJECTION INTRAMUSCULAR; INTRAVENOUS EVERY 12 HOURS
Status: DISCONTINUED | OUTPATIENT
Start: 2017-01-13 | End: 2017-01-13

## 2017-01-13 RX ADMIN — METOCLOPRAMIDE 10 MG: 5 INJECTION, SOLUTION INTRAMUSCULAR; INTRAVENOUS at 17:00

## 2017-01-13 RX ADMIN — CEFEPIME HYDROCHLORIDE 2 G: 2 INJECTION, POWDER, FOR SOLUTION INTRAVENOUS at 11:00

## 2017-01-13 RX ADMIN — Medication 10 ML: at 05:17

## 2017-01-13 RX ADMIN — Medication 100 MG: at 08:16

## 2017-01-13 RX ADMIN — POLYETHYLENE GLYCOL 3350 17 G: 17 POWDER, FOR SOLUTION ORAL at 08:16

## 2017-01-13 RX ADMIN — PHENYLEPHRINE HYDROCHLORIDE 90 MCG/MIN: 10 INJECTION INTRAVENOUS at 14:21

## 2017-01-13 RX ADMIN — Medication 100 MCG/HR: at 13:40

## 2017-01-13 RX ADMIN — PROPOFOL 30 MCG/KG/MIN: 10 INJECTION, EMULSION INTRAVENOUS at 03:49

## 2017-01-13 RX ADMIN — LEVOFLOXACIN 750 MG: 5 INJECTION, SOLUTION INTRAVENOUS at 05:13

## 2017-01-13 RX ADMIN — Medication 10 ML: at 14:46

## 2017-01-13 RX ADMIN — METOCLOPRAMIDE 10 MG: 5 INJECTION, SOLUTION INTRAMUSCULAR; INTRAVENOUS at 11:00

## 2017-01-13 RX ADMIN — CHLORHEXIDINE GLUCONATE 15 ML: 1.2 RINSE ORAL at 08:04

## 2017-01-13 RX ADMIN — ACETAMINOPHEN 650 MG: 160 SOLUTION ORAL at 09:22

## 2017-01-13 RX ADMIN — IPRATROPIUM BROMIDE AND ALBUTEROL SULFATE 3 ML: .5; 3 SOLUTION RESPIRATORY (INHALATION) at 15:42

## 2017-01-13 RX ADMIN — CEFEPIME HYDROCHLORIDE 2 G: 2 INJECTION, POWDER, FOR SOLUTION INTRAVENOUS at 18:00

## 2017-01-13 RX ADMIN — PROPOFOL 30 MCG/KG/MIN: 10 INJECTION, EMULSION INTRAVENOUS at 19:18

## 2017-01-13 RX ADMIN — POTASSIUM CHLORIDE 20 MEQ: 400 INJECTION, SOLUTION INTRAVENOUS at 08:15

## 2017-01-13 RX ADMIN — BUMETANIDE 1 MG: 0.25 INJECTION, SOLUTION INTRAMUSCULAR; INTRAVENOUS at 08:16

## 2017-01-13 RX ADMIN — Medication 100 MCG/HR: at 04:32

## 2017-01-13 RX ADMIN — IPRATROPIUM BROMIDE AND ALBUTEROL SULFATE 3 ML: .5; 3 SOLUTION RESPIRATORY (INHALATION) at 08:06

## 2017-01-13 RX ADMIN — PROPOFOL 40 MCG/KG/MIN: 10 INJECTION, EMULSION INTRAVENOUS at 14:59

## 2017-01-13 RX ADMIN — IPRATROPIUM BROMIDE AND ALBUTEROL SULFATE 3 ML: .5; 3 SOLUTION RESPIRATORY (INHALATION) at 11:38

## 2017-01-13 RX ADMIN — POTASSIUM CHLORIDE 20 MEQ: 400 INJECTION, SOLUTION INTRAVENOUS at 10:12

## 2017-01-13 RX ADMIN — ENOXAPARIN SODIUM 40 MG: 40 INJECTION, SOLUTION INTRAVENOUS; SUBCUTANEOUS at 08:16

## 2017-01-13 RX ADMIN — SODIUM CHLORIDE 40 MG: 9 INJECTION INTRAMUSCULAR; INTRAVENOUS; SUBCUTANEOUS at 08:16

## 2017-01-13 RX ADMIN — IPRATROPIUM BROMIDE AND ALBUTEROL SULFATE 3 ML: .5; 3 SOLUTION RESPIRATORY (INHALATION) at 20:07

## 2017-01-13 RX ADMIN — BUMETANIDE 1 MG: 0.25 INJECTION, SOLUTION INTRAMUSCULAR; INTRAVENOUS at 17:00

## 2017-01-13 RX ADMIN — BISACODYL 10 MG: 10 SUPPOSITORY RECTAL at 10:12

## 2017-01-13 RX ADMIN — THERA TABS 1 TABLET: TAB at 08:16

## 2017-01-13 RX ADMIN — METOCLOPRAMIDE 10 MG: 5 INJECTION, SOLUTION INTRAMUSCULAR; INTRAVENOUS at 05:13

## 2017-01-13 RX ADMIN — FOLIC ACID 1 MG: 1 TABLET ORAL at 08:16

## 2017-01-13 RX ADMIN — METOCLOPRAMIDE 10 MG: 5 INJECTION, SOLUTION INTRAMUSCULAR; INTRAVENOUS at 23:57

## 2017-01-13 RX ADMIN — CHLORHEXIDINE GLUCONATE 15 ML: 1.2 RINSE ORAL at 20:32

## 2017-01-13 RX ADMIN — PROPOFOL 40 MCG/KG/MIN: 10 INJECTION, EMULSION INTRAVENOUS at 10:11

## 2017-01-13 RX ADMIN — POTASSIUM CHLORIDE 20 MEQ: 400 INJECTION, SOLUTION INTRAVENOUS at 09:19

## 2017-01-13 RX ADMIN — POTASSIUM CHLORIDE 20 MEQ: 400 INJECTION, SOLUTION INTRAVENOUS at 06:23

## 2017-01-13 RX ADMIN — VANCOMYCIN HYDROCHLORIDE 2000 MG: 10 INJECTION, POWDER, LYOPHILIZED, FOR SOLUTION INTRAVENOUS at 12:23

## 2017-01-13 RX ADMIN — Medication 100 MCG/HR: at 23:24

## 2017-01-13 RX ADMIN — Medication 10 ML: at 22:07

## 2017-01-13 NOTE — INTERDISCIPLINARY ROUNDS
Interdisciplinary team rounds were held 1/13/2017 with the following team members:Care Management, Diabetes Treatment Specialist, Nursing, Nutrition, Pharmacy, Physician and Respiratory Therapy. Plan of care discussed, medications adjusted, medications adjusted. See clinical pathway and/or care plan for interventions and desired outcomes.

## 2017-01-13 NOTE — PROGRESS NOTES
Hospitalist Progress Note    NAME: Erasmo Fitzgerald   :  1960   MRN:  [id number]       Interim Hospital Summary: 49 y.o. female whom presented on 2017 with right ankle deformity s/p injury R unstable trimalleolar ankle fx/dislocatio  ADHD on Adderall  Typically drinks 1 bottle of wine per day    Initially admitted to ortho service-- multiple attempts at ankle reduction were made with out success. On 1/3 s/p ORIF, on  consulted hospitalist for acute delirium ( with concern for alcohol withdrawal) on  transferred to ICU requiring ativan drip and precedex. Further deteriorated on  requiring intubation  Now with spiking fevers and shock liver  Ortho transferred the patient care to medicine service on       Critically ill, with respiratory failure,  ARDS, delirium tremens  GNR sepsis with septic shock      Chely Ram [phone] updated on 1/10     Assessment / Plan:  Very high risk of further deterioration  Including cardiac arrest    Acute hypoxic respiratory failure secondary   To respiratory depression ( delirium tremens  Being on ativan and precedex)  With multiorgan failure ( shock liver, concern for hcap)    Vent support ( intensivist managing)  Empiric, cefepime and levo d5  Requiring pressors as well  On iv diuresis  Chest xray:ETT in satisfactory position. Interval improved aeration of the lungs with some regression of pulmonary edema. Persistent bilateral small pleural effusions and probably some consolidation atleft base. Follow the blood cultures with gram neg rods   urine cultures with gram neg rods  Platelets ok  Ammonia normal, consider ct head  picc placed on   Follow the echo Systolic function was normal. Ejection  fraction was estimated in the range of 55 % to 60 %. There were no  regional wall motion abnormalities. Wall thickness was normal. DOPPLER:  Left ventricular diastolic function parameters were normal for the  patient's age.     Hypertension    Depression ADHD    Right trimalleolar ankle fracture, poa now s/p closed reduction and ORIF 1/2/17  -ortho managing  Code status: full  Prophylaxis: lovenox  Recommended Disposition: tbd     Subjective:     Chief Complaint / Reason for Physician Visit  Follow up on respiratory failure, delirium tremens  intubated    Discussed with RN events overnight. Review of Systems:  Symptom Y/N Comments  Symptom Y/N Comments   Fever/Chills    Chest Pain     Poor Appetite    Edema     Cough    Abdominal Pain     Sputum    Joint Pain     SOB/FUENTES    Pruritis/Rash     Nausea/vomit    Tolerating PT/OT     Diarrhea    Tolerating Diet     Constipation    Other       Could NOT obtain due to: intubated     Objective:     VITALS:   Last 24hrs VS reviewed since prior progress note.  Most recent are:  Patient Vitals for the past 24 hrs:   Temp Pulse Resp BP SpO2   01/13/17 1500 98.6 °F (37 °C) 79 10 96/56 100 %   01/13/17 1445 - 82 11 100/57 100 %   01/13/17 1400 98.3 °F (36.8 °C) 88 13 104/59 100 %   01/13/17 1300 97.7 °F (36.5 °C) 72 23 105/62 100 %   01/13/17 1200 - 79 23 107/58 100 %   01/13/17 1140 - 71 17 - 100 %   01/13/17 1139 - - - - 99 %   01/13/17 1100 99 °F (37.2 °C) 77 10 - 99 %   01/13/17 1000 (!) 100.9 °F (38.3 °C) 88 14 96/54 100 %   01/13/17 0900 (!) 101.7 °F (38.7 °C) (!) 102 13 (!) 89/54 100 %   01/13/17 0830 - (!) 117 14 101/50 97 %   01/13/17 0809 - (!) 120 (!) 34 - 96 %   01/13/17 0807 - - - - 94 %   01/13/17 0730 99.9 °F (37.7 °C) 94 16 (!) 81/48 96 %   01/13/17 0700 - 75 10 94/53 100 %   01/13/17 0638 - 86 10 138/77 100 %   01/13/17 0622 98.7 °F (37.1 °C) 76 11 101/60 100 %   01/13/17 0600 - 77 10 104/60 100 %   01/13/17 0500 - 76 10 99/60 100 %   01/13/17 0400 98.9 °F (37.2 °C) 73 11 115/67 100 %   01/13/17 0300 - 68 10 109/67 100 %   01/13/17 0200 - 76 10 101/57 100 %   01/13/17 0100 99.3 °F (37.4 °C) 75 10 101/60 100 %   01/13/17 0000 - 77 9 97/56 100 %   01/12/17 2330 100 °F (37.8 °C) 78 10 101/58 99 %   01/12/17 2200 - 93 12 96/60 100 %   01/12/17 2115 99.6 °F (37.6 °C) 98 20 103/69 100 %   01/12/17 2101 - 80 - (!) 88/51 -   01/12/17 2100 - 81 10 (!) 88/51 99 %   01/12/17 2038 - 83 - 97/62 -   01/12/17 2000 - 97 11 103/58 100 %   01/12/17 1933 - (!) 101 18 - 100 %   01/12/17 1932 - - - - 100 %   01/12/17 1900 100.1 °F (37.8 °C) 91 12 107/71 100 %   01/12/17 1730 - 95 12 103/59 100 %   01/12/17 1700 - 98 14 91/49 -   01/12/17 1630 - (!) 104 13 100/55 -   01/12/17 1600 (!) 101 °F (38.3 °C) (!) 105 25 131/68 97 %   01/12/17 1553 - 100 26 - 98 %   01/12/17 1530 - 88 14 101/61 99 %       Intake/Output Summary (Last 24 hours) at 01/13/17 1521  Last data filed at 01/13/17 1500   Gross per 24 hour   Intake          3057.58 ml   Output             3759 ml   Net          -701.42 ml        PHYSICAL EXAM:  General: Intubated and sedated  EENT:  EOMI. Anicteric sclerae. Resp:  B/l decreased air entry. No accessory muscle use  CV:  Regular  rhythm,  No edema  GI:  Soft, Non distended, Non tender.  +Bowel sounds  Neurologic:  intubated  Psych:             Not anxious nor agitated  Skin:  No rashes. No jaundice    Reviewed most current lab test results and cultures  YES  Reviewed most current radiology test results   YES  Review and summation of old records today    NO  Reviewed patient's current orders and MAR    YES  PMH/SH reviewed - no change compared to H&P  ________________________________________________________________________  Care Plan discussed with:    Comments   Patient     Family      RN y    Care Manager     Consultant                        [facility] held today with , nursing, pharmacist and clinical coordinator. Patient's plan of care was discussed; medications were reviewed and discharge planning was addressed.      ________________________________________________________________________  Total NON critical care TIME:30   Minutes    Total CRITICAL CARE TIME Spent:   Minutes non procedure based      Comments   >50% of visit spent in counseling and coordination of care     ________________________________________________________________________  Cherylene Keeler, MD     Procedures: see electronic medical records for all procedures/Xrays and details which were not copied into this note but were reviewed prior to creation of Plan. LABS:  I reviewed today's most current labs and imaging studies.   Pertinent labs include:  Recent Labs      01/13/17 0404 01/12/17 0419 01/11/17   0150   WBC  12.2*  8.3  9.7   HGB  10.0*  9.6*  14.1   HCT  30.5*  29.2*  43.2   PLT  238  184  145*     Recent Labs      01/13/17 0404 01/12/17 0419 01/11/17   0150   NA  140  140  141   K  2.9*  3.4*  3.8   CL  97  105  110*   CO2  33*  28  24   GLU  182*  181*  103*   BUN  16  16  11   CREA  0.80  0.91  1.13*   CA  8.1*  7.9*  7.7*   MG  2.1  2.1  2.0   PHOS  2.6  2.4*  3.8   ALB  1.8*  1.8*  2.2*   TBILI  0.8  0.7  1.0   SGOT  90*  190*  690*   ALT  311*  497*  740*   INR   --   1.2*   --        Signed: Cherylene Keeler, MD

## 2017-01-13 NOTE — PROGRESS NOTES
0730: report received from [facility], [facility].   0887: tylenol given for temp 102.0   1100: reassessment complete, no changes noted. 1410: pt family member call and request to have phone put to pt ear. Family talked to pt this way for 3min. Family agreeable this was good. 1500: pt reassessed, no changes noted  1915: Bedside shift change report given to Julito RN (oncoming nurse) by Karla Zacarias RN (offgoing nurse). Report included the following information SBAR, Kardex, Intake/Output, MAR, Recent Results and Med Rec Status.

## 2017-01-13 NOTE — PROGRESS NOTES
1930  Shift assessment completed. Pt. Restless and anxious looking at times. RR 20s-40s, sinus tach, coughing and setting vent off. Suctioned patient, not much out. Shivering/ quivering at times. Increased propofol to 30, patient relaxed some. Withdraws with BLE, no movement with BUE. Opens eyes to pain at times. Pupils equal and reactive. Lungs still sounding coarse throughout. TF at goal, GR 70. Skin intact. Edema in hands 2+, pitting, 1+ non pitting RLE. Prevalon boot placed back on LLE. Active BS but no BM today. 2115  O2 sats dipped to 70s after suctioning, patient shivering/ quivering at times and sets the vent off, RT in to assess, added some air to ETT cuff. Cooling blanket cut off. Temp 99  TF flushed. 2200  101.2 , cooling blanket cut back on. Changed tube feed bottle and tubing.     2220  PRN Tylenol given via OGT. [id number]  Reassessment. Patient less interactive and lethargic, compliant with vent. VSS. Lungs still sound very coarse. BS hypoactive. GR 70. Temp  F.     0400  Reassessment no changes. 0540  Potassium 2.9, placed replacement orders per prn electrolyte replacement: 4 doses going for 1 hour each     0700  Pt very agitated/ anxious off propofol, coughing, draws core inward with quivering/shivering, lots of thick tan/ yellow secretions. Bedside and Verbal shift change report given to [facility] RN  (oncoming nurse) by Sagrario Pool RN (offgoing nurse). Report included the following information SBAR, Kardex, Intake/Output, MAR, Recent Results, Med Rec Status and Cardiac Rhythm NSR.

## 2017-01-13 NOTE — PROGRESS NOTES
[facility]  Pulmonary, Critical Care, and Sleep Medicine    Name: Karyn Martino MRN: [id number]   : 1960 Hospital: [facility]   Date: 2017        IMPRESSION:   · Acute hypoxic respiratory failure  · Delirium tremens  · ARDS  · Severe GNR sepsis with septic shock   · Persistent fevers  · Acute renal failure  · Shock liver  · S/P surgery for ankle fracture 17  · EtOH abuse - 1 bottle wine/day  · Tobacco/MJ use      PLAN:   · Ventilator support - difficult to achieve ARDS goals, significant ventilator dyssynchrony  · Pressors   · Sedation - titrate  · Follow up repeat cultures  · Empiric antibiotics pending culture data  · Thiamine/MVI/folate   · Replete electrolytes as needed  · Diurese again today  · DVT/GI prophylaxis  · Critically ill      Subjective/Interval History:   I have reviewed the flowsheet and previous days notes. The patient is unable to give any meaningful history or review of systems because the patient is:   Intubated/sedated       The patient is critically ill on:      Mechanical ventilation/pressors     Review of Systems   Unable to perform ROS: Intubated     Objective:   Vital Signs:    Visit Vitals    /77    Pulse 86    Temp 98.7 °F (37.1 °C)    Resp 10    Ht 5' 4\" (1.626 m)    Wt 78.9 kg (173 lb 15.1 oz)    SpO2 100%    BMI 29.86 kg/m2       O2 Device: Ventilator   O2 Flow Rate (L/min): 2 l/min   Temp (24hrs), Av.7 °F (37.6 °C), Min:98.7 °F (37.1 °C), Max:101 °F (38.3 °C)       Intake/Output:   Last shift:         Last 3 shifts:  1901 -  0700  In: 3120 [I.V.:1390]  Out: 6433 [NBUYI:8004]    Intake/Output Summary (Last 24 hours) at 17 0722  Last data filed at 17 7322   Gross per 24 hour   Intake          1655.85 ml   Output             4838 ml   Net         -3182.15 ml     Hemodynamics:   PAP:   CO:     Wedge:   CI:     CVP:    SVR:       PVR:       Ventilator Settings:  Mode Rate Tidal Volume Pressure FiO2 PEEP   Pressure control   450 ml  5 cm H2O 70 % 10 cm H20     Peak airway pressure: 23 cm H2O    Minute ventilation: 7.9 l/min       Physical Exam   Constitutional: She is sedated and intubated. HENT:   Head: Normocephalic and atraumatic. Mouth/Throat: No oropharyngeal exudate. Eyes: No scleral icterus. Cardiovascular: Normal rate and regular rhythm. Exam reveals no gallop. Pulmonary/Chest: She is intubated. No respiratory distress. She has no wheezes. She has rales. Abdominal: Soft. Bowel sounds are normal. She exhibits no distension. There is no tenderness. Musculoskeletal: She exhibits edema. Skin: Skin is warm and dry.      Data:     Current Facility-Administered Medications   Medication Dose Route Frequency    potassium chloride 20 mEq in 50 ml IVPB  20 mEq IntraVENous Q1H    polyethylene glycol (MIRALAX) packet 17 g  17 g Oral DAILY    bumetanide (BUMEX) injection 1 mg  1 mg IntraVENous BID    PHENYLephrine (NEOSYNEPHRINE) 100,000 mcg in 0.9% sodium chloride 250 mL infusion   mcg/min IntraVENous TITRATE    metoclopramide HCl (REGLAN) injection 10 mg  10 mg IntraVENous Q6H    cefepime (MAXIPIME) 2 g in 0.9% sodium chloride (MBP/ADV) 100 mL  2 g IntraVENous Q12H    albuterol-ipratropium (DUO-NEB) 2.5 MG-0.5 MG/3 ML  3 mL Nebulization QID RT    fentaNYL (PF) 900 mcg/30 ml infusion soln   mcg/hr IntraVENous TITRATE    levoFLOXacin (LEVAQUIN) 750 mg in D5W IVPB  750 mg IntraVENous Q24H    chlorhexidine (PERIDEX) 0.12 % mouthwash 15 mL  15 mL Oral Q12H    pantoprazole (PROTONIX) 40 mg in sodium chloride 0.9 % 10 mL injection  40 mg IntraVENous DAILY    thiamine (B-1) tablet 100 mg  100 mg Per NG tube DAILY    folic acid (FOLVITE) tablet 1 mg  1 mg Per NG tube DAILY    therapeutic multivitamin (THERAGRAN) tablet 1 Tab  1 Tab Oral DAILY    propofol (DIPRIVAN) infusion  5-50 mcg/kg/min IntraVENous TITRATE    sodium chloride (NS) flush 10 mL  10 mL InterCATHeter Q24H    sodium chloride (NS) flush 10-40 mL  10-40 mL InterCATHeter Q8H    nicotine (NICODERM CQ) 14 mg/24 hr patch 1 Patch  1 Patch TransDERmal DAILY    sodium chloride (NS) flush 5-10 mL  5-10 mL IntraVENous Q8H    enoxaparin (LOVENOX) injection 40 mg  40 mg SubCUTAneous Q24H                Labs:  Recent Labs      01/13/17   0404  01/12/17   0419  01/11/17   0150   WBC  12.2*  8.3  9.7   HGB  10.0*  9.6*  14.1   HCT  30.5*  29.2*  43.2   PLT  238  184  145*     Recent Labs      01/13/17   0404  01/12/17   0419  01/11/17   0150   NA  140  140  141   K  2.9*  3.4*  3.8   CL  97  105  110*   CO2  33*  28  24   GLU  182*  181*  103*   BUN  16  16  11   CREA  0.80  0.91  1.13*   CA  8.1*  7.9*  7.7*   MG  2.1  2.1  2.0   PHOS  2.6  2.4*  3.8   ALB  1.8*  1.8*  2.2*   TBILI  0.8  0.7  1.0   SGOT  90*  190*  690*   ALT  311*  497*  740*   INR   --   1.2*   --      Recent Labs      01/12/17   0623  01/11/17   0930  01/11/17   0652   PH  7.40  7.34*  7.31*   PCO2  45  43  45   PO2  121*  60*  78*   HCO3  27*  23  22   FIO2  70  70  100     Imaging:  I have personally reviewed the patients radiographs and have reviewed the reports:  Decreasing bilateral infiltrates        Total critical care time exclusive of procedures: 25 minutes  Shyam Maza MD

## 2017-01-13 NOTE — PROGRESS NOTES
Pharmacy Automatic Renal Dosing Protocol - Antimicrobials    Indication for Antimicrobials: Sepsis (E. Coli in urine and blood), persistent fever with leukocytosis     Current Regimen of Each Antimicrobial (Start Day & Day of Therapy):  Levofloxacin 750 mg IV every 24 hours (Started 17; Day #5)  Cefepime 2 gm IV every 12 hours (Started 17; Day #5)  Vancomycin 2000 mg IV load followed by 1250 mg IV every 12 hours (17; Day #1)    Goal Level (if needed): 15-20 mcg/mL    Significant Cultures:   17 Respiratory culture = Light normal resp josh; Few yeast (FINAL)  17 Urine culture = E. Coli pan sensitive (FINAL)  17 Blood culture = No growth x 4 days (Results pending)  17 Blood culture = Pan-sensitive Ecoli in 2/2 (FINAL)    Blood: NGTD x 15 hours- pending  CAPD, Hemodialysis or Renal Replacement Therapy: None   Paralysis, amputations, malnutrition: None  Recent Labs      17   0404  17   0419  17   0150   CREA  0.80  0.91  1.13*   BUN  16  16  11   WBC  12.2*  8.3  9.7     Temp (24hrs), Av.9 °F (37.7 °C), Min:98.7 °F (37.1 °C), Max:101.7 °F (38.7 °C)    Creatinine Clearance (Creatinine Clearance (ml/min)): 69 mL/min     Impression/Plan: Will order vancomycin 2000 mg IV load followed by 1250 mg IV every 12 hours for an estimated trough of 18 mcg/mL. Will change cefepime to 2 g IV every 8 hours for CrCl > 60 mL/min per renal dosing protocol. Pharmacy will follow daily and adjust as appropriate.     Thank you,  Jade Flores, PHARMD

## 2017-01-14 ENCOUNTER — APPOINTMENT (OUTPATIENT)
Dept: GENERAL RADIOLOGY | Age: 57
DRG: 492 | End: 2017-01-14
Attending: INTERNAL MEDICINE
Payer: COMMERCIAL

## 2017-01-14 LAB
ALBUMIN SERPL BCP-MCNC: 1.7 G/DL (ref 3.5–5)
ALBUMIN/GLOB SERPL: 0.4 {RATIO} (ref 1.1–2.2)
ALP SERPL-CCNC: 142 U/L (ref 45–117)
ALT SERPL-CCNC: 206 U/L (ref 12–78)
ANION GAP BLD CALC-SCNC: 8 MMOL/L (ref 5–15)
ARTERIAL PATENCY WRIST A: YES
AST SERPL W P-5'-P-CCNC: 54 U/L (ref 15–37)
BASE EXCESS BLDA CALC-SCNC: 11.2 MMOL/L
BASOPHILS # BLD AUTO: 0 K/UL (ref 0–0.1)
BASOPHILS # BLD: 0 % (ref 0–1)
BDY SITE: ABNORMAL
BILIRUB SERPL-MCNC: 0.5 MG/DL (ref 0.2–1)
BUN SERPL-MCNC: 19 MG/DL (ref 6–20)
BUN/CREAT SERPL: 25 (ref 12–20)
CALCIUM SERPL-MCNC: 8.6 MG/DL (ref 8.5–10.1)
CHLORIDE SERPL-SCNC: 100 MMOL/L (ref 97–108)
CO2 SERPL-SCNC: 35 MMOL/L (ref 21–32)
CREAT SERPL-MCNC: 0.77 MG/DL (ref 0.55–1.02)
EOSINOPHIL # BLD: 0 K/UL (ref 0–0.4)
EOSINOPHIL NFR BLD: 0 % (ref 0–7)
EPAP/CPAP/PEEP, PAPEEP: 10
ERYTHROCYTE [DISTWIDTH] IN BLOOD BY AUTOMATED COUNT: 14.8 % (ref 11.5–14.5)
FIO2 ON VENT: 50 %
GAS FLOW.O2 SETTING OXYMISER: 10 L/MIN
GLOBULIN SER CALC-MCNC: 4.4 G/DL (ref 2–4)
GLUCOSE SERPL-MCNC: 209 MG/DL (ref 65–100)
HCO3 BLDA-SCNC: 38 MMOL/L (ref 22–26)
HCT VFR BLD AUTO: 29.8 % (ref 35–47)
HGB BLD-MCNC: 9.5 G/DL (ref 11.5–16)
IPAP/PIP, IPAPIP: 12
LYMPHOCYTES # BLD AUTO: 14 % (ref 12–49)
LYMPHOCYTES # BLD: 1.2 K/UL (ref 0.8–3.5)
MAGNESIUM SERPL-MCNC: 2.4 MG/DL (ref 1.6–2.4)
MCH RBC QN AUTO: 29.4 PG (ref 26–34)
MCHC RBC AUTO-ENTMCNC: 31.9 G/DL (ref 30–36.5)
MCV RBC AUTO: 92.3 FL (ref 80–99)
MONOCYTES # BLD: 0.8 K/UL (ref 0–1)
MONOCYTES NFR BLD AUTO: 9 % (ref 5–13)
NEUTS SEG # BLD: 6.6 K/UL (ref 1.8–8)
NEUTS SEG NFR BLD AUTO: 77 % (ref 32–75)
PCO2 BLDA: 58 MMHG (ref 35–45)
PH BLDA: 7.43 [PH] (ref 7.35–7.45)
PHOSPHATE SERPL-MCNC: 2.6 MG/DL (ref 2.6–4.7)
PLATELET # BLD AUTO: 277 K/UL (ref 150–400)
PO2 BLDA: 62 MMHG (ref 80–100)
POTASSIUM SERPL-SCNC: 3.6 MMOL/L (ref 3.5–5.1)
PROT SERPL-MCNC: 6.1 G/DL (ref 6.4–8.2)
RBC # BLD AUTO: 3.23 M/UL (ref 3.8–5.2)
SAO2 % BLD: 92 % (ref 92–97)
SAO2% DEVICE SAO2% SENSOR NAME: ABNORMAL
SODIUM SERPL-SCNC: 143 MMOL/L (ref 136–145)
SPECIMEN SITE: ABNORMAL
TRIGL SERPL-MCNC: 480 MG/DL (ref ?–150)
VENTILATION MODE VENT: ABNORMAL
WBC # BLD AUTO: 8.6 K/UL (ref 3.6–11)

## 2017-01-14 PROCEDURE — 84478 ASSAY OF TRIGLYCERIDES: CPT | Performed by: INTERNAL MEDICINE

## 2017-01-14 PROCEDURE — 77030020847 HC STATLOK BARD -A

## 2017-01-14 PROCEDURE — 82962 GLUCOSE BLOOD TEST: CPT

## 2017-01-14 PROCEDURE — 74011250636 HC RX REV CODE- 250/636: Performed by: INTERNAL MEDICINE

## 2017-01-14 PROCEDURE — 74011250636 HC RX REV CODE- 250/636: Performed by: HOSPITALIST

## 2017-01-14 PROCEDURE — 74011250637 HC RX REV CODE- 250/637: Performed by: INTERNAL MEDICINE

## 2017-01-14 PROCEDURE — 80053 COMPREHEN METABOLIC PANEL: CPT | Performed by: INTERNAL MEDICINE

## 2017-01-14 PROCEDURE — 85025 COMPLETE CBC W/AUTO DIFF WBC: CPT | Performed by: INTERNAL MEDICINE

## 2017-01-14 PROCEDURE — 74011000250 HC RX REV CODE- 250: Performed by: INTERNAL MEDICINE

## 2017-01-14 PROCEDURE — 36600 WITHDRAWAL OF ARTERIAL BLOOD: CPT | Performed by: INTERNAL MEDICINE

## 2017-01-14 PROCEDURE — C9113 INJ PANTOPRAZOLE SODIUM, VIA: HCPCS | Performed by: INTERNAL MEDICINE

## 2017-01-14 PROCEDURE — 84100 ASSAY OF PHOSPHORUS: CPT | Performed by: INTERNAL MEDICINE

## 2017-01-14 PROCEDURE — 94003 VENT MGMT INPAT SUBQ DAY: CPT

## 2017-01-14 PROCEDURE — 82803 BLOOD GASES ANY COMBINATION: CPT | Performed by: INTERNAL MEDICINE

## 2017-01-14 PROCEDURE — 36415 COLL VENOUS BLD VENIPUNCTURE: CPT | Performed by: INTERNAL MEDICINE

## 2017-01-14 PROCEDURE — 94640 AIRWAY INHALATION TREATMENT: CPT

## 2017-01-14 PROCEDURE — 74011000258 HC RX REV CODE- 258: Performed by: INTERNAL MEDICINE

## 2017-01-14 PROCEDURE — 65620000000 HC RM CCU GENERAL

## 2017-01-14 PROCEDURE — 71010 XR CHEST PORT: CPT

## 2017-01-14 PROCEDURE — 74011250636 HC RX REV CODE- 250/636: Performed by: ORTHOPAEDIC SURGERY

## 2017-01-14 PROCEDURE — 83735 ASSAY OF MAGNESIUM: CPT | Performed by: INTERNAL MEDICINE

## 2017-01-14 RX ORDER — POTASSIUM CHLORIDE 29.8 MG/ML
20 INJECTION INTRAVENOUS
Status: COMPLETED | OUTPATIENT
Start: 2017-01-14 | End: 2017-01-18

## 2017-01-14 RX ORDER — SUCCINYLCHOLINE CHLORIDE 20 MG/ML
INJECTION INTRAMUSCULAR; INTRAVENOUS
Status: DISCONTINUED
Start: 2017-01-14 | End: 2017-01-14

## 2017-01-14 RX ORDER — PROPOFOL 10 MG/ML
INJECTION, EMULSION INTRAVENOUS
Status: DISCONTINUED
Start: 2017-01-14 | End: 2017-01-14

## 2017-01-14 RX ADMIN — BUMETANIDE 1 MG: 0.25 INJECTION, SOLUTION INTRAMUSCULAR; INTRAVENOUS at 08:02

## 2017-01-14 RX ADMIN — Medication 10 ML: at 21:46

## 2017-01-14 RX ADMIN — SODIUM CHLORIDE 40 MG: 9 INJECTION INTRAMUSCULAR; INTRAVENOUS; SUBCUTANEOUS at 08:02

## 2017-01-14 RX ADMIN — ENOXAPARIN SODIUM 40 MG: 40 INJECTION, SOLUTION INTRAVENOUS; SUBCUTANEOUS at 08:02

## 2017-01-14 RX ADMIN — METOCLOPRAMIDE 10 MG: 5 INJECTION, SOLUTION INTRAMUSCULAR; INTRAVENOUS at 23:16

## 2017-01-14 RX ADMIN — Medication 10 ML: at 05:35

## 2017-01-14 RX ADMIN — POTASSIUM CHLORIDE 20 MEQ: 400 INJECTION, SOLUTION INTRAVENOUS at 09:08

## 2017-01-14 RX ADMIN — PROPOFOL 35 MCG/KG/MIN: 10 INJECTION, EMULSION INTRAVENOUS at 17:05

## 2017-01-14 RX ADMIN — VANCOMYCIN HYDROCHLORIDE 1250 MG: 10 INJECTION, POWDER, LYOPHILIZED, FOR SOLUTION INTRAVENOUS at 00:00

## 2017-01-14 RX ADMIN — PROPOFOL 25 MCG/KG/MIN: 10 INJECTION, EMULSION INTRAVENOUS at 11:42

## 2017-01-14 RX ADMIN — POLYETHYLENE GLYCOL 3350 17 G: 17 POWDER, FOR SOLUTION ORAL at 08:03

## 2017-01-14 RX ADMIN — IPRATROPIUM BROMIDE AND ALBUTEROL SULFATE 3 ML: .5; 3 SOLUTION RESPIRATORY (INHALATION) at 15:00

## 2017-01-14 RX ADMIN — CEFEPIME HYDROCHLORIDE 2 G: 2 INJECTION, POWDER, FOR SOLUTION INTRAVENOUS at 02:09

## 2017-01-14 RX ADMIN — PHENYLEPHRINE HYDROCHLORIDE 60 MCG/MIN: 10 INJECTION INTRAVENOUS at 16:57

## 2017-01-14 RX ADMIN — PROPOFOL 35 MCG/KG/MIN: 10 INJECTION, EMULSION INTRAVENOUS at 21:44

## 2017-01-14 RX ADMIN — CEFEPIME HYDROCHLORIDE 2 G: 2 INJECTION, POWDER, FOR SOLUTION INTRAVENOUS at 18:00

## 2017-01-14 RX ADMIN — Medication 100 MCG/HR: at 17:51

## 2017-01-14 RX ADMIN — FOLIC ACID 1 MG: 1 TABLET ORAL at 08:02

## 2017-01-14 RX ADMIN — Medication 10 ML: at 15:00

## 2017-01-14 RX ADMIN — METOCLOPRAMIDE 10 MG: 5 INJECTION, SOLUTION INTRAMUSCULAR; INTRAVENOUS at 11:14

## 2017-01-14 RX ADMIN — CHLORHEXIDINE GLUCONATE 15 ML: 1.2 RINSE ORAL at 20:39

## 2017-01-14 RX ADMIN — Medication 100 MCG/HR: at 08:38

## 2017-01-14 RX ADMIN — IPRATROPIUM BROMIDE AND ALBUTEROL SULFATE 3 ML: .5; 3 SOLUTION RESPIRATORY (INHALATION) at 07:34

## 2017-01-14 RX ADMIN — METOCLOPRAMIDE 10 MG: 5 INJECTION, SOLUTION INTRAMUSCULAR; INTRAVENOUS at 05:33

## 2017-01-14 RX ADMIN — CEFEPIME HYDROCHLORIDE 2 G: 2 INJECTION, POWDER, FOR SOLUTION INTRAVENOUS at 10:00

## 2017-01-14 RX ADMIN — IPRATROPIUM BROMIDE AND ALBUTEROL SULFATE 3 ML: .5; 3 SOLUTION RESPIRATORY (INHALATION) at 21:06

## 2017-01-14 RX ADMIN — METOCLOPRAMIDE 10 MG: 5 INJECTION, SOLUTION INTRAMUSCULAR; INTRAVENOUS at 17:03

## 2017-01-14 RX ADMIN — PROPOFOL 25 MCG/KG/MIN: 10 INJECTION, EMULSION INTRAVENOUS at 09:45

## 2017-01-14 RX ADMIN — BUMETANIDE 1 MG: 0.25 INJECTION, SOLUTION INTRAMUSCULAR; INTRAVENOUS at 17:03

## 2017-01-14 RX ADMIN — IPRATROPIUM BROMIDE AND ALBUTEROL SULFATE 3 ML: .5; 3 SOLUTION RESPIRATORY (INHALATION) at 12:00

## 2017-01-14 RX ADMIN — THERA TABS 1 TABLET: TAB at 08:02

## 2017-01-14 RX ADMIN — Medication 100 MG: at 08:02

## 2017-01-14 RX ADMIN — VANCOMYCIN HYDROCHLORIDE 1250 MG: 10 INJECTION, POWDER, LYOPHILIZED, FOR SOLUTION INTRAVENOUS at 12:00

## 2017-01-14 RX ADMIN — CHLORHEXIDINE GLUCONATE 15 ML: 1.2 RINSE ORAL at 08:01

## 2017-01-14 RX ADMIN — LEVOFLOXACIN 750 MG: 5 INJECTION, SOLUTION INTRAVENOUS at 05:35

## 2017-01-14 RX ADMIN — PHENYLEPHRINE HYDROCHLORIDE 80 MCG/MIN: 10 INJECTION INTRAVENOUS at 07:58

## 2017-01-14 RX ADMIN — PROPOFOL 25 MCG/KG/MIN: 10 INJECTION, EMULSION INTRAVENOUS at 02:17

## 2017-01-14 RX ADMIN — POTASSIUM CHLORIDE 20 MEQ: 400 INJECTION, SOLUTION INTRAVENOUS at 07:58

## 2017-01-14 NOTE — PROGRESS NOTES
0710: received report from [facility]. Pt able to open eyes on command slowly. 1164: pt moving all extremities, wiggling down in bed. Dr. Sara Hightower notified. Ok to restart propofol. Reduce peep in efforts to extubate soon.

## 2017-01-14 NOTE — PROGRESS NOTES
[facility]  Pulmonary, Critical Care, and Sleep Medicine    Name: Anjali Smith MRN: [id number]   : 1960 Hospital: [facility]   Date: 2017        IMPRESSION:   · Acute hypoxic respiratory failure- gas exchange trending better? · Delirium tremens  · ARDS  · Severe GNR sepsis with septic shock   · Persistent fevers  · Acute renal failure  · Shock liver  · S/P surgery for ankle fracture 17  · EtOH abuse - 1 bottle wine/day  · Tobacco/MJ use      PLAN:   · Ventilator support - difficult to achieve ARDS goals, significant ventilator dyssynchrony  · Pressors   · Sedation - titrate and try SAT in AM  · Follow up repeat cultures  · Empiric antibiotics pending culture data  · Thiamine/MVI/folate   · Replete electrolytes as needed  · DVT/GI prophylaxis  · Critically ill      Subjective/Interval History:   I have reviewed the flowsheet and previous days notes. The patient is unable to give any meaningful history or review of systems because the patient is: Intubated/sedated        failed SAT the other day.  Less restless this AM. No new secretions    The patient is critically ill on:      Mechanical ventilation/pressors     Review of Systems   Unable to perform ROS: Intubated     Objective:   Vital Signs:    Visit Vitals    BP 94/55    Pulse 92    Temp 99 °F (37.2 °C)    Resp 20    Ht 5' 4\" (1.626 m)    Wt 78.9 kg (173 lb 15.1 oz)    SpO2 100%    BMI 29.86 kg/m2       O2 Device: Ventilator   O2 Flow Rate (L/min): 2 l/min   Temp (24hrs), Av.7 °F (37.1 °C), Min:97.7 °F (36.5 °C), Max:99.3 °F (37.4 °C)       Intake/Output:   Last shift:       07 -  1900  In: 413.9 [I.V.:148.9]  Out: 1100 [Urine:1100]  Last 3 shifts: 1901 -  0700  In: 4767.1 [I.V.:2542.1]  Out: 9838 [Urine:5629]    Intake/Output Summary (Last 24 hours) at 17 1054  Last data filed at 17 1000   Gross per 24 hour   Intake          3377.08 ml   Output 4217 ml   Net          -839.92 ml     Hemodynamics:   PAP:   CO:     Wedge:   CI:     CVP:    SVR:       PVR:       Ventilator Settings:  Mode Rate Tidal Volume Pressure FiO2 PEEP   Pressure control   450 ml  5 cm H2O 50 % 6 cm H20     Peak airway pressure: 24 cm H2O    Minute ventilation: 6.58 l/min       Physical Exam   Constitutional: She is sedated and intubated. HENT:   Head: Normocephalic and atraumatic. Mouth/Throat: No oropharyngeal exudate. Eyes: No scleral icterus. Cardiovascular: Normal rate and regular rhythm. Exam reveals no gallop. Pulmonary/Chest: She is intubated. No respiratory distress. She has no wheezes. She has rales. Abdominal: Soft. Bowel sounds are normal. She exhibits no distension. There is no tenderness. Musculoskeletal: She exhibits edema. Skin: Skin is warm and dry.      Data:     Current Facility-Administered Medications   Medication Dose Route Frequency    propofol (DIPRIVAN) 10 mg/mL injection        succinylcholine (ANECTINE) 20 mg/mL injection        vancomycin (VANCOCIN) 1250 mg in  ml infusion  1,250 mg IntraVENous Q12H    cefepime (MAXIPIME) 2 g in 0.9% sodium chloride (MBP/ADV) 100 mL  2 g IntraVENous Q8H    polyethylene glycol (MIRALAX) packet 17 g  17 g Oral DAILY    bumetanide (BUMEX) injection 1 mg  1 mg IntraVENous BID    PHENYLephrine (NEOSYNEPHRINE) 100,000 mcg in 0.9% sodium chloride 250 mL infusion   mcg/min IntraVENous TITRATE    metoclopramide HCl (REGLAN) injection 10 mg  10 mg IntraVENous Q6H    albuterol-ipratropium (DUO-NEB) 2.5 MG-0.5 MG/3 ML  3 mL Nebulization QID RT    fentaNYL (PF) 900 mcg/30 ml infusion soln   mcg/hr IntraVENous TITRATE    levoFLOXacin (LEVAQUIN) 750 mg in D5W IVPB  750 mg IntraVENous Q24H    chlorhexidine (PERIDEX) 0.12 % mouthwash 15 mL  15 mL Oral Q12H    pantoprazole (PROTONIX) 40 mg in sodium chloride 0.9 % 10 mL injection  40 mg IntraVENous DAILY    thiamine (B-1) tablet 100 mg 100 mg Per NG tube DAILY    folic acid (FOLVITE) tablet 1 mg  1 mg Per NG tube DAILY    therapeutic multivitamin (THERAGRAN) tablet 1 Tab  1 Tab Oral DAILY    propofol (DIPRIVAN) infusion  5-50 mcg/kg/min IntraVENous TITRATE    sodium chloride (NS) flush 10 mL  10 mL InterCATHeter Q24H    sodium chloride (NS) flush 10-40 mL  10-40 mL InterCATHeter Q8H    nicotine (NICODERM CQ) 14 mg/24 hr patch 1 Patch  1 Patch TransDERmal DAILY    enoxaparin (LOVENOX) injection 40 mg  40 mg SubCUTAneous Q24H                Labs:  Recent Labs      01/14/17   0411  01/13/17   0404  01/12/17   0419   WBC  8.6  12.2*  8.3   HGB  9.5*  10.0*  9.6*   HCT  29.8*  30.5*  29.2*   PLT  277  238  184     Recent Labs      01/14/17   0411  01/13/17   0404  01/12/17   0419   NA  143  140  140   K  3.6  2.9*  3.4*   CL  100  97  105   CO2  35*  33*  28   GLU  209*  182*  181*   BUN  19  16  16   CREA  0.77  0.80  0.91   CA  8.6  8.1*  7.9*   MG  2.4  2.1  2.1   PHOS  2.6  2.6  2.4*   ALB  1.7*  1.8*  1.8*   TBILI  0.5  0.8  0.7   SGOT  54*  90*  190*   ALT  206*  311*  497*   INR   --    --   1.2*     Recent Labs      01/14/17   0600  01/12/17   0623   PH  7.43  7.40   PCO2  58*  45   PO2  62*  121*   HCO3  38*  27*   FIO2  50  70     Imaging:  I have personally reviewed the patients radiographs and have reviewed the reports:  Decreasing bilateral infiltrates        Total critical care time exclusive of procedures: 25 minutes  Maddy Jesus MD

## 2017-01-14 NOTE — PROGRESS NOTES
Hospitalist Progress Note    NAME: Henri Course   :  1960   MRN:  [id number]       Interim Hospital Summary: 49 y.o. female whom presented on 2017 with right ankle deformity s/p injury R unstable trimalleolar ankle fx/dislocatio  ADHD on Adderall  Typically drinks 1 bottle of wine per day    Initially admitted to ortho service-- multiple attempts at ankle reduction were made with out success. On 1/3 s/p ORIF, on  consulted hospitalist for acute delirium ( with concern for alcohol withdrawal) on  transferred to ICU requiring ativan drip and precedex. Further deteriorated on  requiring intubation  Now with spiking fevers and shock liver  Ortho transferred the patient care to medicine service on       Critically ill, with respiratory failure,  ARDS, delirium tremens  GNR sepsis with septic shock      Whitney Carnes [phone] updated on 1/10     Assessment / Plan:  Very high risk of further deterioration including cardiac arrest    Acute hypoxic respiratory failure secondary  to   respiratory depression ( delirium tremens  Being on ativan and precedex)  With multiorgan failure ( shock liver, concern for hcap)    Vent support ( intensivist managing)  Empiric, cefepime and levo d6  Requiring pressors as well  On iv diuresis  Chest xray:ETT in satisfactory position. Interval improved aeration of the lungs with some regression of pulmonary edema. Persistent bilateral small pleural effusions and probably some consolidation atleft base. Follow the blood cultures with gram neg rods with e coli   urine cultures with gram neg rods  Platelets ok  Ammonia normal, consider ct head  picc placed on   Follow the echo Systolic function was normal. Ejection  fraction was estimated in the range of 55 % to 60 %. There were no  regional wall motion abnormalities. Wall thickness was normal. DOPPLER:  Left ventricular diastolic function parameters were normal for the  patient's age.   orhto checked the ankle on wednesday    Hypertension    Depression ADHD    Right trimalleolar ankle fracture, poa now s/p closed reduction and ORIF 1/2/17  -ortho managing  Code status: full  Prophylaxis: lovenox  Recommended Disposition: tbd     Subjective:     Chief Complaint / Reason for Physician Visit  Follow up on respiratory failure, delirium tremens  intubated    Discussed with RN events overnight. Review of Systems:  Symptom Y/N Comments  Symptom Y/N Comments   Fever/Chills    Chest Pain     Poor Appetite    Edema     Cough    Abdominal Pain     Sputum    Joint Pain     SOB/FUENTES    Pruritis/Rash     Nausea/vomit    Tolerating PT/OT     Diarrhea    Tolerating Diet     Constipation    Other       Could NOT obtain due to: intubated     Objective:     VITALS:   Last 24hrs VS reviewed since prior progress note.  Most recent are:  Patient Vitals for the past 24 hrs:   Temp Pulse Resp BP SpO2   01/14/17 1200 98.7 °F (37.1 °C) 81 15 (!) 88/44 100 %   01/14/17 1100 98.7 °F (37.1 °C) 78 14 102/57 100 %   01/14/17 1000 99 °F (37.2 °C) 92 20 94/55 100 %   01/14/17 0900 99 °F (37.2 °C) 87 10 98/58 99 %   01/14/17 0800 99.1 °F (37.3 °C) 88 10 102/56 100 %   01/14/17 0736 - 81 10 - 100 %   01/14/17 0735 - - - - 100 %   01/14/17 0700 98.9 °F (37.2 °C) 78 10 101/59 100 %   01/14/17 0600 - 77 10 91/54 100 %   01/14/17 0545 - 77 10 (!) 77/39 99 %   01/14/17 0501 - 88 10 - 99 %   01/14/17 0500 - 87 18 97/63 99 %   01/14/17 0430 - 86 11 102/63 99 %   01/14/17 0400 99.3 °F (37.4 °C) 91 17 120/73 99 %   01/14/17 0300 - 74 20 95/57 100 %   01/14/17 0200 - 75 10 96/52 100 %   01/14/17 0100 - 81 14 101/54 99 %   01/14/17 0030 - 88 10 92/51 100 %   01/14/17 0015 - 93 11 (!) 65/37 97 %   01/14/17 0000 99.1 °F (37.3 °C) 76 10 139/78 100 %   01/13/17 2300 - 80 14 97/56 100 %   01/13/17 2200 - 79 10 96/52 100 %   01/13/17 2112 - 88 10 - 100 %   01/13/17 2100 - 85 10 97/54 99 %   01/13/17 2000 99 °F (37.2 °C) 93 10 110/69 99 %   01/13/17 1800 - 76 10 98/54 100 % 01/13/17 1700 97.9 °F (36.6 °C) 83 11 107/56 100 %   01/13/17 1600 - 82 10 102/58 99 %   01/13/17 1543 - 72 10 - 99 %   01/13/17 1542 - - - - 99 %   01/13/17 1500 98.6 °F (37 °C) 79 10 96/56 100 %   01/13/17 1445 - 82 11 100/57 100 %   01/13/17 1400 98.3 °F (36.8 °C) 88 13 104/59 100 %   01/13/17 1300 97.7 °F (36.5 °C) 72 23 105/62 100 %       Intake/Output Summary (Last 24 hours) at 01/14/17 1243  Last data filed at 01/14/17 1200   Gross per 24 hour   Intake          3527.34 ml   Output             4037 ml   Net          -509.66 ml        PHYSICAL EXAM:  General: Intubated and sedated  EENT:  EOMI. Anicteric sclerae. Resp:  B/l decreased air entry. No accessory muscle use  CV:  Regular  rhythm,  No edema  GI:  Soft, Non distended, Non tender.  +Bowel sounds  Neurologic:  intubated  Psych:             Not anxious nor agitated  Skin:  No rashes. No jaundice    Reviewed most current lab test results and cultures  YES  Reviewed most current radiology test results   YES  Review and summation of old records today    NO  Reviewed patient's current orders and MAR    YES  PMH/ reviewed - no change compared to H&P  ________________________________________________________________________  Care Plan discussed with:    Comments   Patient     Family      RN y    Care Manager     Consultant                        Multidiciplinary team rounds were held today with , nursing, pharmacist and clinical coordinator. Patient's plan of care was discussed; medications were reviewed and discharge planning was addressed.      ________________________________________________________________________  Total NON critical care TIME:15  Minutes    Total CRITICAL CARE TIME Spent:   Minutes non procedure based      Comments   >50% of visit spent in counseling and coordination of care     ________________________________________________________________________  Otis Heredia MD     Procedures: see electronic medical records for all procedures/Xrays and details which were not copied into this note but were reviewed prior to creation of Plan. LABS:  I reviewed today's most current labs and imaging studies.   Pertinent labs include:  Recent Labs      01/14/17 0411 01/13/17 0404 01/12/17 0419   WBC  8.6  12.2*  8.3   HGB  9.5*  10.0*  9.6*   HCT  29.8*  30.5*  29.2*   PLT  277  238  184     Recent Labs      01/14/17 0411 01/13/17 0404 01/12/17 0419   NA  143  140  140   K  3.6  2.9*  3.4*   CL  100  97  105   CO2  35*  33*  28   GLU  209*  182*  181*   BUN  19  16  16   CREA  0.77  0.80  0.91   CA  8.6  8.1*  7.9*   MG  2.4  2.1  2.1   PHOS  2.6  2.6  2.4*   ALB  1.7*  1.8*  1.8*   TBILI  0.5  0.8  0.7   SGOT  54*  90*  190*   ALT  206*  311*  497*   INR   --    --   1.2*       Signed: Scott Gracia MD

## 2017-01-14 NOTE — PROGRESS NOTES
Patient remains intubated, responsive to pain, no eye opening. Sedation includes fentanyl and propofol. Tube feed residuals 120.   0605- Propofol off for SAT.   0700- Report to [facility].

## 2017-01-15 ENCOUNTER — APPOINTMENT (OUTPATIENT)
Dept: GENERAL RADIOLOGY | Age: 57
DRG: 492 | End: 2017-01-15
Attending: INTERNAL MEDICINE
Payer: COMMERCIAL

## 2017-01-15 LAB
ANION GAP BLD CALC-SCNC: 7 MMOL/L (ref 5–15)
BACTERIA SPEC CULT: NORMAL
BUN SERPL-MCNC: 19 MG/DL (ref 6–20)
BUN/CREAT SERPL: 24 (ref 12–20)
CALCIUM SERPL-MCNC: 8.8 MG/DL (ref 8.5–10.1)
CHLORIDE SERPL-SCNC: 100 MMOL/L (ref 97–108)
CO2 SERPL-SCNC: 38 MMOL/L (ref 21–32)
CREAT SERPL-MCNC: 0.79 MG/DL (ref 0.55–1.02)
DATE LAST DOSE: ABNORMAL
GLUCOSE BLD STRIP.AUTO-MCNC: 302 MG/DL (ref 65–100)
GLUCOSE SERPL-MCNC: 287 MG/DL (ref 65–100)
POTASSIUM SERPL-SCNC: 3.7 MMOL/L (ref 3.5–5.1)
REPORTED DOSE,DOSE: ABNORMAL UNITS
REPORTED DOSE/TIME,TMG: ABNORMAL
SERVICE CMNT-IMP: ABNORMAL
SERVICE CMNT-IMP: NORMAL
SODIUM SERPL-SCNC: 145 MMOL/L (ref 136–145)
VANCOMYCIN TROUGH SERPL-MCNC: 10.9 UG/ML (ref 5–10)

## 2017-01-15 PROCEDURE — 74011000250 HC RX REV CODE- 250: Performed by: INTERNAL MEDICINE

## 2017-01-15 PROCEDURE — 74011250636 HC RX REV CODE- 250/636: Performed by: INTERNAL MEDICINE

## 2017-01-15 PROCEDURE — 74011250636 HC RX REV CODE- 250/636: Performed by: HOSPITALIST

## 2017-01-15 PROCEDURE — 74011250636 HC RX REV CODE- 250/636: Performed by: ORTHOPAEDIC SURGERY

## 2017-01-15 PROCEDURE — 74011250637 HC RX REV CODE- 250/637: Performed by: INTERNAL MEDICINE

## 2017-01-15 PROCEDURE — 36415 COLL VENOUS BLD VENIPUNCTURE: CPT | Performed by: INTERNAL MEDICINE

## 2017-01-15 PROCEDURE — 65620000000 HC RM CCU GENERAL

## 2017-01-15 PROCEDURE — 87070 CULTURE OTHR SPECIMN AEROBIC: CPT | Performed by: INTERNAL MEDICINE

## 2017-01-15 PROCEDURE — 94640 AIRWAY INHALATION TREATMENT: CPT

## 2017-01-15 PROCEDURE — C9113 INJ PANTOPRAZOLE SODIUM, VIA: HCPCS | Performed by: INTERNAL MEDICINE

## 2017-01-15 PROCEDURE — 80202 ASSAY OF VANCOMYCIN: CPT | Performed by: HOSPITALIST

## 2017-01-15 PROCEDURE — 74011000258 HC RX REV CODE- 258: Performed by: INTERNAL MEDICINE

## 2017-01-15 PROCEDURE — 80048 BASIC METABOLIC PNL TOTAL CA: CPT | Performed by: INTERNAL MEDICINE

## 2017-01-15 PROCEDURE — 94003 VENT MGMT INPAT SUBQ DAY: CPT

## 2017-01-15 PROCEDURE — 71010 XR CHEST PORT: CPT

## 2017-01-15 RX ORDER — POTASSIUM CHLORIDE 29.8 MG/ML
20 INJECTION INTRAVENOUS
Status: COMPLETED | OUTPATIENT
Start: 2017-01-15 | End: 2017-01-18

## 2017-01-15 RX ADMIN — PROPOFOL 20 MCG/KG/MIN: 10 INJECTION, EMULSION INTRAVENOUS at 08:56

## 2017-01-15 RX ADMIN — CEFEPIME HYDROCHLORIDE 2 G: 2 INJECTION, POWDER, FOR SOLUTION INTRAVENOUS at 10:05

## 2017-01-15 RX ADMIN — CEFEPIME HYDROCHLORIDE 2 G: 2 INJECTION, POWDER, FOR SOLUTION INTRAVENOUS at 03:13

## 2017-01-15 RX ADMIN — FOLIC ACID 1 MG: 1 TABLET ORAL at 08:49

## 2017-01-15 RX ADMIN — METOCLOPRAMIDE 10 MG: 5 INJECTION, SOLUTION INTRAMUSCULAR; INTRAVENOUS at 06:38

## 2017-01-15 RX ADMIN — PHENYLEPHRINE HYDROCHLORIDE 70 MCG/MIN: 10 INJECTION INTRAVENOUS at 06:41

## 2017-01-15 RX ADMIN — PROPOFOL 35 MCG/KG/MIN: 10 INJECTION, EMULSION INTRAVENOUS at 03:52

## 2017-01-15 RX ADMIN — THERA TABS 1 TABLET: TAB at 08:49

## 2017-01-15 RX ADMIN — ENOXAPARIN SODIUM 40 MG: 40 INJECTION, SOLUTION INTRAVENOUS; SUBCUTANEOUS at 08:00

## 2017-01-15 RX ADMIN — CHLORHEXIDINE GLUCONATE 15 ML: 1.2 RINSE ORAL at 21:24

## 2017-01-15 RX ADMIN — SODIUM CHLORIDE 40 MG: 9 INJECTION INTRAMUSCULAR; INTRAVENOUS; SUBCUTANEOUS at 08:00

## 2017-01-15 RX ADMIN — METOCLOPRAMIDE 10 MG: 5 INJECTION, SOLUTION INTRAMUSCULAR; INTRAVENOUS at 17:02

## 2017-01-15 RX ADMIN — BUMETANIDE 1 MG: 0.25 INJECTION, SOLUTION INTRAMUSCULAR; INTRAVENOUS at 17:02

## 2017-01-15 RX ADMIN — IPRATROPIUM BROMIDE AND ALBUTEROL SULFATE 3 ML: .5; 3 SOLUTION RESPIRATORY (INHALATION) at 07:33

## 2017-01-15 RX ADMIN — POTASSIUM CHLORIDE 20 MEQ: 400 INJECTION, SOLUTION INTRAVENOUS at 09:21

## 2017-01-15 RX ADMIN — Medication 100 MCG/HR: at 12:33

## 2017-01-15 RX ADMIN — Medication 75 MCG/HR: at 21:34

## 2017-01-15 RX ADMIN — POLYETHYLENE GLYCOL 3350 17 G: 17 POWDER, FOR SOLUTION ORAL at 08:49

## 2017-01-15 RX ADMIN — POTASSIUM CHLORIDE 20 MEQ: 400 INJECTION, SOLUTION INTRAVENOUS at 08:00

## 2017-01-15 RX ADMIN — IPRATROPIUM BROMIDE AND ALBUTEROL SULFATE 3 ML: .5; 3 SOLUTION RESPIRATORY (INHALATION) at 15:22

## 2017-01-15 RX ADMIN — Medication 10 ML: at 06:38

## 2017-01-15 RX ADMIN — VANCOMYCIN HYDROCHLORIDE 1000 MG: 1 INJECTION, POWDER, LYOPHILIZED, FOR SOLUTION INTRAVENOUS at 11:55

## 2017-01-15 RX ADMIN — Medication 100 MCG/HR: at 03:11

## 2017-01-15 RX ADMIN — CEFTRIAXONE 1 G: 1 INJECTION, POWDER, FOR SOLUTION INTRAMUSCULAR; INTRAVENOUS at 10:33

## 2017-01-15 RX ADMIN — Medication 10 ML: at 14:07

## 2017-01-15 RX ADMIN — CHLORHEXIDINE GLUCONATE 15 ML: 1.2 RINSE ORAL at 08:00

## 2017-01-15 RX ADMIN — Medication 10 ML: at 21:25

## 2017-01-15 RX ADMIN — Medication 100 MG: at 08:49

## 2017-01-15 RX ADMIN — IPRATROPIUM BROMIDE AND ALBUTEROL SULFATE 3 ML: .5; 3 SOLUTION RESPIRATORY (INHALATION) at 11:10

## 2017-01-15 RX ADMIN — METOCLOPRAMIDE 10 MG: 5 INJECTION, SOLUTION INTRAMUSCULAR; INTRAVENOUS at 11:27

## 2017-01-15 RX ADMIN — VANCOMYCIN HYDROCHLORIDE 1250 MG: 10 INJECTION, POWDER, LYOPHILIZED, FOR SOLUTION INTRAVENOUS at 00:19

## 2017-01-15 RX ADMIN — LEVOFLOXACIN 750 MG: 5 INJECTION, SOLUTION INTRAVENOUS at 06:37

## 2017-01-15 RX ADMIN — VANCOMYCIN HYDROCHLORIDE 1000 MG: 1 INJECTION, POWDER, LYOPHILIZED, FOR SOLUTION INTRAVENOUS at 21:24

## 2017-01-15 RX ADMIN — IPRATROPIUM BROMIDE AND ALBUTEROL SULFATE 3 ML: .5; 3 SOLUTION RESPIRATORY (INHALATION) at 19:52

## 2017-01-15 RX ADMIN — BUMETANIDE 1 MG: 0.25 INJECTION, SOLUTION INTRAMUSCULAR; INTRAVENOUS at 08:00

## 2017-01-15 RX ADMIN — PROPOFOL 40 MCG/KG/MIN: 10 INJECTION, EMULSION INTRAVENOUS at 18:52

## 2017-01-15 NOTE — PROGRESS NOTES
Pharmacy Automatic Renal Dosing Protocol - Antimicrobials    Indication for Antimicrobials: Sepsis (E. Coli in urine and blood), persistent fever with leukocytosis     Current Regimen of Each Antimicrobial (Start Day & Day of Therapy):  Vancomycin 2000 mg IV load followed by 1250 mg IV every 12 hours (17; Day #3  CTX 1g q 24 hours for 3 days (1/15; day #1/3    Previous abx:  Levofloxacin 750 mg IV every 24 hours (Started 17; Day #6  Cefepime 2 gm IV every 12 hours (Started 17; Day #6    Goal Level (if needed): 15-20 mcg/mL    Significant Cultures:   17 Respiratory culture = Light normal resp josh; Few yeast (FINAL)  17 Urine culture = E. Coli pan sensitive (FINAL)  17 Blood culture = No growth x 4 days (Results pending)  17 Blood culture = Pan-sensitive Ecoli in 2/2 (FINAL)    Blood: NG - pending    CAPD, Hemodialysis or Renal Replacement Therapy: None   Paralysis, amputations, malnutrition: None  Estimated Creatinine Clearance: 82.8 mL/min (based on Cr of 0.79). Estimated Creatinine Clearance (using IBW): 70.3 mL/min  Recent Labs      01/15/17   0406  17   0411  17   0404   CREA  0.79  0.77  0.80   BUN  19  19  16   WBC   --   8.6  12.2*   NA  145  143  140   K  3.7  3.6  2.9*   MG   --   2.4  2.1   CA  8.8  8.6  8.1*   PHOS   --   2.6  2.6   ALB   --   1.7*  1.8*   HGB   --   9.5*  10.0*   HCT   --   29.8*  30.5*   PLT   --   277  238   ALT   --   206*  311*     Temp (24hrs), Av.3 °F (37.4 °C), Min:98.5 °F (36.9 °C), Max:99.9 °F (37.7 °C)    Creatinine Clearance (Creatinine Clearance (ml/min)): >60 mL/min     Impression/Plan: Vancomycin added on  as the patient had an elevation in her WBC and she still was febrile despite being on appropriate coverage for the E. Coli.  On 1/15, the patient is afebrile and no WBC  Vancomycin trough of 10.9 and calculated trough also 10.92 mcg/ml; therefore, will adjust the vancomycin to 1g q 8 hours and this will yield an estimated trough of ~16.7 mcg/ml. D/C LVQ and cefepime  Follow sputum cx. Started on CTX for 3 days       Pharmacy will follow daily and adjust as appropriate.     Thank you,   Adelina Dueñas, PHARMD

## 2017-01-15 NOTE — PROGRESS NOTES
01/15/17 0834   ABCDE Bundle   SBT Trial Passed No   SBT Trial Reason for Failure RSBI>105;Respiratory rate > 35   Placed pt, back on AC 12  450 40% 5+

## 2017-01-15 NOTE — PROGRESS NOTES
[facility]  Pulmonary, Critical Care, and Sleep Medicine    Name: Selwyn Renteria MRN: [id number]   : 1960 Hospital: [facility]   Date: 1/15/2017        IMPRESSION:   · Acute hypoxic respiratory failure- gas exchange trending better? But secretions much worse and WOB, shallow efforts- worrisome on very good abx  · Delirium tremens- resovled  · ARDS  · Severe E. Coli sepsis with septic shock   · Persistent fevers  · Acute renal failure  · Shock liver  · S/P surgery for ankle fracture 17  · EtOH abuse - 1 bottle wine/day  · Tobacco/MJ use      PLAN:   · Ventilator support will continue volume ventilation  · Wean Pressors   · Diurese when off pressors  · Sputum culture  · Sedation - titrate and try SBT in AM  · Simplify abx to IV Rocephin but need to see what sputum culture shows before stopping all abx  · Thiamine/MVI/folate   · Replete electrolytes as needed  · DVT/GI prophylaxis  · Critically ill      Subjective/Interval History:   I have reviewed the flowsheet and previous days notes. The patient is unable to give any meaningful history or review of systems because the patient is: Intubated/sedated       1/15 More secretions. Failed SBT today after one hour. Sputum cultured   failed SAT the other day.  Less restless this AM. No new secretions      The patient is critically ill on:      Mechanical ventilation/pressors     Review of Systems   Unable to perform ROS: Intubated     Objective:   Vital Signs:    Visit Vitals    /58    Pulse (!) 107    Temp 99.9 °F (37.7 °C)    Resp 16    Ht 5' 4\" (1.626 m)    Wt 78.9 kg (173 lb 15.1 oz)    SpO2 96%    BMI 29.86 kg/m2       O2 Device: Endotracheal tube   O2 Flow Rate (L/min): 2 l/min   Temp (24hrs), Av.2 °F (37.3 °C), Min:98.5 °F (36.9 °C), Max:99.9 °F (37.7 °C)       Intake/Output:   Last shift:      01/15 0701 - 01/15 1900  In: 273.3 [I.V.:153.3]  Out: 850 [Urine:850]  Last 3 shifts: 1901 - 01/15 0700  In: 4528 [I.V.:2353]  Out: 4901 [Urine:4901]    Intake/Output Summary (Last 24 hours) at 01/15/17 1016  Last data filed at 01/15/17 1000   Gross per 24 hour   Intake          2526.59 ml   Output             3500 ml   Net          -973.41 ml     Hemodynamics:   PAP:   CO:     Wedge:   CI:     CVP:    SVR:       PVR:       Ventilator Settings:  Mode Rate Tidal Volume Pressure FiO2 PEEP   Assist control   450 ml  5 cm H2O 40 % 5 cm H20     Peak airway pressure: 22 cm H2O    Minute ventilation: 6.55 l/min       Physical Exam   Constitutional: She is sedated and intubated. HENT:   Head: Normocephalic and atraumatic. Mouth/Throat: No oropharyngeal exudate. Eyes: No scleral icterus. Cardiovascular: Normal rate and regular rhythm. Exam reveals no gallop. Pulmonary/Chest: She is intubated. No respiratory distress. She has no wheezes. She has rales. Abdominal: Soft. Bowel sounds are normal. She exhibits no distension. There is no tenderness. Musculoskeletal: She exhibits edema. Skin: Skin is warm and dry.      Data:     Current Facility-Administered Medications   Medication Dose Route Frequency    potassium chloride 20 mEq in 50 ml IVPB  20 mEq IntraVENous Q1H    vancomycin (VANCOCIN) 1250 mg in  ml infusion  1,250 mg IntraVENous Q12H    cefepime (MAXIPIME) 2 g in 0.9% sodium chloride (MBP/ADV) 100 mL  2 g IntraVENous Q8H    polyethylene glycol (MIRALAX) packet 17 g  17 g Oral DAILY    bumetanide (BUMEX) injection 1 mg  1 mg IntraVENous BID    PHENYLephrine (NEOSYNEPHRINE) 100,000 mcg in 0.9% sodium chloride 250 mL infusion   mcg/min IntraVENous TITRATE    metoclopramide HCl (REGLAN) injection 10 mg  10 mg IntraVENous Q6H    albuterol-ipratropium (DUO-NEB) 2.5 MG-0.5 MG/3 ML  3 mL Nebulization QID RT    fentaNYL (PF) 900 mcg/30 ml infusion soln   mcg/hr IntraVENous TITRATE    levoFLOXacin (LEVAQUIN) 750 mg in D5W IVPB  750 mg IntraVENous Q24H    chlorhexidine (PERIDEX) 0.12 % mouthwash 15 mL  15 mL Oral Q12H    pantoprazole (PROTONIX) 40 mg in sodium chloride 0.9 % 10 mL injection  40 mg IntraVENous DAILY    thiamine (B-1) tablet 100 mg  100 mg Per NG tube DAILY    folic acid (FOLVITE) tablet 1 mg  1 mg Per NG tube DAILY    therapeutic multivitamin (THERAGRAN) tablet 1 Tab  1 Tab Oral DAILY    propofol (DIPRIVAN) infusion  5-50 mcg/kg/min IntraVENous TITRATE    sodium chloride (NS) flush 10 mL  10 mL InterCATHeter Q24H    sodium chloride (NS) flush 10-40 mL  10-40 mL InterCATHeter Q8H    nicotine (NICODERM CQ) 14 mg/24 hr patch 1 Patch  1 Patch TransDERmal DAILY    enoxaparin (LOVENOX) injection 40 mg  40 mg SubCUTAneous Q24H                Labs:  Recent Labs      01/14/17   0411  01/13/17   0404   WBC  8.6  12.2*   HGB  9.5*  10.0*   HCT  29.8*  30.5*   PLT  277  238     Recent Labs      01/15/17   0406  01/14/17   0411  01/13/17   0404   NA  145  143  140   K  3.7  3.6  2.9*   CL  100  100  97   CO2  38*  35*  33*   GLU  287*  209*  182*   BUN  19  19  16   CREA  0.79  0.77  0.80   CA  8.8  8.6  8.1*   MG   --   2.4  2.1   PHOS   --   2.6  2.6   ALB   --   1.7*  1.8*   TBILI   --   0.5  0.8   SGOT   --   54*  90*   ALT   --   206*  311*     Recent Labs      01/14/17   0600   PH  7.43   PCO2  58*   PO2  62*   HCO3  38*   FIO2  50     Imaging:  I have personally reviewed the patients radiographs and have reviewed the reports:  Decreasing bilateral infiltrates        Total critical care time exclusive of procedures:30 minutes  Manav Orellana MD

## 2017-01-15 NOTE — PROGRESS NOTES
Hospitalist Progress Note    NAME: Maximo Yu   :  1960   MRN:  [id number]       Interim Hospital Summary: 49 y.o. female whom presented on 2017 with right ankle deformity s/p injury R unstable trimalleolar ankle fx/dislocatio  ADHD on Adderall  Typically drinks 1 bottle of wine per day    Initially admitted to ortho service-- multiple attempts at ankle reduction were made with out success. On 1/3 s/p ORIF, on  consulted hospitalist for acute delirium ( with concern for alcohol withdrawal) on  transferred to ICU requiring ativan drip and precedex. Further deteriorated on  requiring intubation  Now with spiking fevers and shock liver  Ortho transferred the patient care to medicine service on       Critically ill, with respiratory failure,  ARDS, delirium tremens  GNR sepsis with septic shock      Mayco Cortes [phone] updated on 1/10     Assessment / Plan:  Very high risk of further deterioration including cardiac arrest    Acute hypoxic respiratory failure secondary  to   respiratory depression ( delirium tremens  Being on ativan and precedex)  With multiorgan failure ( shock liver, concern for hcap)  ecoli bacteremia secondary to e coli uti    Trial for SBT today  Ceftriaxone toal d7 ( was on cefepime and levo for 6 days)  Requiring pressors as well  On iv diuresis  Chest xray:ETT in satisfactory position. Interval improved aeration of the lungs with some regression of pulmonary edema. Persistent bilateral small pleural effusions and probably some consolidation atleft base. Follow the blood cultures with gram neg rods with e coli   urine cultures with gram neg rods with ecoli  Platelets ok  Ammonia normal, consider ct head  picc placed on   Follow the echo Systolic function was normal. Ejection  fraction was estimated in the range of 55 % to 60 %. There were no  regional wall motion abnormalities.  Wall thickness was normal. DOPPLER:  Left ventricular diastolic function parameters were normal for the  patient's age. orhto checked the ankle on wednesday    Hypertension    Depression ADHD    Right trimalleolar ankle fracture, poa now s/p closed reduction and ORIF 1/2/17  -ortho managing  Code status: full  Prophylaxis: lovenox  Recommended Disposition: tbd     Subjective:     Chief Complaint / Reason for Physician Visit  Follow up on respiratory failure, delirium tremens  intubated    Discussed with RN events overnight. Review of Systems:  Symptom Y/N Comments  Symptom Y/N Comments   Fever/Chills    Chest Pain     Poor Appetite    Edema     Cough    Abdominal Pain     Sputum    Joint Pain     SOB/FUENTES    Pruritis/Rash     Nausea/vomit    Tolerating PT/OT     Diarrhea    Tolerating Diet     Constipation    Other       Could NOT obtain due to: intubated     Objective:     VITALS:   Last 24hrs VS reviewed since prior progress note.  Most recent are:  Patient Vitals for the past 24 hrs:   Temp Pulse Resp BP SpO2   01/15/17 1400 99.3 °F (37.4 °C) (!) 103 21 117/72 98 %   01/15/17 1300 - (!) 103 16 109/69 97 %   01/15/17 1200 100 °F (37.8 °C) (!) 103 12 93/58 96 %   01/15/17 1110 - - - - 96 %   01/15/17 1100 99.8 °F (37.7 °C) 99 13 94/52 95 %   01/15/17 1000 99.9 °F (37.7 °C) (!) 107 16 106/58 96 %   01/15/17 0900 99.9 °F (37.7 °C) (!) 103 13 103/63 98 %   01/15/17 0800 99.7 °F (37.6 °C) (!) 115 23 119/67 95 %   01/15/17 0736 - - - - 96 %   01/15/17 0700 99.3 °F (37.4 °C) 94 19 112/72 97 %   01/15/17 0630 - 75 12 103/57 98 %   01/15/17 0600 - 78 12 96/54 98 %   01/15/17 0530 - 77 12 103/57 98 %   01/15/17 0504 - 82 13 - 99 %   01/15/17 0500 - 82 10 102/56 99 %   01/15/17 0430 99 °F (37.2 °C) 80 10 101/55 99 %   01/15/17 0400 - 92 12 96/54 98 %   01/15/17 0330 - 80 10 97/59 99 %   01/15/17 0300 - 78 10 98/54 99 %   01/15/17 0230 - 81 11 99/52 99 %   01/15/17 0200 - 79 10 100/55 99 %   01/15/17 0130 - 82 10 102/53 99 %   01/15/17 0100 - 86 11 103/54 99 %   01/15/17 0053 - 89 10 - 99 % 01/15/17 0030 - 89 10 101/54 99 %   01/15/17 0000 99.7 °F (37.6 °C) 93 10 94/48 99 %   01/14/17 2330 - 87 11 94/54 99 %   01/14/17 2300 - 92 11 106/59 99 %   01/14/17 2230 - 89 10 94/49 98 %   01/14/17 2200 99.4 °F (37.4 °C) 89 11 98/57 99 %   01/14/17 2130 - 93 11 94/49 99 %   01/14/17 2107 - 83 10 - 100 %   01/14/17 2104 - - - - 100 %   01/14/17 2100 - 83 10 91/51 99 %   01/14/17 2030 - (!) 101 13 98/55 99 %   01/14/17 2000 99.7 °F (37.6 °C) (!) 101 14 104/54 99 %   01/14/17 1900 - 93 11 101/56 98 %   01/14/17 1800 98.9 °F (37.2 °C) 91 12 103/59 99 %   01/14/17 1730 - 89 12 99/58 99 %   01/14/17 1700 - 92 11 (!) 82/43 99 %   01/14/17 1530 - (!) 111 17 111/64 97 %   01/14/17 1500 98.9 °F (37.2 °C) (!) 102 26 102/57 99 %   01/14/17 1456 - - - - 99 %   01/14/17 1453 - 99 18 - 99 %       Intake/Output Summary (Last 24 hours) at 01/15/17 1412  Last data filed at 01/15/17 1400   Gross per 24 hour   Intake          2416.08 ml   Output             3510 ml   Net         -1093.92 ml        PHYSICAL EXAM:  General: Intubated   EENT:  EOMI. Anicteric sclerae. Resp:  B/l decreased air entry. basal crackles No accessory muscle use  CV:  Regular  rhythm,  No edema  GI:  Soft, Non distended, Non tender.  +Bowel sounds  Neurologic:  intubated  Psych:             Not anxious nor agitated  Skin:  No rashes. No jaundice    Reviewed most current lab test results and cultures  YES  Reviewed most current radiology test results   YES  Review and summation of old records today    NO  Reviewed patient's current orders and MAR    YES  PMH/SH reviewed - no change compared to H&P  ________________________________________________________________________  Care Plan discussed with:    Comments   Patient     Family      RN y    Care Manager     Consultant                        Multidiciplinary team rounds were held today with , nursing, pharmacist and clinical coordinator.   Patient's plan of care was discussed; medications were reviewed and discharge planning was addressed. ________________________________________________________________________  Total NON critical care TIME:15  Minutes    Total CRITICAL CARE TIME Spent:   Minutes non procedure based      Comments   >50% of visit spent in counseling and coordination of care     ________________________________________________________________________  Kerry Mcgrath MD     Procedures: see electronic medical records for all procedures/Xrays and details which were not copied into this note but were reviewed prior to creation of Plan. LABS:  I reviewed today's most current labs and imaging studies.   Pertinent labs include:  Recent Labs      01/14/17 0411 01/13/17   0404   WBC  8.6  12.2*   HGB  9.5*  10.0*   HCT  29.8*  30.5*   PLT  277  238     Recent Labs      01/15/17   0406  01/14/17 0411 01/13/17   0404   NA  145  143  140   K  3.7  3.6  2.9*   CL  100  100  97   CO2  38*  35*  33*   GLU  287*  209*  182*   BUN  19  19  16   CREA  0.79  0.77  0.80   CA  8.8  8.6  8.1*   MG   --   2.4  2.1   PHOS   --   2.6  2.6   ALB   --   1.7*  1.8*   TBILI   --   0.5  0.8   SGOT   --   54*  90*   ALT   --   206*  311*       Signed: Kerry Mcgrath MD

## 2017-01-15 NOTE — PROGRESS NOTES
4623: report received from Lon Jefferson, [address] [facility] [address]: K 3.7, replacement protocol ordered   0844: sedation restarted r/t no plan to extubate today. 1100: re assessment complete, no changes noted  1500: re assessment complete, no changes noted. 1915: Bedside shift change report given to Lon Jefferson RN (oncoming nurse) by Tevin Pimentel RN (offgoing nurse). Report included the following information SBAR, Kardex, ED Summary, Intake/Output, MAR and Accordion.

## 2017-01-16 LAB
DATE LAST DOSE: ABNORMAL
GLUCOSE BLD STRIP.AUTO-MCNC: 152 MG/DL (ref 65–100)
GLUCOSE BLD STRIP.AUTO-MCNC: 173 MG/DL (ref 65–100)
REPORTED DOSE,DOSE: ABNORMAL UNITS
REPORTED DOSE/TIME,TMG: ABNORMAL
SERVICE CMNT-IMP: ABNORMAL
SERVICE CMNT-IMP: ABNORMAL
VANCOMYCIN TROUGH SERPL-MCNC: 26.2 UG/ML (ref 5–10)

## 2017-01-16 PROCEDURE — 94003 VENT MGMT INPAT SUBQ DAY: CPT

## 2017-01-16 PROCEDURE — 36415 COLL VENOUS BLD VENIPUNCTURE: CPT | Performed by: INTERNAL MEDICINE

## 2017-01-16 PROCEDURE — 74011250636 HC RX REV CODE- 250/636: Performed by: INTERNAL MEDICINE

## 2017-01-16 PROCEDURE — 77030018798 HC PMP KT ENTRL FED COVD -A

## 2017-01-16 PROCEDURE — 74011250636 HC RX REV CODE- 250/636: Performed by: ORTHOPAEDIC SURGERY

## 2017-01-16 PROCEDURE — 82962 GLUCOSE BLOOD TEST: CPT

## 2017-01-16 PROCEDURE — 94640 AIRWAY INHALATION TREATMENT: CPT

## 2017-01-16 PROCEDURE — 74011250637 HC RX REV CODE- 250/637: Performed by: INTERNAL MEDICINE

## 2017-01-16 PROCEDURE — 74011636637 HC RX REV CODE- 636/637: Performed by: INTERNAL MEDICINE

## 2017-01-16 PROCEDURE — C9113 INJ PANTOPRAZOLE SODIUM, VIA: HCPCS | Performed by: INTERNAL MEDICINE

## 2017-01-16 PROCEDURE — 74011000258 HC RX REV CODE- 258: Performed by: INTERNAL MEDICINE

## 2017-01-16 PROCEDURE — 74011000250 HC RX REV CODE- 250: Performed by: INTERNAL MEDICINE

## 2017-01-16 PROCEDURE — 80202 ASSAY OF VANCOMYCIN: CPT | Performed by: INTERNAL MEDICINE

## 2017-01-16 PROCEDURE — 65620000000 HC RM CCU GENERAL

## 2017-01-16 PROCEDURE — 74011250636 HC RX REV CODE- 250/636: Performed by: HOSPITALIST

## 2017-01-16 RX ORDER — MAGNESIUM SULFATE 100 %
4 CRYSTALS MISCELLANEOUS AS NEEDED
Status: DISCONTINUED | OUTPATIENT
Start: 2017-01-16 | End: 2017-01-24 | Stop reason: HOSPADM

## 2017-01-16 RX ORDER — DEXTROSE 50 % IN WATER (D50W) INTRAVENOUS SYRINGE
12.5-25 AS NEEDED
Status: DISCONTINUED | OUTPATIENT
Start: 2017-01-16 | End: 2017-01-24 | Stop reason: HOSPADM

## 2017-01-16 RX ORDER — INSULIN LISPRO 100 [IU]/ML
INJECTION, SOLUTION INTRAVENOUS; SUBCUTANEOUS EVERY 6 HOURS
Status: DISCONTINUED | OUTPATIENT
Start: 2017-01-16 | End: 2017-01-22

## 2017-01-16 RX ORDER — VANCOMYCIN HYDROCHLORIDE
1250 EVERY 12 HOURS
Status: DISCONTINUED | OUTPATIENT
Start: 2017-01-17 | End: 2017-01-17

## 2017-01-16 RX ADMIN — THERA TABS 1 TABLET: TAB at 09:26

## 2017-01-16 RX ADMIN — ACETAMINOPHEN 650 MG: 160 SOLUTION ORAL at 04:27

## 2017-01-16 RX ADMIN — SODIUM CHLORIDE 40 MG: 9 INJECTION INTRAMUSCULAR; INTRAVENOUS; SUBCUTANEOUS at 09:26

## 2017-01-16 RX ADMIN — INSULIN LISPRO 2 UNITS: 100 INJECTION, SOLUTION INTRAVENOUS; SUBCUTANEOUS at 17:30

## 2017-01-16 RX ADMIN — PHENYLEPHRINE HYDROCHLORIDE 70 MCG/MIN: 10 INJECTION INTRAVENOUS at 04:49

## 2017-01-16 RX ADMIN — Medication 75 MCG/HR: at 09:51

## 2017-01-16 RX ADMIN — INSULIN LISPRO 2 UNITS: 100 INJECTION, SOLUTION INTRAVENOUS; SUBCUTANEOUS at 12:36

## 2017-01-16 RX ADMIN — PROPOFOL 50 MCG/KG/MIN: 10 INJECTION, EMULSION INTRAVENOUS at 04:27

## 2017-01-16 RX ADMIN — FOLIC ACID 1 MG: 1 TABLET ORAL at 09:26

## 2017-01-16 RX ADMIN — SODIUM CHLORIDE 0.5 MCG/KG/HR: 900 INJECTION, SOLUTION INTRAVENOUS at 11:16

## 2017-01-16 RX ADMIN — ACETAMINOPHEN 650 MG: 160 SOLUTION ORAL at 19:35

## 2017-01-16 RX ADMIN — Medication 50 MCG/HR: at 22:17

## 2017-01-16 RX ADMIN — ENOXAPARIN SODIUM 40 MG: 40 INJECTION, SOLUTION INTRAVENOUS; SUBCUTANEOUS at 09:27

## 2017-01-16 RX ADMIN — METOCLOPRAMIDE 10 MG: 5 INJECTION, SOLUTION INTRAMUSCULAR; INTRAVENOUS at 00:39

## 2017-01-16 RX ADMIN — SODIUM CHLORIDE 0.7 MCG/KG/HR: 900 INJECTION, SOLUTION INTRAVENOUS at 19:32

## 2017-01-16 RX ADMIN — BUMETANIDE 1 MG: 0.25 INJECTION, SOLUTION INTRAMUSCULAR; INTRAVENOUS at 17:30

## 2017-01-16 RX ADMIN — CHLORHEXIDINE GLUCONATE 15 ML: 1.2 RINSE ORAL at 19:36

## 2017-01-16 RX ADMIN — POLYETHYLENE GLYCOL 3350 17 G: 17 POWDER, FOR SOLUTION ORAL at 09:28

## 2017-01-16 RX ADMIN — METOCLOPRAMIDE 10 MG: 5 INJECTION, SOLUTION INTRAMUSCULAR; INTRAVENOUS at 12:00

## 2017-01-16 RX ADMIN — VANCOMYCIN HYDROCHLORIDE 1000 MG: 1 INJECTION, POWDER, LYOPHILIZED, FOR SOLUTION INTRAVENOUS at 12:50

## 2017-01-16 RX ADMIN — IPRATROPIUM BROMIDE AND ALBUTEROL SULFATE 3 ML: .5; 3 SOLUTION RESPIRATORY (INHALATION) at 20:10

## 2017-01-16 RX ADMIN — BUMETANIDE 1 MG: 0.25 INJECTION, SOLUTION INTRAMUSCULAR; INTRAVENOUS at 09:26

## 2017-01-16 RX ADMIN — VANCOMYCIN HYDROCHLORIDE 1000 MG: 1 INJECTION, POWDER, LYOPHILIZED, FOR SOLUTION INTRAVENOUS at 04:27

## 2017-01-16 RX ADMIN — CHLORHEXIDINE GLUCONATE 15 ML: 1.2 RINSE ORAL at 09:29

## 2017-01-16 RX ADMIN — CEFTRIAXONE 1 G: 1 INJECTION, POWDER, FOR SOLUTION INTRAMUSCULAR; INTRAVENOUS at 11:10

## 2017-01-16 RX ADMIN — Medication 10 ML: at 04:44

## 2017-01-16 RX ADMIN — METOCLOPRAMIDE 10 MG: 5 INJECTION, SOLUTION INTRAMUSCULAR; INTRAVENOUS at 17:30

## 2017-01-16 RX ADMIN — PROPOFOL 20 MCG/KG/MIN: 10 INJECTION, EMULSION INTRAVENOUS at 12:57

## 2017-01-16 RX ADMIN — IPRATROPIUM BROMIDE AND ALBUTEROL SULFATE 3 ML: .5; 3 SOLUTION RESPIRATORY (INHALATION) at 07:45

## 2017-01-16 RX ADMIN — PROPOFOL 50 MCG/KG/MIN: 10 INJECTION, EMULSION INTRAVENOUS at 09:09

## 2017-01-16 RX ADMIN — METOCLOPRAMIDE 10 MG: 5 INJECTION, SOLUTION INTRAMUSCULAR; INTRAVENOUS at 04:44

## 2017-01-16 RX ADMIN — Medication 100 MG: at 09:25

## 2017-01-16 RX ADMIN — IPRATROPIUM BROMIDE AND ALBUTEROL SULFATE 3 ML: .5; 3 SOLUTION RESPIRATORY (INHALATION) at 12:19

## 2017-01-16 RX ADMIN — IPRATROPIUM BROMIDE AND ALBUTEROL SULFATE 3 ML: .5; 3 SOLUTION RESPIRATORY (INHALATION) at 15:26

## 2017-01-16 RX ADMIN — Medication 10 ML: at 19:36

## 2017-01-16 NOTE — PROGRESS NOTES
Nutrition Assessment:    RECOMMENDATIONS:   Resume TF, but decrease rate to Osmolite 1.2 @ 40mL/h + 100mL H2O flush q 4h (provides 1152kcals/53gPro/1387mL)    ASSESSMENT:   Chart reviewed, case discussed during CCU rounds. Pt remains intubated, needs re-estimated. TF on hold for SBT, which pt failed. To resume on sedation and TF. Propofol running at 18.9mL/h which provides 499 kcals daily. Bob drip. As P[facility] is down and propofol provides nearly 500 kcals, current TF goal rate would overfeed her. See new recommendations above. Pt did have a BM over the weekend. Dietitians Intervention(s)/Plan(s): Decrease TF goal rate, monitor plan of care  SUBJECTIVE/OBJECTIVE:   Pt intubated and sedated   Diet Order: NPO, Other (comment) (TF via OGT: Osm 1.2 @ 55mL/h + 100mL flush q 4h (provides 1584kcals/73gPro/1682mL))  % Eaten:  Patient Vitals for the past 72 hrs:   % Diet Eaten   01/13/17 2000 0 %     TF on hold at 55 mL/hr flush with 100 mL  Q4H  via OG Tube   Residuals: 90 mL    Pertinent Medications:bumex, rocephin, folvite, humalog, reglan, protonic, miralax, KCl, MVI, thiamin, vancomycin; Drips: precedex, propofol. Chemistries:  Lab Results   Component Value Date/Time    Sodium 145 01/15/2017 04:06 AM    Potassium 3.7 01/15/2017 04:06 AM    Chloride 100 01/15/2017 04:06 AM    CO2 38 01/15/2017 04:06 AM    Anion gap 7 01/15/2017 04:06 AM    Glucose 287 01/15/2017 04:06 AM    BUN 19 01/15/2017 04:06 AM    Creatinine 0.79 01/15/2017 04:06 AM    BUN/Creatinine ratio 24 01/15/2017 04:06 AM    GFR est AA >60 01/15/2017 04:06 AM    GFR est non-AA >60 01/15/2017 04:06 AM    Calcium 8.8 01/15/2017 04:06 AM    Albumin 1.7 01/14/2017 04:11 AM      Anthropometrics: Height: 5' 4\" (162.6 cm) Weight: 78.9 kg (173 lb 15.1 oz)    IBW (%IBW):   ( ) UBW (%UBW):   (  %)    BMI: Body mass index is 29.86 kg/(m^2).     This BMI is indicative of:  []Underweight   []Normal   [x]Overweight   [] Obesity   [] Extreme Obesity (BMI>40)  Estimated Nutrition Needs (Based on): 1625 Kcals/day (PSU (INC 3924)) , 63 g (0.8gPro/kg) Protein  Carbohydrate: At Least 130 g/day  Fluids: 1600 mL/day    Last BM: 1/14   [x]Active     []Hyperactive  []Hypoactive       [] Absent   BS  Skin:    [] Intact   [x] Incision  [] Breakdown   [] DTI   [] Tears/Excoriation/Abrasion  [x]Edema(+1-all extremities) [] Other: Wt Readings from Last 30 Encounters:   01/07/17 78.9 kg (173 lb 15.1 oz)      NUTRITION DIAGNOSES:   Problem:  Inadequate protein-energy intake      Etiology: related to pt is intubated and NPO      Signs/Symptoms: as evidenced by NPO + propofol meets <25% kcal and 0% protein needs. Previous dx re: inadequate intake continues as TF is on hold. NUTRITION INTERVENTIONS:    Enteral/Parenteral Nutrition: Modify rate, concentration, composition, and schedule     Nutrition-Related Medication Mgmt: Other (comment)          GOAL:   Pt will tolerate TF at new goal rate with residuals <250mL in 2-4 days.      NUTRITION MONITORING AND EVALUATION   Previous Goal: Pt will tolerate TF at goal rate with residuals <250mL in 2-4 days  Previous Goal Met: Yes   Previous Recommendations Implemented: Yes   Cultural, Congregational, or Ethnic Dietary Needs: None   LEARNING NEEDS (Diet, Food/Nutrient-Drug Interaction):    [x] None Identified   [] Identified and Education Provided/Documented   [] Identified and Pt declined/was not appropriate      [x] Interdisciplinary Care Plan Reviewed/Documented    [x] Participated in Discharge Planning: UTD   [] Interdisciplinary Rounds     NUTRITION RISK:    [x] High              [] Moderate           []  Low  []  Minimal/Uncompromised      Tommy Fam RD  Pager [phone]  Weekend Pager [phone]

## 2017-01-16 NOTE — PROGRESS NOTES
Pharmacy Automatic Renal Dosing Protocol - Antimicrobials    Indication for Antimicrobials: EColi UTI/bacteremia; Persistent fever (Possible HAP/VAP)    Current Regimen of Each Antimicrobial (Start Day & Day of Therapy):  Vancomycin 1000 mg IV every 8 hours (Started 17; Day #4)  Ceftriaxone 1 gm IV every 24 hours (Appropriate therapy started 17; Day #8 of 10)    Goal Vancomycin Trough: 15-20 mcg/mL    Vancomycin Trough (17 at 1135): 26.2 mcg/mL (True trough = 24.9 mcg/mL)    Significant Cultures:   1/15/17 Respiratory culture = Results pending  17 Blood culture = No growth x 3 days (Results pending)  17 Respiratory culture = Light normal resp josh; Few yeast (FINAL)  17 Urine culture = Greater than 100K CFU/mL EColi (FINAL)  17 Blood culture = No growth (FINAL)  17 Blood culture = Pan-sensitive Ecoli in 2/2 (FINAL)    Recent Labs      01/15/17   0406  17   0411   CREA  0.79  0.77   BUN  19  19   WBC   --   8.6     Temp (24hrs), Av.5 °F (37.5 °C), Min:98.9 °F (37.2 °C), Max:100.1 °F (37.8 °C)    Creatinine Clearance: Greater than 60 mL/min     Impression/Plan:   - Vancomycin trough is above goal.  Will adjust vancomycin dose to 1250 mg IV every 12 hours to achieve a trough of 15-20 mcg/mL. - Ceftriaxone dosed appropriately. Continue current regimen. Pharmacy will follow daily and adjust as appropriate.     Thank you,  Denice Elias, PHARMD

## 2017-01-16 NOTE — PROGRESS NOTES
Hospitalist Progress Note    NAME: Henri Course   :  1960   MRN:  [id number]       Interim Hospital Summary: 49 y.o. female whom presented on 2017 with right ankle deformity s/p injury R unstable trimalleolar ankle fx/dislocatio  ADHD on Adderall  Typically drinks 1 bottle of wine per day    Initially admitted to ortho service-- multiple attempts at ankle reduction were made with out success. On 1/3 s/p ORIF, on  consulted hospitalist for acute delirium ( with concern for alcohol withdrawal) on  transferred to ICU requiring ativan drip and precedex. Further deteriorated on  requiring intubation  Now with spiking fevers and shock liver  Ortho transferred the patient care to medicine service on       Critically ill, with respiratory failure,  ARDS, delirium tremens  GNR sepsis with septic shock      Whitney Carnes [phone] updated on 1/10     Assessment / Plan:  Very high risk of further deterioration including cardiac arrest    Acute hypoxic respiratory failure secondary  To  respiratory depression ( delirium tremens  Being on ativan and precedex)  With multiorgan failure ( shock liver, concern for hcap)  ecoli bacteremia secondary to e coli uti    Trial of SBT today  Ceftriaxone total d8( was on cefepime and levo for 6 days)  Waiting for  Sputum cultures  Requiring pressors as well  On iv diuresis  Chest xray:ETT in satisfactory position. Interval improved aeration of the lungs with some regression of pulmonary edema. Persistent bilateral small pleural effusions and probably some consolidation atleft base. Follow the blood cultures with gram neg rods with e coli   urine cultures with gram neg rods with ecoli  Platelets ok  Ammonia normal, consider ct head  picc placed on   Follow the echo Systolic function was normal. Ejection fraction was estimated in the range of 55 % to 60 %. There were no regional wall motion abnormalities.  Wall thickness was normal. DOPPLER:Left ventricular diastolic function parameters were normal for the patient's age. orhto checked the ankle on wednesday    Hypertension    Depression ADHD    Right trimalleolar ankle fracture, poa now s/p closed reduction and ORIF 1/2/17  -ortho managing  Code status: full  Prophylaxis: lovenox  Recommended Disposition: tbd     Subjective:     Chief Complaint / Reason for Physician Visit  Follow up on respiratory failure, delirium tremens  Intubated, plan for sbt today    Discussed with RN events overnight. Review of Systems:  Symptom Y/N Comments  Symptom Y/N Comments   Fever/Chills    Chest Pain     Poor Appetite    Edema     Cough    Abdominal Pain     Sputum    Joint Pain     SOB/FUENTES    Pruritis/Rash     Nausea/vomit    Tolerating PT/OT     Diarrhea    Tolerating Diet     Constipation    Other       Could NOT obtain due to: intubated     Objective:     VITALS:   Last 24hrs VS reviewed since prior progress note.  Most recent are:  Patient Vitals for the past 24 hrs:   Temp Pulse Resp BP SpO2   01/16/17 1600 100.2 °F (37.9 °C) 86 14 102/72 95 %   01/16/17 1220 - 67 12 - 98 %   01/16/17 1219 - - - - 98 %   01/16/17 1200 98.9 °F (37.2 °C) 72 12 113/69 97 %   01/16/17 1100 - 83 12 102/72 94 %   01/16/17 1000 - 98 15 107/58 95 %   01/16/17 0900 - 90 12 110/54 93 %   01/16/17 0800 - 94 13 109/64 95 %   01/16/17 0754 99.4 °F (37.4 °C) 99 13 109/68 95 %   01/16/17 0747 - 93 17 - 95 %   01/16/17 0745 - - - - 93 %   01/16/17 0700 - 78 12 127/76 98 %   01/16/17 0630 - 67 12 110/67 98 %   01/16/17 0600 - 69 12 104/62 98 %   01/16/17 0530 - 73 12 99/56 98 %   01/16/17 0500 - 74 12 98/58 98 %   01/16/17 0430 - 95 13 108/67 96 %   01/16/17 0400 99.6 °F (37.6 °C) 97 14 117/71 97 %   01/16/17 0330 - 94 15 110/64 97 %   01/16/17 0329 - 93 13 - 97 %   01/16/17 0300 - 89 14 105/62 96 %   01/16/17 0230 - 99 15 118/71 95 %   01/16/17 0200 - 97 11 106/64 97 %   01/16/17 0130 - 96 15 104/62 97 %   01/16/17 0100 - 86 13 134/60 99 %   01/16/17 0020 - (!) 109 19 - 96 %   01/16/17 0000 100.1 °F (37.8 °C) (!) 104 13 103/66 96 %   01/15/17 2330 - (!) 104 16 104/65 95 %   01/15/17 2300 - (!) 103 19 103/64 96 %   01/15/17 2230 - (!) 101 18 106/61 97 %   01/15/17 2200 - (!) 103 13 108/56 96 %   01/15/17 2130 - (!) 102 15 106/58 96 %   01/15/17 2100 - 98 18 100/62 97 %   01/15/17 2030 - 93 12 90/49 97 %   01/15/17 2000 99.8 °F (37.7 °C) 88 12 (!) 85/49 97 %   01/15/17 1952 - 89 12 - 96 %   01/15/17 1900 - (!) 107 18 119/88 97 %   01/15/17 1800 99.2 °F (37.3 °C) 99 17 123/76 98 %   01/15/17 1700 99.5 °F (37.5 °C) 85 12 96/63 99 %       Intake/Output Summary (Last 24 hours) at 01/16/17 1625  Last data filed at 01/16/17 1500   Gross per 24 hour   Intake          1021.78 ml   Output             3535 ml   Net         -2513.22 ml        PHYSICAL EXAM:  General: intubated  EENT:  EOMI. Anicteric sclerae. Resp:  B/l decreased air entry. basal crackles No accessory muscle use  CV:  Regular  rhythm,  No edema  GI:  Soft, Non distended, Non tender.  +Bowel sounds  Neurologic:  intubated  Psych:             Not anxious nor agitated  Skin:  No rashes. No jaundice    Reviewed most current lab test results and cultures  YES  Reviewed most current radiology test results   YES  Review and summation of old records today    NO  Reviewed patient's current orders and MAR    YES  PMH/SH reviewed - no change compared to H&P  ________________________________________________________________________  Care Plan discussed with:    Comments   Patient     Family      RN y    Care Manager     Consultant                        Multidiciplinary team rounds were held today with , nursing, pharmacist and clinical coordinator. Patient's plan of care was discussed; medications were reviewed and discharge planning was addressed.      ________________________________________________________________________  Total NON critical care TIME:15  Minutes    Total CRITICAL CARE TIME Spent:   Minutes non procedure based      Comments   >50% of visit spent in counseling and coordination of care     ________________________________________________________________________  Smita Spaulding MD     Procedures: see electronic medical records for all procedures/Xrays and details which were not copied into this note but were reviewed prior to creation of Plan. LABS:  I reviewed today's most current labs and imaging studies.   Pertinent labs include:  Recent Labs      01/14/17 0411   WBC  8.6   HGB  9.5*   HCT  29.8*   PLT  277     Recent Labs      01/15/17   0406  01/14/17 0411   NA  145  143   K  3.7  3.6   CL  100  100   CO2  38*  35*   GLU  287*  209*   BUN  19  19   CREA  0.79  0.77   CA  8.8  8.6   MG   --   2.4   PHOS   --   2.6   ALB   --   1.7*   TBILI   --   0.5   SGOT   --   54*   ALT   --   206*       Signed: Smita Spaulding MD

## 2017-01-16 NOTE — INTERDISCIPLINARY ROUNDS
Interdisciplinary team rounds were held with the following team members:Case Management, Diabetes Treatment Specialist, Nursing, Nutrition, Occupational Therapy, Physical Therapy,Pharmacy, Physician and Respiratory Therapy. Plan of care discussed. See clinical pathway and/or care plan for interventions and desired outcomes.     Goals:monitor mental status, sedation as needed,

## 2017-01-16 NOTE — PROGRESS NOTES
0730  Bedside report received. Propofol off and pt very restless and agitated and fighting vent. Moving all extremities and turning head side to side. o2 sat 95. Excess oral secretions suctioned. 9253  Restarted on Propofol. 1230  DECREASED PROPOFOL TO 30 MCG Tube feeding restarted    1300  Decreased propofol to20 as pt is very calm and not restless.  in to see. 1545  Propofol off. Precedex at 0.5. Pt tolerating well. 1615  Pt very restless and bucking the vent. Precedex increased to 0.7mctg/kg/min O2 sat is 96 and she is breathing over the vent at 16 per ;minute      1645  Placed bacik on propofol @ 10 mcg. Pt very restless. Moving all extremities, but not following commands. When asked to open eyes she squeezes them shut. And opens mouth wide as if crying. No spontaneous eye opening or following commands. Flexing and straightening both legs over and over. Will try to get her comfortable again    1700  Increased propofol to 10 mcg. Pt very restless and looks uncomfortable. Sweating some. 1715  No effect noticed from propofol. Increased to 20 mcg. 1730  Pt much more calm.  Still moving but no bucking the vent or coughing    1900  Bedside report given to [facility]

## 2017-01-16 NOTE — PROGRESS NOTES
[facility]  Pulmonary, Critical Care, and Sleep Medicine    Name: Paulo Wilkerson MRN: [id number]   : 1960 Hospital: [facility]   Date: 2017        IMPRESSION:   · Acute hypoxic respiratory failure- gas exchange trending better? secretions thick with more WOB, shallow efforts- on very good abx  · Delirium tremens- resovled  · ARDS  · Severe E. Coli sepsis with septic shock - completed course  · Persistent fevers- better  · Acute renal failure  · Shock liver  · S/P surgery for ankle fracture 17  · EtOH abuse - 1 bottle wine/day  · Tobacco/MJ use      PLAN:   · Ventilator support will continue volume ventilation  · Wean Pressors   · Diurese when off pressors  · Sputum culture  · Sedation -will switch from IV Propofol to IV precedex and try SBT in AM  · Tube feedings  · Simplify abx to IV Rocephin but need to see what sputum culture shows before stopping all abx  · Thiamine/MVI/folate   · Replete electrolytes as needed  · DVT/GI prophylaxis  · Critically ill      Subjective/Interval History:   I have reviewed the flowsheet and previous days notes. The patient is unable to give any meaningful history or review of systems because the patient is: Intubated/sedated        failed SAT this AM. Cultures NGSF  1/15 More secretions. Failed SBT today after one hour. Sputum cultured   failed SAT the other day.  Less restless this AM. No new secretions      The patient is critically ill on:      Mechanical ventilation/pressors     Review of Systems   Unable to perform ROS: Intubated     Objective:   Vital Signs:    Visit Vitals    /72    Pulse 83    Temp 99.4 °F (37.4 °C)    Resp 12    Ht 5' 4\" (1.626 m)    Wt 78.9 kg (173 lb 15.1 oz)    SpO2 94%    BMI 29.86 kg/m2       O2 Device: Ventilator   O2 Flow Rate (L/min): 2 l/min   Temp (24hrs), Av.6 °F (37.6 °C), Min:99.2 °F (37.3 °C), Max:100.1 °F (37.8 °C)       Intake/Output:   Last shift: 01/16 0701 - 01/16 1900  In: 43.7 [I.V.:43.7]  Out: 550 [Urine:550]  Last 3 shifts: 01/14 1901 - 01/16 0700  In: 2976.4 [I.V.:2401.4]  Out: 3925 [Urine:5045]    Intake/Output Summary (Last 24 hours) at 01/16/17 1148  Last data filed at 01/16/17 1000   Gross per 24 hour   Intake          1520.58 ml   Output             2770 ml   Net         -1249.42 ml     Hemodynamics:   PAP:   CO:     Wedge:   CI:     CVP:    SVR:       PVR:       Ventilator Settings:  Mode Rate Tidal Volume Pressure FiO2 PEEP   Assist control   450 ml  5 cm H2O 40 % 5 cm H20     Peak airway pressure: 21 cm H2O    Minute ventilation: 6.6 l/min       Physical Exam   Constitutional: She is sedated and intubated. HENT:   Head: Normocephalic and atraumatic. Mouth/Throat: No oropharyngeal exudate. Eyes: No scleral icterus. Cardiovascular: Normal rate and regular rhythm. Exam reveals no gallop. Pulmonary/Chest: She is intubated. No respiratory distress. She has no wheezes. She has rales. Abdominal: Soft. Bowel sounds are normal. She exhibits no distension. There is no tenderness. Musculoskeletal: She exhibits edema. Skin: Skin is warm and dry.      Data:     Current Facility-Administered Medications   Medication Dose Route Frequency    dexmedetomidine (PRECEDEX) 400 mcg in 0.9% sodium chloride 100 mL infusion  0.2-0.7 mcg/kg/hr IntraVENous TITRATE    insulin lispro (HUMALOG) injection   SubCUTAneous Q6H    VANCOMYCIN TROUGH  1 Each Other ONCE    cefTRIAXone (ROCEPHIN) 1 g in 0.9% sodium chloride (MBP/ADV) 50 mL  1 g IntraVENous Q24H    vancomycin (VANCOCIN) 1,000 mg in 0.9% sodium chloride (MBP/ADV) 250 mL  1,000 mg IntraVENous Q8H    polyethylene glycol (MIRALAX) packet 17 g  17 g Oral DAILY    bumetanide (BUMEX) injection 1 mg  1 mg IntraVENous BID    PHENYLephrine (NEOSYNEPHRINE) 100,000 mcg in 0.9% sodium chloride 250 mL infusion   mcg/min IntraVENous TITRATE    metoclopramide HCl (REGLAN) injection 10 mg  10 mg IntraVENous Q6H    albuterol-ipratropium (DUO-NEB) 2.5 MG-0.5 MG/3 ML  3 mL Nebulization QID RT    fentaNYL (PF) 900 mcg/30 ml infusion soln   mcg/hr IntraVENous TITRATE    chlorhexidine (PERIDEX) 0.12 % mouthwash 15 mL  15 mL Oral Q12H    pantoprazole (PROTONIX) 40 mg in sodium chloride 0.9 % 10 mL injection  40 mg IntraVENous DAILY    thiamine (B-1) tablet 100 mg  100 mg Per NG tube DAILY    folic acid (FOLVITE) tablet 1 mg  1 mg Per NG tube DAILY    therapeutic multivitamin (THERAGRAN) tablet 1 Tab  1 Tab Oral DAILY    propofol (DIPRIVAN) infusion  5-50 mcg/kg/min IntraVENous TITRATE    sodium chloride (NS) flush 10 mL  10 mL InterCATHeter Q24H    sodium chloride (NS) flush 10-40 mL  10-40 mL InterCATHeter Q8H    nicotine (NICODERM CQ) 14 mg/24 hr patch 1 Patch  1 Patch TransDERmal DAILY    enoxaparin (LOVENOX) injection 40 mg  40 mg SubCUTAneous Q24H                Labs:  Recent Labs      01/14/17   0411   WBC  8.6   HGB  9.5*   HCT  29.8*   PLT  277     Recent Labs      01/15/17   0406  01/14/17   0411   NA  145  143   K  3.7  3.6   CL  100  100   CO2  38*  35*   GLU  287*  209*   BUN  19  19   CREA  0.79  0.77   CA  8.8  8.6   MG   --   2.4   PHOS   --   2.6   ALB   --   1.7*   TBILI   --   0.5   SGOT   --   54*   ALT   --   206*     Recent Labs      01/14/17   0600   PH  7.43   PCO2  58*   PO2  62*   HCO3  38*   FIO2  50     Imaging:  I have personally reviewed the patients radiographs and have reviewed the reports:  Decreasing bilateral infiltrates        Total critical care time exclusive of procedures:30 minutes  Fide Simpson MD

## 2017-01-17 ENCOUNTER — APPOINTMENT (OUTPATIENT)
Dept: GENERAL RADIOLOGY | Age: 57
DRG: 492 | End: 2017-01-17
Attending: INTERNAL MEDICINE
Payer: COMMERCIAL

## 2017-01-17 LAB
BACTERIA SPEC CULT: NORMAL
GLUCOSE BLD STRIP.AUTO-MCNC: 159 MG/DL (ref 65–100)
GLUCOSE BLD STRIP.AUTO-MCNC: 161 MG/DL (ref 65–100)
GLUCOSE BLD STRIP.AUTO-MCNC: 165 MG/DL (ref 65–100)
GLUCOSE BLD STRIP.AUTO-MCNC: 171 MG/DL (ref 65–100)
GLUCOSE BLD STRIP.AUTO-MCNC: 223 MG/DL (ref 65–100)
GRAM STN SPEC: NORMAL
GRAM STN SPEC: NORMAL
SERVICE CMNT-IMP: ABNORMAL
SERVICE CMNT-IMP: NORMAL
SERVICE CMNT-IMP: NORMAL

## 2017-01-17 PROCEDURE — 74011250636 HC RX REV CODE- 250/636: Performed by: HOSPITALIST

## 2017-01-17 PROCEDURE — 74011250636 HC RX REV CODE- 250/636: Performed by: INTERNAL MEDICINE

## 2017-01-17 PROCEDURE — 74011000250 HC RX REV CODE- 250: Performed by: INTERNAL MEDICINE

## 2017-01-17 PROCEDURE — 74011250637 HC RX REV CODE- 250/637: Performed by: INTERNAL MEDICINE

## 2017-01-17 PROCEDURE — 74000 XR ABD (KUB): CPT

## 2017-01-17 PROCEDURE — 74011636637 HC RX REV CODE- 636/637: Performed by: INTERNAL MEDICINE

## 2017-01-17 PROCEDURE — 74011250636 HC RX REV CODE- 250/636: Performed by: ORTHOPAEDIC SURGERY

## 2017-01-17 PROCEDURE — 77030008771 HC TU NG SALEM SUMP -A

## 2017-01-17 PROCEDURE — 77030019563 HC DEV ATTCH FEED HOLL -A

## 2017-01-17 PROCEDURE — 94640 AIRWAY INHALATION TREATMENT: CPT

## 2017-01-17 PROCEDURE — 94003 VENT MGMT INPAT SUBQ DAY: CPT

## 2017-01-17 PROCEDURE — C9113 INJ PANTOPRAZOLE SODIUM, VIA: HCPCS | Performed by: INTERNAL MEDICINE

## 2017-01-17 PROCEDURE — 65620000000 HC RM CCU GENERAL

## 2017-01-17 PROCEDURE — 74011000258 HC RX REV CODE- 258: Performed by: INTERNAL MEDICINE

## 2017-01-17 PROCEDURE — 82962 GLUCOSE BLOOD TEST: CPT

## 2017-01-17 RX ORDER — MIDAZOLAM HYDROCHLORIDE 1 MG/ML
2 INJECTION, SOLUTION INTRAMUSCULAR; INTRAVENOUS
Status: DISCONTINUED | OUTPATIENT
Start: 2017-01-17 | End: 2017-01-18

## 2017-01-17 RX ADMIN — IPRATROPIUM BROMIDE AND ALBUTEROL SULFATE 3 ML: .5; 3 SOLUTION RESPIRATORY (INHALATION) at 12:25

## 2017-01-17 RX ADMIN — INSULIN LISPRO 2 UNITS: 100 INJECTION, SOLUTION INTRAVENOUS; SUBCUTANEOUS at 23:23

## 2017-01-17 RX ADMIN — METOCLOPRAMIDE 10 MG: 5 INJECTION, SOLUTION INTRAMUSCULAR; INTRAVENOUS at 06:54

## 2017-01-17 RX ADMIN — BUMETANIDE 1 MG: 0.25 INJECTION, SOLUTION INTRAMUSCULAR; INTRAVENOUS at 09:34

## 2017-01-17 RX ADMIN — PROPOFOL 10 MCG/KG/MIN: 10 INJECTION, EMULSION INTRAVENOUS at 12:49

## 2017-01-17 RX ADMIN — Medication 100 MCG/HR: at 12:12

## 2017-01-17 RX ADMIN — PROPOFOL 10 MCG/KG/MIN: 10 INJECTION, EMULSION INTRAVENOUS at 12:25

## 2017-01-17 RX ADMIN — INSULIN LISPRO 3 UNITS: 100 INJECTION, SOLUTION INTRAVENOUS; SUBCUTANEOUS at 00:00

## 2017-01-17 RX ADMIN — METOCLOPRAMIDE 10 MG: 5 INJECTION, SOLUTION INTRAMUSCULAR; INTRAVENOUS at 01:55

## 2017-01-17 RX ADMIN — CHLORHEXIDINE GLUCONATE 15 ML: 1.2 RINSE ORAL at 20:31

## 2017-01-17 RX ADMIN — METOCLOPRAMIDE 10 MG: 5 INJECTION, SOLUTION INTRAMUSCULAR; INTRAVENOUS at 11:33

## 2017-01-17 RX ADMIN — METOCLOPRAMIDE 10 MG: 5 INJECTION, SOLUTION INTRAMUSCULAR; INTRAVENOUS at 23:24

## 2017-01-17 RX ADMIN — PROPOFOL 10 MCG/KG/MIN: 10 INJECTION, EMULSION INTRAVENOUS at 13:37

## 2017-01-17 RX ADMIN — Medication 10 ML: at 22:42

## 2017-01-17 RX ADMIN — CEFTRIAXONE 1 G: 1 INJECTION, POWDER, FOR SOLUTION INTRAMUSCULAR; INTRAVENOUS at 11:28

## 2017-01-17 RX ADMIN — SODIUM CHLORIDE 40 MG: 9 INJECTION INTRAMUSCULAR; INTRAVENOUS; SUBCUTANEOUS at 09:34

## 2017-01-17 RX ADMIN — INSULIN LISPRO 2 UNITS: 100 INJECTION, SOLUTION INTRAVENOUS; SUBCUTANEOUS at 06:54

## 2017-01-17 RX ADMIN — LORAZEPAM 2 MG: 2 INJECTION INTRAMUSCULAR; INTRAVENOUS at 22:49

## 2017-01-17 RX ADMIN — IPRATROPIUM BROMIDE AND ALBUTEROL SULFATE 3 ML: .5; 3 SOLUTION RESPIRATORY (INHALATION) at 19:53

## 2017-01-17 RX ADMIN — THERA TABS 1 TABLET: TAB at 09:31

## 2017-01-17 RX ADMIN — FOLIC ACID 1 MG: 1 TABLET ORAL at 09:31

## 2017-01-17 RX ADMIN — METOCLOPRAMIDE 10 MG: 5 INJECTION, SOLUTION INTRAMUSCULAR; INTRAVENOUS at 18:57

## 2017-01-17 RX ADMIN — ENOXAPARIN SODIUM 40 MG: 40 INJECTION, SOLUTION INTRAVENOUS; SUBCUTANEOUS at 09:30

## 2017-01-17 RX ADMIN — POLYETHYLENE GLYCOL 3350 17 G: 17 POWDER, FOR SOLUTION ORAL at 09:35

## 2017-01-17 RX ADMIN — PHENYLEPHRINE HYDROCHLORIDE 60 MCG/MIN: 10 INJECTION INTRAVENOUS at 06:23

## 2017-01-17 RX ADMIN — INSULIN LISPRO 2 UNITS: 100 INJECTION, SOLUTION INTRAVENOUS; SUBCUTANEOUS at 12:54

## 2017-01-17 RX ADMIN — SODIUM CHLORIDE 1 MCG/KG/HR: 900 INJECTION, SOLUTION INTRAVENOUS at 22:55

## 2017-01-17 RX ADMIN — INSULIN LISPRO 2 UNITS: 100 INJECTION, SOLUTION INTRAVENOUS; SUBCUTANEOUS at 19:13

## 2017-01-17 RX ADMIN — CHLORHEXIDINE GLUCONATE 15 ML: 1.2 RINSE ORAL at 09:39

## 2017-01-17 RX ADMIN — SODIUM CHLORIDE 0.7 MCG/KG/HR: 900 INJECTION, SOLUTION INTRAVENOUS at 02:02

## 2017-01-17 RX ADMIN — IPRATROPIUM BROMIDE AND ALBUTEROL SULFATE 3 ML: .5; 3 SOLUTION RESPIRATORY (INHALATION) at 07:14

## 2017-01-17 RX ADMIN — Medication 100 MG: at 09:31

## 2017-01-17 RX ADMIN — Medication 75 MCG/HR: at 18:09

## 2017-01-17 RX ADMIN — Medication 10 ML: at 06:27

## 2017-01-17 RX ADMIN — VANCOMYCIN HYDROCHLORIDE 1250 MG: 10 INJECTION, POWDER, LYOPHILIZED, FOR SOLUTION INTRAVENOUS at 01:55

## 2017-01-17 RX ADMIN — IPRATROPIUM BROMIDE AND ALBUTEROL SULFATE 3 ML: .5; 3 SOLUTION RESPIRATORY (INHALATION) at 16:03

## 2017-01-17 RX ADMIN — BUMETANIDE 1 MG: 0.25 INJECTION, SOLUTION INTRAMUSCULAR; INTRAVENOUS at 18:57

## 2017-01-17 RX ADMIN — PHENYLEPHRINE HYDROCHLORIDE 50 MCG/MIN: 10 INJECTION INTRAVENOUS at 18:57

## 2017-01-17 RX ADMIN — PROPOFOL 20 MCG/KG/MIN: 10 INJECTION, EMULSION INTRAVENOUS at 01:40

## 2017-01-17 RX ADMIN — Medication 100 MCG/HR: at 22:41

## 2017-01-17 RX ADMIN — SODIUM CHLORIDE 1 MCG/KG/HR: 900 INJECTION, SOLUTION INTRAVENOUS at 11:55

## 2017-01-17 RX ADMIN — PROPOFOL 10 MCG/KG/MIN: 10 INJECTION, EMULSION INTRAVENOUS at 07:55

## 2017-01-17 RX ADMIN — ACETAMINOPHEN 650 MG: 160 SOLUTION ORAL at 19:48

## 2017-01-17 NOTE — INTERDISCIPLINARY ROUNDS
Interdisciplinary team rounds were held 1/17/16 with the following team members:Care Management, Diabetes Treatment Specialist, Nursing, Nutrition, Pharmacy, Physician, Respiratory Therapy and Clinical Coordinator. Plan of care discussed. Goal: See MD orders and progress notes for further  interventions and desired outcomes.

## 2017-01-17 NOTE — ADT AUTH CERT NOTES
Respiratory Failure [facility] - Care Day 15 (1/16/2017) by José Luis Granda RN        Review Status Review Entered       Completed 1/16/2017       Details              Care Day: 15 Care Date: 1/16/2017 Level of Care: ICU       Guideline Day 2        Level Of Care       (X) ICU or ventilator-capable area              Clinical Status       ( ) * Weaning assessment performed              Interventions       (X) Inpatient interventions continue              1/16/2017 4:46 PM EST by Danica Constantino       Subject: Additional Clinical Information       LABS:VANCOMYCIN TROUGH 26.2,              1/16/2017 4:45 PM EST by Danica Constantino       Subject: Additional Clinical Information                      Current Facility-Administered Medications          Medication      Dose      Route      Frequency          â¢      dexmedetomidine (PRECEDEX) 400 mcg in 0.9% sodium chloride 100 mL infusion       0.2-0.7 mcg/kg/hr      IntraVENous      TITRATE          â¢      insulin lispro (HUMALOG) injection                SubCUTAneous      Q6H          â¢      VANCOMYCIN TROUGH       1 Each      Other      ONCE          â¢      cefTRIAXone (ROCEPHIN) 1 g in 0.9% sodium chloride (MBP/ADV) 50 mL       1 g      IntraVENous      Q24H          â¢      vancomycin (VANCOCIN) 1,000 mg in 0.9% sodium chloride (MBP/ADV) 250 mL       1,000 mg      IntraVENous      Q8H          â¢      polyethylene glycol (MIRALAX) packet 17 g       17 g      Oral      DAILY          â¢      bumetanide (BUMEX) injection 1 mg       1 mg      IntraVENous      BID          â¢      PHENYLephrine (NEOSYNEPHRINE) 100,000 mcg in 0.9% sodium chloride 250 mL infusion        mcg/min      IntraVENous      TITRATE          â¢      metoclopramide HCl (REGLAN) injection 10 mg       10 mg      IntraVENous      Q6H          â¢      albuterol-ipratropium (DUO-NEB) 2.5 MG-0.5 MG/3 ML       3 mL      Nebulization      QID RT          â¢      fentaNYL (PF) 900 mcg/30 ml infusion soln        mcg/hr      IntraVENous      TITRATE          â¢      chlorhexidine (PERIDEX) 0.12 % mouthwash 15 mL       15 mL      Oral      Q12H          â¢      pantoprazole (PROTONIX) 40 mg in sodium chloride 0.9 % 10 mL injection       40 mg      IntraVENous      DAILY          â¢      thiamine (B-1) tablet 100 mg       100 mg      Per NG tube      DAILY          â¢      folic acid (FOLVITE) tablet 1 mg       1 mg      Per NG tube      DAILY          â¢      therapeutic multivitamin (THERAGRAN) tablet 1 Tab       1 Tab      Oral      DAILY          â¢      propofol (DIPRIVAN) infusion       5-50 mcg/kg/min      IntraVENous      TITRATE          â¢      sodium chloride (NS) flush 10 mL       10 mL      InterCATHeter      Q24H          â¢      sodium chloride (NS) flush 10-40 mL       10-40 mL      InterCATHeter      Q8H          â¢      nicotine (NICODERM CQ) 14 mg/24 hr patch 1 Patch       1 Patch      TransDERmal      DAILY          â¢      enoxaparin (LOVENOX) injection 40 mg       40 mg      SubCUTAneous      Q24H                                             2017 4:45 PM EST by Alexx Herrera       Subject: Additional Clinical Information                              Visit Vitals          â¢      BP      102/72          â¢      Pulse      83          â¢      Temp      99.4  °F (37.4  °C)          â¢      Resp      12          â¢      Ht      5' 4\" (1.626 m)          â¢      Wt      78.9 kg (173 lb 15.1 oz)          â¢      SpO2      94%          â¢      BMI      29.86 kg/m2                     O2 Device: Ventilator          O2 Flow Rate (L/min): 2 l/min          Temp (24hrs), Av.6  °F (37.6  °C), Min:99.2  °F (37.3  °C), Max:100.1  °F (37.8  °C)                      2017 4:44 PM EST by Alexx Herrera       Subject: Additional Clinical Information             PLMONAYR PROGRESS NOTE:          IMPRESSION:              Acute hypoxic respiratory failure- gas exchange trending better? secretions thick with more WOB, shallow efforts- on very good abx             Delirium tremens- resovled             ARDS             Severe E. Coli sepsis with septic shock - completed course             Persistent fevers- better             Acute renal failure             Shock liver             S/P surgery for ankle fracture 1-2-17             EtOH abuse - 1 bottle wine/day             Tobacco/MJ use                                 PLAN:              Ventilator support will continue volume ventilation             Wean Pressors              Diurese when off pressors             Sputum culture             Sedation -will switch from IV Propofol to IV precedex and try SBT in AM             Tube feedings             Simplify abx to IV Rocephin but need to see what sputum culture shows before stopping all abx             Thiamine/MVI/folate              Replete electrolytes as needed             DVT/GI prophylaxis             Critically ill                                  1/16/2017 4:44 PM EST by Daniela Pérez       Subject: Additional Clinical Information       MEDICAL PROGRESS NOTE:  Assessment / Plan:          Very high risk of further deterioration including cardiac arrest      Acute hypoxic respiratory failure secondary   To   respiratory depression ( delirium tremens   Being on ativan and precedex)  With multiorgan failure ( shock liver, concern for hcap)  ecoli bacteremia secondary to e coli uti      Trial of SBT today  Ceftriaxone total d8( was on cefepime and levo for 6 days)  Waiting for   Sputum cultures  Requiring pressors as well  On iv diuresis  Chest xray:ETT in satisfactory position.  Interval improved aeration of the lungs with some regression of pulmonary edema.  Persistent bilateral small pleural effusions and probably some consolidation atleft base.   Follow the blood cultures with gram neg rods with e coli   urine cultures with gram neg rods with ecoli  Platelets ok  Ammonia normal, consider ct head  picc placed on   Follow the echo Systolic function was normal. Ejection fraction was estimated in the range of 55 % to 60 %. There were no regional wall motion abnormalities. Wall thickness was normal. DOPPLER:Left ventricular diastolic function parameters were normal for the patient's age. orhto checked the ankle on wednesday      Hypertension      Depression ADHD      Right trimalleolar ankle fracture, poa now s/p closed reduction and ORIF 17  -ortho managing  Code status: full  Prophylaxis: lovenox  Recommended Disposition: tbd                                           * Milestone                  Respiratory Failure [facility] - Care Day 13 (2017) by Lane Yanez RN        Review Status Review Entered       Completed 2017       Details              Care Day: 13 Care Date: 2017 Level of Care: ICU       Guideline Day 2        Level Of Care       (X) ICU or ventilator-capable area              Clinical Status       ( ) * Weaning assessment performed              Interventions       (X) Inpatient interventions continue              2017 4:43 PM EST by Adela Bowen       Subject: Additional Clinical Information       CXR:  1.  Improving airspace disease/pulmonary edema                  2017 4:42 PM EST by Adela Bowen       Subject: Additional Clinical Information                              Vitals          â¢      BP      94/55          â¢      Pulse      92          â¢      Temp      99  °F (37.2  °C)          â¢      Resp      20          â¢      Ht      5' 4\" (1.626 m)          â¢      Wt      78.9 kg (173 lb 15.1 oz)          â¢      SpO2      100%          â¢      BMI      29.86 kg/m2                     O2 Device: Ventilator          O2 Flow Rate (L/min): 2 l/min          Temp (24hrs), Av.7  °F (37.1  °C), Min:97.7  °F (36.5  °C), Max:99.3  °F (37.4  °C)                      2017 4:42 PM EST by Adela Bowen       Subject: Additional Clinical Information       LABS: PCO2 58,PO2 62,HCO3 38              1/16/2017 4:42 PM EST by Tatyana Miguel       Subject: Additional Clinical Information       LABS: HGB 9.5, HCT 29.8, CO2 35, ,ALB 1.7, SGOT 54,              1/16/2017 4:41 PM EST by Tatyana Miguel       Subject: Additional Clinical Information                      Current Facility-Administered Medications          Medication      Dose      Route      Frequency          â¢      propofol (DIPRIVAN) 10 mg/mL injection                                    â¢      succinylcholine (ANECTINE) 20 mg/mL injection                                    â¢      vancomycin (VANCOCIN) 1250 mg in  ml infusion       1,250 mg      IntraVENous      Q12H          â¢      cefepime (MAXIPIME) 2 g in 0.9% sodium chloride (MBP/ADV) 100 mL       2 g      IntraVENous      Q8H          â¢      polyethylene glycol (MIRALAX) packet 17 g       17 g      Oral      DAILY          â¢      bumetanide (BUMEX) injection 1 mg       1 mg      IntraVENous      BID          â¢      PHENYLephrine (NEOSYNEPHRINE) 100,000 mcg in 0.9% sodium chloride 250 mL infusion        mcg/min      IntraVENous      TITRATE          â¢      metoclopramide HCl (REGLAN) injection 10 mg       10 mg      IntraVENous      Q6H          â¢      albuterol-ipratropium (DUO-NEB) 2.5 MG-0.5 MG/3 ML       3 mL      Nebulization      QID RT          â¢      fentaNYL (PF) 900 mcg/30 ml infusion soln        mcg/hr      IntraVENous      TITRATE          â¢      levoFLOXacin (LEVAQUIN) 750 mg in D5W IVPB       750 mg      IntraVENous      Q24H          â¢      chlorhexidine (PERIDEX) 0.12 % mouthwash 15 mL       15 mL      Oral      Q12H          â¢      pantoprazole (PROTONIX) 40 mg in sodium chloride 0.9 % 10 mL injection       40 mg      IntraVENous      DAILY          â¢      thiamine (B-1) tablet 100 mg       100 mg      Per NG tube      DAILY          â¢      folic acid (Google) tablet 1 mg       1 mg      Per NG tube      DAILY          â¢      therapeutic multivitamin (THERAGRAN) tablet 1 Tab       1 Tab      Oral      DAILY          â¢      propofol (DIPRIVAN) infusion       5-50 mcg/kg/min      IntraVENous      TITRATE          â¢      sodium chloride (NS) flush 10 mL       10 mL      InterCATHeter      Q24H          â¢      sodium chloride (NS) flush 10-40 mL       10-40 mL      InterCATHeter      Q8H          â¢      nicotine (NICODERM CQ) 14 mg/24 hr patch 1 Patch       1 Patch      TransDERmal      DAILY          â¢      enoxaparin (LOVENOX) injection 40 mg       40 mg      SubCUTAneous      Q24H                      1/16/2017 4:40 PM EST by Jez Jha       Subject: Additional Clinical Information              Assessment / Plan:          Very high risk of further deterioration including cardiac arrest      Acute hypoxic respiratory failure secondary   to   respiratory depression ( delirium tremens   Being on ativan and precedex)  With multiorgan failure ( shock liver, concern for hcap)      Vent support ( intensivist managing)  Empiric, cefepime and levo d6  Requiring pressors as well  On iv diuresis  Chest xray:ETT in satisfactory position.  Interval improved aeration of the lungs with some regression of pulmonary edema.  Persistent bilateral small pleural effusions and probably some consolidation atleft base. Follow the blood cultures with gram neg rods with e coli   urine cultures with gram neg rods  Platelets ok  Ammonia normal, consider ct head  picc placed on 1/7  Follow the echo Systolic function was normal. Ejectionfraction was estimated in the range of 55 % to 60 %. There were noregional wall motion abnormalities. Wall thickness was normal. DOPPLER:Left ventricular diastolic function parameters were normal for thepatient's age.   orhto checked the ankle on wednesday      Hypertension      Depression ADHD      Right trimalleolar ankle fracture, poa now s/p closed reduction and ORIF 1/2/17  -ortho managing  Code status: full  Prophylaxis: lovenox  Recommended Disposition: tbd                          1/16/2017 4:40 PM EST by Candy Walker       Subject: Additional Clinical Information              Interim Hospital Summary: 49 y.o. female whom presented on 1/2/2017 with right ankle deformity s/p injury R unstable trimalleolar ankle fx/dislocatio  ADHD on Adderall  Typically drinks 1 bottle of wine per day  Initially admitted to ortho service-- multiple attempts at ankle reduction were made with out success. On 1/3 s/p ORIF, on 1/5 consulted hospitalist for acute delirium ( with concern for alcohol withdrawal) on 1/6 transferred to ICU requiring ativan drip and precedex. Further deteriorated on 1/8 requiring intubation  Now with spiking fevers and shock liver  Ortho transferred the patient care to medicine service on 1/9          Critically ill, with respiratory failure,   ARDS, delirium tremens  GNR sepsis with septic shock              1/16/2017 4:40 PM EST by Candy Walker       Subject: Additional Clinical Information        PULMONARY NOTE:          IMPRESSION:              Acute hypoxic respiratory failure- gas exchange trending better?             Delirium tremens             ARDS             Severe GNR sepsis with septic shock              Persistent fevers             Acute renal failure             Shock liver             S/P surgery for ankle fracture 1-2-17             EtOH abuse - 1 bottle wine/day             Tobacco/MJ use                                 PLAN:              Ventilator support - difficult to achieve ARDS goals, significant ventilator dyssynchrony             Pressors              Sedation - titrate and try SAT in AM             Follow up repeat cultures             Empiric antibiotics pending culture data             Thiamine/MVI/folate              Replete electrolytes as needed             DVT/GI prophylaxis             Critically ill                                            * Milestone

## 2017-01-17 NOTE — PROGRESS NOTES
0730  Bedside report received from [facility]  Pt very restless. Back on vent. Failed SBT. Very tachypneic    0800  Propofol on again and having to increase.  present. Pt moving around a lot. Kicking feet into the air and extending and flexing arms. Responds to husbands voice with grimacing, but won't follow commands or open eyes    1000  Finally got pt settled and more calm. Still moving extremeties in the same way. 1200  Incontinent of large BM. No form and yellow    1230  OG tube removed and 16Fr NG tube placed. Marked at 68cm in the left nare. Pt tolerated fairly well. Yellow drainage obtained and placement checked with air bolus. Awaiting KUB    1245  Propofol decreased in attempt to extubate later. Fentanyl and Precedex increased. 1355   Pt tolerating SBT currently per Dr. Wendy Foley.. Propofol off and O2 sat 95. Resp 22,  and pt much more calm at this point and tolerating well    1430  Pt more restless, but TV increased to 476 and O2 sat 96  Respirations 18. Dr Wendy Foley aware. 1600  Pt extubated without difficulty. NG tube remains in place. Pt asking for water. Very restless and moving all extremities without purpose. 1700  Asking for Duane. Follows commands at times.  Fentanyl reduced to 75mcg    1900  Bedside report given to [facility]

## 2017-01-17 NOTE — PROGRESS NOTES
Hospitalist Progress Note    NAME: Tevin Levine   :  1960   MRN:  [id number]       Interim Hospital Summary: 49 y.o. female whom presented on 2017 with right ankle deformity s/p injury R unstable trimalleolar ankle fx/dislocatio  ADHD on Adderall  Typically drinks 1 bottle of wine per day    Initially admitted to ortho service-- multiple attempts at ankle reduction were made with out success. On 1/3 s/p ORIF, on  consulted hospitalist for acute delirium ( with concern for alcohol withdrawal) on  transferred to ICU requiring ativan drip and precedex. Further deteriorated on  requiring intubation  Now with spiking fevers and shock liver  Ortho transferred the patient care to medicine service on       Critically ill, with respiratory failure,  ARDS, delirium tremens  GNR sepsis with septic shock      Jeremiah Lemusa [phone] updated on 1/10     Assessment / Plan:  Very high risk of further deteriortion    Acute hypoxic respiratory failure secondary  To  respiratory depression ( delirium tremens  Being on ativan and precedex)  With multiorgan failure ( shock liver, concern for hcap)  ecoli bacteremia secondary to e coli uti    Trial of SBT ongoing  Ceftriaxone total d9 was on cefepime and levo for 6 days)  Waiting for  Sputum cultures, before stopping antibiotics  Requiring pressors as well  Diuresis as needed  Chest xray:ETT in satisfactory position. Interval improved aeration of the lungs with some regression of pulmonary edema. Persistent bilateral small pleural effusions and probably some consolidation atleft base. Follow the blood cultures with gram neg rods with e coli   urine cultures with gram neg rods with ecoli  Platelets ok  Ammonia normal, consider ct head  picc placed on   Follow the echo Systolic function was normal. Ejection fraction was estimated in the range of 55 % to 60 %. There were no regional wall motion abnormalities.  Wall thickness was normal. DOPPLER:Left ventricular diastolic function parameters were normal for the patient's age. orhto checked the ankle on wednesday    Hypertension    Depression ADHD    Right trimalleolar ankle fracture, poa now s/p closed reduction and ORIF 1/2/17  -ortho managing  Code status: full  Prophylaxis: lovenox  Recommended Disposition: tbd     Subjective:     Chief Complaint / Reason for Physician Visit  Follow up on respiratory failure, delirium tremens  SBT trial today    Discussed with RN events overnight. Review of Systems:  Symptom Y/N Comments  Symptom Y/N Comments   Fever/Chills    Chest Pain     Poor Appetite    Edema     Cough    Abdominal Pain     Sputum    Joint Pain     SOB/FUENTES    Pruritis/Rash     Nausea/vomit    Tolerating PT/OT     Diarrhea    Tolerating Diet     Constipation    Other       Could NOT obtain due to: intubated     Objective:     VITALS:   Last 24hrs VS reviewed since prior progress note.  Most recent are:  Patient Vitals for the past 24 hrs:   Temp Pulse Resp BP SpO2   01/17/17 1601 - - - - 96 %   01/17/17 1400 - 78 24 127/80 95 %   01/17/17 1300 - 89 21 99/64 95 %   01/17/17 1226 - 85 17 - 97 %   01/17/17 1200 - 76 13 97/59 92 %   01/17/17 1100 - 84 - 103/60 96 %   01/17/17 1000 - 88 - (!) 88/60 92 %   01/17/17 0900 - 87 12 95/61 97 %   01/17/17 0800 - 86 12 91/57 94 %   01/17/17 0738 (!) 100.6 °F (38.1 °C) 88 13 102/68 95 %   01/17/17 0716 - - - - 100 %   01/17/17 0700 - 76 27 95/56 97 %   01/17/17 0600 - 71 12 118/72 97 %   01/17/17 0500 - 76 12 115/71 100 %   01/17/17 0400 100.1 °F (37.8 °C) 68 12 113/72 100 %   01/17/17 0300 - 69 12 112/70 100 %   01/17/17 0200 - 72 12 113/72 100 %   01/17/17 0130 - 71 12 114/72 100 %   01/17/17 0100 - 69 12 118/77 100 %   01/17/17 0030 - 73 13 109/64 99 %   01/17/17 0000 (!) 100.7 °F (38.2 °C) 77 13 106/62 99 %   01/16/17 2330 - 71 12 116/85 100 %   01/16/17 2300 - 71 12 118/67 100 %   01/16/17 2230 - 74 17 96/58 98 %   01/16/17 2200 - 73 13 119/72 99 %   01/16/17 2130 - 73 12 117/72 99 %   01/16/17 2100 - 78 13 110/64 98 %   01/16/17 2030 - 88 14 101/55 97 %   01/16/17 2012 - 79 13 - 98 %   01/16/17 2011 - - - - 99 %   01/16/17 2000 (!) 100.7 °F (38.2 °C) 80 14 97/55 96 %   01/16/17 1930 - 76 12 113/71 97 %   01/16/17 1900 - 75 12 118/73 97 %   01/16/17 1800 - 78 12 123/77 97 %   01/16/17 1700 - 89 24 105/64 95 %   01/16/17 1658 - 88 30 - 94 %       Intake/Output Summary (Last 24 hours) at 01/17/17 1611  Last data filed at 01/17/17 0900   Gross per 24 hour   Intake           229.65 ml   Output             1425 ml   Net         -1195.35 ml        PHYSICAL EXAM:  General: intubated  EENT:  EOMI. Anicteric sclerae. Resp:  B/l  Decreased air entry No accessory muscle use  CV:  Regular  rhythm,  No edema  GI:  Soft, Non distended, Non tender.  +Bowel sounds  Neurologic:  intubated  Psych:             Not anxious nor agitated  Skin:  No rashes. No jaundice    Reviewed most current lab test results and cultures  YES  Reviewed most current radiology test results   YES  Review and summation of old records today    NO  Reviewed patient's current orders and MAR    YES  PMH/ reviewed - no change compared to H&P  ________________________________________________________________________  Care Plan discussed with:    Comments   Patient     Family      RN y    Care Manager     Consultant                        Multidiciplinary team rounds were held today with , nursing, pharmacist and clinical coordinator. Patient's plan of care was discussed; medications were reviewed and discharge planning was addressed.      ________________________________________________________________________  Total NON critical care TIME:20  Minutes    Total CRITICAL CARE TIME Spent:   Minutes non procedure based      Comments   >50% of visit spent in counseling and coordination of care     ________________________________________________________________________  Danial Blunt MD     Procedures: see electronic medical records for all procedures/Xrays and details which were not copied into this note but were reviewed prior to creation of Plan. LABS:  I reviewed today's most current labs and imaging studies. Pertinent labs include:  No results for input(s): WBC, HGB, HCT, PLT, HGBEXT, HCTEXT, PLTEXT, HGBEXT, HCTEXT, PLTEXT in the last 72 hours.   Recent Labs      01/15/17   0406   NA  145   K  3.7   CL  100   CO2  38*   GLU  287*   BUN  19   CREA  0.79   CA  8.8       Signed: Smita Spaulding MD

## 2017-01-17 NOTE — PROGRESS NOTES
[facility]  Pulmonary, Critical Care, and Sleep Medicine    Name: Vel Oneil MRN: [id number]   : 1960 Hospital: [facility]   Date: 2017        IMPRESSION:   · Acute hypoxic respiratory failure- gas exchange trending better? secretions thick with more WOB, shallow efforts- on very good abx  · Delirium tremens- resovled  · ARDS  · Severe E. Coli sepsis with septic shock - completed course  · Persistent fevers- better  · Acute renal failure  · Shock liver  · S/P surgery for ankle fracture 17  · EtOH abuse - 1 bottle wine/day  · Tobacco/MJ use      PLAN:   · Ventilator   · May try abbreviated SBT and trail of extubation  · Wean Pressors   · Diurese when off pressors  · Sputum culture  · Free water  · Sedation -IV precedex and try SBT in AM  · Tube feedings  · Simplify abx to IV Rocephin but need to see what sputum culture shows before stopping all abx  · Thiamine/MVI/folate   · Replete electrolytes as needed  · DVT/GI prophylaxis  · Critically ill      Subjective/Interval History:   I have reviewed the flowsheet and previous days notes. The patient is unable to give any meaningful history or review of systems because the patient is: Intubated/sedated          still failing SBT; now on IV precedex but still needs IV fentanyl and IV propofol   failed SAT this AM. Cultures NGSF  1/15 More secretions. Failed SBT today after one hour. Sputum cultured   failed SAT the other day.  Less restless this AM. No new secretions      The patient is critically ill on:      Mechanical ventilation/pressors     Review of Systems   Unable to perform ROS: Intubated     Objective:   Vital Signs:    Visit Vitals    /68 (BP 1 Location: Right arm, BP Patient Position: At rest)    Pulse 88    Temp (!) 100.6 °F (38.1 °C)    Resp 13    Ht 5' 4\" (1.626 m)    Wt 78.9 kg (173 lb 15.1 oz)    SpO2 95%    BMI 29.86 kg/m2       O2 Device: Ventilator   O2 Flow Rate (L/min): 60 l/min   Temp (24hrs), Av.2 °F (37.9 °C), Min:98.9 °F (37.2 °C), Max:100.7 °F (38.2 °C)       Intake/Output:   Last shift:         Last 3 shifts: 01/15 1901 -  0700  In: 1611.2 [I.V.:1511.2]  Out: 5689 [Urine:3877]    Intake/Output Summary (Last 24 hours) at 17 0929  Last data filed at 17 2300   Gross per 24 hour   Intake           621.09 ml   Output             2817 ml   Net         -2195.91 ml     Hemodynamics:   PAP:   CO:     Wedge:   CI:     CVP:    SVR:       PVR:       Ventilator Settings:  Mode Rate Tidal Volume Pressure FiO2 PEEP   Assist control   450 ml  5 cm H2O 40 % 5 cm H20     Peak airway pressure: 24 cm H2O    Minute ventilation: 6.3 l/min       Physical Exam   Constitutional: She is sedated and intubated. HENT:   Head: Normocephalic and atraumatic. Mouth/Throat: No oropharyngeal exudate. Eyes: No scleral icterus. Cardiovascular: Normal rate and regular rhythm. Exam reveals no gallop. Pulmonary/Chest: She is intubated. No respiratory distress. She has no wheezes. She has rales. Abdominal: Soft. Bowel sounds are normal. She exhibits no distension. There is no tenderness. Musculoskeletal: She exhibits edema. Skin: Skin is warm and dry.      Data:     Current Facility-Administered Medications   Medication Dose Route Frequency    dexmedetomidine (PRECEDEX) 400 mcg in 0.9% sodium chloride 100 mL infusion  0.2-0.7 mcg/kg/hr IntraVENous TITRATE    insulin lispro (HUMALOG) injection   SubCUTAneous Q6H    vancomycin (VANCOCIN) 1250 mg in  ml infusion  1,250 mg IntraVENous Q12H    cefTRIAXone (ROCEPHIN) 1 g in 0.9% sodium chloride (MBP/ADV) 50 mL  1 g IntraVENous Q24H    polyethylene glycol (MIRALAX) packet 17 g  17 g Oral DAILY    bumetanide (BUMEX) injection 1 mg  1 mg IntraVENous BID    PHENYLephrine (NEOSYNEPHRINE) 100,000 mcg in 0.9% sodium chloride 250 mL infusion   mcg/min IntraVENous TITRATE    metoclopramide HCl (REGLAN) injection 10 mg  10 mg IntraVENous Q6H    albuterol-ipratropium (DUO-NEB) 2.5 MG-0.5 MG/3 ML  3 mL Nebulization QID RT    fentaNYL (PF) 900 mcg/30 ml infusion soln   mcg/hr IntraVENous TITRATE    chlorhexidine (PERIDEX) 0.12 % mouthwash 15 mL  15 mL Oral Q12H    pantoprazole (PROTONIX) 40 mg in sodium chloride 0.9 % 10 mL injection  40 mg IntraVENous DAILY    thiamine (B-1) tablet 100 mg  100 mg Per NG tube DAILY    folic acid (FOLVITE) tablet 1 mg  1 mg Per NG tube DAILY    therapeutic multivitamin (THERAGRAN) tablet 1 Tab  1 Tab Oral DAILY    propofol (DIPRIVAN) infusion  5-50 mcg/kg/min IntraVENous TITRATE    sodium chloride (NS) flush 10 mL  10 mL InterCATHeter Q24H    sodium chloride (NS) flush 10-40 mL  10-40 mL InterCATHeter Q8H    nicotine (NICODERM CQ) 14 mg/24 hr patch 1 Patch  1 Patch TransDERmal DAILY    enoxaparin (LOVENOX) injection 40 mg  40 mg SubCUTAneous Q24H                Labs:  No results for input(s): WBC, HGB, HCT, PLT, HGBEXT, HCTEXT, PLTEXT, HGBEXT, HCTEXT, PLTEXT in the last 72 hours. Recent Labs      01/15/17   0406   NA  145   K  3.7   CL  100   CO2  38*   GLU  287*   BUN  19   CREA  0.79   CA  8.8     No results for input(s): PH, PCO2, PO2, HCO3, FIO2 in the last 72 hours.   Imaging:  I have personally reviewed the patients radiographs and have reviewed the reports:none today  Decreasing bilateral infiltrates       Yandel Seals MD

## 2017-01-18 ENCOUNTER — APPOINTMENT (OUTPATIENT)
Dept: GENERAL RADIOLOGY | Age: 57
DRG: 492 | End: 2017-01-18
Attending: INTERNAL MEDICINE
Payer: COMMERCIAL

## 2017-01-18 LAB
GLUCOSE BLD STRIP.AUTO-MCNC: 131 MG/DL (ref 65–100)
GLUCOSE BLD STRIP.AUTO-MCNC: 150 MG/DL (ref 65–100)
SERVICE CMNT-IMP: ABNORMAL
SERVICE CMNT-IMP: ABNORMAL

## 2017-01-18 PROCEDURE — 77030033263 HC DRSG MEPILEX 16-48IN BORD MOLN -B

## 2017-01-18 PROCEDURE — 74011000250 HC RX REV CODE- 250: Performed by: INTERNAL MEDICINE

## 2017-01-18 PROCEDURE — 74011250636 HC RX REV CODE- 250/636: Performed by: INTERNAL MEDICINE

## 2017-01-18 PROCEDURE — 71010 XR CHEST PORT: CPT

## 2017-01-18 PROCEDURE — 82962 GLUCOSE BLOOD TEST: CPT

## 2017-01-18 PROCEDURE — 74011250636 HC RX REV CODE- 250/636: Performed by: ORTHOPAEDIC SURGERY

## 2017-01-18 PROCEDURE — 74011000258 HC RX REV CODE- 258: Performed by: INTERNAL MEDICINE

## 2017-01-18 PROCEDURE — 74011250637 HC RX REV CODE- 250/637: Performed by: INTERNAL MEDICINE

## 2017-01-18 PROCEDURE — 65620000000 HC RM CCU GENERAL

## 2017-01-18 PROCEDURE — 74011636637 HC RX REV CODE- 636/637: Performed by: INTERNAL MEDICINE

## 2017-01-18 PROCEDURE — 74011250637 HC RX REV CODE- 250/637: Performed by: NURSE PRACTITIONER

## 2017-01-18 PROCEDURE — 94640 AIRWAY INHALATION TREATMENT: CPT

## 2017-01-18 PROCEDURE — 77010033678 HC OXYGEN DAILY

## 2017-01-18 RX ORDER — OXYCODONE AND ACETAMINOPHEN 5; 325 MG/1; MG/1
1 TABLET ORAL
Status: DISCONTINUED | OUTPATIENT
Start: 2017-01-18 | End: 2017-01-24 | Stop reason: HOSPADM

## 2017-01-18 RX ORDER — LORAZEPAM 2 MG/ML
1 INJECTION INTRAMUSCULAR
Status: DISCONTINUED | OUTPATIENT
Start: 2017-01-18 | End: 2017-01-21

## 2017-01-18 RX ADMIN — Medication 10 ML: at 05:21

## 2017-01-18 RX ADMIN — METOCLOPRAMIDE 10 MG: 5 INJECTION, SOLUTION INTRAMUSCULAR; INTRAVENOUS at 05:21

## 2017-01-18 RX ADMIN — LORAZEPAM 1 MG: 2 INJECTION INTRAMUSCULAR; INTRAVENOUS at 20:36

## 2017-01-18 RX ADMIN — LORAZEPAM 1 MG: 2 INJECTION INTRAMUSCULAR; INTRAVENOUS at 13:39

## 2017-01-18 RX ADMIN — IPRATROPIUM BROMIDE AND ALBUTEROL SULFATE 3 ML: .5; 3 SOLUTION RESPIRATORY (INHALATION) at 20:00

## 2017-01-18 RX ADMIN — ACETAMINOPHEN 650 MG: 160 SOLUTION ORAL at 18:34

## 2017-01-18 RX ADMIN — OXYCODONE HYDROCHLORIDE 10 MG: 5 TABLET ORAL at 21:19

## 2017-01-18 RX ADMIN — BUMETANIDE 1 MG: 0.25 INJECTION, SOLUTION INTRAMUSCULAR; INTRAVENOUS at 11:19

## 2017-01-18 RX ADMIN — IPRATROPIUM BROMIDE AND ALBUTEROL SULFATE 3 ML: .5; 3 SOLUTION RESPIRATORY (INHALATION) at 07:39

## 2017-01-18 RX ADMIN — Medication 100 MG: at 11:19

## 2017-01-18 RX ADMIN — ACETAMINOPHEN 650 MG: 160 SOLUTION ORAL at 01:08

## 2017-01-18 RX ADMIN — CHLORHEXIDINE GLUCONATE 15 ML: 1.2 RINSE ORAL at 20:35

## 2017-01-18 RX ADMIN — FOLIC ACID 1 MG: 1 TABLET ORAL at 11:19

## 2017-01-18 RX ADMIN — Medication 10 ML: at 21:02

## 2017-01-18 RX ADMIN — INSULIN LISPRO 2 UNITS: 100 INJECTION, SOLUTION INTRAVENOUS; SUBCUTANEOUS at 05:21

## 2017-01-18 RX ADMIN — SODIUM CHLORIDE 1 MCG/KG/HR: 900 INJECTION, SOLUTION INTRAVENOUS at 04:10

## 2017-01-18 RX ADMIN — IPRATROPIUM BROMIDE AND ALBUTEROL SULFATE 3 ML: .5; 3 SOLUTION RESPIRATORY (INHALATION) at 11:37

## 2017-01-18 RX ADMIN — LORAZEPAM 2 MG: 2 INJECTION INTRAMUSCULAR; INTRAVENOUS at 04:52

## 2017-01-18 RX ADMIN — THERA TABS 1 TABLET: TAB at 11:19

## 2017-01-18 RX ADMIN — ENOXAPARIN SODIUM 40 MG: 40 INJECTION, SOLUTION INTRAVENOUS; SUBCUTANEOUS at 11:21

## 2017-01-18 NOTE — PROGRESS NOTES
Critical care interdisciplinary rounds held on 1/18/17.  ). Following members present, Pharmacy, Diabetes Treatment, Case Management, Respiratory Therapy, Physical Therapy, Pastoral Care, Palliative Care and Nutrition. Led by Siddharth Licona RN and Dr. Mehul Sorensen. Plan of care discussed. See clinical pathway for plan of care and interventions and desired outcomes.

## 2017-01-18 NOTE — PROGRESS NOTES
Hospitalist Progress Note    NAME: Christelle Arellano   :  1960   MRN:  [id number]       Interim Hospital Summary: 49 y.o. female whom presented on 2017 with right ankle deformity s/p injury R unstable trimalleolar ankle fx/dislocatio  ADHD on Adderall  Typically drinks 1 bottle of wine per day    Initially admitted to ortho service-- multiple attempts at ankle reduction were made with out success. On 1/3 s/p ORIF, on  consulted hospitalist for acute delirium ( with concern for alcohol withdrawal) on  transferred to ICU requiring ativan drip and precedex. Further deteriorated on  requiring intubation  Now with spiking fevers and shock liver  Ortho transferred the patient care to medicine service on       Critically ill, with respiratory failure,  ARDS, delirium tremens  GNR sepsis with septic shock      [facility] Record [phone] updated on 1/10     Assessment / Plan:    Acute hypoxic respiratory failure secondary  To  respiratory depression ( delirium tremens  Being on ativan and precedex)  With multiorgan failure ( shock liver, concern for hcap)  ecoli bacteremia secondary to e coli uti    Extubated on    Sputum cultures, neg, completed the antibiotics  Still with low grade fevers  Requiring pressors as well  Diuresis as needed  Chest xray:ETT in satisfactory position. Interval improved aeration of the lungs with some regression of pulmonary edema. Persistent bilateral small pleural effusions and probably some consolidation atleft base. Follow the blood cultures with gram neg rods with e coli   urine cultures with gram neg rods with ecoli  Platelets ok  Ammonia normal, would consider ct head  picc placed on   Follow the echo Systolic function was normal. Ejection fraction was estimated in the range of 55 % to 60 %. There were no regional wall motion abnormalities. Wall thickness was normal. DOPPLER:Left ventricular diastolic function parameters were normal for the patient's age.   juan jose checked the ankle on wednesday    Hypertension    Depression ADHD    Right trimalleolar ankle fracture, poa now s/p closed reduction and ORIF 1/2/17  -ortho managing  Code status: full  Prophylaxis: lovenox  Recommended Disposition: tbd     Subjective:     Chief Complaint / Reason for Physician Visit  Follow up on respiratory failure, delirium tremens  Extubated, confused, following some commands    Discussed with RN events overnight. Review of Systems:  Symptom Y/N Comments  Symptom Y/N Comments   Fever/Chills    Chest Pain     Poor Appetite    Edema     Cough    Abdominal Pain     Sputum    Joint Pain     SOB/FUENTES    Pruritis/Rash     Nausea/vomit    Tolerating PT/OT     Diarrhea    Tolerating Diet     Constipation    Other       Could NOT obtain due to: delirium     Objective:     VITALS:   Last 24hrs VS reviewed since prior progress note.  Most recent are:  Patient Vitals for the past 24 hrs:   Temp Pulse Resp BP SpO2   01/18/17 1200 - 99 16 113/62 97 %   01/18/17 1130 - 99 20 114/61 -   01/18/17 1100 - 93 20 113/46 94 %   01/18/17 1030 - 90 20 106/75 95 %   01/18/17 1000 - 86 13 106/81 91 %   01/18/17 0930 - 87 16 102/72 98 %   01/18/17 0800 - 76 21 (!) 86/54 97 %   01/18/17 0700 - 64 14 100/65 100 %   01/18/17 0600 97.5 °F (36.4 °C) 66 15 117/75 100 %   01/18/17 0500 - 70 17 136/79 99 %   01/18/17 0400 100.2 °F (37.9 °C) 77 13 106/69 95 %   01/18/17 0300 - 74 20 102/66 98 %   01/18/17 0200 - 76 22 115/68 98 %   01/18/17 0100 - 78 24 108/64 97 %   01/18/17 0000 - 82 28 106/61 96 %   01/17/17 2300 (!) 100.9 °F (38.3 °C) 80 21 120/71 96 %   01/17/17 2230 - 84 21 111/51 95 %   01/17/17 2130 - 82 22 108/71 99 %   01/17/17 2100 - 87 11 101/61 (!) 82 %   01/17/17 2000 - 87 20 107/68 96 %   01/17/17 1953 - - - - 96 %   01/17/17 1930 - 79 19 110/74 97 %   01/17/17 1900 (!) 100.6 °F (38.1 °C) 84 13 (!) 150/95 98 %   01/17/17 1857 - 84 - 144/90 -   01/17/17 1800 - 81 21 (!) 157/93 98 %       Intake/Output Summary (Last 24 hours) at 01/18/17 1706  Last data filed at 01/18/17 0700   Gross per 24 hour   Intake           783.02 ml   Output             1427 ml   Net          -643.98 ml        PHYSICAL EXAM:  General: Extubated, following commands some times  EENT:  EOMI. Anicteric sclerae. Resp:  B/l  Decreased air entry, no wheezing No accessory muscle use  CV:  Regular  rhythm,  No edema  GI:  Soft, Non distended, Non tender.  +Bowel sounds  Neurologic:  Awake , alert, not oriented  Psych:             Not anxious nor agitated  Skin:  No rashes. No jaundice    Reviewed most current lab test results and cultures  YES  Reviewed most current radiology test results   YES  Review and summation of old records today    NO  Reviewed patient's current orders and MAR    YES  PMH/ reviewed - no change compared to H&P  ________________________________________________________________________  Care Plan discussed with:    Comments   Patient     Family      RN y    Care Manager     Consultant                        Multidiciplinary team rounds were held today with , nursing, pharmacist and clinical coordinator. Patient's plan of care was discussed; medications were reviewed and discharge planning was addressed. ________________________________________________________________________  Total NON critical care TIME:25  Minutes    Total CRITICAL CARE TIME Spent:   Minutes non procedure based      Comments   >50% of visit spent in counseling and coordination of care     ________________________________________________________________________  Bandar Vieira MD     Procedures: see electronic medical records for all procedures/Xrays and details which were not copied into this note but were reviewed prior to creation of Plan. LABS:  I reviewed today's most current labs and imaging studies. Pertinent labs include:  No results for input(s): WBC, HGB, HCT, PLT, HGBEXT, HCTEXT, PLTEXT, HGBEXT, HCTEXT, PLTEXT in the last 72 hours.   No results for input(s): NA, K, CL, CO2, GLU, BUN, CREA, CA, MG, PHOS, ALB, TBIL, TBILI, SGOT, ALT, INR in the last 72 hours.     No lab exists for component: Gurpreet Ask    Signed: Kerry Mcgrath MD

## 2017-01-18 NOTE — PROGRESS NOTES
Attended Interdisciplinary rounds in Critical Care Unit, where patient care was discussed. Visit by: Cuate Estevez. Justo George.  Bethanie Peterson MA, [address]

## 2017-01-18 NOTE — PROGRESS NOTES
[facility]  Pulmonary, Critical Care, and Sleep Medicine    Name: Low Heredia MRN: [id number]   : 1960 Hospital: [facility]   Date: 2017        IMPRESSION:   · Acute hypoxic respiratory failure- extubated   · Delirium tremens- resovled but baseline? · Encephalopathy or baseline agitation and personality disorder  · Watch temp  · ARDS  · Severe E. Coli sepsis with septic shock - completed course  · Persistent fevers- better  · Acute renal failure  · Shock liver  · S/P surgery for ankle fracture 17  · EtOH abuse - 1 bottle wine/day  · Tobacco/MJ use      PLAN:   · Reorient  · PT, OT  · Stop diuretics  · Wean Pressors   · advacne diet  · Thiamine/MVI/folate   · Replete electrolytes as needed  · DVT/GI prophylaxis  · May transfer pt to floor soon     Subjective/Interval History:   I have reviewed the flowsheet and previous days notes.  off vent, off IV sedation but now very restless when stimulated. Loose stools. Hungry. Sitter overnight. Low grade fever 100.7   still failing SBT; now on IV precedex but still needs IV fentanyl and IV propofol; reepeat SBT successfula nd pt extubated   failed SAT this AM. Cultures NGSF  1/15 More secretions. Failed SBT today after one hour. Sputum cultured   failed SAT the other day.  Less restless this AM. No new secretions    Review of Systems   Unable to perform ROS: Mental status change     Objective:   Vital Signs:    Visit Vitals    /65    Pulse 64    Temp 97.5 °F (36.4 °C)    Resp 14    Ht 5' 4\" (1.626 m)    Wt 78.9 kg (173 lb 15.1 oz)    SpO2 100%    BMI 29.86 kg/m2       O2 Device: Nasal cannula   O2 Flow Rate (L/min): 4 l/min   Temp (24hrs), Av.7 °F (37.6 °C), Min:97.5 °F (36.4 °C), Max:101.2 °F (38.4 °C)       Intake/Output:   Last shift:         Last 3 shifts:  1901 -  0700  In: 7983 [I.V.:1167]  Out: 4327 [UNNAN:3136]    Intake/Output Summary (Last 24 hours) at 01/18/17 1137  Last data filed at 01/18/17 0700   Gross per 24 hour   Intake           783.02 ml   Output             3027 ml   Net         -2243.98 ml     Hemodynamics:   PAP:   CO:     Wedge:   CI:     CVP:    SVR:       PVR:       Ventilator Settings:  Mode Rate Tidal Volume Pressure FiO2 PEEP   Spontaneous   450 ml  5 cm H2O 40 % 5 cm H20     Peak airway pressure: 22 cm H2O    Minute ventilation: 6.93 l/min       Physical Exam   Constitutional: Vital signs are normal. She has a sickly appearance. Nasal cannula in place. HENT:   Head: Normocephalic and atraumatic. Mouth/Throat: No oropharyngeal exudate. Eyes: No scleral icterus. Cardiovascular: Normal rate and regular rhythm. Exam reveals no gallop. Pulmonary/Chest: No respiratory distress. She has no wheezes. She has rales. Abdominal: Soft. Bowel sounds are normal. She exhibits no distension. There is no tenderness. Musculoskeletal: She exhibits edema. Skin: Skin is warm and dry.      Data:     Current Facility-Administered Medications   Medication Dose Route Frequency    insulin lispro (HUMALOG) injection   SubCUTAneous Q6H    polyethylene glycol (MIRALAX) packet 17 g  17 g Oral DAILY    PHENYLephrine (NEOSYNEPHRINE) 100,000 mcg in 0.9% sodium chloride 250 mL infusion   mcg/min IntraVENous TITRATE    albuterol-ipratropium (DUO-NEB) 2.5 MG-0.5 MG/3 ML  3 mL Nebulization QID RT    chlorhexidine (PERIDEX) 0.12 % mouthwash 15 mL  15 mL Oral Q12H    thiamine (B-1) tablet 100 mg  100 mg Per NG tube DAILY    folic acid (FOLVITE) tablet 1 mg  1 mg Per NG tube DAILY    therapeutic multivitamin (THERAGRAN) tablet 1 Tab  1 Tab Oral DAILY    sodium chloride (NS) flush 10 mL  10 mL InterCATHeter Q24H    sodium chloride (NS) flush 10-40 mL  10-40 mL InterCATHeter Q8H    nicotine (NICODERM CQ) 14 mg/24 hr patch 1 Patch  1 Patch TransDERmal DAILY    enoxaparin (LOVENOX) injection 40 mg  40 mg SubCUTAneous Q24H                Labs:  No results for input(s): WBC, HGB, HCT, PLT, HGBEXT, HCTEXT, PLTEXT, HGBEXT, HCTEXT, PLTEXT in the last 72 hours. No results for input(s): NA, K, CL, CO2, GLU, BUN, CREA, CA, MG, PHOS, ALB, TBIL, TBILI, SGOT, ALT, INR in the last 72 hours. No lab exists for component: INREXT, INREXT  No results for input(s): PH, PCO2, PO2, HCO3, FIO2 in the last 72 hours.   Imaging:  I have personally reviewed the patients radiographs and have reviewed the reports:none today  Decreasing bilateral infiltrates       Yandel Seals MD

## 2017-01-18 NOTE — PROGRESS NOTES
, patient received from day shift. Febrile 100.7 bladder. Neuro; Alert and oriented to self situation but not all the time, do not know the , GCS;  Eye; 4, verbal; 4, motor; 6. On Precedex at 1 jon/kg/hr, Fentanyl gtt 75 jon/hr. Respiratory; Sat 98% on NC 4 L/min, diminished lower lung sounds. Cardiac; NSR, 87 B/min, NIBP 107/68 mmHg, on Bob at 50 jon/min  GI; NPO for today as she extubated today, Abd soft with active bowel sound, NGT at 62 cm level,   Renal; funk cath with adequate urine out put. Skin; cast at right leg  Endo; SSI Q 6 hr,   Lines; Left PICC, NGT, Funk. Code; Full. Lab; WBC; 8.6,  HGB; 9.5, platelet; 269 , Na; 981 , K; 3.7 , BUN; 19, Crea; 0.79 ,  DVT; SCD left leg & Lovenox. Plan; Titrate Bob to keep MAP above 65 mmHg, pain and agitation management, follow lab tomorrow,      0700, patient been agitation twice over night ativan 2 mg IV PRN given twice > patient sleeping after each dose of ativan, febrile 100.7, tylenol 650 mg per NGT given twice, Temp in morning 97.5 bladder with help of cooling blanket, Bob and precedex titrated down in morning, no lab today, CXR done,   0730, Bedside and Verbal shift change report given to Jessie Ballard RN (oncoming nurse) by Austyn Case RN (offgoing nurse). Report included the following information SBAR, Kardex, Intake/Output, MAR, Accordion, Recent Results and Cardiac Rhythm NSR.          Problem: Falls - Risk of  Goal: *Absence of falls  Outcome: Progressing Towards Goal  .  Goal: *Knowledge of fall prevention  Outcome: Progressing Towards Goal  .    Problem: Patient Education: Go to Patient Education Activity  Goal: Patient/Family Education  Outcome: Progressing Towards Goal  .    Problem: Pressure Ulcer - Risk of  Goal: *Prevention of pressure ulcer  Outcome: Progressing Towards Goal  .    Problem: Patient Education: Go to Patient Education Activity  Goal: Patient/Family Education  Outcome: Progressing Towards Goal  .    Problem: Breathing Pattern - Ineffective  Goal: *Absence of hypoxia  Outcome: Progressing Towards Goal  .  Goal: *Use of effective breathing techniques  Outcome: Progressing Towards Goal  .

## 2017-01-19 LAB
EST. AVERAGE GLUCOSE BLD GHB EST-MCNC: 111 MG/DL
GLUCOSE BLD STRIP.AUTO-MCNC: 130 MG/DL (ref 65–100)
GLUCOSE BLD STRIP.AUTO-MCNC: 130 MG/DL (ref 65–100)
GLUCOSE BLD STRIP.AUTO-MCNC: 134 MG/DL (ref 65–100)
HBA1C MFR BLD: 5.5 % (ref 4.2–6.3)
SERVICE CMNT-IMP: ABNORMAL

## 2017-01-19 PROCEDURE — G8979 MOBILITY GOAL STATUS: HCPCS

## 2017-01-19 PROCEDURE — 74011250636 HC RX REV CODE- 250/636: Performed by: HOSPITALIST

## 2017-01-19 PROCEDURE — 74011000250 HC RX REV CODE- 250: Performed by: HOSPITALIST

## 2017-01-19 PROCEDURE — 74011250636 HC RX REV CODE- 250/636: Performed by: ORTHOPAEDIC SURGERY

## 2017-01-19 PROCEDURE — 77030027138 HC INCENT SPIROMETER -A

## 2017-01-19 PROCEDURE — 97165 OT EVAL LOW COMPLEX 30 MIN: CPT

## 2017-01-19 PROCEDURE — 36415 COLL VENOUS BLD VENIPUNCTURE: CPT | Performed by: HOSPITALIST

## 2017-01-19 PROCEDURE — 82962 GLUCOSE BLOOD TEST: CPT

## 2017-01-19 PROCEDURE — 94640 AIRWAY INHALATION TREATMENT: CPT

## 2017-01-19 PROCEDURE — 74011250637 HC RX REV CODE- 250/637: Performed by: INTERNAL MEDICINE

## 2017-01-19 PROCEDURE — 74011250636 HC RX REV CODE- 250/636: Performed by: INTERNAL MEDICINE

## 2017-01-19 PROCEDURE — 97164 PT RE-EVAL EST PLAN CARE: CPT

## 2017-01-19 PROCEDURE — 65270000015 HC RM PRIVATE ONCOLOGY

## 2017-01-19 PROCEDURE — 74011000250 HC RX REV CODE- 250: Performed by: INTERNAL MEDICINE

## 2017-01-19 PROCEDURE — 83036 HEMOGLOBIN GLYCOSYLATED A1C: CPT | Performed by: HOSPITALIST

## 2017-01-19 PROCEDURE — G8978 MOBILITY CURRENT STATUS: HCPCS

## 2017-01-19 RX ORDER — FOLIC ACID 1 MG/1
1 TABLET ORAL DAILY
Status: DISCONTINUED | OUTPATIENT
Start: 2017-01-20 | End: 2017-01-24 | Stop reason: HOSPADM

## 2017-01-19 RX ORDER — LANOLIN ALCOHOL/MO/W.PET/CERES
100 CREAM (GRAM) TOPICAL DAILY
Status: DISCONTINUED | OUTPATIENT
Start: 2017-01-20 | End: 2017-01-24 | Stop reason: HOSPADM

## 2017-01-19 RX ORDER — DEXTROAMPHETAMINE SACCHARATE, AMPHETAMINE ASPARTATE MONOHYDRATE, DEXTROAMPHETAMINE SULFATE AND AMPHETAMINE SULFATE 2.5; 2.5; 2.5; 2.5 MG/1; MG/1; MG/1; MG/1
10 CAPSULE, EXTENDED RELEASE ORAL
Status: DISCONTINUED | OUTPATIENT
Start: 2017-01-20 | End: 2017-01-20

## 2017-01-19 RX ORDER — DESVENLAFAXINE 50 MG/1
50 TABLET, EXTENDED RELEASE ORAL DAILY
Status: DISCONTINUED | OUTPATIENT
Start: 2017-01-20 | End: 2017-01-24 | Stop reason: HOSPADM

## 2017-01-19 RX ADMIN — Medication 10 ML: at 14:35

## 2017-01-19 RX ADMIN — ENOXAPARIN SODIUM 40 MG: 40 INJECTION, SOLUTION INTRAVENOUS; SUBCUTANEOUS at 08:29

## 2017-01-19 RX ADMIN — Medication 10 ML: at 14:34

## 2017-01-19 RX ADMIN — WATER 2 MG: 1 INJECTION INTRAMUSCULAR; INTRAVENOUS; SUBCUTANEOUS at 19:07

## 2017-01-19 RX ADMIN — Medication 60 ML: at 21:12

## 2017-01-19 RX ADMIN — LORAZEPAM 1 MG: 2 INJECTION INTRAMUSCULAR; INTRAVENOUS at 02:35

## 2017-01-19 RX ADMIN — Medication 10 ML: at 05:21

## 2017-01-19 RX ADMIN — THERA TABS 1 TABLET: TAB at 08:29

## 2017-01-19 RX ADMIN — FOLIC ACID 1 MG: 1 TABLET ORAL at 08:29

## 2017-01-19 RX ADMIN — LORAZEPAM 1 MG: 2 INJECTION INTRAMUSCULAR; INTRAVENOUS at 22:15

## 2017-01-19 RX ADMIN — IPRATROPIUM BROMIDE AND ALBUTEROL SULFATE 3 ML: .5; 3 SOLUTION RESPIRATORY (INHALATION) at 07:56

## 2017-01-19 RX ADMIN — Medication 20 ML: at 22:15

## 2017-01-19 RX ADMIN — Medication 100 MG: at 08:29

## 2017-01-19 RX ADMIN — SODIUM CHLORIDE, PRESERVATIVE FREE 300 UNITS: 5 INJECTION INTRAVENOUS at 15:58

## 2017-01-19 RX ADMIN — WATER 1 MG: 1 INJECTION INTRAMUSCULAR; INTRAVENOUS; SUBCUTANEOUS at 19:09

## 2017-01-19 NOTE — PROGRESS NOTES
1646 Re-assessed PICC line after heparinizing red port. Flushes but still no blood return. Attempted to draw labs from gray port, blood return but blood eventually stopped. Only able to draw out 6mL of \"wasted\" tube. White port no longer flushing. Will call PICC team again. Attempting to obtain labs via peripheral stick. 1650 Left message for PICC team.     3479 TTCV team assessed PICC. White port flushing with blood return. Red and gray ports not working. Per PICC team request, paged Dr. Batool Salas to request order for cath-vicky.

## 2017-01-19 NOTE — PROGRESS NOTES
Problem: Mobility Impaired (Adult and Pediatric)  Goal: *Acute Goals and Plan of Care (Insert Text)  Physical Therapy Goals  Revised 1/19/2017  1. Patient will move from supine to sit and sit to supine , scoot up and down and roll side to side in bed with modified independence within 7 day(s). 2. Patient will transfer from bed to chair and chair to bed with minimal assistance/contact guard assist using the least restrictive device within 7 day(s). 3. Patient will perform sit to stand with minimal assistance/contact guard assist within 7 day(s). 4. Patient will ambulate with minimal assistance/contact guard assist for 25 feet with the least restrictive device within 7 day(s). All while maintaining NWB status of R LE    Initiated 1/4/2017  1. Patient will move from supine to sit and sit to supine in bed with independence within 7 day(s). 2. Patient will transfer from bed to chair and chair to bed with supervision/set-up using the least restrictive device within 7 day(s). 3. Patient will perform sit to stand with supervision/set-up within 7 day(s). 4. Patient will ambulate with supervision/set-up for 100 feet with the least restrictive device within 7 day(s). 5. Patient will ascend/descend 8 stairs with B handrail(s) with supervision/set-up within 7 day(s).    PHYSICAL THERAPY REEVALUATION  Patient: Tj Dave (74 y.o. female)  Date: 1/19/2017  Primary Diagnosis: RIGHT ANKLE FRACTURE  Closed right trimalleolar fracture, initial encounter  Right Ankle Fracture  Procedure(s) (LRB):  REMOVAL EXTERNAL FIXATOR AND RIGHT ANKLE OPEN REDUCTION INTERNAL FIXATION, GASTROCNEMIUS RECESSION (Req Large C-Arm, Ti Mini and Small Frag and Tong Clamps) (Right) 16 Days Post-Op   Precautions:   NWB, Fall      ASSESSMENT :  Based on the objective data described below, the patient presents with impaired functional mobility secondary to increased confusion, impulsiveness, decreased safety awareness, noncompliance with NWB status of R LE, decreased insight into deficits, generalized weakness, and decreased activity tolerance/endurance. Pt is s/p ORIF of R ankle fx on 1/2/17 then went through DTs, requiring transfer to CCU and intubated. Pt successfully extubated and cleared for participation with therapy by RN and MD. Tanay Tara pt regarding NWB status of R LE however question pt's retention of information/education given confusion/impaired cognition. Pt assumed seated position EOB at supervision level however required CG-supervision while EOB given increased impulsion and impaired safety awareness. Pt sit<>stand w/ minAx2 however noted to be bearing full weight through RLE despite increased VC. Once standing, pt required minAx2 and HHAx2 to maintain static stance. Weightbearing improved to 50% through R LE however pt unable to maintain NWB. Pt impulsively and unsafely returned herself to seated position EOB. Sit<>stand attempted a 2nd time, again with minAx2 and HHAx2 however pt refused attempts to mobilize to bedside chair, again unsafely and impulsively returning self to seated position EOB. All VSS throughout on RA. At this time, pt's impaired cognition and decreased safety awareness/impulsiveness are biggest limiting factor's to SAFE, functional mobility. Pending resolution of confusion, recommend SNF vs Inpatient Rehab. .     Patient will benefit from skilled intervention to address the above impairments.   Patients rehabilitation potential is considered to be Fair  Factors which may influence rehabilitation potential include:   [ ]           None noted  [X]           Mental ability/status  [ ]           Medical condition  [ ]           Home/family situation and support systems  [ ]           Safety awareness  [ ]           Pain tolerance/management  [ ]           Other:        PLAN :  Recommendations and Planned Interventions:  [X]             Bed Mobility Training             [ ]      Neuromuscular Re-Education  [X] Transfer Training                   [ ]      Orthotic/Prosthetic Training  [X]             Gait Training                         [ ]      Modalities  [X]             Therapeutic Exercises           [ ]      Edema Management/Control  [X]             Therapeutic Activities            [X]      Patient and Family Training/Education  [ ]             Other (comment):  Frequency/Duration: Patient will be followed by physical therapy daily to address goals. Discharge Recommendations: Inpatient Rehab vs SNF   Further Equipment Recommendations for Discharge: TBD       SUBJECTIVE:   Patient stated I just want to go home.       OBJECTIVE DATA SUMMARY:       Past Medical History   Diagnosis Date    Hypertension      Ill-defined condition         high cholesterol    Psychiatric disorder         depression, ADHD     Past Surgical History   Procedure Laterality Date    Hx other surgical           colonoscopy    Hx gyn           [facility] course since last seen and reason for reevaluation: Pt s/p ORIF of R kattolar fx, went through DTs and transferred to CCU and intubated.    Critical Behavior:  Neurologic State: Agitated, Restless  Orientation Level: Disoriented to time, Oriented to person, Oriented to place  Cognition: Follows commands     Skin: clean, dry, intact  Strength:    Strength: Generally decreased, functional                       Tone & Sensation:   Tone: Normal              Sensation: Intact               Range Of Motion:  AROM: Within functional limits                       Coordination:  Coordination: Generally decreased, functional     Functional Mobility:  Bed Mobility:  Rolling: Supervision  Supine to Sit: Supervision  Sit to Supine: Supervision  Scooting: Supervision  Transfers:  Sit to Stand: Minimum assistance;Assist x2  Stand to Sit: Minimum assistance;Assist x2                    Balance:   Sitting: Impaired (needs supervision/CGA secondary to impulsiveness )  Sitting - Static: Fair (occasional)  Sitting - Dynamic: Fair (occasional)  Standing: Impaired; With support  Standing - Static: Fair;Constant support  Ambulation/Gait Training:              Gait Description (WDL):  (ambulation did not occur )     Right Side Weight Bearing: Non-weight bearing                                            Ambulation did not occur. Functional Measure:  Barthel Index:      Bathin  Bladder: 5  Bowels: 5  Groomin  Dressin  Feedin  Mobility: 0  Stairs: 0  Toilet Use: 5  Transfer (Bed to Chair and Back): 5  Total: 30         Barthel and G-code impairment scale:  Percentage of impairment CH  0% CI  1-19% CJ  20-39% CK  40-59% CL  60-79% CM  80-99% CN  100%   Barthel Score 0-100 100 99-80 79-60 59-40 20-39 1-19    0   Barthel Score 0-20 20 17-19 13-16 9-12 5-8 1-4 0      The Barthel ADL Index: Guidelines  1. The index should be used as a record of what a patient does, not as a record of what a patient could do. 2. The main aim is to establish degree of independence from any help, physical or verbal, however minor and for whatever reason. 3. The need for supervision renders the patient not independent. 4. A patient's performance should be established using the best available evidence. Asking the patient, friends/relatives and nurses are the usual sources, but direct observation and common sense are also important. However direct testing is not needed. 5. Usually the patient's performance over the preceding 24-48 hours is important, but occasionally longer periods will be relevant. 6. Middle categories imply that the patient supplies over 50 per cent of the effort. 7. Use of aids to be independent is allowed. Mansoor Juan., Barthel, D.W. ([id number]). Functional evaluation: the Barthel Index. [facility] (14)2. CÉSAR Azul, Kenyetta Lal., Suraj Hitchcock, [address] [place] [address] ().  Measuring the change indisability after inpatient rehabilitation; comparison of the responsiveness of the Barthel Index and Functional [place] Measure. Journal of Neurology, Neurosurgery, and Psychiatry, 66(4), 329-588. CANDIDA Regalado.A, VALENTIN Chacon, & Rhea Marquez M.A. (2004.) Assessment of post-stroke quality of life in cost-effectiveness studies: The usefulness of the Barthel Index and the EuroQoL-5D. Quality of Life Research, 13, 982-60         G codes: In compliance with CMSs Claims Based Outcome Reporting, the following G-code set was chosen for this patient based on their primary functional limitation being treated: The outcome measure chosen to determine the severity of the functional limitation was the Barthel Index with a score of 30/100 which was correlated with the impairment scale. · Mobility - Walking and Moving Around:               - CURRENT STATUS:    CL - 60%-79% impaired, limited or restricted               - GOAL STATUS:           CJ - 20%-39% impaired, limited or restricted               - D/C STATUS:                       ---------------To be determined---------------      Pain:  Pain Scale 1: Numeric (0 - 10)  Pain Intensity 1: 0              Activity Tolerance:   VSS throughout on RA however impaired cognition impairs ability to actively participate  Please refer to the flowsheet for vital signs taken during this treatment. After treatment:   [ ]  Patient left in no apparent distress sitting up in chair  [X]  Patient left in no apparent distress in bed  [X]  Call bell left within reach  [X]  Nursing notified  [X]  Caregiver present - caregiver and 1:1 sitter  [ ]  Bed alarm activated      COMMUNICATION/EDUCATION:   The patients plan of care was discussed with: Occupational Therapist and Registered Nurse.  [X]  Fall prevention education was provided and the patient/caregiver indicated understanding. [X]  Patient/family have participated as able in goal setting and plan of care.   [X]  Patient/family agree to work toward stated goals and plan of care.  [ ]  Patient understands intent and goals of therapy, but is neutral about his/her participation. [ ]  Patient is unable to participate in goal setting and plan of care.      Thank you for this referral.  Zaida Luo, PT, DPT   Time Calculation: 16 mins
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
## 2017-01-19 NOTE — PROGRESS NOTES
Occupational Therapy Goals  Initiated 1/19/2017  1. Patient will follow basic one step commands with 100% accuracy within 7 days. 2. Patient will perform grooming with supervision/set-up within 7 day(s). 3. Patient will perform upper bathing seated with supervision/set-up within 7 day(s). 4. Patient will perform toilet transfers to [facility] with minimal assistance/contact guard assist within 7 day(s). 5. Patient will perform all aspects of toileting with minimal assistance/contact guard assist within 7 day(s). 6. Patient will adhere to NWB R LE with no more than 2 verbal cues within 7 days. Occupational Therapy EVALUATION  [facility] (28 [facility]. female)  Date: 1/19/2017  Primary Diagnosis: RIGHT ANKLE FRACTURE  Closed right trimalleolar fracture, initial encounter  Right Ankle Fracture  Procedure(s) (LRB):  REMOVAL EXTERNAL FIXATOR AND RIGHT ANKLE OPEN REDUCTION INTERNAL FIXATION, GASTROCNEMIUS RECESSION (Req Large C-Arm, Ti Mini and Small Frag and Tong Clamps) (Right) 16 Days Post-Op   Precautions:   NWB, Fall    ASSESSMENT :  Based on the objective data described below, the patient presents with impulsivity, irritability, poor safety awareness, impaired command following, poor adherence to NWB R LE, risk for falls and decreased independence in ADLs s/p admission for R ankle ORIF POD 16. Pt with complicated medical course, closed ORIF 1/2 R ankle, admitted to CCU from PCU 1/7 for delirium tremens and ETOH withdrawal, intubated 1/8 and extubated 1/17. Pt was supervision for bed mobility, but pt repeatedly returning to supine while EOB despite max encouragement for OOB activity. Pt completed 2 sit to stands with HHA x 2, poor adherence to WB (about 50% on R LE) despite cues. Unsafe to attempt RW as pt was grabbing walker with severe forward flexion. Pt with poor command following for any tasks and pt voluntarily returned to supine, refusing transfer to chair.  and sitter present.  At this time, pt's progress with therapy is limited by her cognitive status and irritability. If appropriate to transfer OOB to chair, recommend use of recliner chair for safety and to decrease fall risk. She is fluctuating on her assist level for basic ADLs due to cognitive state. Will need SNF rehab at this time but pending progress, may be appropriate for IP if mentation clears. Patient will benefit from skilled intervention to address the above impairments. Patients rehabilitation potential is considered to be Fair  Factors which may influence rehabilitation potential include:   []             None noted  [x]             Mental ability/status  [x]             Medical condition  []             Home/family situation and support systems  []             Safety awareness  []             Pain tolerance/management  []             Other:      PLAN :  Recommendations and Planned Interventions:  [x]               Self Care Training                  [x]        Therapeutic Activities  [x]               Functional Mobility Training    []        Cognitive Retraining  [x]               Therapeutic Exercises           [x]        Endurance Activities  [x]               Balance Training                   []        Neuromuscular Re-Education  []               Visual/Perceptual Training     [x]   Home Safety Training  [x]               Patient Education                 [x]        Family Training/Education  []               Other (comment):    Frequency/Duration: Patient will be followed by occupational therapy 4 times a week to address goals.   Discharge Recommendations: [facility] at this time, IP if mentation improves  Further Equipment Recommendations for Discharge: TBD     SUBJECTIVE:   Patient stated I need to rest.    OBJECTIVE DATA SUMMARY:   HISTORY:   Past Medical History   Diagnosis Date    Hypertension     Ill-defined condition      high cholesterol    Psychiatric disorder      depression, ADHD     Past Surgical History   Procedure Laterality Date    Hx other surgical       colonoscopy    Hx gyn       D&C X2       Prior Level of Function/Home Situation: independent at baseline, working, lives with   Expanded or extensive additional review of patient history: hx depression, ADHD, ETOH abuse    Home Situation  Home Environment: Private residence  # Steps to Enter: 8  Rails to Enter: Yes  Hand Rails : Bilateral  One/Two Story Residence: Other (Comment) (tri level)  # of Interior Steps: 7  Living Alone: No  Support Systems: Spouse/Significant Other/Partner  Patient Expects to be Discharged to[de-identified] Private residence  Current DME Used/Available at Home: None  Tub or Shower Type: Shower  [x]  Right hand dominant   []  Left hand dominant    EXAMINATION OF PERFORMANCE DEFICITS:  Cognitive/Behavioral Status:  Neurologic State: Agitated; Restless  Orientation Level: Disoriented to time;Oriented to person;Oriented to place  Cognition: Follows commands           Skin: R bandage C/D/I  Edema: None noted  Vision/Perceptual:                                   Range of Motion:    AROM: Within functional limits                       Strength:    Strength: Generally decreased, functional              Coordination:  Coordination: Generally decreased, functional  Fine Motor Skills-Upper: Left Intact; Right Intact    Gross Motor Skills-Upper: Left Intact; Right Intact  Tone & Sensation:    Tone: Normal  Sensation: Intact to light touch distal R toes                      Balance:  Sitting: Impaired (needs supervision/CGA secondary to impulsiveness )  Sitting - Static: Fair (occasional)  Sitting - Dynamic: Fair (occasional)  Standing: Impaired; With support  Standing - Static: Fair;Constant support    Functional Mobility and Transfers for ADLs:  Bed Mobility:  Rolling: Supervision  Supine to Sit: Supervision  Sit to Supine: Supervision  Scooting: Supervision    Transfers:  Sit to Stand: Minimum assistance;Assist x2  Stand to Sit: Minimum assistance;Assist x2    ADL Assessment:  Feeding: Independent    Oral Facial Hygiene/Grooming: Supervision    Bathing: Moderate assistance    Upper Body Dressing: Minimum assistance    Lower Body Dressing: Maximum assistance (for managment in standing, poor adhernce NWB R LE)    Toileting: Maximum assistance                ADL Intervention and task modifications:   educated pt on role of NWB  RLE, pt verbally indicated understanding but required maximum cues to adhere. Pt with poor safety awareness and poor insight into deficits despite education and cues. Functional Measure:  Barthel Index:    Bathin  Bladder: 5  Bowels: 5  Groomin  Dressin  Feedin  Mobility: 0  Stairs: 0  Toilet Use: 5  Transfer (Bed to Chair and Back): 5  Total: 30       Barthel and G-code impairment scale:  Percentage of impairment CH  0% CI  1-19% CJ  20-39% CK  40-59% CL  60-79% CM  80-99% CN  100%   Barthel Score 0-100 100 99-80 79-60 59-40 20-39 1-19   0   Barthel Score 0-20 20 17-19 13-16 9-12 5-8 1-4 0      The Barthel ADL Index: Guidelines  1. The index should be used as a record of what a patient does, not as a record of what a patient could do. 2. The main aim is to establish degree of independence from any help, physical or verbal, however minor and for whatever reason. 3. The need for supervision renders the patient not independent. 4. A patient's performance should be established using the best available evidence. Asking the patient, friends/relatives and nurses are the usual sources, but direct observation and common sense are also important. However direct testing is not needed. 5. Usually the patient's performance over the preceding 24-48 hours is important, but occasionally longer periods will be relevant. 6. Middle categories imply that the patient supplies over 50 per cent of the effort. 7. Use of aids to be independent is allowed. Angelina Welch., Barthel, D.W. ([id number]). Functional evaluation: the Barthel Index. [address] [facility] (14)2. Adriane [place] rachel CÉSAR Pickens, Jose Ramon Hebert.[facility]., Suraj, [address] (1999). Measuring the change indisability after inpatient rehabilitation; comparison of the responsiveness of the Barthel Index and Functional [place] Measure. Journal of Neurology, Neurosurgery, and Psychiatry, 66(4), 385-291. MARK Villarreal, VALENTIN Chacon, & Nayeli Rice M.A. (2004.) Assessment of post-stroke quality of life in cost-effectiveness studies: The usefulness of the Barthel Index and the EuroQoL-5D. Quality of Life Research, 13, 331-74         G codes: In compliance with CMSs Claims Based Outcome Reporting, the following G-code set was chosen for this patient based on their primary functional limitation being treated: The outcome measure chosen to determine the severity of the functional limitation was the barthel index with a score of 30/100 which was correlated with the impairment scale. ? Self Care:     - CURRENT STATUS: CL - 60%-79% impaired, limited or restricted    - GOAL STATUS: CJ - 20%-39% impaired, limited or restricted    - D/C STATUS:  ---------------To be determined---------------     Occupational Therapy Evaluation Charge Determination   History Examination Decision-Making   MEDIUM Complexity : Expanded review of history including physical, cognitive and psychosocial  history  LOW Complexity : 1-3 performance deficits relating to physical, cognitive , or psychosocial skils that result in activity limitations and / or participation restrictions  MEDIUM Complexity : Patient may present with comorbidities that affect occupational performnce.  Miniml to moderate modification of tasks or assistance (eg, physical or verbal ) with assesment(s) is necessary to enable patient to complete evaluation       Based on the above components, the patient evaluation is determined to be of the following complexity level: LOW Pain:  Pain Scale 1: Numeric (0 - 10)  Pain Intensity 1: 0              Activity Tolerance:   VSS  Please refer to the flowsheet for vital signs taken during this treatment. After treatment:   [] Patient left in no apparent distress sitting up in chair  [x] Patient left in no apparent distress in bed  [x] Call bell left within reach  [x] Nursing notified  [x] Caregiver present  [] Bed alarm activated    COMMUNICATION/EDUCATION:   The patients plan of care was discussed with: Physical Therapist, Registered Nurse and Certified Nursing Assistant/Patient Care Technician.  [] Home safety education was provided and the patient/caregiver indicated understanding. [x] Patient/family have participated as able in goal setting and plan of care. [] Patient/family agree to work toward stated goals and plan of care. [] Patient understands intent and goals of therapy, but is neutral about his/her participation. [] Patient is unable to participate in goal setting and plan of care. This patients plan of care is appropriate for delegation to LEO.     Thank you for this referral.  Saul Martines OT  Time Calculation: 12 mins

## 2017-01-19 NOTE — PROGRESS NOTES
Attended Interdisciplinary rounds in Critical Care Unit, where patient care was discussed. Visit by: Cm Walton. Татьяна Burch.  Morris Méndez MA, [address]

## 2017-01-19 NOTE — PROGRESS NOTES
Following pt's progress. Extubated on 1/17 - now on RA. Pt now with confusion, impulsiveness - has required sitter. PT/OT have evaluated and feel she has rehab potential if cognition clears- possible inpt rehab. Psych consult ordered. Pt transferred to [phone] for routine progression of care. REf sent to  Baljeet Echevarria via [facility] [address].   MIA Hercules

## 2017-01-19 NOTE — PROGRESS NOTES
Primary Nurse Mehnaz Silva RN and Chapis Castellanos RN performed a dual skin assessment on this patient No impairment noted, except ace bandage to left leg and blanchable redness on sacrum.    Sukhi score is 16

## 2017-01-19 NOTE — PROGRESS NOTES
Hospitalist Progress Note    NAME: Shavon Lama   :  1960   MRN:  [id number]       Interim Hospital Summary: 49 y.o. female whom presented on 2017 with right ankle deformity s/p injury R unstable trimalleolar ankle fx/dislocatio  ADHD on Adderall  Typically drinks 1 bottle of wine per day    Initially admitted to ortho service-- multiple attempts at ankle reduction were made with out success. On 1/3 s/p ORIF, on  consulted hospitalist for acute delirium ( with concern for alcohol withdrawal) on  transferred to ICU requiring ativan drip and precedex. Further deteriorated on  requiring intubation  Now with spiking fevers and shock liver  Ortho transferred the patient care to medicine service on       , with respiratory failure,  ARDS, delirium tremens,GNR sepsis with septic shock  Now getting better, extubated, completed antibiotics  Psych to see regarding bipolar disorder    Jose Heredia [phone] updated on 1/10     Assessment / Plan:  Psych to see   Pt supposed to be adderell and desvenlafaxine  May need to restart, wait for psych recommendations  Pt/ot -- inpatient rehab to snf    Hyperglycemia  Check hba1c  poc and achs      Acute hypoxic respiratory failure secondary  To  respiratory depression ( delirium tremens  Being on ativan and precedex)  With multiorgan failure ( shock liver, concern for hcap)  ecoli bacteremia secondary to e coli uti    Extubated on , Sputum cultures, neg, completed the antibiotics    Chest xray:ETT in satisfactory position. Interval improved aeration of the lungs with some regression of pulmonary edema. Persistent bilateral small pleural effusions and probably some consolidation atleft base. Follow the blood cultures with gram neg rods with e coli   urine cultures with gram neg rods with ecoli  Platelets ok  Ammonia normal,   picc placed on   Follow the echo Systolic function was normal. Ejection fraction was estimated in the range of 55 % to 60 %. There were no regional wall motion abnormalities. Wall thickness was normal. DOPPLER:Left ventricular diastolic function parameters were normal for the patient's age. ortho checked the ankle on wednesday    Hypertension    Depression ADHD    Right trimalleolar ankle fracture, poa now s/p closed reduction and ORIF 1/2/17  -ortho managing  Code status: full  Prophylaxis: lovenox  Recommended Disposition: tbd     Subjective:     Chief Complaint / Reason for Physician Visit  Follow up on respiratory failure, delirium tremens   no acute complaints    Discussed with RN events overnight. Review of Systems:  Symptom Y/N Comments  Symptom Y/N Comments   Fever/Chills n   Chest Pain n    Poor Appetite    Edema     Cough n   Abdominal Pain n    Sputum    Joint Pain     SOB/FUENTES n   Pruritis/Rash     Nausea/vomit    Tolerating PT/OT     Diarrhea    Tolerating Diet     Constipation    Other       Could NOT obtain due to:      Objective:     VITALS:   Last 24hrs VS reviewed since prior progress note.  Most recent are:  Patient Vitals for the past 24 hrs:   Temp Pulse Resp BP SpO2   01/19/17 1129 97.6 °F (36.4 °C) 85 17 116/74 93 %   01/19/17 0900 - 96 15 115/61 94 %   01/19/17 0800 98.7 °F (37.1 °C) 93 17 108/68 95 %   01/19/17 0756 - - - - 96 %   01/19/17 0700 - 95 14 117/66 95 %   01/19/17 0600 - 94 20 108/67 94 %   01/19/17 0518 - 90 18 108/68 97 %   01/19/17 0500 - (!) 103 18 - 97 %   01/19/17 0400 98.5 °F (36.9 °C) 94 18 120/64 98 %   01/19/17 0300 - 92 20 113/55 93 %   01/19/17 0200 - 94 16 96/71 92 %   01/19/17 0100 - 92 18 103/64 95 %   01/19/17 0000 - 93 14 119/60 95 %   01/18/17 2300 98.8 °F (37.1 °C) 93 10 107/55 97 %   01/18/17 2200 - 96 19 94/69 99 %   01/18/17 2100 - (!) 109 20 125/72 99 %   01/18/17 2000 - - - - 97 %   01/18/17 1900 98.4 °F (36.9 °C) 100 15 112/71 100 %   01/18/17 1800 - (!) 102 16 116/77 -   01/18/17 1700 - 100 24 113/62 -       Intake/Output Summary (Last 24 hours) at 01/19/17 1607  Last data filed at 01/19/17 0400   Gross per 24 hour   Intake              560 ml   Output              300 ml   Net              260 ml        PHYSICAL EXAM:  General: Alert, oriented, following commands  EENT:  EOMI. Anicteric sclerae. Resp:  B/l  Air entry, no wheezing No accessory muscle use  CV:  Regular  rhythm,  No edema  GI:  Soft, Non distended, Non tender.  +Bowel sounds  Neurologic:  Awake , alert,   Psych:             Not anxious nor agitated  Skin:  No rashes. No jaundice    Reviewed most current lab test results and cultures  YES  Reviewed most current radiology test results   YES  Review and summation of old records today    NO  Reviewed patient's current orders and MAR    YES  PMH/SH reviewed - no change compared to H&P  ________________________________________________________________________  Care Plan discussed with:    Comments   Patient y    Family      RN y    Care Manager     Consultant                        Multidiciplinary team rounds were held today with , nursing, pharmacist and clinical coordinator. Patient's plan of care was discussed; medications were reviewed and discharge planning was addressed. ________________________________________________________________________  Total NON critical care TIME: 25  Minutes    Total CRITICAL CARE TIME Spent:   Minutes non procedure based      Comments   >50% of visit spent in counseling and coordination of care     ________________________________________________________________________  Inés Villalobos MD     Procedures: see electronic medical records for all procedures/Xrays and details which were not copied into this note but were reviewed prior to creation of Plan. LABS:  I reviewed today's most current labs and imaging studies. Pertinent labs include:  No results for input(s): WBC, HGB, HCT, PLT, HGBEXT, HCTEXT, PLTEXT, HGBEXT, HCTEXT, PLTEXT in the last 72 hours.   No results for input(s): NA, K, CL, CO2, GLU, BUN, CREA, CA, MG, PHOS, ALB, TBIL, TBILI, SGOT, ALT, INR in the last 72 hours.     No lab exists for component: Kit Just    Signed: Vivian Mijares MD

## 2017-01-19 NOTE — PROGRESS NOTES
Oncology Nursing Communication Tool      7:02 PM  1/19/2017     Bedside shift change report given to JAYLEN Melara (incoming nurse) by Lavone Schilder, RN (outgoing nurse) on David Welch a 49 y.o. female who was admitted on 1/2/2017 12:49 PM. Report included the following information SBAR, Kardex, Procedure Summary, Intake/Output, MAR, Accordion, Recent Results and Med Rec Status. Significant changes during shift: Psych consult today. Cath-vicky administered by PICC team.       Issues for physician to address: None            Code Status: Full Code     Infections: No current active infections     Allergies: Pcn [penicillins]     Current diet: DIET DIABETIC CONSISTENT CARB Regular       Pain Controlled [x] yes [] no   Bowel Movement [x] yes [] no   Last Bowel Movement (date)     01/19            Vital Signs:   Patient Vitals for the past 12 hrs:   Temp Pulse Resp BP SpO2   01/19/17 1129 97.6 °F (36.4 °C) 85 17 116/74 93 %   01/19/17 0900 - 96 15 115/61 94 %   01/19/17 0800 98.7 °F (37.1 °C) 93 17 108/68 95 %   01/19/17 0756 - - - - 96 %   01/19/17 0700 - 95 14 117/66 95 %   01/19/17 0600 - 94 20 108/67 94 %   01/19/17 0518 - 90 18 108/68 97 %   01/19/17 0500 - (!) 103 18 - 97 %   01/19/17 0400 98.5 °F (36.9 °C) 94 18 120/64 98 %   01/19/17 0300 - 92 20 113/55 93 %   01/19/17 0200 - 94 16 96/71 92 %   01/19/17 0100 - 92 18 103/64 95 %      Intake & Output:     Intake/Output Summary (Last 24 hours) at 01/19/17 1213  Last data filed at 01/19/17 0400   Gross per 24 hour   Intake              560 ml   Output              465 ml   Net               95 ml      Laboratory Results:     Recent Results (from the past 12 hour(s))   GLUCOSE, POC    Collection Time: 01/19/17  5:19 AM   Result Value Ref Range    Glucose (POC) 130 (H) 65 - 100 mg/dL    Performed by R Capela 99 for questions and clarifications were given to the incoming nurse.  Patient's bed is in low position, side rails x2, door open PRN, call bell within reach and patient not in distress.       Vamsi Cummings RN

## 2017-01-19 NOTE — PROGRESS NOTES
[facility]  Pulmonary, Critical Care, and Sleep Medicine    Name: Mayank Askew MRN: [id number]   : 1960 Hospital: [facility]   Date: 2017        IMPRESSION:   · Acute hypoxic respiratory failure- extubated - resolved  · Delirium tremens- resovled but baseline? depression? ?   · Encephalopathy or baseline agitation and personality disorder  · ARDS- resolved  · Severe E. Coli sepsis with septic shock - completed course  · Persistent fevers- better  · Acute renal failure  · Shock liver  · S/P surgery for ankle fracture 17  · EtOH abuse - 1 bottle wine/day  · Tobacco/MJ use      PLAN:   · May transfer to floor  · Sitter prn  · consuilt psychiatry  · PT, OT  · Thiamine/MVI/folate   · Replete electrolytes as needed  · DVT/GI prophylaxis     Subjective/Interval History:   I have reviewed the flowsheet and previous days notes.  no fever. On room air. \" I want to be put to sleep\" no pain. No SOB. No nausea. Eating   off vent, off IV sedation but now very restless when stimulated. Loose stools. Hungry. Sitter overnight. Low grade fever 100.7   still failing SBT; now on IV precedex but still needs IV fentanyl and IV propofol; reepeat SBT successfula nd pt extubated   failed SAT this AM. Cultures NGSF  1/15 More secretions. Failed SBT today after one hour. Sputum cultured   failed SAT the other day.  Less restless this AM. No new secretions    Review of Systems   Unable to perform ROS: Mental status change     Objective:   Vital Signs:    Visit Vitals    /66    Pulse 95    Temp 98.5 °F (36.9 °C)    Resp 14    Ht 5' 4\" (1.626 m)    Wt 78.9 kg (173 lb 15.1 oz)    SpO2 96%    BMI 29.86 kg/m2       O2 Device: Room air   O2 Flow Rate (L/min): 1 l/min   Temp (24hrs), Av.5 °F (36.9 °C), Min:98.1 °F (36.7 °C), Max:98.8 °F (37.1 °C)       Intake/Output:   Last shift:         Last 3 shifts: 1901 - 700  In: 1066.2 [P.O.:560; I.V.:506.2]  Out: 1917 [Urine:1917]    Intake/Output Summary (Last 24 hours) at 01/19/17 0901  Last data filed at 01/19/17 0400   Gross per 24 hour   Intake              560 ml   Output              725 ml   Net             -165 ml       Physical Exam   Constitutional: Vital signs are normal. She has a sickly appearance. HENT:   Head: Normocephalic and atraumatic. Mouth/Throat: No oropharyngeal exudate. Eyes: No scleral icterus. Cardiovascular: Normal rate and regular rhythm. Exam reveals no gallop. Pulmonary/Chest: No respiratory distress. She has no wheezes. She has no rales. Abdominal: Soft. Bowel sounds are normal. She exhibits no distension. There is no tenderness. Musculoskeletal: She exhibits edema. Skin: Skin is warm and dry. Data:     Current Facility-Administered Medications   Medication Dose Route Frequency    insulin lispro (HUMALOG) injection   SubCUTAneous Q6H    polyethylene glycol (MIRALAX) packet 17 g  17 g Oral DAILY    PHENYLephrine (NEOSYNEPHRINE) 100,000 mcg in 0.9% sodium chloride 250 mL infusion   mcg/min IntraVENous TITRATE    albuterol-ipratropium (DUO-NEB) 2.5 MG-0.5 MG/3 ML  3 mL Nebulization QID RT    chlorhexidine (PERIDEX) 0.12 % mouthwash 15 mL  15 mL Oral Q12H    thiamine (B-1) tablet 100 mg  100 mg Per NG tube DAILY    folic acid (FOLVITE) tablet 1 mg  1 mg Per NG tube DAILY    therapeutic multivitamin (THERAGRAN) tablet 1 Tab  1 Tab Oral DAILY    sodium chloride (NS) flush 10 mL  10 mL InterCATHeter Q24H    sodium chloride (NS) flush 10-40 mL  10-40 mL InterCATHeter Q8H    nicotine (NICODERM CQ) 14 mg/24 hr patch 1 Patch  1 Patch TransDERmal DAILY    enoxaparin (LOVENOX) injection 40 mg  40 mg SubCUTAneous Q24H                Labs:  No results for input(s): WBC, HGB, HCT, PLT, HGBEXT, HCTEXT, PLTEXT, HGBEXT, HCTEXT, PLTEXT in the last 72 hours.   No results for input(s): NA, K, CL, CO2, GLU, BUN, CREA, CA, MG, PHOS, ALB, TBIL, TBILI, SGOT, ALT, INR in the last 72 hours. No lab exists for component: INREXT, INREXT  No results for input(s): PH, PCO2, PO2, HCO3, FIO2 in the last 72 hours.   Imaging:  I have personally reviewed the patients radiographs and have reviewed the reports:none today  Decreasing bilateral infiltrates       Heladio Lang MD

## 2017-01-20 LAB
ANION GAP BLD CALC-SCNC: 9 MMOL/L (ref 5–15)
BUN SERPL-MCNC: 14 MG/DL (ref 6–20)
BUN/CREAT SERPL: 25 (ref 12–20)
CALCIUM SERPL-MCNC: 8.4 MG/DL (ref 8.5–10.1)
CHLORIDE SERPL-SCNC: 100 MMOL/L (ref 97–108)
CO2 SERPL-SCNC: 32 MMOL/L (ref 21–32)
CREAT SERPL-MCNC: 0.57 MG/DL (ref 0.55–1.02)
ERYTHROCYTE [DISTWIDTH] IN BLOOD BY AUTOMATED COUNT: 13.7 % (ref 11.5–14.5)
GLUCOSE BLD STRIP.AUTO-MCNC: 120 MG/DL (ref 65–100)
GLUCOSE BLD STRIP.AUTO-MCNC: 122 MG/DL (ref 65–100)
GLUCOSE BLD STRIP.AUTO-MCNC: 133 MG/DL (ref 65–100)
GLUCOSE BLD STRIP.AUTO-MCNC: 139 MG/DL (ref 65–100)
GLUCOSE SERPL-MCNC: 137 MG/DL (ref 65–100)
HCT VFR BLD AUTO: 31.1 % (ref 35–47)
HGB BLD-MCNC: 10.1 G/DL (ref 11.5–16)
MAGNESIUM SERPL-MCNC: 2.5 MG/DL (ref 1.6–2.4)
MCH RBC QN AUTO: 29.1 PG (ref 26–34)
MCHC RBC AUTO-ENTMCNC: 32.5 G/DL (ref 30–36.5)
MCV RBC AUTO: 89.6 FL (ref 80–99)
PHOSPHATE SERPL-MCNC: 2.4 MG/DL (ref 2.6–4.7)
PLATELET # BLD AUTO: 455 K/UL (ref 150–400)
POTASSIUM SERPL-SCNC: 2.5 MMOL/L (ref 3.5–5.1)
RBC # BLD AUTO: 3.47 M/UL (ref 3.8–5.2)
SERVICE CMNT-IMP: ABNORMAL
SODIUM SERPL-SCNC: 141 MMOL/L (ref 136–145)
WBC # BLD AUTO: 6.5 K/UL (ref 3.6–11)

## 2017-01-20 PROCEDURE — 65270000015 HC RM PRIVATE ONCOLOGY

## 2017-01-20 PROCEDURE — 97116 GAIT TRAINING THERAPY: CPT

## 2017-01-20 PROCEDURE — 97530 THERAPEUTIC ACTIVITIES: CPT

## 2017-01-20 PROCEDURE — 83735 ASSAY OF MAGNESIUM: CPT

## 2017-01-20 PROCEDURE — 77010033678 HC OXYGEN DAILY

## 2017-01-20 PROCEDURE — 74011250637 HC RX REV CODE- 250/637: Performed by: INTERNAL MEDICINE

## 2017-01-20 PROCEDURE — 85027 COMPLETE CBC AUTOMATED: CPT | Performed by: INTERNAL MEDICINE

## 2017-01-20 PROCEDURE — 74011250636 HC RX REV CODE- 250/636: Performed by: HOSPITALIST

## 2017-01-20 PROCEDURE — 74011000250 HC RX REV CODE- 250: Performed by: HOSPITALIST

## 2017-01-20 PROCEDURE — 84100 ASSAY OF PHOSPHORUS: CPT | Performed by: HOSPITALIST

## 2017-01-20 PROCEDURE — 74011250636 HC RX REV CODE- 250/636: Performed by: EMERGENCY MEDICINE

## 2017-01-20 PROCEDURE — 80048 BASIC METABOLIC PNL TOTAL CA: CPT | Performed by: INTERNAL MEDICINE

## 2017-01-20 PROCEDURE — 74011250636 HC RX REV CODE- 250/636: Performed by: INTERNAL MEDICINE

## 2017-01-20 PROCEDURE — 82962 GLUCOSE BLOOD TEST: CPT

## 2017-01-20 PROCEDURE — 97112 NEUROMUSCULAR REEDUCATION: CPT

## 2017-01-20 PROCEDURE — 74011250636 HC RX REV CODE- 250/636: Performed by: ORTHOPAEDIC SURGERY

## 2017-01-20 PROCEDURE — 36415 COLL VENOUS BLD VENIPUNCTURE: CPT | Performed by: INTERNAL MEDICINE

## 2017-01-20 PROCEDURE — 74011250637 HC RX REV CODE- 250/637: Performed by: EMERGENCY MEDICINE

## 2017-01-20 PROCEDURE — 97535 SELF CARE MNGMENT TRAINING: CPT

## 2017-01-20 PROCEDURE — 74011250637 HC RX REV CODE- 250/637: Performed by: HOSPITALIST

## 2017-01-20 RX ORDER — QUETIAPINE FUMARATE 25 MG/1
25 TABLET, FILM COATED ORAL
Status: DISCONTINUED | OUTPATIENT
Start: 2017-01-20 | End: 2017-01-21

## 2017-01-20 RX ORDER — POTASSIUM CHLORIDE 750 MG/1
40 TABLET, FILM COATED, EXTENDED RELEASE ORAL
Status: COMPLETED | OUTPATIENT
Start: 2017-01-20 | End: 2017-01-20

## 2017-01-20 RX ORDER — POTASSIUM CHLORIDE 7.45 MG/ML
10 INJECTION INTRAVENOUS
Status: COMPLETED | OUTPATIENT
Start: 2017-01-20 | End: 2017-01-20

## 2017-01-20 RX ADMIN — Medication 30 ML: at 11:01

## 2017-01-20 RX ADMIN — POTASSIUM CHLORIDE 10 MEQ: 10 INJECTION, SOLUTION INTRAVENOUS at 07:22

## 2017-01-20 RX ADMIN — POTASSIUM CHLORIDE 10 MEQ: 10 INJECTION, SOLUTION INTRAVENOUS at 05:35

## 2017-01-20 RX ADMIN — POTASSIUM PHOSPHATE, MONOBASIC AND POTASSIUM PHOSPHATE, DIBASIC: 224; 236 INJECTION, SOLUTION INTRAVENOUS at 09:41

## 2017-01-20 RX ADMIN — POTASSIUM CHLORIDE 40 MEQ: 750 TABLET, FILM COATED, EXTENDED RELEASE ORAL at 04:13

## 2017-01-20 RX ADMIN — FOLIC ACID 1 MG: 1 TABLET ORAL at 09:39

## 2017-01-20 RX ADMIN — Medication 10 ML: at 13:14

## 2017-01-20 RX ADMIN — QUETIAPINE FUMARATE 25 MG: 25 TABLET, FILM COATED ORAL at 22:34

## 2017-01-20 RX ADMIN — DESVENLAFAXINE 50 MG: 50 TABLET, EXTENDED RELEASE ORAL at 09:45

## 2017-01-20 RX ADMIN — OXYCODONE HYDROCHLORIDE AND ACETAMINOPHEN 1 TABLET: 5; 325 TABLET ORAL at 03:10

## 2017-01-20 RX ADMIN — THERA TABS 1 TABLET: TAB at 09:40

## 2017-01-20 RX ADMIN — ENOXAPARIN SODIUM 40 MG: 40 INJECTION, SOLUTION INTRAVENOUS; SUBCUTANEOUS at 09:39

## 2017-01-20 RX ADMIN — POTASSIUM CHLORIDE 10 MEQ: 10 INJECTION, SOLUTION INTRAVENOUS at 08:00

## 2017-01-20 RX ADMIN — Medication 20 ML: at 04:41

## 2017-01-20 RX ADMIN — POTASSIUM CHLORIDE 10 MEQ: 10 INJECTION, SOLUTION INTRAVENOUS at 04:32

## 2017-01-20 RX ADMIN — LORAZEPAM 1 MG: 2 INJECTION INTRAMUSCULAR; INTRAVENOUS at 04:41

## 2017-01-20 RX ADMIN — OXYCODONE HYDROCHLORIDE AND ACETAMINOPHEN 1 TABLET: 5; 325 TABLET ORAL at 20:32

## 2017-01-20 RX ADMIN — Medication 100 MG: at 09:40

## 2017-01-20 NOTE — PROGRESS NOTES
Patient Potassium critical level 2.5. Dr. Miller Bodily called and informed of result. Orders placed.

## 2017-01-20 NOTE — ROUTINE PROCESS
All 3 ports of line in left upper arm have blood return and flushed with normal saline without difficulty. Primary RN, Saritha at bedside and aware. Pt tolerated procedure well.

## 2017-01-20 NOTE — PROGRESS NOTES
Initiated declot interventions of cathflo at [address] heparin was unsuccessful per primary nurse, Terry Ordonez RN   Repositioning the patient in bed, asking patient to cough and deep breathe and raising  Left arm were unsuccessful. Instilled 1mg/1ml Cathflo x 3 ports. Patient tolerated procedure well. Primary nurse aware. Refer to [facility] and Docflow sheet for specifics. Darya Powell, Supervisor notified Chris Rene should be removed at 1900. Primary RN notified not to use line and to call Supervisor, Darya Powell at 1900 to have Cath Colin removed.    Ama Mcgrath RN / Vascular Access Team

## 2017-01-20 NOTE — CONSULTS
[facility],  [address]    Name:   Shavon Lama  :   1960  MRN:   [id number]  CSN:   [id number]  Room #:  7892/79  Address:  [place] [id number]  PCP:   Ismael Payton MD  Date of Service:  2017    Ms. Sherry Guillaume is a 49y.o. year old female who was admitted on 2017 for treatment of RIGHT ANKLE FRACTURE;Closed right trimalleolar fracture, in*. Psychiatric consultation was requested from Scott Gracia MD re: capacity, ETOH, depression? ADHD or bipolar? HPI:  Pt has hx of depression, treated with Pristiq + Adderall. She noted stimulant was to treat depression and not ADHD. She is being treated by PCP, doing well prior to admission from mood standpoint, medications have been maintained long term. She was oriented to self and hospital only but not city or year. She presented as embarassed and tearful for getting orientation questions wrong \"I don't want you to think I'm crazy. \" She denied naomi confusion or hallucinations. She denied that her  caused the ankle injury and noted that they were moving furniture which was the cause of her injury. This is contrary to what the forensics nurse notes on . Pt admitted to regular cannabis and alcohol use but adamantly denied hx of withdrawals, DTs, seizures from alcohol. She denied SI/HI/AVH. Contracted for safety. She agreed to try some Seroquel as a way of mitigating delirium. Coordination was markedly poor as patient attempted to eat her dinner tray      ALLERGIES:  Allergies   Allergen Reactions    Pcn [Penicillins] Rash       HOME MEDS:  Prescriptions Prior to Admission   Medication Sig    dextroamphetamine-amphetamine (ADDERALL) 10 mg tablet Take 10 mg by mouth.  Desvenlafaxine (PRISTIQ) 100 mg Tb24 Take 50 mg by mouth.  aspirin delayed-release 81 mg tablet Take 81 mg by mouth daily.     calcium-vitamin D (OYSTER SHELL) 500 mg(1,250mg) -200 unit per tablet Take 1 Tab by mouth two (2) times daily (with meals).  atorvastatin (LIPITOR) 10 mg tablet Take  by mouth daily.        CURRENT MEDS:  Current Facility-Administered Medications   Medication Dose Route Frequency    potassium phosphate 20 mmol in 0.9% sodium chloride 250 mL infusion   IntraVENous ONCE    folic acid (FOLVITE) tablet 1 mg  1 mg Oral DAILY    thiamine (B-1) tablet 100 mg  100 mg Oral DAILY    desvenlafaxine ER 24 hour tablet 50 mg  50 mg Oral DAILY    amphetamine-dextroamphetamine XR (ADDERALL XR) capsule 10 mg  10 mg Oral 7am    LORazepam (ATIVAN) injection 1 mg  1 mg IntraVENous Q6H PRN    oxyCODONE-acetaminophen (PERCOCET) 5-325 mg per tablet 1 Tab  1 Tab Oral Q4H PRN    insulin lispro (HUMALOG) injection   SubCUTAneous Q6H    glucose chewable tablet 16 g  4 Tab Oral PRN    dextrose (D50W) injection syrg 12.5-25 g  12.5-25 g IntraVENous PRN    glucagon (GLUCAGEN) injection 1 mg  1 mg IntraMUSCular PRN    albuterol-ipratropium (DUO-NEB) 2.5 MG-0.5 MG/3 ML  3 mL Nebulization Q4H PRN    acetaminophen (TYLENOL) solution 650 mg  650 mg Oral Q4H PRN    polyethylene glycol (MIRALAX) packet 17 g  17 g Oral DAILY    ELECTROLYTE REPLACEMENT PROTOCOL  1 Each Other PRN    therapeutic multivitamin (THERAGRAN) tablet 1 Tab  1 Tab Oral DAILY    sodium chloride (NS) flush 10-30 mL  10-30 mL InterCATHeter PRN    sodium chloride (NS) flush 10 mL  10 mL InterCATHeter Q24H    sodium chloride (NS) flush 10 mL  10 mL InterCATHeter PRN    sodium chloride (NS) flush 10-40 mL  10-40 mL InterCATHeter Q8H    heparin (porcine) pf 300 Units  300 Units InterCATHeter PRN    haloperidol lactate (HALDOL) injection 5 mg  5 mg IntraMUSCular Q4H PRN    nicotine (NICODERM CQ) 14 mg/24 hr patch 1 Patch  1 Patch TransDERmal DAILY    benzocaine-menthol (CHLORASEPTIC MAX) lozenge 1 Lozenge  1 Lozenge Oral Q2H PRN    sodium chloride (NS) flush 5-10 mL  5-10 mL IntraVENous PRN    enoxaparin (LOVENOX) injection 40 mg  40 mg SubCUTAneous Q24H       PSYCHIATRIC HISTORY:  Previous psychiatric care: by PCP  Previous suicide attempts: denied  Previous hospitalizations: denied    MEDICAL HISTORY:  Past Medical History   Diagnosis Date    Hypertension     Ill-defined condition      high cholesterol    Psychiatric disorder      depression, ADHD     Past Surgical History   Procedure Laterality Date    Hx other surgical       colonoscopy    Hx gyn       D&C X2       FAMILY HISTORY  Family History   Problem Relation Age of Onset    Diabetes Father     Heart Disease Father        SOCIAL HISTORY:  Social History     Social History    Marital status:      Spouse name: N/A    Number of children: N/A    Years of education: N/A     Occupational History    Not on file. Social History Main Topics    Smoking status: Current Every Day Smoker     Packs/day: 0.50    Smokeless tobacco: Never Used    Alcohol use 14.4 oz/week     24 Glasses of wine per week    Drug use: Yes     Special: Marijuana    Sexual activity: Yes     Partners: Male     Other Topics Concern    Not on file     Social History Narrative       REVIEW OF SYSTEMS:  All systems reviewed and pertinent positives/negatives as in HPI:       EXAM:  Patient Vitals for the past 8 hrs:   BP Temp Pulse Resp SpO2   01/20/17 0559 113/73 98.3 °F (36.8 °C) 88 18 96 %       Appearance WDWN female in NAD. Appearing stated age. Gait and Station Sitting up in bed   Sensorium: AxO to person only. Mild confusion   Attitude: cooperative but guarded   Motor Behavior:  abnormal   Speech: Rate, rhythm, tone, volume, prosody WNL   Mood: \"okay\"   Affect:  Dysphoric, mild tearfulness   Thought Process: Linear, logical and goal directed. Thought Content: Devoid of paranoia, delusion, obsessions, or preoccupation. Perceptions: Does not appear to be responding to internal stimuli. Patient denied auditory or visual hallucinations or illusions.     Memory recent  impaired   Memory remote:  adequate   Concentration: adequate   Abstraction:  abstract   Insight:  limited   Judgment:  fair   Reliability limited   Suicidal ideations: none   Homicidal ideations: none       LABS:  Lab Results   Component Value Date/Time    Sodium 141 01/20/2017 03:12 AM    Potassium 2.5 01/20/2017 03:12 AM    Chloride 100 01/20/2017 03:12 AM    CO2 32 01/20/2017 03:12 AM    Anion gap 9 01/20/2017 03:12 AM    Glucose 137 01/20/2017 03:12 AM    BUN 14 01/20/2017 03:12 AM    Creatinine 0.57 01/20/2017 03:12 AM    BUN/Creatinine ratio 25 01/20/2017 03:12 AM    GFR est AA >60 01/20/2017 03:12 AM    GFR est non-AA >60 01/20/2017 03:12 AM    Calcium 8.4 01/20/2017 03:12 AM    Bilirubin, total 0.5 01/14/2017 04:11 AM     01/14/2017 04:11 AM    AST 54 01/14/2017 04:11 AM    Alk. phosphatase 142 01/14/2017 04:11 AM    Protein, total 6.1 01/14/2017 04:11 AM    Albumin 1.7 01/14/2017 04:11 AM    Globulin 4.4 01/14/2017 04:11 AM    A-G Ratio 0.4 01/14/2017 04:11 AM     Lab Results   Component Value Date/Time    WBC 6.5 01/20/2017 03:12 AM    HGB 10.1 01/20/2017 03:12 AM    HCT 31.1 01/20/2017 03:12 AM    PLATELET 577 54/04/4875 03:12 AM    MCV 89.6 01/20/2017 03:12 AM     No results found for: TSH, TSH2, TSH3, TSHP, TSHEXT        ASSESSMENT:  50 yo WF w hx of depression well controlled with SNRI + stimulant. Likely has an alcohol use disorder given her complicated hospital course but is not forthcoming about her alcohol use. Appears to be disoriented and mildly confused on exam.  Chart review corroborates this    She appeared clearer on her exam than her chart indicates, suggesting a waxing/waning picture of delirium. At this time, she does NOT have capacity for medical decision-making however she may quickly regain capacity as her delirium resolves.      DIAGNOSIS:  Delirium  Major depression, in partial remission  Rule out alcohol dependence  Rule out cannabis dependence    RECOMMENDATIONS:  - continue home Pristiq  - do not restart home Adderall in hospital  - consider starting Seroquel 25mg qHS IF her confusion does not improve  - no 1:1 or psychiatric hospitalization needed at this time    Other recs re: delirium:  - avoid deliriogenic medications if possible: anticholinergics, sedative/hypnotics (benzodiazepines), antihistamines, opioids  - keep blinds open during the day, discourage patient from sleeping through the day and ambulate TID if possible to mitigate day/night reversal  - encourage family to visit to keep patient in company of familiar people  - frequently re-orient patient         Thank you very much for the opportunity to participate in the care of your patient. I will sign off at this time.   Please reconsult as necessary       Signed:  Mark Nielson MD  1/20/2017  Consultation Psychiatriast

## 2017-01-20 NOTE — PROGRESS NOTES
Hospitalist Progress Note    NAME: Rufino Porras   :  1960   MRN:  [id number]       Interim Hospital Summary: 49 y.o. female whom presented on 2017 with right ankle deformity s/p injury R unstable trimalleolar ankle fx/dislocatio  ADHD on Adderall  Typically drinks 1 bottle of wine per day    Initially admitted to ortho service-- multiple attempts at ankle reduction were made with out success. On 1/3 s/p ORIF, on  consulted hospitalist for acute delirium ( with concern for alcohol withdrawal) on  transferred to ICU requiring ativan drip and precedex. Further deteriorated on  requiring intubation  Now with spiking fevers and shock liver  Ortho transferred the patient care to medicine service on       with respiratory failure,  ARDS, delirium tremens,GNR sepsis with septic shock  Now getting better, extubated, completed antibiotics  Psych seen  Regarding recommendations  -- on pristiq, and started on seroquel    Christine Dyer [phone] updated on 1/10     Assessment / Plan:  Delirium  Major depression in partial remission  Alcohol dependenace, concern fpr cannabis dependence    Cont home pristiq, added seroquel   No 1:1 or psych hospitalization needed per psych  No home adderall in hospital  Hypokalemia  Hypophosphatemia  Replace, check in am    Hyperglycemia  Check hba1c 5.5  poc and achs      Acute hypoxic respiratory failure secondary  To  respiratory depression ( delirium tremens  Being on ativan and precedex)  With multiorgan failure ( shock liver, concern for hcap)  ecoli bacteremia secondary to e coli uti    Extubated on , Sputum cultures, neg, completed the antibiotics    Chest xray:ETT in satisfactory position. Interval improved aeration of the lungs with some regression of pulmonary edema. Persistent bilateral small pleural effusions and probably some consolidation atleft base.   Follow the blood cultures with gram neg rods with e coli   urine cultures with gram neg rods with ecoli  Platelets ok  Ammonia normal,   picc placed on 1/7  Follow the echo Systolic function was normal. Ejection fraction was estimated in the range of 55 % to 60 %. There were no regional wall motion abnormalities. Wall thickness was normal. DOPPLER:Left ventricular diastolic function parameters were normal for the patient's age. ortho checked the ankle in this interim    Hypertension    Depression ADHD    Right trimalleolar ankle fracture, poa now s/p closed reduction and ORIF 1/2/17  -ortho managing  Code status: full  Prophylaxis: lovenox  Recommended Disposition: tbd     Subjective:     Chief Complaint / Reason for Physician Visit  Follow up on respiratory failure, delirium tremens  Pt still confused    Discussed with RN events overnight. Review of Systems:  Symptom Y/N Comments  Symptom Y/N Comments   Fever/Chills n   Chest Pain n    Poor Appetite    Edema     Cough n   Abdominal Pain n    Sputum    Joint Pain     SOB/FUENTES n   Pruritis/Rash     Nausea/vomit    Tolerating PT/OT     Diarrhea    Tolerating Diet     Constipation    Other       Could NOT obtain due to:      Objective:     VITALS:   Last 24hrs VS reviewed since prior progress note. Most recent are:  Patient Vitals for the past 24 hrs:   Temp Pulse Resp BP SpO2   01/20/17 0559 98.3 °F (36.8 °C) 88 18 113/73 96 %   01/19/17 2216 98.9 °F (37.2 °C) 89 18 122/66 93 %       Intake/Output Summary (Last 24 hours) at 01/20/17 1225  Last data filed at 01/20/17 1003   Gross per 24 hour   Intake              840 ml   Output              100 ml   Net              740 ml        PHYSICAL EXAM:  General: Alert, oriented,  following commands  EENT:  EOMI. Anicteric sclerae. Resp:  B/l  Air entry, no wheezing  No accessory muscle use  CV:  Regular  rhythm,  No edema  GI:  Soft, Non distended, Non tender.  +Bowel sounds  Neurologic:  Awake , alert,   Psych:             Not anxious nor agitated  Skin:  No rashes.   No jaundice    Reviewed most current lab test results and cultures  YES  Reviewed most current radiology test results   YES  Review and summation of old records today    NO  Reviewed patient's current orders and MAR    YES  PMH/SH reviewed - no change compared to H&P  ________________________________________________________________________  Care Plan discussed with:    Comments   Patient y    Family      RN y    Care Manager     Consultant                        Multidiciplinary team rounds were held today with , nursing, pharmacist and clinical coordinator. Patient's plan of care was discussed; medications were reviewed and discharge planning was addressed. ________________________________________________________________________  Total NON critical care TIME: 30  Minutes    Total CRITICAL CARE TIME Spent:   Minutes non procedure based      Comments   >50% of visit spent in counseling and coordination of care     ________________________________________________________________________  Mitzie Dandy, MD     Procedures: see electronic medical records for all procedures/Xrays and details which were not copied into this note but were reviewed prior to creation of Plan. LABS:  I reviewed today's most current labs and imaging studies.   Pertinent labs include:  Recent Labs      01/20/17 0312   WBC  6.5   HGB  10.1*   HCT  31.1*   PLT  455*     Recent Labs      01/20/17 0312   NA  141   K  2.5*   CL  100   CO2  32   GLU  137*   BUN  14   CREA  0.57   CA  8.4*   MG  2.5*   PHOS  2.4*       Signed: Mitzie Dandy, MD

## 2017-01-20 NOTE — PROGRESS NOTES
S: Patient resting. No new complaints.      O:  Visit Vitals    /78 (BP 1 Location: Right arm, BP Patient Position: At rest)    Pulse 80    Temp 98 °F (36.7 °C)    Resp 18    Ht 5' 4\" (1.626 m)    Wt 78.9 kg (173 lb 15.1 oz)    SpO2 92%    BMI 29.86 kg/m2       Patient interactive  Splint removed  Incisions c/d/i  Nylon suture in place  No significant soft tissue swelling  - Motor: +EHL/FHL/TA/GSC/PL/PB    - SILT DP/MP/LP/MP/SURAL/SAPHENOUS    - Vascular: + DP, CR < 2 sec      A/P: s/p ORIF R trimal ankle fx/dislocation  - New cast applied; well padded  - NWB RLE  - pain control

## 2017-01-20 NOTE — PROGRESS NOTES
Bedside and Verbal shift change report given to Jen Zamora RN (oncoming nurse) by Nicolle Lee RN (offgoing nurse). Report included the following information SBAR, Kardex, Intake/Output and MAR.

## 2017-01-20 NOTE — PROGRESS NOTES
Nutrition Assessment:    INTERVENTIONS/RECOMMENDATIONS:   Continue current diet   Ensure with breakfast    ASSESSMENT:   Chart reviewed, extubated on 1/17 and tolerating PO diet. Pt reports eating >50% of meals. Due to increased protein needs from fractured ankle, will send Ensure for breakfast. Will continue to follow PO intake. Diet Order: Consistent carb  % Eaten:  Patient Vitals for the past 72 hrs:   % Diet Eaten   01/18/17 1807 40 %     Pertinent Medications: [x] Reviewed []Other: (folic acid, thiamine, SSI, miralax, KCl, MVI)  Pertinent Labs: [x]Reviewed  []Other: (K+ 2.5, repleating)  Food Allergies: [x]None []Other:     Last BM: 1/19   [x]Active     []Hyperactive  []Hypoactive       [] Absent  BS  Skin:    [] Intact   [x] Incision  [] Breakdown   []Edema   []Other:    Anthropometrics: Height: 5' 4\" (162.6 cm) Weight: 78.9 kg (173 lb 15.1 oz)    IBW (%IBW):   ( ) UBW (%UBW):   (  %)    BMI: Body mass index is 29.86 kg/(m^2). This BMI is indicative of:  []Underweight   []Normal   [x]Overweight   [] Obesity   [] Extreme Obesity (BMI>40)  Last Weight Metrics:  Weight Loss Metrics 1/7/2017 1/2/2017   Today's Wt 173 lb 15.1 oz -   BMI - 29.86 kg/m2       Estimated Nutrition Needs (Based on): 1800 Kcals/day (MSJ: 1375 x 1.3) , 90 g (1.1 g/kg) Protein  Carbohydrate: At Least 130 g/day  Fluids: 1800 mL/day     Pt expected to meet estimated nutrient needs: [x]Yes []No    NUTRITION DIAGNOSES:   Problem:  Increased protein needs      Etiology: related to fractue healing      Signs/Symptoms: as evidenced by fractured ankle      NUTRITION INTERVENTIONS:  Meals/Snacks: General/healthful diet Enteral/Parenteral Nutrition: Modify rate, concentration, composition, and schedule Supplements: Commercial supplement   Nutrition-Related Medication Mgmt: Other (comment)          GOAL:   consume >75% of meals and ONS in 3-5 days    NUTRITION MONITORING AND EVALUATION      Food/Nutrient Intake Outcomes:  Total energy intake  Physical Signs/Symptoms Outcomes: Weight/weight change, Electrolyte and renal profile, Glucose profile, GI    Previous Goal Met:   [x] Met              [] Progressing Towards Goal              [] Not Progressing Towards Goal   Previous Recommendations:   [x] Implemented          [] Not Implemented          [] Not Applicable    LEARNING NEEDS (Diet, Food/Nutrient-Drug Interaction):    [x] None Identified   [] Identified and Education Provided/Documented   [] Identified and Pt declined/was not appropriate     Cultural, Orthodoxy, OR Ethnic Dietary Needs:    [] None Identified   [] Identified and Addressed     [x] Interdisciplinary Care Plan Reviewed/Documented    [x] Discharge Planning: General healthy diet   [] Participated in Interdisciplinary Rounds    NUTRITION RISK:    [] High              [] Moderate           []  Low  []  Minimal/Uncompromised      Destini Carl RDN  Pager [phone]  Weekend Pager [phone]

## 2017-01-20 NOTE — PROGRESS NOTES
Problem: Self Care Deficits Care Plan (Adult)  Goal: *Acute Goals and Plan of Care (Insert Text)  Occupational Therapy Goals  Initiated 1/19/2017  1. Patient will follow basic one step commands with 100% accuracy within 7 days. 2. Patient will perform grooming with supervision/set-up within 7 day(s). 3. Patient will perform upper bathing seated with supervision/set-up within 7 day(s). 4. Patient will perform toilet transfers to [facility] with minimal assistance/contact guard assist within 7 day(s). 5. Patient will perform all aspects of toileting with minimal assistance/contact guard assist within 7 day(s). 6. Patient will adhere to NWB R LE with no more than 2 verbal cues within 7 days/   OCCUPATIONAL THERAPY TREATMENT  Patient: Angela Marie (6 y.o. female)  Date: 1/20/2017  Diagnosis: RIGHT ANKLE FRACTURE  Closed right trimalleolar fracture, initial encounter  Right Ankle Fracture <principal problem not specified>  Procedure(s) (LRB):  REMOVAL EXTERNAL FIXATOR AND RIGHT ANKLE OPEN REDUCTION INTERNAL FIXATION, GASTROCNEMIUS RECESSION (Req Large C-Arm, Ti Mini and Small Frag and Tong Clamps) (Right) 17 Days Post-Op  Precautions: NWB, Fall      ASSESSMENT:  Pt was supine in bed and willing to get up with therapy this am.  Pts decreased attention span and decreased ability to process information given to pt is a main limiting factor. OT tried pt with written and verbal cues to lift her right foot with no improvement. Pt would lift her right foot inconsistently and then lower. She  stated \"okay\" and then would not move. OT had nursing tech hold pts foot up and had her hop to the chair with min to mod assist of 2. She became tearful at time during the tx session and appeared to be frustrated. Her  came into the room and tried to help with no change.   Pt was left in recliner with sitter and  in the room eating breakfast.  Recommend that pt have further therapy at in pt pending her progress at discharge. Progression toward goals:  [X]       Improving appropriately and progressing toward goals  [ ]       Improving slowly and progressing toward goals  [ ]       Not making progress toward goals and plan of care will be adjusted       PLAN:  Patient continues to benefit from skilled intervention to address the above impairments. Continue treatment per established plan of care. Discharge Recommendations:  Inpatient Rehab  Further Equipment Recommendations for Discharge:  tbd       SUBJECTIVE:   Patient stated Staci Downer      I'm trying.       OBJECTIVE DATA SUMMARY:   Cognitive/Behavioral Status:  Neurologic State: Alert;Confused  Orientation Level: Oriented to person  Cognition: Impaired decision making;Memory loss;Decreased attention/concentration;Decreased command following;Poor safety awareness  Perception: Appears intact  Perseveration: Perseverates during conversation  Safety/Judgement: Lack of insight into deficits; Fall prevention;Decreased awareness of need for safety;Decreased awareness of need for assistance;Decreased awareness of environment     Functional Mobility and Transfers for ADLs:  Bed Mobility:  Rolling: Contact guard assistance  Supine to Sit: Contact guard assistance     Transfers:  Sit to Stand: Minimum assistance;Assist x2; Additional time  Stand to Sit: Minimum assistance;Assist x2; Additional time     Balance:  Sitting: Intact  Standing: Impaired  Standing - Static: Fair;Constant support  Standing - Dynamic : Fair     ADL Intervention:  Feeding  Feeding Assistance: Supervision/set-up           Toileting  Toileting Assistance: Maximum assistance  Bladder Hygiene: Maximum assistance  Bowel Hygiene: Maximum assistance     Cognitive Retraining  Safety/Judgement: Lack of insight into deficits; Fall prevention;Decreased awareness of need for safety;Decreased awareness of need for assistance;Decreased awareness of environment        Pain:  Pain Scale 1: Visual  Pain Intensity 1: 0  Pain Location 1: Ankle  Pain Orientation 1: Right  Pain Description 1: Aching  Pain Intervention(s) 1: Medication (see MAR)  Activity Tolerance:   vss  Please refer to the flowsheet for vital signs taken during this treatment.   After treatment:   [X] Patient left in no apparent distress sitting up in chair  [ ] Patient left in no apparent distress in bed  [X] Call bell left within reach  [X] Nursing notified  [X] Caregiver present  [ ] Bed alarm activated      COMMUNICATION/COLLABORATION:   The patients plan of care was discussed with: Physical Therapist, Registered Nurse and sitter and family     Dedra Pickard OT  Time Calculation: 32 mins

## 2017-01-20 NOTE — PROGRESS NOTES
Oncology Nursing Communication Tool      8:24 AM  1/20/2017     Bedside shift change report given to Angela Dale RN (incoming nurse) by Magdalena Tripp RN (outgoing nurse) on Felicia Pair a 49 y.o. female who was admitted on 1/2/2017 12:49 PM. Report included the following information SBAR, Kardex, ED Summary, OR Summary, Intake/Output, MAR and Recent Results. Significant changes during shift: critical potassium 2.5. Dr. Pierce Cornejo called and orders placed.       Issues for physician to address: none          Code Status: Full Code     Infections: No current active infections     Allergies: Pcn [penicillins]     Current diet: DIET DIABETIC CONSISTENT CARB Regular       Pain Controlled [x] yes [] no   Bowel Movement [x] yes [] no   Last Bowel Movement (date)     1/20/17            Vital Signs:   Patient Vitals for the past 12 hrs:   Temp Pulse Resp BP SpO2   01/20/17 0559 98.3 °F (36.8 °C) 88 18 113/73 96 %   01/19/17 2216 98.9 °F (37.2 °C) 89 18 122/66 93 %      Intake & Output:     Intake/Output Summary (Last 24 hours) at 01/20/17 0824  Last data filed at 01/20/17 0723   Gross per 24 hour   Intake              840 ml   Output                0 ml   Net              840 ml      Laboratory Results:     Recent Results (from the past 12 hour(s))   GLUCOSE, POC    Collection Time: 01/20/17 12:22 AM   Result Value Ref Range    Glucose (POC) 133 (H) 65 - 100 mg/dL    Performed by Peterson Melara    CBC W/O DIFF    Collection Time: 01/20/17  3:12 AM   Result Value Ref Range    WBC 6.5 3.6 - 11.0 K/uL    RBC 3.47 (L) 3.80 - 5.20 M/uL    HGB 10.1 (L) 11.5 - 16.0 g/dL    HCT 31.1 (L) 35.0 - 47.0 %    MCV 89.6 80.0 - 99.0 FL    MCH 29.1 26.0 - 34.0 PG    MCHC 32.5 30.0 - 36.5 g/dL    RDW 13.7 11.5 - 14.5 %    PLATELET 496 (H) 020 - 876 K/uL   METABOLIC PANEL, BASIC    Collection Time: 01/20/17  3:12 AM   Result Value Ref Range    Sodium 141 136 - 145 mmol/L    Potassium 2.5 (LL) 3.5 - 5.1 mmol/L Chloride 100 97 - 108 mmol/L    CO2 32 21 - 32 mmol/L    Anion gap 9 5 - 15 mmol/L    Glucose 137 (H) 65 - 100 mg/dL    BUN 14 6 - 20 MG/DL    Creatinine 0.57 0.55 - 1.02 MG/DL    BUN/Creatinine ratio 25 (H) 12 - 20      GFR est AA >60 >60 ml/min/1.73m2    GFR est non-AA >60 >60 ml/min/1.73m2    Calcium 8.4 (L) 8.5 - 10.1 MG/DL   MAGNESIUM    Collection Time: 01/20/17  3:12 AM   Result Value Ref Range    Magnesium 2.5 (H) 1.6 - 2.4 mg/dL   PHOSPHORUS    Collection Time: 01/20/17  3:12 AM   Result Value Ref Range    Phosphorus 2.4 (L) 2.6 - 4.7 MG/DL   GLUCOSE, POC    Collection Time: 01/20/17  5:39 AM   Result Value Ref Range    Glucose (POC) 139 (H) 65 - 100 mg/dL    Performed by [employer] [facility] for questions and clarifications were given to the incoming nurse. Patient's bed is in low position, side rails x2, door open PRN, call bell within reach and patient not in distress.       Magdalena Tripp RN

## 2017-01-21 LAB
ANION GAP BLD CALC-SCNC: 8 MMOL/L (ref 5–15)
BUN SERPL-MCNC: 9 MG/DL (ref 6–20)
BUN/CREAT SERPL: 18 (ref 12–20)
CALCIUM SERPL-MCNC: 8.6 MG/DL (ref 8.5–10.1)
CHLORIDE SERPL-SCNC: 103 MMOL/L (ref 97–108)
CO2 SERPL-SCNC: 29 MMOL/L (ref 21–32)
CREAT SERPL-MCNC: 0.51 MG/DL (ref 0.55–1.02)
GLUCOSE BLD STRIP.AUTO-MCNC: 120 MG/DL (ref 65–100)
GLUCOSE BLD STRIP.AUTO-MCNC: 137 MG/DL (ref 65–100)
GLUCOSE BLD STRIP.AUTO-MCNC: 140 MG/DL (ref 65–100)
GLUCOSE SERPL-MCNC: 119 MG/DL (ref 65–100)
PHOSPHATE SERPL-MCNC: 3.1 MG/DL (ref 2.6–4.7)
POTASSIUM SERPL-SCNC: 3.1 MMOL/L (ref 3.5–5.1)
SERVICE CMNT-IMP: ABNORMAL
SODIUM SERPL-SCNC: 140 MMOL/L (ref 136–145)

## 2017-01-21 PROCEDURE — 65270000015 HC RM PRIVATE ONCOLOGY

## 2017-01-21 PROCEDURE — 74011250637 HC RX REV CODE- 250/637: Performed by: INTERNAL MEDICINE

## 2017-01-21 PROCEDURE — 74011250636 HC RX REV CODE- 250/636: Performed by: INTERNAL MEDICINE

## 2017-01-21 PROCEDURE — 74011000250 HC RX REV CODE- 250: Performed by: HOSPITALIST

## 2017-01-21 PROCEDURE — 74011250636 HC RX REV CODE- 250/636: Performed by: HOSPITALIST

## 2017-01-21 PROCEDURE — 80048 BASIC METABOLIC PNL TOTAL CA: CPT | Performed by: HOSPITALIST

## 2017-01-21 PROCEDURE — 36415 COLL VENOUS BLD VENIPUNCTURE: CPT | Performed by: HOSPITALIST

## 2017-01-21 PROCEDURE — 82962 GLUCOSE BLOOD TEST: CPT

## 2017-01-21 PROCEDURE — 74011250636 HC RX REV CODE- 250/636: Performed by: ORTHOPAEDIC SURGERY

## 2017-01-21 PROCEDURE — 74011250637 HC RX REV CODE- 250/637: Performed by: PSYCHIATRY & NEUROLOGY

## 2017-01-21 PROCEDURE — 74011250637 HC RX REV CODE- 250/637: Performed by: HOSPITALIST

## 2017-01-21 PROCEDURE — 84100 ASSAY OF PHOSPHORUS: CPT | Performed by: HOSPITALIST

## 2017-01-21 RX ORDER — QUETIAPINE FUMARATE 25 MG/1
25 TABLET, FILM COATED ORAL 3 TIMES DAILY
Status: DISCONTINUED | OUTPATIENT
Start: 2017-01-21 | End: 2017-01-24 | Stop reason: HOSPADM

## 2017-01-21 RX ORDER — ZIPRASIDONE MESYLATE 20 MG/ML
20 INJECTION, POWDER, LYOPHILIZED, FOR SOLUTION INTRAMUSCULAR ONCE
Status: COMPLETED | OUTPATIENT
Start: 2017-01-21 | End: 2017-01-21

## 2017-01-21 RX ORDER — POTASSIUM CHLORIDE 750 MG/1
40 TABLET, FILM COATED, EXTENDED RELEASE ORAL EVERY 4 HOURS
Status: DISPENSED | OUTPATIENT
Start: 2017-01-21 | End: 2017-01-21

## 2017-01-21 RX ORDER — HALOPERIDOL 5 MG/ML
1 INJECTION INTRAMUSCULAR
Status: DISCONTINUED | OUTPATIENT
Start: 2017-01-21 | End: 2017-01-21

## 2017-01-21 RX ORDER — HALOPERIDOL 5 MG/ML
2 INJECTION INTRAMUSCULAR ONCE
Status: COMPLETED | OUTPATIENT
Start: 2017-01-21 | End: 2017-01-21

## 2017-01-21 RX ORDER — LORAZEPAM 2 MG/ML
1 INJECTION INTRAMUSCULAR
Status: DISCONTINUED | OUTPATIENT
Start: 2017-01-21 | End: 2017-01-24 | Stop reason: HOSPADM

## 2017-01-21 RX ORDER — HALOPERIDOL 5 MG/ML
4 INJECTION INTRAMUSCULAR
Status: DISCONTINUED | OUTPATIENT
Start: 2017-01-21 | End: 2017-01-24 | Stop reason: HOSPADM

## 2017-01-21 RX ADMIN — LORAZEPAM 1 MG: 2 INJECTION INTRAMUSCULAR; INTRAVENOUS at 10:50

## 2017-01-21 RX ADMIN — QUETIAPINE FUMARATE 25 MG: 25 TABLET, FILM COATED ORAL at 21:02

## 2017-01-21 RX ADMIN — POTASSIUM CHLORIDE 40 MEQ: 10 TABLET, FILM COATED, EXTENDED RELEASE ORAL at 09:00

## 2017-01-21 RX ADMIN — ENOXAPARIN SODIUM 40 MG: 40 INJECTION, SOLUTION INTRAVENOUS; SUBCUTANEOUS at 09:21

## 2017-01-21 RX ADMIN — Medication 10 ML: at 07:05

## 2017-01-21 RX ADMIN — DESVENLAFAXINE 50 MG: 50 TABLET, EXTENDED RELEASE ORAL at 09:22

## 2017-01-21 RX ADMIN — LORAZEPAM 1 MG: 2 INJECTION INTRAMUSCULAR; INTRAVENOUS at 17:36

## 2017-01-21 RX ADMIN — WATER 20 MG: 1 INJECTION INTRAMUSCULAR; INTRAVENOUS; SUBCUTANEOUS at 18:36

## 2017-01-21 RX ADMIN — Medication 10 ML: at 00:26

## 2017-01-21 RX ADMIN — THERA TABS 1 TABLET: TAB at 09:22

## 2017-01-21 RX ADMIN — HALOPERIDOL LACTATE 1 MG: 5 INJECTION, SOLUTION INTRAMUSCULAR at 13:50

## 2017-01-21 RX ADMIN — Medication 100 MG: at 09:22

## 2017-01-21 RX ADMIN — LORAZEPAM 1 MG: 2 INJECTION INTRAMUSCULAR; INTRAVENOUS at 04:01

## 2017-01-21 RX ADMIN — HALOPERIDOL LACTATE 2 MG: 5 INJECTION, SOLUTION INTRAMUSCULAR at 15:00

## 2017-01-21 RX ADMIN — ZIPRASIDONE MESYLATE 20 MG: 20 INJECTION, POWDER, LYOPHILIZED, FOR SOLUTION INTRAMUSCULAR at 15:00

## 2017-01-21 RX ADMIN — Medication 10 ML: at 17:45

## 2017-01-21 RX ADMIN — FOLIC ACID 1 MG: 1 TABLET ORAL at 09:22

## 2017-01-21 RX ADMIN — Medication 10 ML: at 17:36

## 2017-01-21 RX ADMIN — HALOPERIDOL LACTATE 4 MG: 5 INJECTION, SOLUTION INTRAMUSCULAR at 17:45

## 2017-01-21 NOTE — PROGRESS NOTES
Oncology Nursing Communication Tool      7:46 AM  1/21/2017     Bedside and Verbal shift change report given to Corrinne Exon, RN (incoming nurse) by Mariela Franklin RN (outgoing nurse) on José Paz a 49 y.o. female who was admitted on 1/2/2017 12:49 PM. Report included the following information SBAR, Kardex, [facility], Recent Results and Med Rec Status. Significant changes during shift: agitation; pt removed nicotine patch and made several requests to go outside and smoke.       Issues for physician to address: none            Code Status: Full Code     Infections: No current active infections     Allergies: Pcn [penicillins]     Current diet: DIET DIABETIC CONSISTENT CARB Regular  DIET NUTRITIONAL SUPPLEMENTS Breakfast; Ensure Complete       Pain Controlled [x] yes [] no   Bowel Movement [x] yes [] no   Last Bowel Movement (date) 1/20/2017            Vital Signs:   Patient Vitals for the past 12 hrs:   Temp Pulse Resp BP SpO2   01/21/17 0546 98.2 °F (36.8 °C) 94 18 112/75 95 %   01/20/17 2026 98 °F (36.7 °C) 81 18 133/75 93 %      Intake & Output:     Intake/Output Summary (Last 24 hours) at 01/21/17 0746  Last data filed at 01/20/17 1003   Gross per 24 hour   Intake                0 ml   Output              100 ml   Net             -100 ml      Laboratory Results:     Recent Results (from the past 12 hour(s))   GLUCOSE, POC    Collection Time: 01/21/17 12:15 AM   Result Value Ref Range    Glucose (POC) 140 (H) 65 - 100 mg/dL    Performed by [address], [facility]    Collection Time: 01/21/17  4:05 AM   Result Value Ref Range    Sodium 140 136 - 145 mmol/L    Potassium 3.1 (L) 3.5 - 5.1 mmol/L    Chloride 103 97 - 108 mmol/L    CO2 29 21 - 32 mmol/L    Anion gap 8 5 - 15 mmol/L    Glucose 119 (H) 65 - 100 mg/dL    BUN 9 6 - 20 MG/DL    Creatinine 0.51 (L) 0.55 - 1.02 MG/DL    BUN/Creatinine ratio 18 12 - 20      GFR est AA >60 >60 ml/min/1.73m2    GFR est non-AA >60 >60 ml/min/1.73m2    Calcium 8.6 8.5 - 10.1 MG/DL   PHOSPHORUS    Collection Time: 01/21/17  4:05 AM   Result Value Ref Range    Phosphorus 3.1 2.6 - 4.7 MG/DL   GLUCOSE, POC    Collection Time: 01/21/17  6:46 AM   Result Value Ref Range    Glucose (POC) 137 (H) 65 - 100 mg/dL    Performed by [id number] [employer] [facility] for questions and clarifications were given to the incoming nurse. Patient's bed is in low position, side rails x2, door open PRN, call bell within reach and patient not in distress.       Christopher Monet RN

## 2017-01-21 NOTE — PROGRESS NOTES
Chart reviewed, attempted to see patient for treatment but code [place] was announced for this patient just prior. Pt not appropriate for therapy today, will follow up tomorrow.

## 2017-01-21 NOTE — PROGRESS NOTES
CM spoke with Carolin Ly [phone] on-call staff in admissions at [facility], as per Carolin Ly no bed available today and pt is due for reevaluation on Monday 1/23/17.      [address]  X [phone]

## 2017-01-21 NOTE — PROGRESS NOTES
Samia stated patient asked her for a aspirin out of her patients belongings bag. [employer] contacted nurse. Nurse removed a large bag of pills prescribed to patient from patients room. Spoke to [employer] in pharmacy, who instructed a ashraf be taken, and noted in chart. Pill bottles should be locked up and sent home with family today, when they visit today. This information was relayed to patient.      Doxepin 50 mg - 2 bottles  Adderall 20mg - 60count  Rubia Aspirin 81mg  Atorvastatin 20mg - 2 bottles  Trazodone 150mg ( 2 halves)  Pristiq 50mg  Losartan 50mg  Vitamin D3 1000iu    1 lighter  1 pack of cigarettes - [place] [employer] ( 7 cigarettes inside)    Contents verified with Primary Nurse Nathen Obregon RN

## 2017-01-21 NOTE — CONSULTS
Psychiatry Consult Note    Subjective:   Patient:  Felicia Fuentes Age:  49 y.o. :  1960  Date of Evaluation:  2017    Reason for Referral:  Felicia Fuentes was admitted for right leg fracture treatment    History of Presenting Problem: Pt was seen this afternoon and she was sleeping and she was not awakened. Talked to Doctor and she stated she gets agitated and aggressive and loud and in and out of agitation. Talked to 1 to 1 sitter and she stated she tries to walk on her foot and she is at times confused and loud and agitated and irritated. Talked to the nursing and she gave the similar report. Reviewed the psychiatry consult note by Dr Maegan Villatoro.        Social History:   Social History     Social History    Marital status:      Spouse name: N/A    Number of children: N/A    Years of education: N/A     Social History Main Topics    Smoking status: Current Every Day Smoker     Packs/day: 0.50    Smokeless tobacco: Never Used    Alcohol use 14.4 oz/week     24 Glasses of wine per week    Drug use: Yes     Special: Marijuana    Sexual activity: Yes     Partners: Male     Other Topics Concern    None     Social History Narrative       Legal: none reported    Family History:     Family History   Problem Relation Age of Onset    Diabetes Father     Heart Disease Father          Medical History:   Past Medical History   Diagnosis Date    Hypertension     Ill-defined condition      high cholesterol    Psychiatric disorder      depression, ADHD       Psychiatric History: depression       Substance Use History:   History   Alcohol Use    14.4 oz/week    24 Glasses of wine per week     History   Drug Use    Yes    Special: Marijuana           Objective:     Vitals/Physical Assessment:        Patient Vitals for the past 8 hrs:   BP Temp Pulse Resp SpO2   17 1343 120/80 98.6 °F (37 °C) 93 18 93 %           MENTAL STATUS EXAM:   FINDINGS WITHIN NORMAL LIMITS (WNL) UNLESS OTHERWISE STATED BELOW:    Sensorium     Relations uncooperative   Appearance:  age appropriate and older than stated age   Motor Behavior:     Speech: Thought Process: Thought Content    Suicidal ideations    Homicidal ideations    Mood:     Affect:     Memory recent     Memory remote:     Concentration:     Abstraction:     Insight:     Reliability    Judgment:            Pertinant Data:  Patient Vitals for the past 8 hrs:   Temp Pulse Resp BP SpO2   01/21/17 1343 98.6 °F (37 °C) 93 18 120/80 93 %       Recent Results (from the past 24 hour(s))   GLUCOSE, POC    Collection Time: 01/20/17  5:02 PM   Result Value Ref Range    Glucose (POC) 120 (H) 65 - 100 mg/dL    Performed by LINDY CLEMENTS    GLUCOSE, POC    Collection Time: 01/21/17 12:15 AM   Result Value Ref Range    Glucose (POC) 140 (H) 65 - 100 mg/dL    Performed by Obdulio [facility] [employer], BASIC    Collection Time: 01/21/17  4:05 AM   Result Value Ref Range    Sodium 140 136 - 145 mmol/L    Potassium 3.1 (L) 3.5 - 5.1 mmol/L    Chloride 103 97 - 108 mmol/L    CO2 29 21 - 32 mmol/L    Anion gap 8 5 - 15 mmol/L    Glucose 119 (H) 65 - 100 mg/dL    BUN 9 6 - 20 MG/DL    Creatinine 0.51 (L) 0.55 - 1.02 MG/DL    BUN/Creatinine ratio 18 12 - 20      GFR est AA >60 >60 ml/min/1.73m2    GFR est non-AA >60 >60 ml/min/1.73m2    Calcium 8.6 8.5 - 10.1 MG/DL   PHOSPHORUS    Collection Time: 01/21/17  4:05 AM   Result Value Ref Range    Phosphorus 3.1 2.6 - 4.7 MG/DL   GLUCOSE, POC    Collection Time: 01/21/17  6:46 AM   Result Value Ref Range    Glucose (POC) 137 (H) 65 - 100 mg/dL    Performed by Davidson Rajan, POC    Collection Time: 01/21/17  1:00 PM   Result Value Ref Range    Glucose (POC) 120 (H) 65 - 100 mg/dL    Performed by Yany Starkey      No results found.           Impression:      Active Problems:    Closed trimalleolar fracture of right ankle (1/2/2017)      Closed right trimalleolar fracture (1/2/2017)        Her current agitation and in and out of confusion state could be due to delerium related to alcohol or hospital stay. I would recommend to follow up the recommendation given by Dr Martín Salinas.  Will increase seroquel to 25 mg Po TID  Will give haldol 5 mg PO/IM every 6 hrs PRN for agitation and ativan 1 mg PO/IM PRN for agitation      Plan:       Medications:  Current Facility-Administered Medications   Medication Dose Route Frequency    potassium chloride SR (KLOR-CON 10) tablet 40 mEq  40 mEq Oral Q4H    haloperidol lactate (HALDOL) injection 4 mg  4 mg IntraVENous Q6H PRN    LORazepam (ATIVAN) injection 1 mg  1 mg IntraVENous Q6H PRN    QUEtiapine (SEROquel) tablet 25 mg  25 mg Oral QHS    folic acid (FOLVITE) tablet 1 mg  1 mg Oral DAILY    thiamine (B-1) tablet 100 mg  100 mg Oral DAILY    desvenlafaxine ER 24 hour tablet 50 mg  50 mg Oral DAILY    oxyCODONE-acetaminophen (PERCOCET) 5-325 mg per tablet 1 Tab  1 Tab Oral Q4H PRN    insulin lispro (HUMALOG) injection   SubCUTAneous Q6H    glucose chewable tablet 16 g  4 Tab Oral PRN    dextrose (D50W) injection syrg 12.5-25 g  12.5-25 g IntraVENous PRN    glucagon (GLUCAGEN) injection 1 mg  1 mg IntraMUSCular PRN    albuterol-ipratropium (DUO-NEB) 2.5 MG-0.5 MG/3 ML  3 mL Nebulization Q4H PRN    acetaminophen (TYLENOL) solution 650 mg  650 mg Oral Q4H PRN    polyethylene glycol (MIRALAX) packet 17 g  17 g Oral DAILY    ELECTROLYTE REPLACEMENT PROTOCOL  1 Each Other PRN    therapeutic multivitamin (THERAGRAN) tablet 1 Tab  1 Tab Oral DAILY    sodium chloride (NS) flush 10-30 mL  10-30 mL InterCATHeter PRN    sodium chloride (NS) flush 10 mL  10 mL InterCATHeter Q24H    sodium chloride (NS) flush 10 mL  10 mL InterCATHeter PRN    sodium chloride (NS) flush 10-40 mL  10-40 mL InterCATHeter Q8H    heparin (porcine) pf 300 Units  300 Units InterCATHeter PRN    nicotine (NICODERM CQ) 14 mg/24 hr patch 1 Patch  1 Patch TransDERmal DAILY    benzocaine-menthol (CHLORASEPTIC MAX) lozenge 1 Lozenge  1 Lozenge Oral Q2H PRN    sodium chloride (NS) flush 5-10 mL  5-10 mL IntraVENous PRN    enoxaparin (LOVENOX) injection 40 mg  40 mg SubCUTAneous Q24H        Scheduled Medications:   Current Facility-Administered Medications   Medication Dose Route Frequency    potassium chloride SR (KLOR-CON 10) tablet 40 mEq  40 mEq Oral Q4H    QUEtiapine (SEROquel) tablet 25 mg  25 mg Oral QHS    folic acid (FOLVITE) tablet 1 mg  1 mg Oral DAILY    thiamine (B-1) tablet 100 mg  100 mg Oral DAILY    desvenlafaxine ER 24 hour tablet 50 mg  50 mg Oral DAILY    insulin lispro (HUMALOG) injection   SubCUTAneous Q6H    polyethylene glycol (MIRALAX) packet 17 g  17 g Oral DAILY    therapeutic multivitamin (THERAGRAN) tablet 1 Tab  1 Tab Oral DAILY    sodium chloride (NS) flush 10 mL  10 mL InterCATHeter Q24H    sodium chloride (NS) flush 10-40 mL  10-40 mL InterCATHeter Q8H    nicotine (NICODERM CQ) 14 mg/24 hr patch 1 Patch  1 Patch TransDERmal DAILY    enoxaparin (LOVENOX) injection 40 mg  40 mg SubCUTAneous Q24H            Interventions:  Continue to orient the patient from time to time and continue meds        Recommendations for Treatment/Conditions:    Continue meds     Referral To:           Nava Peacock MD  1/21/2017 3:58 PM

## 2017-01-21 NOTE — PROGRESS NOTES
Hospitalist Progress Note    NAME: Anjali Smith   :  1960   MRN:  [id number]       Interim Hospital Summary: 49 y.o. female whom presented on 2017 with right ankle deformity s/p injury R unstable trimalleolar ankle fx/dislocatio  ADHD on Adderall  Typically drinks 1 bottle of wine per day    Initially admitted to ortho service-- multiple attempts at ankle reduction were made with out success. On 1/3 s/p ORIF, on  consulted hospitalist for acute delirium ( with concern for alcohol withdrawal) on  transferred to ICU requiring ativan drip and precedex. Further deteriorated on  requiring intubation  with respiratory failure, ARDS, delirium tremens,GNR sepsis with septic shock  Ortho transferred the patient care to medicine service on     Extubated , completed antibiotics  Psych seen Regarding recommendations -- on pristiq, and started on seroquel     CM consulted for disposition           Yuliya Cadena [phone] updated on 1/10     Assessment / Plan:  Delirium  Major depression in partial remission  Alcohol dependenace, concern fpr cannabis dependence    Cont home pristiq, added seroquel   No 1:1 or psych hospitalization needed per psych  No home adderall in hospital      Hypokalemia  Hypophosphatemia  Replacing    Hyperglycemia  Check hba1c 5.5  poc and achs      Acute hypoxic respiratory failure secondary  To  respiratory depression ( delirium tremens  Being on ativan and precedex)  With multiorgan failure ( shock liver, concern for hcap)  ecoli bacteremia secondary to e coli uti    Extubated on , Sputum cultures, neg, completed the antibiotics    Chest xray:ETT in satisfactory position. Interval improved aeration of the lungs with some regression of pulmonary edema. Persistent bilateral small pleural effusions and probably some consolidation atleft base.   Follow the blood cultures with gram neg rods with e coli   urine cultures with gram neg rods with ecoli  Platelets ok  Ammonia normal,   picc placed on 1/7  Follow the echo Systolic function was normal. Ejection fraction was estimated in the range of 55 % to 60 %. There were no regional wall motion abnormalities. Wall thickness was normal. DOPPLER:Left ventricular diastolic function parameters were normal for the patient's age. ortho checked the ankle in this interim    Hypertension    Depression ADHD    Right trimalleolar ankle fracture, poa now s/p closed reduction and ORIF 1/2/17  -per ortho recommendations  Needs asa 325mg on discharge  And follow up with [facility] in 3-4 weeks  Last seen here on 1/19, new case applied  Code status: full  Prophylaxis: lovenox  Recommended Disposition: tbd     Subjective:     Chief Complaint / Reason for Physician Visit  Follow up on respiratory failure, delirium tremens  Still confused    Discussed with RN events overnight. Review of Systems:  Symptom Y/N Comments  Symptom Y/N Comments   Fever/Chills n   Chest Pain n    Poor Appetite    Edema     Cough n   Abdominal Pain n    Sputum    Joint Pain     SOB/FUENTES n   Pruritis/Rash     Nausea/vomit    Tolerating PT/OT     Diarrhea    Tolerating Diet     Constipation    Other       Could NOT obtain due to:      Objective:     VITALS:   Last 24hrs VS reviewed since prior progress note. Most recent are:  Patient Vitals for the past 24 hrs:   Temp Pulse Resp BP SpO2   01/21/17 0546 98.2 °F (36.8 °C) 94 18 112/75 95 %   01/20/17 2026 98 °F (36.7 °C) 81 18 133/75 93 %   01/20/17 1314 98 °F (36.7 °C) 80 18 104/78 92 %       Intake/Output Summary (Last 24 hours) at 01/21/17 1102  Last data filed at 01/21/17 0844   Gross per 24 hour   Intake               25 ml   Output                0 ml   Net               25 ml        PHYSICAL EXAM:  General: Alert, oriented,  following commands  EENT:  EOMI. Anicteric sclerae.    Resp:  B/l  Air entry, no wheezing  No accessory muscle use  CV:  Regular  rhythm,  No edema  GI:  Soft, Non distended, Non tender.  +Bowel sounds  Neurologic:  Awake , alert,   Psych:             Not anxious nor agitated  Skin:  No rashes. No jaundice right leg in new cast    Reviewed most current lab test results and cultures  YES  Reviewed most current radiology test results   YES  Review and summation of old records today    NO  Reviewed patient's current orders and MAR    YES  PMH/SH reviewed - no change compared to H&P  ________________________________________________________________________  Care Plan discussed with:    Comments   Patient y    Family      RN y    Care Manager     Consultant                        Multidiciplinary team rounds were held today with , nursing, pharmacist and clinical coordinator. Patient's plan of care was discussed; medications were reviewed and discharge planning was addressed. ________________________________________________________________________  Total NON critical care TIME: 25  Minutes    Total CRITICAL CARE TIME Spent:   Minutes non procedure based      Comments   >50% of visit spent in counseling and coordination of care     ________________________________________________________________________  Danial Blunt MD     Procedures: see electronic medical records for all procedures/Xrays and details which were not copied into this note but were reviewed prior to creation of Plan. LABS:  I reviewed today's most current labs and imaging studies.   Pertinent labs include:  Recent Labs      01/20/17 0312   WBC  6.5   HGB  10.1*   HCT  31.1*   PLT  455*     Recent Labs      01/21/17   0405  01/20/17   0312   NA  140  141   K  3.1*  2.5*   CL  103  100   CO2  29  32   GLU  119*  137*   BUN  9  14   CREA  0.51*  0.57   CA  8.6  8.4*   MG   --   2.5*   PHOS  3.1  2.4*       Signed: Danial Blunt MD

## 2017-01-21 NOTE — PROGRESS NOTES
Pt with agitation, screaming at the staff, kicking, trying to get out of bed ( with ankle fracture)  Tried haldol 1mg an hour ago, not much help  Try another 2mg, and if not working geodon 20mg im one dose    Spoke with psych on call ( dr. Monica Ortega)  Will start on haldol 5mg q 6 hours ( iv feasible,)  Ativan 1mg iv q 6hours  Appreciate dr. Igor High help would see her today    Addendum:at 7 pm  Seen by psych as well today-- cont haldol and ativan  Pt now again agitated, kicking and biting  Told to give geodon 2 nd dose 20mg ( max 40mg/day)  If not able to control--- may need restraints, and upgrade to pcu. Pt was in severe alcohol withdrawal after ORIF ankle--- intubated 1/8 ( required ativan drip and precedex drip) and extubated on 1/17. May need to talk to  psych whether she would benefit from ativan drip. As she is just out of  alcohol withdrawal, do not think this is alcohol withdrawal again.      Additional direct care spent 35 min  Coordinating care, discussed with psychiatry

## 2017-01-21 NOTE — ROUTINE PROCESS
Bedside and Verbal shift change report given to Amelia Luis RN (oncoming nurse) by Kandy Rockwell RN (offgoing nurse). Report included the following information SBAR, Kardex, Intake/Output and Recent Results.

## 2017-01-21 NOTE — PROGRESS NOTES
Ortho Progress    Attempted to see patient with Dr Albert Mcdaniel. Patient appeared awake but  and patient sitter addiment that no one should see her as they \"just got her calmed down\". Patient appeared awake and resting comfortably. No further exam performed. Call with concerns.     Hamzah Mendoza PA-C  [address]

## 2017-01-22 LAB
ANION GAP BLD CALC-SCNC: 8 MMOL/L (ref 5–15)
BUN SERPL-MCNC: 8 MG/DL (ref 6–20)
BUN/CREAT SERPL: 13 (ref 12–20)
CALCIUM SERPL-MCNC: 8.8 MG/DL (ref 8.5–10.1)
CHLORIDE SERPL-SCNC: 107 MMOL/L (ref 97–108)
CO2 SERPL-SCNC: 29 MMOL/L (ref 21–32)
CREAT SERPL-MCNC: 0.6 MG/DL (ref 0.55–1.02)
GLUCOSE BLD STRIP.AUTO-MCNC: 101 MG/DL (ref 65–100)
GLUCOSE BLD STRIP.AUTO-MCNC: 119 MG/DL (ref 65–100)
GLUCOSE BLD STRIP.AUTO-MCNC: 119 MG/DL (ref 65–100)
GLUCOSE SERPL-MCNC: 107 MG/DL (ref 65–100)
POTASSIUM SERPL-SCNC: 3.4 MMOL/L (ref 3.5–5.1)
SERVICE CMNT-IMP: ABNORMAL
SODIUM SERPL-SCNC: 144 MMOL/L (ref 136–145)

## 2017-01-22 PROCEDURE — 80048 BASIC METABOLIC PNL TOTAL CA: CPT | Performed by: HOSPITALIST

## 2017-01-22 PROCEDURE — 74011250636 HC RX REV CODE- 250/636: Performed by: HOSPITALIST

## 2017-01-22 PROCEDURE — 36415 COLL VENOUS BLD VENIPUNCTURE: CPT | Performed by: HOSPITALIST

## 2017-01-22 PROCEDURE — 74011250637 HC RX REV CODE- 250/637: Performed by: HOSPITALIST

## 2017-01-22 PROCEDURE — 74011250637 HC RX REV CODE- 250/637: Performed by: INTERNAL MEDICINE

## 2017-01-22 PROCEDURE — 74011250637 HC RX REV CODE- 250/637: Performed by: PSYCHIATRY & NEUROLOGY

## 2017-01-22 PROCEDURE — 82962 GLUCOSE BLOOD TEST: CPT

## 2017-01-22 PROCEDURE — 74011250636 HC RX REV CODE- 250/636: Performed by: ORTHOPAEDIC SURGERY

## 2017-01-22 PROCEDURE — 65270000015 HC RM PRIVATE ONCOLOGY

## 2017-01-22 RX ORDER — POTASSIUM CHLORIDE 20 MEQ/1
20 TABLET, EXTENDED RELEASE ORAL
Status: COMPLETED | OUTPATIENT
Start: 2017-01-22 | End: 2017-01-22

## 2017-01-22 RX ADMIN — FOLIC ACID 1 MG: 1 TABLET ORAL at 09:48

## 2017-01-22 RX ADMIN — QUETIAPINE FUMARATE 25 MG: 25 TABLET, FILM COATED ORAL at 09:48

## 2017-01-22 RX ADMIN — QUETIAPINE FUMARATE 25 MG: 25 TABLET, FILM COATED ORAL at 17:31

## 2017-01-22 RX ADMIN — ENOXAPARIN SODIUM 40 MG: 40 INJECTION, SOLUTION INTRAVENOUS; SUBCUTANEOUS at 09:46

## 2017-01-22 RX ADMIN — Medication 30 ML: at 07:05

## 2017-01-22 RX ADMIN — QUETIAPINE FUMARATE 25 MG: 25 TABLET, FILM COATED ORAL at 21:11

## 2017-01-22 RX ADMIN — Medication 30 ML: at 21:11

## 2017-01-22 RX ADMIN — OXYCODONE HYDROCHLORIDE AND ACETAMINOPHEN 1 TABLET: 5; 325 TABLET ORAL at 23:31

## 2017-01-22 RX ADMIN — Medication 10 ML: at 12:22

## 2017-01-22 RX ADMIN — Medication 30 ML: at 01:04

## 2017-01-22 RX ADMIN — Medication 20 ML: at 03:50

## 2017-01-22 RX ADMIN — POTASSIUM CHLORIDE 20 MEQ: 20 TABLET, EXTENDED RELEASE ORAL at 09:47

## 2017-01-22 RX ADMIN — Medication 10 ML: at 17:33

## 2017-01-22 RX ADMIN — DESVENLAFAXINE 50 MG: 50 TABLET, EXTENDED RELEASE ORAL at 09:46

## 2017-01-22 RX ADMIN — LORAZEPAM 1 MG: 2 INJECTION INTRAMUSCULAR; INTRAVENOUS at 20:45

## 2017-01-22 RX ADMIN — Medication 100 MG: at 09:47

## 2017-01-22 RX ADMIN — THERA TABS 1 TABLET: TAB at 09:46

## 2017-01-22 NOTE — ROUTINE PROCESS
Oncology Nursing Communication Tool      7:13 AM  1/22/2017     Bedside shift change report given to Patricia Jones, RN (incoming nurse) by Shahzad Zaragoza RN (outgoing nurse) on Tj Cardinal a 49 y.o. female who was admitted on 1/2/2017 12:49 PM. Report included the following information SBAR, Kardex, Intake/Output, MAR and Recent Results. Significant changes during shift: pt still confused but calm & cooperative overnight. No prn meds given/needed. Sitter at bedside. Issues for physician to address: confusion. Restless/agitated on days.             Code Status: Full Code     Infections: No current active infections     Allergies: Pcn [penicillins]     Current diet: DIET DIABETIC CONSISTENT CARB Regular  DIET NUTRITIONAL SUPPLEMENTS Breakfast; Ensure Complete       Pain Controlled [x] yes [] no   Bowel Movement [] yes [x] no   Last Bowel Movement (date) 1/21/17            Vital Signs:   Patient Vitals for the past 12 hrs:   Temp Pulse Resp BP SpO2   01/22/17 0619 98.9 °F (37.2 °C) 87 18 152/83 94 %   01/21/17 2105 97.3 °F (36.3 °C) 93 18 (!) 122/94 96 %      Intake & Output:     Intake/Output Summary (Last 24 hours) at 01/22/17 3540  Last data filed at 01/21/17 2102   Gross per 24 hour   Intake              150 ml   Output                0 ml   Net              150 ml      Laboratory Results:     Recent Results (from the past 12 hour(s))   GLUCOSE, POC    Collection Time: 01/22/17  1:00 AM   Result Value Ref Range    Glucose (POC) 101 (H) 65 - 100 mg/dL    Performed by [address] [place] [address],[address], BASIC    Collection Time: 01/22/17  3:48 AM   Result Value Ref Range    Sodium 144 136 - 145 mmol/L    Potassium 3.4 (L) 3.5 - 5.1 mmol/L    Chloride 107 97 - 108 mmol/L    CO2 29 21 - 32 mmol/L    Anion gap 8 5 - 15 mmol/L    Glucose 107 (H) 65 - 100 mg/dL    BUN 8 6 - 20 MG/DL    Creatinine 0.60 0.55 - 1.02 MG/DL    BUN/Creatinine ratio 13 12 - 20      GFR est AA >60 >60 ml/min/1.73m2    GFR est non-AA >60 >60 ml/min/1.73m2    Calcium 8.8 8.5 - 10.1 MG/DL   GLUCOSE, POC    Collection Time: 01/22/17  6:21 AM   Result Value Ref Range    Glucose (POC) 119 (H) 65 - 100 mg/dL    Performed by Hola Haskins (ED)               Opportunity for questions and clarifications were given to the incoming nurse. Patient's bed is in low position, side rails x2, door open PRN, call bell within reach and patient not in distress.       Lyndsay Meredith RN

## 2017-01-22 NOTE — PROGRESS NOTES
Hospitalist Progress Note    NAME: Rony Biggs   :  1960   MRN:  [id number]       Interim Hospital Summary: 49 y.o. female whom presented on 2017 with right ankle deformity s/p injury R unstable trimalleolar ankle fx/dislocation,ADHD on Adderall  Typically drinks 1 bottle of wine per day    Initially admitted to ortho service-- multiple attempts at ankle reduction were made with out success. On 1/3 s/p ORIF, on  consulted hospitalist for acute delirium ( with concern for alcohol withdrawal) on  transferred to ICU requiring ativan drip and precedex. Further deteriorated on  requiring intubation,with respiratory failure, ARDS, delirium tremens,GNR sepsis with septic shock  Ortho transferred the patient care to medicine service on     Extubated , completed antibiotics  Psych seen Regarding recommendations -- on pristiq, and started on seroquel. To hold adderell while inpatient     Medically stable. But active psych issues going on   On  after the  visited the patient, started with agitation, biting the staff, kicking  Code atlas called 2 times. Got multiple doses of haldol and ativan and 2 doses of geodan  Seen by psych as well    Psych to reevalate for inpatient psych admission and needing of psych TDO  Or if psych tells no need -- plan for snf       Modesta Parikh [phone] updated on 1/10     Assessment / Plan:  Delirium  Major depression in partial remission  Alcohol dependenace, concern fpr cannabis dependence  Underlying ADHD    Cont home pristiq, added seroquel   No home adderall in hospital  No 1:1 or psych hospitalization needed per psych  On , now reconsulted given events on       On  after the  visited the patient, started with agitation, biting the staff, kickingCode atlas called 2 times.  Got multiple doses of haldol and ativan and 2 doses of geodan, Seen by psych as well    Psych to reevalate for inpatient psych admission and needing of psych TDO      Replacing electrolytes    Hyperglycemia  Check hba1c 5.5  poc and achs      Acute hypoxic respiratory failure secondary  To  respiratory depression ( delirium tremens  Being on ativan and precedex)  With multiorgan failure ( shock liver, concern for hcap)  ecoli bacteremia secondary to e coli uti    Extubated on 1/17, Sputum cultures, neg, completed the antibiotics    Chest xray:ETT in satisfactory position. Interval improved aeration of the lungs with some regression of pulmonary edema. Persistent bilateral small pleural effusions and probably some consolidation atleft base. Follow the blood cultures with gram neg rods with e coli   urine cultures with gram neg rods with ecoli  Platelets ok  Ammonia normal,   picc placed on 1/7  Follow the echo Systolic function was normal. Ejection fraction was estimated in the range of 55 % to 60 %. There were no regional wall motion abnormalities. Wall thickness was normal. DOPPLER:Left ventricular diastolic function parameters were normal for the patient's age. ortho checked the ankle in this interim    Hypertension  Not on meds    Right trimalleolar ankle fracture, poa now s/p closed reduction and ORIF 1/2/17  -per ortho recommendations  Needs asa 325mg on discharge  And follow up with p[facility] in 3-4 weeks  Last seen here on 1/19, new case applied      Code status: full  Prophylaxis: lovenox  Recommended Disposition: tbd     Subjective:     Chief Complaint / Reason for Physician Visit  Follow up on respiratory failure, delirium tremens  Pt resting now, was cooperative over the night    Discussed with RN events overnight.      Review of Systems:  Symptom Y/N Comments  Symptom Y/N Comments   Fever/Chills    Chest Pain     Poor Appetite    Edema     Cough    Abdominal Pain     Sputum    Joint Pain     SOB/FUENTES    Pruritis/Rash     Nausea/vomit    Tolerating PT/OT     Diarrhea    Tolerating Diet     Constipation    Other       Could NOT obtain due to: sleeping Objective:     VITALS:   Last 24hrs VS reviewed since prior progress note. Most recent are:  Patient Vitals for the past 24 hrs:   Temp Pulse Resp BP SpO2   01/22/17 0619 98.9 °F (37.2 °C) 87 18 152/83 94 %   01/21/17 2105 97.3 °F (36.3 °C) 93 18 (!) 122/94 96 %   01/21/17 1343 98.6 °F (37 °C) 93 18 120/80 93 %       Intake/Output Summary (Last 24 hours) at 01/22/17 3087  Last data filed at 01/21/17 2102   Gross per 24 hour   Intake              150 ml   Output                0 ml   Net              150 ml        PHYSICAL EXAM:  General: Resting,   EENT:  EOMI. Anicteric sclerae. Resp:  B/l  Air entry, no wheezing, no rales  No accessory muscle use  CV:  Regular  rhythm,  No edema  GI:  Soft, Non distended,  Non tender.  +Bowel sounds  Neurologic:  Alert, delirium   Psych:             Not anxious nor agitated  Skin:  No rashes. No jaundice , right leg in new cast    Reviewed most current lab test results and cultures  YES  Reviewed most current radiology test results   YES  Review and summation of old records today    NO  Reviewed patient's current orders and MAR    YES  PMH/ reviewed - no change compared to H&P  ________________________________________________________________________  Care Plan discussed with:    Comments   Patient     Family      RN y    Care Manager     Consultant                        Multidiciplinary team rounds were held today with , nursing, pharmacist and clinical coordinator. Patient's plan of care was discussed; medications were reviewed and discharge planning was addressed.      ________________________________________________________________________  Total NON critical care TIME:35  Minutes    Total CRITICAL CARE TIME Spent:   Minutes non procedure based      Comments   >50% of visit spent in counseling and coordination of care     ________________________________________________________________________  Cherylene Keeler, MD     Procedures: see electronic medical records for all procedures/Xrays and details which were not copied into this note but were reviewed prior to creation of Plan. LABS:  I reviewed today's most current labs and imaging studies.   Pertinent labs include:  Recent Labs      01/20/17 0312   WBC  6.5   HGB  10.1*   HCT  31.1*   PLT  455*     Recent Labs      01/22/17   0348  01/21/17   0405  01/20/17 0312   NA  144  140  141   K  3.4*  3.1*  2.5*   CL  107  103  100   CO2  29  29  32   GLU  107*  119*  137*   BUN  8  9  14   CREA  0.60  0.51*  0.57   CA  8.8  8.6  8.4*   MG   --    --   2.5*   PHOS   --   3.1  2.4*       Signed: Shikha Ambrosio MD

## 2017-01-22 NOTE — ROUTINE PROCESS
Patient very agitated and trying repeatedly to get out of the bed, verbally abusive.  Ativan administered per orders

## 2017-01-22 NOTE — ROUTINE PROCESS
Bedside and Verbal shift change report given to Ollie French RN (oncoming nurse) by Sotero Sandhu RN (offgoing nurse). Report included the following information SBAR, Kardex, Intake/Output, MAR and Recent Results.

## 2017-01-22 NOTE — PROGRESS NOTES
PICC dressing was not changed today, per Primary Nurse patient was agitated and not appropriate to change at this time.      Michael Hall RN, PICC Nurse

## 2017-01-22 NOTE — PROGRESS NOTES
Code atlas called; patient verbally and physically aggressive toward staff, attempting to get out of bed and hit staff with her cast.  Physician on unit when this occurred; ordered 20 mg Geodon, IM, one time. Patient already received PRN lorazepam IV as well as PRN Haldol IV. IM Geodon administered; patient tolerated well.

## 2017-01-22 NOTE — ROUTINE PROCESS
Called Dr. Nicanor Amador and got an order for geodon, asked for restraints but wants to try geodon first. Pt trying to bite the sitter at this time.

## 2017-01-22 NOTE — PROGRESS NOTES
Patient's  came to the unit; asked if the patient was sleeping. This nurse looked into the patient's room and the patient was in fact asleep. The patient's  stated that he did not want to wake her up after she had gotten agitated during his visit yesterday. This nurse informed the patient's  that he could visit the patient whenever he wanted and agreed with him that it would be best to let her sleep for now. The patient's  asked how her night had been. He was informed that per night shift, overnight was uneventful. The patient's  asked if the patient would be transferred to another facility; this nurse informed him that a consult was pending that could determine where the patient may go; however, the patient would not be going anywhere today. The patient's  asked if the patient needed her Adderal.  This nurse informed him that the attending physician would be asked about this by the primary nurse and if the Emily Tho is needed, the nurse would call him so that he can bring the medication to the hospital so it can be verified by pharmacy. The  provided the nurse with his contact number and stated that he would come back later this evening when that patient was awake. In addition, the patient's  was provided with the phone numbers for both the unit and the patient's room. Upon leaving the unit, the patient's  gave this nurse a cell phone with  that belongs to the patient and requested that it be given to her. The nurse took the cell phone and  into the room, plugged the  into an outlet and connected the cell phone to it within reach of the patient.

## 2017-01-22 NOTE — ROUTINE PROCESS
Patient continues to be agitated and trying to get out of bed, fighting staff, trying to use her feet to kick, haldol given.

## 2017-01-23 LAB
GLUCOSE BLD STRIP.AUTO-MCNC: 165 MG/DL (ref 65–100)
GLUCOSE BLD STRIP.AUTO-MCNC: 86 MG/DL (ref 65–100)
SERVICE CMNT-IMP: ABNORMAL
SERVICE CMNT-IMP: NORMAL

## 2017-01-23 PROCEDURE — 74011250637 HC RX REV CODE- 250/637: Performed by: INTERNAL MEDICINE

## 2017-01-23 PROCEDURE — 74011250636 HC RX REV CODE- 250/636: Performed by: ORTHOPAEDIC SURGERY

## 2017-01-23 PROCEDURE — 74011250637 HC RX REV CODE- 250/637: Performed by: PSYCHIATRY & NEUROLOGY

## 2017-01-23 PROCEDURE — 77030020847 HC STATLOK BARD -A

## 2017-01-23 PROCEDURE — 65270000015 HC RM PRIVATE ONCOLOGY

## 2017-01-23 PROCEDURE — 74011250636 HC RX REV CODE- 250/636: Performed by: HOSPITALIST

## 2017-01-23 PROCEDURE — 74011250637 HC RX REV CODE- 250/637: Performed by: HOSPITALIST

## 2017-01-23 PROCEDURE — 82962 GLUCOSE BLOOD TEST: CPT

## 2017-01-23 RX ORDER — POTASSIUM CHLORIDE 750 MG/1
20 TABLET, FILM COATED, EXTENDED RELEASE ORAL
Status: COMPLETED | OUTPATIENT
Start: 2017-01-23 | End: 2017-01-23

## 2017-01-23 RX ADMIN — QUETIAPINE FUMARATE 25 MG: 25 TABLET, FILM COATED ORAL at 09:17

## 2017-01-23 RX ADMIN — ENOXAPARIN SODIUM 40 MG: 40 INJECTION, SOLUTION INTRAVENOUS; SUBCUTANEOUS at 09:16

## 2017-01-23 RX ADMIN — LORAZEPAM 1 MG: 2 INJECTION INTRAMUSCULAR; INTRAVENOUS at 21:44

## 2017-01-23 RX ADMIN — QUETIAPINE FUMARATE 25 MG: 25 TABLET, FILM COATED ORAL at 21:44

## 2017-01-23 RX ADMIN — LORAZEPAM 1 MG: 2 INJECTION INTRAMUSCULAR; INTRAVENOUS at 04:21

## 2017-01-23 RX ADMIN — THERA TABS 1 TABLET: TAB at 09:17

## 2017-01-23 RX ADMIN — QUETIAPINE FUMARATE 25 MG: 25 TABLET, FILM COATED ORAL at 17:15

## 2017-01-23 RX ADMIN — Medication 40 ML: at 21:49

## 2017-01-23 RX ADMIN — DESVENLAFAXINE 50 MG: 50 TABLET, EXTENDED RELEASE ORAL at 09:21

## 2017-01-23 RX ADMIN — Medication 30 ML: at 04:25

## 2017-01-23 RX ADMIN — POTASSIUM CHLORIDE 20 MEQ: 750 TABLET, FILM COATED, EXTENDED RELEASE ORAL at 09:21

## 2017-01-23 RX ADMIN — FOLIC ACID 1 MG: 1 TABLET ORAL at 09:17

## 2017-01-23 RX ADMIN — Medication 30 ML: at 11:42

## 2017-01-23 RX ADMIN — Medication 100 MG: at 09:17

## 2017-01-23 RX ADMIN — Medication 10 ML: at 06:50

## 2017-01-23 RX ADMIN — Medication 10 ML: at 17:15

## 2017-01-23 NOTE — PROGRESS NOTES
Attempted to see pt for PT tx. Spiritual care currently in with pt and then PICC team awaiting to enter. Noted psych consult and possible transfer for IP psych. Will follow up later as able. Thank you.   Hero Zhang, PT, DPT

## 2017-01-23 NOTE — PROGRESS NOTES
Hospitalist Progress Note    NAME: Felicia Fuentes   :  1960   MRN:  [id number]       Interim Hospital Summary: 49 y.o. female whom presented on 2017 with right ankle deformity s/p injury R unstable trimalleolar ankle fx/dislocation,ADHD on Adderall  Typically drinks 1 bottle of wine per day    Initially admitted to ortho service-- multiple attempts at ankle reduction were made with out success. On 1/3 s/p ORIF, on  consulted hospitalist for acute delirium ( with concern for alcohol withdrawal) on  transferred to ICU requiring ativan drip and precedex. Further deteriorated on  requiring intubation,with respiratory failure, ARDS, delirium tremens,GNR sepsis with septic shock  Ortho transferred the patient care to medicine service on     Extubated , completed antibiotics  Psych seen Regarding recommendations -- on pristiq, and started on seroquel. To hold adderell while inpatient     Medically stable. But active psych issues going on   On  after the  visited the patient, started with agitation, biting the staff, kicking  Code atlas called 2 times. Got multiple doses of haldol and ativan and 2 doses of geodan  Seen by psych as well  Given the events on   Psych to reevalate for inpatient psych admission and needing of psych TDO  Or if psych tells no need -- plan for snf       Davin Just [phone] updated on 1/10     Assessment / Plan:  Delirium  Major depression in partial remission  Alcohol dependenace, concern fpr cannabis dependence  Underlying ADHD    Cont home pristiq, added seroquel   No home adderall in hospital  No 1:1 or psych hospitalization needed per psych  On , now reconsulted given events on       On  after the  visited the patient, started with agitation, biting the staff, kickingCode atlas called 2 times.  Got multiple doses of haldol and ativan and 2 doses of geodan, Seen by psych as well    Psych to reevalate for inpatient psych admission and needing of psych TDO        Hyperglycemia  Check hba1c 5.5  poc and achs      Acute hypoxic respiratory failure secondary  To  respiratory depression ( delirium tremens  Being on ativan and precedex)  With multiorgan failure ( shock liver, concern for hcap)  ecoli bacteremia secondary to e coli uti    Extubated on 1/17, Sputum cultures, neg, completed the antibiotics    Chest xray:ETT in satisfactory position. Interval improved aeration of the lungs with some regression of pulmonary edema. Persistent bilateral small pleural effusions and probably some consolidation atleft base. Follow the blood cultures with gram neg rods with e coli   urine cultures with gram neg rods with ecoli  Platelets ok  Ammonia normal,   picc placed on 1/7  Follow the echo Systolic function was normal. Ejection fraction was estimated in the range of 55 % to 60 %. There were no regional wall motion abnormalities. Wall thickness was normal. DOPPLER:Left ventricular diastolic function parameters were normal for the patient's age. ortho checked the ankle in this interim    Hypertension  Not on meds    Right trimalleolar ankle fracture, poa now s/p closed reduction and ORIF 1/2/17  -per ortho recommendations  Needs asa 325mg on discharge  And follow up with [facility] in 3-4 weeks  Last seen here on 1/19, new case applied      Code status: full  Prophylaxis: lovenox  Recommended Disposition: tbd     Subjective:     Chief Complaint / Reason for Physician Visit  Follow up on respiratory failure, delirium tremens      Discussed with RN events overnight.      Review of Systems:  Symptom Y/N Comments  Symptom Y/N Comments   Fever/Chills    Chest Pain     Poor Appetite    Edema     Cough    Abdominal Pain     Sputum    Joint Pain     SOB/FUENTES    Pruritis/Rash     Nausea/vomit    Tolerating PT/OT     Diarrhea    Tolerating Diet     Constipation    Other       Could NOT obtain due to:      Objective:     VITALS:   Last 24hrs VS reviewed since prior progress note. Most recent are:  Patient Vitals for the past 24 hrs:   Temp Pulse Resp BP SpO2   01/23/17 0650 98.4 °F (36.9 °C) 97 20 (!) 130/93 94 %   01/22/17 2300 98.3 °F (36.8 °C) 98 16 137/83 95 %   01/22/17 1329 97.4 °F (36.3 °C) 88 16 140/81 95 %       Intake/Output Summary (Last 24 hours) at 01/23/17 0909  Last data filed at 01/23/17 0650   Gross per 24 hour   Intake              120 ml   Output              300 ml   Net             -180 ml        PHYSICAL EXAM:  General: Resting,   EENT:  EOMI. Anicteric sclerae. Resp:  B/l air entry, no wheezing, no rales. No accessory muscle use  CV:  Regular  rhythm,  No edema  GI:  Soft, Non distended,  Non tender.  +Bowel sounds  Neurologic:  Alert, delirium   Psych:             Not anxious nor agitated  Skin:  No rashes. No jaundice , right leg in new cast    Reviewed most current lab test results and cultures  YES  Reviewed most current radiology test results   YES  Review and summation of old records today    NO  Reviewed patient's current orders and MAR    YES  PMH/SH reviewed - no change compared to H&P  ________________________________________________________________________  Care Plan discussed with:    Comments   Patient     Family      RN y    Care Manager     Consultant                        Multidiciplinary team rounds were held today with , nursing, pharmacist and clinical coordinator. Patient's plan of care was discussed; medications were reviewed and discharge planning was addressed.      ________________________________________________________________________  Total NON critical care TIME: 25  Minutes    Total CRITICAL CARE TIME Spent:   Minutes non procedure based      Comments   >50% of visit spent in counseling and coordination of care     ________________________________________________________________________  Mone Nguyễn MD     Procedures: see electronic medical records for all procedures/Xrays and details which were not copied into this note but were reviewed prior to creation of Plan. LABS:  I reviewed today's most current labs and imaging studies. Pertinent labs include:  No results for input(s): WBC, HGB, HCT, PLT, HGBEXT, HCTEXT, PLTEXT, HGBEXT, HCTEXT, PLTEXT in the last 72 hours.   Recent Labs      01/22/17   0348  01/21/17   0405   NA  144  140   K  3.4*  3.1*   CL  107  103   CO2  29  29   GLU  107*  119*   BUN  8  9   CREA  0.60  0.51*   CA  8.8  8.6   PHOS   --   3.1       Signed: Ruba Moscoso MD

## 2017-01-23 NOTE — PROGRESS NOTES
OT note: Attempted to see pt for  tx. Pt currently with spiritual care and PICC team waiting to enter room. Noted that psych consult and possible transfer to IP psych.   Will defer pt today and follow up with pt tomorrow.

## 2017-01-23 NOTE — ADT AUTH CERT NOTES
Delirium - Care Day 21 (1/22/2017) by Arabella Aguilar RN        Review Status Review Entered       Completed 1/23/2017       Details              Care Day: 21 Care Date: 1/22/2017 Level of Care:        Guideline Day 1        Clinical Status       (X) * Clinical Indications met [D]              Interventions       (X) Possible physical restraint [F]              Medications       (X) Benzodiazepine for delirium due to alcohol or sedative [A] withdrawal       1/23/2017 1:42 PM EST by Denver Inoue         Ativan 1mg IV q 6 hrs prn x 1, Desvenlafaxine ER 50mg po daily, Haldol 1mg IV x 1              (X) Possible antipsychotic [G]       1/23/2017 1:42 PM EST by Denver Inoue         geodon 2 nd dose 20mg ( max 40mg/day)                     Evaluation       (X) Medical, psychiatric, social, and substance use histories       1/23/2017 1:42 PM EST by Denver Inoue         ETOH and cannibus dependence, major depression              (X) Laboratory studies [C]       1/23/2017 1:42 PM EST by Denver Inoue         K 3.4, Glu 107              (X) Condition assessed multiple times per shift       1/23/2017 1:42 PM EST by Denver Inoue         Required f/u MD assessment 1/22/17                                          * Milestone              Additional Notes       1/22/17- IP concurrent       Labs:  K 3.4, Glu 107       Meds:  Desvenlafaxine ER 50mg po daily, Lovenox 40mg sq  q 24 hrs, Ativan 1mg IV q 6 hrs prn x 1, Percocet 5 po prn x 1, Vanco 200mg IV x 1, Vanco 100mg IV q 8 hrs       Assessment / Plan:       Delirium       Major depression in partial remission       Alcohol dependenace, concern fpr cannabis dependence       Underlying ADHD               Cont home pristiq, added seroquel        No home adderall in hospital       No 1:1 or psych hospitalization needed per psych On 1/20, now reconsulted given events on 1/21                       On 1/21 after the  visited the patient, started with agitation, biting the staff, kickingCode atlas called 2 times. Got multiple doses of haldol and ativan and 2 doses of geodan, Seen by psych as well               Psych to reevalate for inpatient psych admission and needing of psych MATT TANG addendum:        Pt with agitation, screaming at the staff, kicking, trying to get out of bed ( with ankle fracture)       Tried haldol 1mg an hour ago, not much help       Try another 2mg, and if not working geodon 20mg im one dose               Spoke with psych on call ( dr. Gabbi Sutton)       Will start on haldol 5mg q 6 hours ( iv feasible,)       Ativan 1mg iv q 6hours       Appreciate dr. Vargas Second help would see her today               Addendum:at 7 pm       Seen by psych as well today-- cont haldol and ativan       Pt now again agitated, kicking and biting       Told to give geodon 2 nd dose 20mg ( max 40mg/day)       If not able to control--- may need restraints, and upgrade to pcu.       Pt was in severe alcohol withdrawal after ORIF ankle--- intubated 1/8 ( required ativan drip and precedex drip) and extubated on 1/17.       May need to talk to  psych whether she would benefit from ativan drip.       As she is just out of alcohol withdrawal, do not think this is alcohol withdrawal again.                     1/21/17: psych eval       Impression:                Active Problems:       Closed trimalleolar fracture of right ankle (1/2/2017)               Closed right trimalleolar fracture (1/2/2017)                               Her current agitation and in and out of confusion state could be due to delerium related to alcohol or hospital stay. I would recommend to follow up the recommendation given by Dr Alo Addison.  Will increase seroquel to 25 mg Po TID       Will give haldol 5 mg PO/IM every 6 hrs PRN for agitation and ativan 1 mg PO/IM PRN for agitation                                 Delirium - Clinical Indications for Admission to Inpatient Care by Jania Slater RN        Review Status Review Entered       Completed 2017       Details              Clinical Indications for Admission to Inpatient Care       Most Recent : Sue Has Most Recent Date: 2017 1:39 PM EST       (X) Admission is indicated because of 1 or more of the following  (1) (2) (3):          (X) Delirium due to alcohol or sedative [A] withdrawal          2017 1:39 PM EST by Nic Ortega dependence, concern for cannibus dependence, alcohol withdrawal post surgery                                                     Respiratory Failure GRG - Care Day 18 (2017) by Judi Santoro RN        Review Status Review Entered       Completed 2017       Details              Care Day: 18 Care Date: 2017 Level of Care: Inpatient Floor       Guideline Day 2        Level Of Care       (X) ICU or ventilator-capable area              Clinical Status       (X) * Weaning assessment performed              Interventions       (X) Inpatient interventions continue       (X) Weaning trials attempted              2017 2:14 PM EST by Kristi Schmidt       Subject: Additional Clinical Information                              Vitals          â¢      BP      117/66          â¢      Pulse      95          â¢      Temp      98.5  °F (36.9  °C)          â¢      Resp      14          â¢      Ht      5' 4\" (1.626 m)          â¢      Wt      78.9 kg (173 lb 15.1 oz)          â¢      SpO2      96%          â¢      BMI      29.86 kg/m2                     O2 Device: Room air          O2 Flow Rate (L/min): 1 l/min          Temp (24hrs), Av.5  °F (36.9  °C), Min:98.1  °F (36.7  °C), Max:98.8  °F (37.1  °C)                      2017 2:14 PM EST by Kristi Schmidt       Subject: Additional Clinical Information       PULMONARY NOTE:          IMPRESSION:              Acute hypoxic respiratory failure- extubated - resolved             Delirium tremens- resovled but baseline? depression? ?              Encephalopathy or baseline agitation and personality disorder             ARDS- resolved             Severe E.  Coli sepsis with septic shock - completed course             Persistent fevers- better             Acute renal failure             Shock liver             S/P surgery for ankle fracture 1-2-17             EtOH abuse - 1 bottle wine/day             Tobacco/MJ use                                 PLAN:              May transfer to floor             Sitter prn             consuilt psychiatry             PT, OT             Thiamine/MVI/folate              Replete electrolytes as needed             DVT/GI prophylaxis                                 1/20/2017 2:14 PM EST by Michelle Bell       Subject: Additional Clinical Information                      Current Facility-Administered Medications                 Medication             Dose             Route             Frequency                 â¢             potassium phosphate 20 mmol in 0.9% sodium chloride 250 mL infusion             ãã             IntraVENous             ONCE                 â¢             folic acid (FOLVITE) tablet 1 mg             1 mg             Oral             DAILY                 â¢             thiamine (B-1) tablet 100 mg             100 mg             Oral             DAILY                 â¢             desvenlafaxine ER 24 hour tablet 50 mg             50 mg             Oral             DAILY                 â¢             amphetamine-dextroamphetamine XR (ADDERALL XR) capsule 10 mg             10 mg             Oral             7am                 â¢             LORazepam (ATIVAN) injection 1 mg             1 mg             IntraVENous             Q6H PRN                 â¢             oxyCODONE-acetaminophen (PERCOCET) 5-325 mg per tablet 1 Tab             1 Tab             Oral             Q4H PRN                 â¢             insulin lispro (HUMALOG) injection             ãã             SubCUTAneous             Q6H                 â¢             glucose chewable tablet 16 g             4 Tab             Oral             PRN                 â¢             dextrose (D50W) injection syrg 12.5-25 g             12.5-25 g             IntraVENous             PRN                 â¢             glucagon (GLUCAGEN) injection 1 mg             1 mg             IntraMUSCular             PRN                 â¢             albuterol-ipratropium (DUO-NEB) 2.5 MG-0.5 MG/3 ML             3 mL             Nebulization             Q4H PRN                 â¢             acetaminophen (TYLENOL) solution 650 mg             650 mg             Oral             Q4H PRN                 â¢             polyethylene glycol (MIRALAX) packet 17 g             17 g             Oral             DAILY                 â¢             ELECTROLYTE REPLACEMENT PROTOCOL             1 Each             Other             PRN                 â¢             therapeutic multivitamin (THERAGRAN) tablet 1 Tab             1 Tab             Oral             DAILY                 â¢             haloperidol lactate (HALDOL) injection 5 mg             5 mg             IntraMUSCular             Q4H PRN                 â¢             nicotine (NICODERM CQ) 14 mg/24 hr patch 1 Patch             1 Patch             TransDERmal             DAILY                 â¢             benzocaine-menthol (CHLORASEPTIC MAX) lozenge 1 Lozenge             1 Lozenge             Oral             Q2H PRN                 â¢             sodium chloride (NS) flush 5-10 mL             5-10 mL             IntraVENous             PRN                 â¢             enoxaparin (LOVENOX) injection 40 mg             40 mg             SubCUTAneous             Q24H                 ã         ã                              1/20/2017 2:13 PM EST by Omar Oleary       Subject: Additional Clinical Information       [facility] NOTE:  ASSESSMENT:  50 yo WF w hx of depression well controlled with SNRI + stimulant.  Likely has an alcohol use disorder given her complicated hospital course but is not forthcoming about her alcohol use.   Appears to be disoriented and mildly confused on exam.   Chart review corroborates this      She appeared clearer on her exam than her chart indicates, suggesting a waxing/waning picture of delirium.   At this time, she does NOT have capacity for medical decision-making however she may quickly regain capacity as her delirium resolves.       DIAGNOSIS:  Delirium  Major depression, in partial remission  Rule out alcohol dependence  Rule out cannabis dependence      RECOMMENDATIONS:  - continue home Pristiq  - do not restart home Adderall in hospital  - consider starting Seroquel 25mg qHS IF her confusion does not improve  - no 1:1 or psychiatric hospitalization needed at this time      Other recs re: delirium:  - avoid deliriogenic medications if possible: anticholinergics, sedative/hypnotics (benzodiazepines), antihistamines, opioids  - keep blinds open during the day, discourage patient from sleeping through the day and ambulate TID if possible to mitigate day/night reversal  - encourage family to visit to keep patient in company of familiar people  - frequently re-orient patient                       1/20/2017 2:12 PM EST by Chelle Caal       Subject: Additional Clinical Information       MEDICAL PROGRESS NOTE:  Assessment / Plan:          Psych to see   Pt supposed to be adderell and desvenlafaxine  May need to restart, wait for psych recommendations  Pt/ot -- inpatient rehab to snf      Hyperglycemia  Check hba1c  poc and achs          Acute hypoxic respiratory failure secondary   To   respiratory depression ( delirium tremens   Being on ativan and precedex)  With multiorgan failure ( shock liver, concern for hcap)  ecoli bacteremia secondary to e coli uti      Extubated on 1/17, Sputum cultures, neg, completed the antibiotics      Chest xray:ETT in satisfactory position.  Interval improved aeration of the lungs with some regression of pulmonary edema.  Persistent bilateral small pleural effusions and probably some consolidation atleft base. Follow the blood cultures with gram neg rods with e coli   urine cultures with gram neg rods with ecoli  Platelets ok  Ammonia normal,   picc placed on 1/7  Follow the echo Systolic function was normal. Ejection fraction was estimated in the range of 55 % to 60 %. There were no regional wall motion abnormalities. Wall thickness was normal. DOPPLER:Left ventricular diastolic function parameters were normal for the patient's age. ortho checked the ankle on wednesday      Hypertension      Depression ADHD      Right trimalleolar ankle fracture, poa now s/p closed reduction and ORIF 1/2/17  -ortho managing  Code status: full  Prophylaxis: lovenox  Recommended Disposition: tbd                      1/20/2017 2:12 PM EST by Bay Delong       Subject: Additional Clinical Information       CM NOTE:  Following pt's progress. Extubated on 1/17 - now on RA. Pt now with confusion, impulsiveness - has required sitter. PT/OT have evaluated and feel she has rehab potential if cognition clears- possible inpt rehab. Psych consult ordered.      Pt transferred to [facility] [phone] for routine progression of care. REf sent to [facility] via [address].   Keena Au, MIA                                      * Milestone                  Respiratory Failure GRG - Care Day 16 (1/17/2017) by Satish Kumari RN        Review Status Review Entered       Completed 1/18/2017       Details              Care Day: 16 Care Date: 1/17/2017 Level of Care: ICU       Guideline Day 2        Level Of Care       (X) ICU or ventilator-capable area              Clinical Status       ( ) * Weaning assessment performed              Interventions       (X) Inpatient interventions continue              1/18/2017 3:46 PM EST by Bryan Nieto Kat       Subject: Additional Clinical Information       KUB: Nasogastric tube projects as expected. Central venous catheter tip  at right atrium.                 1/18/2017 3:46 PM EST by Kristi Schmidt       Subject: Additional Clinical Information       MEDICAL PROGRESS NOTE:  Assessment / Plan:          Very high risk of further deteriortion      Acute hypoxic respiratory failure secondary   To   respiratory depression ( delirium tremens   Being on ativan and precedex)  With multiorgan failure ( shock liver, concern for hcap)  ecoli bacteremia secondary to e coli uti      Trial of SBT ongoing  Ceftriaxone total d9 was on cefepime and levo for 6 days)  Waiting for   Sputum cultures, before stopping antibiotics  Requiring pressors as well  Diuresis as needed  Chest xray:ETT in satisfactory position.  Interval improved aeration of the lungs with some regression of pulmonary edema.  Persistent bilateral small pleural effusions and probably some consolidation atleft base. Follow the blood cultures with gram neg rods with e coli   urine cultures with gram neg rods with ecoli  Platelets ok  Ammonia normal, consider ct head  picc placed on 1/7  Follow the echo Systolic function was normal. Ejection fraction was estimated in the range of 55 % to 60 %. There were no regional wall motion abnormalities. Wall thickness was normal. DOPPLER:Left ventricular diastolic function parameters were normal for the patient's age.   orhto checked the ankle on wednesday      Hypertension      Depression ADHD      Right trimalleolar ankle fracture, poa now s/p closed reduction and ORIF 1/2/17  -ortho managing                      1/18/2017 3:45 PM EST by Kristi Schmidt       Subject: Additional Clinical Information                      Current Facility-Administered Medications          Medication      Dose      Route      Frequency          â¢      dexmedetomidine (PRECEDEX) 400 mcg in 0.9% sodium chloride 100 mL infusion       0.2-0.7 mcg/kg/hr      IntraVENous      TITRATE          â¢      insulin lispro (HUMALOG) injection                SubCUTAneous      Q6H          â¢      vancomycin (VANCOCIN) 1250 mg in  ml infusion       1,250 mg      IntraVENous      Q12H          â¢      cefTRIAXone (ROCEPHIN) 1 g in 0.9% sodium chloride (MBP/ADV) 50 mL       1 g      IntraVENous      Q24H          â¢      polyethylene glycol (MIRALAX) packet 17 g       17 g      Oral      DAILY          â¢      bumetanide (BUMEX) injection 1 mg       1 mg      IntraVENous      BID          â¢      PHENYLephrine (NEOSYNEPHRINE) 100,000 mcg in 0.9% sodium chloride 250 mL infusion        mcg/min      IntraVENous      TITRATE          â¢      metoclopramide HCl (REGLAN) injection 10 mg       10 mg      IntraVENous      Q6H          â¢      albuterol-ipratropium (DUO-NEB) 2.5 MG-0.5 MG/3 ML       3 mL      Nebulization      QID RT          â¢      fentaNYL (PF) 900 mcg/30 ml infusion soln        mcg/hr      IntraVENous      TITRATE          â¢      chlorhexidine (PERIDEX) 0.12 % mouthwash 15 mL       15 mL      Oral      Q12H          â¢      pantoprazole (PROTONIX) 40 mg in sodium chloride 0.9 % 10 mL injection       40 mg      IntraVENous      DAILY          â¢      thiamine (B-1) tablet 100 mg       100 mg      Per NG tube      DAILY          â¢      folic acid (FOLVITE) tablet 1 mg       1 mg      Per NG tube      DAILY          â¢      therapeutic multivitamin (THERAGRAN) tablet 1 Tab       1 Tab      Oral      DAILY          â¢      propofol (DIPRIVAN) infusion       5-50 mcg/kg/min      IntraVENous      TITRATE          â¢      sodium chloride (NS) flush 10 mL       10 mL      InterCATHeter      Q24H          â¢      sodium chloride (NS) flush 10-40 mL       10-40 mL      InterCATHeter      Q8H          â¢      nicotine (NICODERM CQ) 14 mg/24 hr patch 1 Patch       1 Patch      TransDERmal      DAILY          â¢      enoxaparin (LOVENOX) injection 40 mg       40 mg      SubCUTAneous      Q24H                      2017 3:45 PM EST by Xiomara Banerjee       Subject: Additional Clinical Information                              Vitals          â¢      BP      102/68 (BP 1 Location: Right arm, BP Patient Position: At rest)          â¢      Pulse      88          â¢      Temp      (!) 100.6  °F (38.1  °C)          â¢      Resp      13          â¢      Ht      5' 4\" (1.626 m)          â¢      Wt      78.9 kg (173 lb 15.1 oz)          â¢      SpO2      95%          â¢      BMI      29.86 kg/m2                     O2 Device: Ventilator          O2 Flow Rate (L/min): 60 l/min          Temp (24hrs), Av.2  °F (37.9  °C), Min:98.9  °F (37.2  °C), Max:100.7  °F (38.2  °C)                      2017 3:44 PM EST by Xiomara Banerjee       Subject: Additional Clinical Information       PULMONARY NOTE:          IMPRESSION:              Acute hypoxic respiratory failure- gas exchange trending better? secretions thick with more WOB, shallow efforts- on very good abx             Delirium tremens- resovled             ARDS             Severe E.  Coli sepsis with septic shock - completed course             Persistent fevers- better             Acute renal failure             Shock liver             S/P surgery for ankle fracture 1-2-17             EtOH abuse - 1 bottle wine/day             Tobacco/MJ use                                 PLAN:              Ventilator              May try abbreviated SBT and trail of extubation             Wean Pressors              Diurese when off pressors             Sputum culture             Free water             Sedation -IV precedex and try SBT in AM             Tube feedings             Simplify abx to IV Rocephin but need to see what sputum culture shows before stopping all abx             Thiamine/MVI/folate              Replete electrolytes as needed             DVT/GI prophylaxis             Critically ill                                                    * Milestone

## 2017-01-23 NOTE — CONSULTS
Psychiatry  Consult    Subjective:     Date of Evaluation:  1/23/2017    Reason for Referral:  Venkatesh Ponce was referred to the examiners from Medical floor  For need  For  Psychiatric  Evaluation . History of Presenting Problem: %5years old woman with long history  Of Alcohol abuse and  Depression  Who presented to  The hospital  After  She fell and fracture her right leg  ,  She   Stated  She  Was passing  Out and  Went t o ICU  And stayed in ICU  For  Two  Weeks , spoke  With  Her   Who stated  She  Was  Drinking  And  She  Had  Collapsed  And  Has  Withdrawal  Symptoms and  She  Was  Confused  And agitated  After  She was  Off ventilator  And  She  Was  Struggling with  Staff and  Really  Confused and has memory  Issues , however he  Stated  She is back to her base line  Now . He  Stated  She is  Stressed out as  She worry  About her motehr  Who  Has  Dementia and she is  In [place] ,  Mrs Matt Ram has bee n in REhab before  For Alcohol abuse  And  Dependence  And  She paid out of her pocket  Almost  20 K  For  That  , he  Stated  She  Takes  Adderall  For her  ADHD   And he knows  It is not  Good  For her ,   She struggles with  Drinking now   And  She has   Been   Struggling  With  Depression but   He  Stated  She  Never  Tried  t o hurt herself  And  Also   He  Can has her  Son watching her  At home  Until he comes  From  Work , patient denied  Being suicidal  Or has thougths about hurting herself  And  She  Stated  She  Worry  About her motehr and  She knows  That  She needs t o stop drinking and  To  Stop  Her  adderall  ,  She   Is not  Currently  Psychotic  And  Does not hear  Voices and no   Visual  Hallucination ,  She  Craves  For cigarette  And  She  Does not  Want  To be in rehap or psych facility  As  She interested  t o do it  As  Outpatient .     Patient Active Problem List    Diagnosis Date Noted    Closed trimalleolar fracture of right ankle 01/02/2017    Closed right trimalleolar fracture 01/02/2017     Past Medical History   Diagnosis Date    Hypertension     Ill-defined condition      high cholesterol    Psychiatric disorder      depression, ADHD      Family History   Problem Relation Age of Onset    Diabetes Father     Heart Disease Father       Social History   Substance Use Topics    Smoking status: Current Every Day Smoker     Packs/day: 0.50    Smokeless tobacco: Never Used    Alcohol use 14.4 oz/week     24 Glasses of wine per week     Past Surgical History   Procedure Laterality Date    Hx other surgical       colonoscopy    Hx gyn       D&C X2      Prior to Admission medications    Medication Sig Start Date End Date Taking? Authorizing Provider   dextroamphetamine-amphetamine (ADDERALL) 10 mg tablet Take 10 mg by mouth. Yes Cece Other, MD   Desvenlafaxine (PRISTIQ) 100 mg Tb24 Take 50 mg by mouth. Yes Cece Corley MD   aspirin delayed-release 81 mg tablet Take 81 mg by mouth daily. Yes Cece Corley MD   calcium-vitamin D (OYSTER SHELL) 500 mg(1,250mg) -200 unit per tablet Take 1 Tab by mouth two (2) times daily (with meals). Yes Cece Corley MD   atorvastatin (LIPITOR) 10 mg tablet Take  by mouth daily.    Yes Cece Corley MD     Allergies   Allergen Reactions    Pcn [Penicillins] Rash          Objective:     Patient Vitals for the past 8 hrs:   BP Temp Pulse Resp SpO2   01/23/17 1429 125/83 98 °F (36.7 °C) 98 16 95 %       Mental Status exam: SHe  Was  Lying in bed able  To  Sit  And  Engage well in interview , able  To  Call her  herself and  Discussed  Her  Case  With  Both  MD and her  ,  She has normal  Speech  Still has tremors in both  Hands  But  Able  To  Give histor y and engage in  Talking , has  Depressed mood but no   Self harm behavior and no  suicidal  Thoughts , denied paranoid  Feeling  And  Denied  Hallucinations ,  Able  To  Give names  For [place] [id number]  And give names  For  [place]   And give her  Address ,date  Today And  History  About her   Work   And  Schedule , has   Fair insight and impaired judgment and impulse  Control       Clinical Interview: Engaged in interview , had her  on the phone  To  Get history  From him ,  Tok insulted  whe n talk about  Supervision at home  But  Able  To  Get  The idea  That  She needs  Somebody   To  Take  Care  Of her until  She recover ,  She denied  Suicidal  Or homicidal  Ideation  And not psychotic        Impression:      Active Problems:    Closed trimalleolar fracture of right ankle (1/2/2017)      Closed right trimalleolar fracture (1/2/2017)          Plan:     Recommendations for Treatment/Conditions:  Outpatient follow up recommended after release  And  Needs outpatient therapist , AA meeting , Stop Adderall  , Supervision  At home  For  Alcohol  Drinking  And possible  inhome health aid for fall  Precaution     Referral To: Intensive outpatient treatment    Competency Statement: At the current time, the patient is competent to make informed consent regarding their current medical care and discharge planning and/or financial decisions.

## 2017-01-23 NOTE — PROGRESS NOTES
Spiritual Care Assessment/Progress Notes    Christelle Arellano [id number]  [id number]    1960  49 y.o.  female    Patient Telephone Number: [phone] (home)   Confucianist Affiliation: No Cheondoism   Language: English   Extended Emergency Contact Information  Primary Emergency Contact: [address]  Mobile Phone: [phone]  Relation: Spouse   Patient Active Problem List    Diagnosis Date Noted    Closed trimalleolar fracture of right ankle 01/02/2017    Closed right trimalleolar fracture 01/02/2017        Date: 1/23/2017       Level of Confucianist/Spiritual Activity:  []         Involved in shannan tradition/spiritual practice    []         Not involved in shannan tradition/spiritual practice  []         Spiritually oriented    [x]         Claims no spiritual orientation    []         seeking spiritual identity  []         Feels alienated from Nondenominational practice/tradition  []         Feels angry about Nondenominational practice/tradition  []         Spirituality/Nondenominational tradition  a resource for coping at this time.   []         Not able to assess due to medical condition    Services Provided Today:  []         crisis intervention    []         reading Scriptures  [x]         spiritual assessment    []         prayer  [x]         empathic listening/emotional support  []         rites and rituals (cite in comments)  []         life review     []         Nondenominational support  []         theological development   []         advocacy  []         ethical dialog     []         blessing  []         bereavement support    []         support to family  []         anticipatory grief support   []         help with AMD  []         spiritual guidance    []         meditation      Spiritual Care Needs  []         Emotional Support  []         Spiritual/Confucianist Care  []         Loss/Adjustment  []         Advocacy/Referral                /Ethics  [x]         No needs expressed at               this time  [] Other: (note in               comments)  Spiritual Care Plan  []         Follow up visits with               pt/family  []         Provide materials  []         Schedule sacraments  []         Contact Community               Clergy  [x]         Follow up as needed  []         Other: (note in               comments)     Comments:  initiated visit due to length of stay. Provided support through empathic listening as pt shared that she needs to be D/C'ed because she is concerned with her mother's health. Pt states that her  comes by to visit her everyday and he has been supporting her well. Also, she has other family members in the area that are able to come by once a week. No further needs were expressed at this time. She is aware of  availability. Cori Gleason M.S.   Spiritual Care Department  If need arise please call STEVE ([phone])

## 2017-01-23 NOTE — ROUTINE PROCESS
Oncology Nursing Communication Tool      7:02 AM  1/23/2017     Bedside shift change report given to Kerrie Lewis RN (incoming nurse) by Ron Whitley RN (outgoing nurse) on YouGovers a 49 y.o. female who was admitted on 1/2/2017 12:49 PM. Report included the following information SBAR, Kardex, Intake/Output, MAR and Recent Results. Significant changes during shift: CIWA 18 &14 overnight. Pt has been pleasant & cooperative but slightly restless &anxious at times & very tremulous. Ativan iv x2 overnight & med x1 for pain. No labs this am. Plan is for psych to see & for possible transfer to [facility]. Issues for physician to address: tremors, restlessness. Code Status: Full Code     Infections: No current active infections     Allergies: Pcn [penicillins]     Current diet: DIET NUTRITIONAL SUPPLEMENTS Breakfast; Ensure Complete  DIET REGULAR       Pain Controlled [x] yes [] no   Bowel Movement [x] yes [] no   Last Bowel Movement (date) 1/23/17            Vital Signs:   Patient Vitals for the past 12 hrs:   Temp Pulse Resp BP SpO2   01/23/17 0650 98.4 °F (36.9 °C) 97 20 (!) 130/93 94 %   01/22/17 2300 98.3 °F (36.8 °C) 98 16 137/83 95 %      Intake & Output:   No intake or output data in the 24 hours ending 01/23/17 0702   Laboratory Results:   No results found for this or any previous visit (from the past 12 hour(s)). Opportunity for questions and clarifications were given to the incoming nurse. Patient's bed is in low position, side rails x2, door open PRN, call bell within reach and patient not in distress.       Ron Whitley RN

## 2017-01-23 NOTE — PROGRESS NOTES
Bedside report received from  [facility] (offgoing nurse). Assumed care of patient. No immediate concerns, patient resting with call bell within reach.

## 2017-01-24 VITALS
TEMPERATURE: 98.3 F | RESPIRATION RATE: 18 BRPM | WEIGHT: 173.94 LBS | SYSTOLIC BLOOD PRESSURE: 123 MMHG | DIASTOLIC BLOOD PRESSURE: 86 MMHG | HEIGHT: 64 IN | OXYGEN SATURATION: 94 % | HEART RATE: 86 BPM | BODY MASS INDEX: 29.7 KG/M2

## 2017-01-24 LAB
GLUCOSE BLD STRIP.AUTO-MCNC: 154 MG/DL (ref 65–100)
SERVICE CMNT-IMP: ABNORMAL

## 2017-01-24 PROCEDURE — 82962 GLUCOSE BLOOD TEST: CPT

## 2017-01-24 RX ORDER — POLYETHYLENE GLYCOL 3350 17 G/17G
17 POWDER, FOR SOLUTION ORAL DAILY
Qty: 30 PACKET | Refills: 0 | Status: SHIPPED
Start: 2017-01-24 | End: 2017-05-08

## 2017-01-24 RX ORDER — THERA TABS 400 MCG
1 TAB ORAL DAILY
Qty: 30 TAB | Refills: 0 | Status: SHIPPED | OUTPATIENT
Start: 2017-01-24 | End: 2017-05-08

## 2017-01-24 RX ORDER — LANOLIN ALCOHOL/MO/W.PET/CERES
100 CREAM (GRAM) TOPICAL DAILY
Qty: 30 TAB | Refills: 0 | Status: SHIPPED | OUTPATIENT
Start: 2017-01-24 | End: 2017-05-08

## 2017-01-24 RX ORDER — OXYCODONE AND ACETAMINOPHEN 5; 325 MG/1; MG/1
1 TABLET ORAL
Qty: 10 TAB | Refills: 0 | Status: SHIPPED | OUTPATIENT
Start: 2017-01-24 | End: 2017-05-08

## 2017-01-24 RX ORDER — FOLIC ACID 1 MG/1
1 TABLET ORAL DAILY
Qty: 30 TAB | Refills: 0 | Status: SHIPPED | OUTPATIENT
Start: 2017-01-24 | End: 2017-05-08

## 2017-01-24 RX ORDER — ASPIRIN 325 MG
325 TABLET ORAL DAILY
Qty: 30 TAB | Refills: 0 | Status: SHIPPED | OUTPATIENT
Start: 2017-01-24 | End: 2017-05-08

## 2017-01-24 NOTE — DISCHARGE SUMMARY
Hospitalist Discharge Summary     Patient ID:  German Desai  [id number]  49 y.o.  1960    PCP on record: Cece Corley MD    Admit date: 1/2/2017  Discharge date and time: 1/24/2017      DISCHARGE DIAGNOSIS:    Right trimalleolar ankle fracture, POA  Delirium  Major depression in partial remission  Alcohol dependence s/p DT  Concern for cannabis dependence  Underlying ADHD  Hyperglycemia, stress induced  Acute hypoxic respiratory failure not POA secondary to respiratory depression ( delirium tremens being on ativan and precedex)  With multiorgan failure ( shock liver, concern for hcap)  E. coli bacteremia secondary to E coli UTI not  POA   Hypertension, diet controlled  Code status: full        CONSULTATIONS:  IP CONSULT TO ORTHOPEDIC SURGERY  IP CONSULT TO HOSPITALIST  IP CONSULT TO PSYCHIATRY  IP CONSULT TO PSYCHIATRY  IP CONSULT TO PSYCHIATRY    Excerpted HPI from H&P of Ajit Mendoza MD:    German Desai is a 49 y.o. female who is being seen for R ankle pain/deformity s/p injury while moving furniture today. Work up has reveled a displaced trimal fx. Pt just moved to the area from [place], normally independent with ADLs and working. Hx of 1/2 PPD smoker, high cholesterol, restless leg syndrome.     There are no active problems to display for this patient.  ______________________________________________________________________  DISCHARGE SUMMARY/HOSPITAL COURSE:  for full details see H&P, daily progress notes, labs, consult notes. 49 y.o. female whom presented on 1/2/2017 with right ankle deformity s/p injury R unstable trimalleolar ankle fx/dislocation,ADHD on Adderall  Typically drinks 1 bottle of wine per day   Initially admitted to ortho service-- multiple attempts at ankle reduction were made with out success. On 1/3 s/p ORIF, on 1/5 consulted hospitalist for acute delirium ( with concern for alcohol withdrawal) on 1/6 transferred to ICU requiring ativan drip and precedex. Further deteriorated on 1/8 requiring intubation,with respiratory failure, ARDS, delirium tremens,GNR sepsis with septic shock  Ortho transferred the patient care to medicine service on 1/9  Extubated 1/17, completed antibiotics  Psych seen Regarding recommendations -- on pristiq, and started on seroquel. To hold adderell while inpatient  Medically was stable for DC but active psych issues ( agitation/agression) was going on. On 1/21 after the  visited the patient, started with agitation, biting the staff, kicking  Code atlas called 2 times. Got multiple doses of haldol and ativan and 2 doses of geodan. No IP psych was recommended. DC planning was [facility] and referral was send 1/20. Delirium  Major depression in partial remission  Alcohol dependence s/p DT/ Concern for cannabis dependence  Underlying ADHD  Pt stated she has her own psychiatrist and will see him asap. She stated she will continue the same meds she was on before admission. Psych help was appreciated: Outpatient follow up recommended after release And Needs outpatient therapist , AA meeting , Stop Adderall , Supervision At home For Alcohol Drinking And possible in home health aid for fall Precaution  At the current time, the patient is competent to make informed consent regarding their current medical care and discharge planning and/or financial decisions.     Hyperglycemia, stress induced, hba1c 5.5      Acute hypoxic respiratory failure not POA secondary to respiratory depression ( delirium tremens Being on ativan and precedex)  With multiorgan failure ( shock liver, concern for hcap)  ecoli bacteremia secondary to E coli UTI not   All issues resolved, completed AB RX   Extubated on 1/17  [facility] 1/19 E.coli, 1/12 - NTD.   E.coli   PICC  1/7 L - will DC today   ECHO: EF 55%       Hypertension  BP stable   Not on meds      Right trimalleolar ankle fracture, POA  s/p closed reduction and ORIF 1/2/17  -per ortho recommendations  ASA 325mg X 1 month   Follow up with Dr. Shea Kim in 3-4 weeks  Last seen here on 1/19, new case applied  HHC: PT/OT [facility]           Code status: full  Prophylaxis: lovenox  Recommended Disposition: medically stable for DC. Psychiatrist cleared for DC home with OP follow up. Pt/james declined to wait for SAH/SNF and want to go [facility] with [facility]. Pt has very poor insight into her medical problem. She is actually planning on flaying to [place] to see her mother. Advised pt against it. Stressed to the pt/ to stop drinking alcohol. Pt has very high risk for re admission. Told  to contact SW if they want to go to SNF after discharge.       _______________________________________________________________________  Patient seen and examined by me on discharge day. PHYSICAL EXAM:  General: WD, WN. Alert, cooperative, no acute distress    EENT:  EOMI. Anicteric sclerae. MMM  Resp:  CTA bilaterally, no wheezing or rales. No accessory muscle use  CV:  Regular rhythm,  No edema  GI:  Soft, Non distended, Non tender.  +Bowel sounds  Neurologic:  Alert and oriented X 3, normal speech,   Psych:   Fair insight. Pt is very anxious and agitated. + tremor   Skin:  No rashes. No jaundice  _______________________________________________________________________  DISCHARGE MEDICATIONS:   Current Discharge Medication List      START taking these medications    Details   folic acid (FOLVITE) 1 mg tablet Take 1 Tab by mouth daily. Qty: 30 Tab, Refills: 0      polyethylene glycol (MIRALAX) 17 gram packet Take 1 Packet by mouth daily. Qty: 30 Packet, Refills: 0      thiamine (B-1) 100 mg tablet Take 1 Tab by mouth daily. Qty: 30 Tab, Refills: 0      therapeutic multivitamin (THERAGRAN) tablet Take 1 Tab by mouth daily. Qty: 30 Tab, Refills: 0      oxyCODONE-acetaminophen (PERCOCET) 5-325 mg per tablet Take 1 Tab by mouth every four (4) hours as needed for Pain. Max Daily Amount: 6 Tabs.   Qty: 10 Tab, Refills: 0         CONTINUE these medications which have NOT CHANGED    Details   Desvenlafaxine (PRISTIQ) 100 mg Tb24 Take 50 mg by mouth.      calcium-vitamin D (OYSTER SHELL) 500 mg(1,250mg) -200 unit per tablet Take 1 Tab by mouth two (2) times daily (with meals). atorvastatin (LIPITOR) 10 mg tablet Take  by mouth daily. STOP taking these medications       dextroamphetamine-amphetamine (ADDERALL) 10 mg tablet Comments:   Reason for Stopping:         aspirin delayed-release 81 mg tablet Comments:   Reason for Stopping:               My Recommended Diet, Activity, Wound Care, and follow-up labs are listed in the patient's Discharge Insturctions which I have personally completed and reviewed. _______________________________________________________________________  DISPOSITION:    Home with Family:    Home with HH/PT/OT/RN: y   SNF/LTC:    MERT:    OTHER:        Condition at Discharge:  Stable  _______________________________________________________________________  Follow up with:   PCP : Cece Corley MD  Follow-up Information     Follow up With Details Comments Contact Info    Edi Macedo MD Schedule an appointment as soon as possible for a visit in 3 weeks Call as soon as you can for postoperative appointment, cast removal and X-rays [address] [place] [address]  [phone]      PCP   establish care      psychiatrist  In 1 week 1-2 weeks                Total time in minutes spent coordinating this discharge (includes going over instructions, follow-up, prescriptions, and preparing report for sign off to her PCP) :  > 30 minutes    Signed:  Susanna Foster MD

## 2017-01-24 NOTE — ROUTINE PROCESS
Oncology Nursing Communication Tool      8:46 AM  1/24/2017     Bedside shift change report given to Alicia Fuentes RN (incoming nurse) by Yanelis Herr RN (outgoing nurse) on Selwyn Renteria a 49 y.o. female who was admitted on 1/2/2017 12:49 PM. Report included the following information SBAR, Kardex, Intake/Output and MAR. Significant changes during shift: pt pleasant & cooperative until midway thru the night when she became more restless & agitated. See previous note. Issues for physician to address: discharge            Code Status: Full Code     Infections: No current active infections     Allergies: Pcn [penicillins]     Current diet: DIET NUTRITIONAL SUPPLEMENTS Breakfast; Ensure Complete  DIET REGULAR       Pain Controlled [x] yes [] no   Bowel Movement [x] yes [] no   Last Bowel Movement (date) 1/24/17            Vital Signs:   Patient Vitals for the past 12 hrs:   Temp Pulse Resp BP SpO2   01/24/17 0715 98.3 °F (36.8 °C) 86 18 123/86 94 %      Intake & Output:     Intake/Output Summary (Last 24 hours) at 01/24/17 0846  Last data filed at 01/24/17 0445   Gross per 24 hour   Intake             1200 ml   Output                0 ml   Net             1200 ml      Laboratory Results:     Recent Results (from the past 12 hour(s))   GLUCOSE, POC    Collection Time: 01/24/17  7:29 AM   Result Value Ref Range    Glucose (POC) 154 (H) 65 - 100 mg/dL    Performed by [address] [place] [address] for questions and clarifications were given to the incoming nurse. Patient's bed is in low position, side rails x2, door open PRN, call bell within reach and patient not in distress.       Yanelis Herr, RN

## 2017-01-24 NOTE — PROGRESS NOTES
Pt. Discharged today to her home with her . They have decided not to go to wait for [facility] decision or to go to SNF. Pt. States she just wants to go home. At first,  she stated she wanted to go home today, and then, go to SNF tomorrow. I told her that the SNF would have to get an [place]. , from [facility], before she could go. Then, she and  decided that they wanted home health PT. I gave a list of agencies to the , but he wasn't familiar with any of them. I told him that we recommend our own BS agency , if pt. Has no particular choice. He stated he would never use [facility] again. I suggested he talk to a friend, or check agencies on line, to make a decision. Then, he could call me, and I would set it up. I wrote my name and no. On the list.  I gave them a list of BS PCPs and info. on Substance Abuse resources, per Dr. Nora Vora request.  A rolling walker was ordered by Dr. Nora Vora and I got this from the [employer]. Form completed and signed by Dr. Nora Vora.  signed other form. Nurse will remove PICC and review DC instructions and pt. Will go home with her .-- YESSICA Swan,[facility]--[phone]    Received call from Reid Fish, stating that Dr. Trena Felty thought pt. Was more SNF appropriate due to NWB status and behaviors. -- Yandel Sweeney RN,ACM    Care Management Interventions  PCP Verified by CM: Yes (given BS list of PCPs)  Mode of Transport at Discharge:  Other (see comment) ()  Hospital Transport Time of Discharge: 1000  Transition of Care Consult (CM Consult): [address], Discharge [address] [address]: No  Reason Outside [place]: Patient declined ordered home care/hospice services ([facility])  MyChart Signup: No  Discharge Durable Medical Equipment: Yes ()  Health Maintenance Reviewed: Yes  Physical Therapy Consult: Yes  Occupational Therapy Consult: Yes  Speech Therapy Consult: No  Current Support Network: Lives with Spouse, Own Home  Confirm Follow Up Transport: Family  Plan discussed with Pt/Family/Caregiver: Yes  Freedom of Choice Offered: Yes  Discharge Location  Discharge Placement: Home with home health ( given list- will call with choice)

## 2017-01-24 NOTE — ROUTINE PROCESS
Informed by pt marvin & another RN that pt was becoming agitated. Pt was medicated with 1mg IV ativan & scheduled seroquel at bedtime at which time pt was alert, pleasant, & cooperative. Pt has not yet slept tonight. Has been on walks around the hospital via wheelchair with the sitter x2 & remains awake & talking & getting more irritated about why she is here & why she hasn't been d/c'd, & why the MDs haven't put her back on her regular meds. I offered/tried to medicate her again with IV ativan which she refused. I also tried to talk with her & get her to calm down & try to rest but she just reiterated why she is the way she is because the MDs won't listen to her & give her her meds. We discussed possible outcomes if she continues to escalate her behavior, to which she replied she felt she was being threatened & would do what she wants. She has her cell phone & has been making calls to various people to complain about & rant about why she is here & what her perception is of events that have occurred. Nursing supervisor informed of potential for escalation as have already occurred during this admission. Marvin remains at bedside.

## 2017-01-24 NOTE — PROGRESS NOTES
Discharge instructions reviewed and received along with prescriptions . verbalizes understanding. Condition stable. Discharge folder given to patient

## 2017-01-24 NOTE — PROGRESS NOTES
Hospitalist Progress Note    NAME: Alex Soto   :  1960   MRN:  [id number]       Interim Hospital Summary: 49 y.o. female whom presented on 2017 with right ankle deformity s/p injury R unstable trimalleolar ankle fx/dislocation,ADHD on Adderall  Typically drinks 1 bottle of wine per day   Initially admitted to ortho service-- multiple attempts at ankle reduction were made with out success. On 1/3 s/p ORIF, on  consulted hospitalist for acute delirium ( with concern for alcohol withdrawal) on  transferred to ICU requiring ativan drip and precedex. Further deteriorated on  requiring intubation,with respiratory failure, ARDS, delirium tremens,GNR sepsis with septic shock  Ortho transferred the patient care to medicine service on   Extubated , completed antibiotics  Psych seen Regarding recommendations -- on pristiq, and started on seroquel. To hold adderell while inpatient  Medically was stable for DC but active psych issues ( agitation/agression) was going on. On  after the  visited the patient, started with agitation, biting the staff, kicking  Code atlas called 2 times. Got multiple doses of haldol and ativan and 2 doses of geodan. No IP psych was recommended. DC planning was [facility] and referral was send . Assessment / Plan:  Delirium  Major depression in partial remission  Alcohol dependence s/p DT/  Concern for cannabis dependence  Underlying ADHD  Pt stated she has her own psychiatrist and will see him asap. She stated she will continue the same meds she was on before admission. Psych help was appreciated:   Outpatient follow up recommended after release And Needs outpatient therapist , AA meeting , Stop Adderall , Supervision At home For Alcohol Drinking And possible in home health aid for fall Precaution  At the current time, the patient is competent to make informed consent regarding their current medical care and discharge planning and/or financial decisions. Hyperglycemia, stress induced, hba1c 5.5     Acute hypoxic respiratory failure not POA secondary to respiratory depression ( delirium tremens Being on ativan and precedex)  With multiorgan failure ( shock liver, concern for hcap)  ecoli bacteremia secondary to E coli UTI not   All issues resolved, completed AB RX   Extubated on 1/17  [facility] [place] 1/19 E.coli, 1/12 - NTD.  E.coli   PICC  1/7 L - will DC today   ECHO: EF 55%      Hypertension  BP stable   Not on meds     Right trimalleolar ankle fracture, POA  s/p closed reduction and ORIF 1/2/17  -per ortho recommendations  ASA 325mg  X 1 month   Follow up with Dr. Wilian Macedo in 3-4 weeks  Last seen here on 1/19, new case applied  HHC: PT/OT NWB RLE         Code status: full  Prophylaxis: lovenox  Recommended Disposition: medically stable for DC. Psychiatrist cleared for DC home with OP follow up. Pt/huband declined to wait for SAH/SNF and want to go home with [facility] 80. Pt has very poor insight into her medical problem. She is actually planning on flaying to [place] to see her mother. Advised pt against it. Stressed to the pt/ to stop drinking alcohol. Pt has very high risk for re admission. Told  to contact SW if they want to go to SNF after discharge. Subjective:     Chief Complaint / Reason for Physician Visit  Called stat by RN to come and see pt. Pt is very agitated and insisting on being discharge.  at bedside. Pt and  was educated in details about the whole hospital stay and psychiatric issues over the weekend which led to the DC delay.  verbalized understanding. Discussed DC planning at this time: to wait for [address] or SNF if [address] not accepting. Pt/ declined both options ( SAH/SNF) and want pt to be DC home with [facility] 84. Stressed to the pt/ NWB status - they both verbalized understanding. Pt/ had opportunity to ask questions and all their questions were answered to their full satisfaction.  I informed then if they have any additional questions let RN know, so I can be paged. Discussed with RN events overnight. Review of Systems:  Symptom Y/N Comments  Symptom Y/N Comments   Fever/Chills n   Chest Pain n    Poor Appetite    Edema     Cough    Abdominal Pain n    Sputum    Joint Pain     SOB/FUENTES n   Pruritis/Rash     Nausea/vomit n   Tolerating PT/OT y    Diarrhea n   Tolerating Diet y    Constipation n   Other       Could NOT obtain due to:      Objective:     VITALS:   Last 24hrs VS reviewed since prior progress note. Most recent are:  Patient Vitals for the past 24 hrs:   Temp Pulse Resp BP SpO2   01/24/17 0715 98.3 °F (36.8 °C) 86 18 123/86 94 %   01/23/17 2031 98.5 °F (36.9 °C) 91 18 142/85 95 %   01/23/17 1429 98 °F (36.7 °C) 98 16 125/83 95 %       Intake/Output Summary (Last 24 hours) at 01/24/17 0845  Last data filed at 01/23/17 1343   Gross per 24 hour   Intake              720 ml   Output                0 ml   Net              720 ml        PHYSICAL EXAM:  General: WD, WN. Alert, cooperative, no acute distress    EENT:  EOMI. Anicteric sclerae. MMM  Resp:  CTA bilaterally, no wheezing or rales. No accessory muscle use  CV:  Regular  rhythm,  No edema  GI:  Soft, Non distended, Non tender.  +Bowel sounds  Neurologic:  Alert and oriented X 3, normal speech,   Psych:   Fair insight. Pt is very anxious and agitated  Skin:  No rashes.   No jaundice    Reviewed most current lab test results and cultures  YES  Reviewed most current radiology test results   YES  Review and summation of old records today    NO  Reviewed patient's current orders and MAR    YES  PMH/SH reviewed - no change compared to H&P  ________________________________________________________________________  Care Plan discussed with:    Comments   Patient y    Family  y  bedside    RN y    Care Manager y    Consultant                        Multidiciplinary team rounds were held today with , nursing, pharmacist and clinical coordinator. Patient's plan of care was discussed; medications were reviewed and discharge planning was addressed. ________________________________________________________________________  Total NON critical care TIME:  50  Minutes    Total CRITICAL CARE TIME Spent:   Minutes non procedure based      Comments   >50% of visit spent in counseling and coordination of care     ________________________________________________________________________  Caesar Lam MD     Procedures: see electronic medical records for all procedures/Xrays and details which were not copied into this note but were reviewed prior to creation of Plan. LABS:  I reviewed today's most current labs and imaging studies. Pertinent labs include:  No results for input(s): WBC, HGB, HCT, PLT, HGBEXT, HCTEXT, PLTEXT in the last 72 hours.   Recent Labs      01/22/17   0348   NA  144   K  3.4*   CL  107   CO2  29   GLU  107*   BUN  8   CREA  0.60   CA  8.8       Signed: Caesar Lam MD

## 2017-01-24 NOTE — PROGRESS NOTES
Oncology Nursing Communication Tool      7:25 PM  1/23/2017     Bedside shift change report given to JAYLEN sales (incoming nurse) by Galilea Bone RN (outgoing nurse) on Kamar Farr a 49 y.o. female who was admitted on 1/2/2017 12:49 PM. Report included the following information SBAR. Significant changes during shift:       Issues for physician to address:             Code Status: Full Code     Infections: No current active infections     Allergies: Pcn [penicillins]     Current diet: DIET NUTRITIONAL SUPPLEMENTS Breakfast; Ensure Complete  DIET REGULAR       Pain Controlled [x] yes [] no   Bowel Movement [] yes  no   Last Bowel Movement (date)                 Vital Signs:   Patient Vitals for the past 12 hrs:   Temp Pulse Resp BP SpO2   01/23/17 1429 98 °F (36.7 °C) 98 16 125/83 95 %      Intake & Output:     Intake/Output Summary (Last 24 hours) at 01/23/17 1925  Last data filed at 01/23/17 1343   Gross per 24 hour   Intake              840 ml   Output              300 ml   Net              540 ml      Laboratory Results:     Recent Results (from the past 12 hour(s))   GLUCOSE, POC    Collection Time: 01/23/17 12:03 PM   Result Value Ref Range    Glucose (POC) 86 65 - 100 mg/dL    Performed by Lujean Galeazzi    GLUCOSE, POC    Collection Time: 01/23/17  5:57 PM   Result Value Ref Range    Glucose (POC) 165 (H) 65 - 100 mg/dL    Performed by Secerno for questions and clarifications were given to the incoming nurse. Patient's bed is in low position, side rails x2, door open PRN, call bell within reach and patient not in distress.       Galilea Bone RN

## 2017-01-24 NOTE — DISCHARGE INSTRUCTIONS
ACTIVITY: Absolutely No weight bearing on Right foot. Use crutches, walker or knee scooter for mobilization. FOLLOW UP: Follow up with Dr. Wilian Macedo on or near 2017 for cast removal, X-rays and exam.      Patient Discharge Instructions    Caremla Latif / [id number] : 1960    Admitted 2017 Discharged: 2017         DISCHARGE DIAGNOSIS:     Closed trimalleolar fracture of right ankle - take aspirin 325 mg daily for blood clot prophylaxis x 1 months then go back to 81 mg daily   Withdrawing from alcohol   Urinary tract infection          Take Home Medications     You were treated with antibiotic while in the hopsital for treatment of UTI   One of the most important side effect to watch while taking antibiotic or after you just finished taking it is diarrhea. This type of diarrhea may be caused by an infection with bacteria called C. difficile. C. difficile normally lives in the intestines. When people are on antibiotics, the C. difficile in their intestines can overgrow and cause infection. If you develop any side effect especially diarrhea while taking antibiotics it is very important to contact your doctor. General drug facts     If you have a very bad allergy, wear an allergy ID at all times. It is important that you take the medication exactly as they are prescribed. Keep your medication in the bottles provided by the pharmacist.  Keep a list of all your drugs (prescription, natural products, vitamins, OTC) with you. Give this list to your doctor. Do not take other medications without consulting your doctor. Do not share your drugs with others and do not take anyone else's drugs. Keep all drugs out of the reach of children and pets. Most drugs may be thrown away in household trash after mixing with coffee grounds or aleta litter and sealing in a plastic bag. Keep a list Call your doctor for help with any side effects.  If in the [place] you may also call the FDA at 4-361-KQP-0772    Talk with the doctor before starting any new drug, including OTC, natural products, or vitamins. What to do at Home    1. Recommended diet:regular     2. Recommended activity: Absolutely No weight bearing on Right foot. Use crutches, walker or knee scooter for mobilization. No driving     3. If you experience any of the following symptoms then please call your primary care physician or return to the emergency room if you cannot get hold of your doctor:fever/chills/diarrhea     4. Wound Care:keep cast on dry and clean     5. Lab work:per PCP     6. Stop smocking and drinking alcohol     7. Bring these papers with you to your follow up appointments. The papers will help your doctors be sure to continue the care plan from the hospital.      Follow-up with:   PCP: Cece Corley MD  Follow-up Information     Follow up With Details Comments Contact Info    Garett J. Shaun Mcardle, MD Schedule an appointment as soon as possible for a visit in 3 weeks Call as soon as you can for postoperative appointment, cast removal and X-rays [address]  [phone]      PCP   establish care      psychiatrist  In 1 week 1-2 weeks             Please call for your own appointment        Information obtained by :  I understand that if any problems occur once I am at home I am to contact my physician. I understand and acknowledge receipt of the instructions indicated above.                                                                                                                                            Physician's or R.N.'s Signature                                                                  Date/Time                                                                                                                                              Patient or Representative Signature Date/Time

## 2017-01-25 NOTE — PROGRESS NOTES
Home Health Care Discharge Planning: [facility]  Face to Face Encounter      NAME: Christiane Turpin   :  1960   MRN:  [id number]     Primary Diagnosis: Right trimalleolar ankle fracture    Date of Face to Face:  2017 10:03 AM                                  Face to Face Encounter findings are related to primary reason for home care:   YES    1. I certify that the patient needs intermittent skilled nursing care, physical therapy and/or speech therapy. I will not be following this patient in the Community and Dr. Ryan Corley MD will be responsible for signing the [address] of Care. 2. Initial Orders for Care: Skilled Nursing, Physical Therapy and Occupational Therapy    3. I certify that this patient is homebound because of illness or injury, need the aid of supportive devices such as crutches, canes, wheelchairs, and walkers; the use of special transportation; or the assistance of another person in order to leave their place of residence. There exists a normal inability to leave home and leaving home requires a considerable and taxing effort. 4. I certify that this patient is under my care and that I had a Face-to-Face Encounter that meets the physician Face-to-Face Encounter requirements. Document the physical findings from the Face-to-Face Encounter that support the need for skilled services: Has new diagnosis that requires skilled nursing teaching and intervention , Needs skilled safety assessment and interventions  and Has new finding of weakness and altered mobility that requires skilled physical/occupational and/or speech therapy services for evaluation and interventions.      Caesar Lam MD  Discharging Physician                 Office:  [phone]  Fax:    [phone]

## 2017-05-08 ENCOUNTER — OFFICE VISIT (OUTPATIENT)
Dept: FAMILY MEDICINE CLINIC | Age: 57
End: 2017-05-08

## 2017-05-08 VITALS
HEIGHT: 64 IN | RESPIRATION RATE: 18 BRPM | TEMPERATURE: 97.7 F | WEIGHT: 170.4 LBS | OXYGEN SATURATION: 95 % | HEART RATE: 98 BPM | BODY MASS INDEX: 29.09 KG/M2 | SYSTOLIC BLOOD PRESSURE: 116 MMHG | DIASTOLIC BLOOD PRESSURE: 76 MMHG

## 2017-05-08 DIAGNOSIS — Z76.89 ENCOUNTER TO ESTABLISH CARE: Primary | ICD-10-CM

## 2017-05-08 DIAGNOSIS — F32.A DEPRESSION, UNSPECIFIED DEPRESSION TYPE: ICD-10-CM

## 2017-05-08 DIAGNOSIS — I10 ESSENTIAL HYPERTENSION: ICD-10-CM

## 2017-05-08 DIAGNOSIS — F90.9 ADULT ADHD: ICD-10-CM

## 2017-05-08 DIAGNOSIS — G47.00 INSOMNIA, UNSPECIFIED TYPE: ICD-10-CM

## 2017-05-08 DIAGNOSIS — E78.5 HYPERLIPIDEMIA, UNSPECIFIED HYPERLIPIDEMIA TYPE: ICD-10-CM

## 2017-05-08 DIAGNOSIS — Z79.899 CONTROLLED SUBSTANCE AGREEMENT SIGNED: ICD-10-CM

## 2017-05-08 RX ORDER — LOSARTAN POTASSIUM 50 MG/1
TABLET ORAL DAILY
COMMUNITY
End: 2017-05-08 | Stop reason: SDUPTHER

## 2017-05-08 RX ORDER — ATORVASTATIN CALCIUM 20 MG/1
20 TABLET, FILM COATED ORAL
Qty: 90 TAB | Refills: 3 | Status: SHIPPED | OUTPATIENT
Start: 2017-05-08 | End: 2018-04-03 | Stop reason: SDUPTHER

## 2017-05-08 RX ORDER — DEXTROAMPHETAMINE SACCHARATE, AMPHETAMINE ASPARTATE, DEXTROAMPHETAMINE SULFATE AND AMPHETAMINE SULFATE 5; 5; 5; 5 MG/1; MG/1; MG/1; MG/1
20 TABLET ORAL
COMMUNITY
End: 2017-05-08 | Stop reason: SDUPTHER

## 2017-05-08 RX ORDER — DESVENLAFAXINE 100 MG/1
50 TABLET, EXTENDED RELEASE ORAL DAILY
Qty: 30 TAB | Refills: 1 | Status: SHIPPED | OUTPATIENT
Start: 2017-05-08 | End: 2017-07-10 | Stop reason: DRUGHIGH

## 2017-05-08 RX ORDER — DOXEPIN HYDROCHLORIDE 50 MG/1
50 CAPSULE ORAL
COMMUNITY
End: 2017-05-08 | Stop reason: SDUPTHER

## 2017-05-08 RX ORDER — GLUCOSAMINE SULFATE 1500 MG
POWDER IN PACKET (EA) ORAL DAILY
COMMUNITY

## 2017-05-08 RX ORDER — DOXEPIN HYDROCHLORIDE 50 MG/1
CAPSULE ORAL
Qty: 90 CAP | Refills: 2 | Status: SHIPPED | OUTPATIENT
Start: 2017-05-08 | End: 2017-10-24 | Stop reason: SDUPTHER

## 2017-05-08 RX ORDER — DEXTROAMPHETAMINE SACCHARATE, AMPHETAMINE ASPARTATE, DEXTROAMPHETAMINE SULFATE AND AMPHETAMINE SULFATE 5; 5; 5; 5 MG/1; MG/1; MG/1; MG/1
20 TABLET ORAL 2 TIMES DAILY
Qty: 60 TAB | Refills: 0 | Status: SHIPPED | OUTPATIENT
Start: 2017-05-08 | End: 2017-06-26 | Stop reason: SDUPTHER

## 2017-05-08 RX ORDER — LOSARTAN POTASSIUM 50 MG/1
50 TABLET ORAL DAILY
Qty: 90 TAB | Refills: 3 | Status: SHIPPED | OUTPATIENT
Start: 2017-05-08 | End: 2018-04-03 | Stop reason: SDUPTHER

## 2017-05-08 NOTE — PATIENT INSTRUCTIONS
Learning About Attention Deficit Hyperactivity Disorder (ADHD) in Adults  What is ADHD? Attention deficit hyperactivity disorder (ADHD) is a condition in which people have a hard time paying attention. Adults with ADHD also may be more active than normal. They tend to act without thinking. ADHD may make it harder for them to focus, get organized, and finish tasks. ADHD most often starts in childhood and lasts into adulthood. Many adults don't know that they have ADHD until their children are diagnosed. Then they begin to see their own symptoms. Doctors don't know what causes ADHD. But it tends to run in families. What are the symptoms? The most common types of ADHD symptoms in adults are attention problems and hyperactivity. Attention problems  Adults with ADHD often find it hard to:  · Finish tasks that don't interest them or aren't easy. But they may become obsessed with activities that they find interesting and enjoy. · Keep relationships. · Focus their attention on conversations, reading materials, or jobs. They may change jobs a lot. · Remember things. They may misplace or lose things. · Pay attention. They are easily distracted. They find it hard to focus on one task. · Think before they act. They may make quick decisions. They may act before they think about the effect of their actions. Hyperactivity  Adults with ADHD may:  · Fidget. They may swing their legs, shift in their seats, or tap their fingers. · Move around a lot. They may feel \"revved up\" or on the go. They may not be able to slow down until they are very tired. · Find it hard to relax. They may feel restless and find it hard to do quiet things like read or watch TV. How does ADHD affect daily life? ADHD in adults may affect:  · Job performance. They may find it hard to organize their work, manage their time, and focus on one task at a time. They may forget, misplace, or lose things.  They may quit their jobs out of boredom. · Relationships. Adults with ADHD may find it hard to focus their attention on conversations. It is hard for them to \"read\" the behavior and moods of others and express their own feelings. · Temper. They may get easily frustrated. This often can make it harder for them to deal with stress. These adults may overreact and have a short, quick temper. · The ability to solve problems. Adults who have a hard time waiting for things they want may act before they think about the effect of their actions. They may take part in risky behaviors. These include unprotected sex, unsafe driving, alcohol and drug use, or unwise business ventures. How is ADHD treated? ADHD can be treated with medicines, behavior training, or counseling. Or it may be a combination of these treatments. Medicines  Stimulant medicines are most often used to treat ADHD. These may include:  · Amphetamines (such as Adderall and Dexedrine). · Methylphenidate (such as Concerta, Daytrana, Focalin, Metadate, and Ritalin). Other medicines that may be used are:  · Atomoxetine, such as Strattera, a nonstimulant medicine for ADHD. · Antihypertensives. These include clonidine (such as Catapres) and guanfacine (such as Tenex). · Antidepressants, which include bupropion (Wellbutrin). Behavior training  Behavior training can help adults with ADHD learn how to:  · Get organized. A daily organizer or planner can help these adults organize their daily tasks. They can write down appointments and other things they need to remember. · Decrease distractions. They can set up their work or home environment so that there are fewer things that will distract them. They may find using headphones or a \"white noise\" machine helpful. College students can arrange a quiet living situation. They may need a single dorm room. · Work on relationships. Social skills training can help adults with ADHD relate to family, friends, and coworkers.  Couples counseling or family therapy can also help improve relationships. Counseling  Counseling is not meant to treat inattention, hyperactivity, or impulsiveness. But it can help with some of the problems that go along with ADHD. These include not getting along well with others and having problems following rules. Where can you learn more? Go to http://stacie-nilda.info/. Enter C256 in the search box to learn more about \"Learning About Attention Deficit Hyperactivity Disorder (ADHD) in Adults. \"  Current as of: July 26, 2016  Content Version: 11.2  © 1662-3616 [employer]. Care instructions adapted under license by [employer] (which disclaims liability or warranty for this information). If you have questions about a medical condition or this instruction, always ask your healthcare professional. [address] any warranty or liability for your use of this information. Learning About Benefits From Quitting Smoking  How does quitting smoking make you healthier? If you're thinking about quitting smoking, you may have a few reasons to be smoke-free. Your health may be one of them. · When you quit smoking, you lower your risks for cancer, lung disease, heart attack, stroke, blood vessel disease, and blindness from macular degeneration. · When you're smoke-free, you get sick less often, and you heal faster. You are less likely to get colds, flu, bronchitis, and pneumonia. · As a nonsmoker, you may find that your mood is better and you are less stressed. When and how will you feel healthier? Quitting has real health benefits that start from day 1 of being smoke-free. And the longer you stay smoke-free, the healthier you get and the better you feel. The first hours  · After just 20 minutes, your blood pressure and heart rate go down. That means there's less stress on your heart and blood vessels.   · Within 12 hours, the level of carbon monoxide in your blood drops back to normal. That makes room for more oxygen. With more oxygen in your body, you may notice that you have more energy than when you smoked. After 2 weeks  · Your lungs start to work better. · Your risk of heart attack starts to drop. After 1 month  · When your lungs are clear, you cough less and breathe deeper, so it's easier to be active. · Your sense of taste and smell return. That means you can enjoy food more than you have since you started smoking. Over the years  · After 1 year, your risk of heart disease is half what it would be if you kept smoking. · After 5 years, your risk of stroke starts to shrink. Within a few years after that, it's about the same as if you'd never smoked. · After 10 years, your risk of dying from lung cancer is cut by about half. And your risk for many other types of cancer is lower too. How would quitting help others in your life? When you quit smoking, you improve the health of everyone who now breathes in your smoke. · Their heart, lung, and cancer risks drop, much like yours. · They are sick less. For babies and small children, living smoke-free means they're less likely to have ear infections, pneumonia, and bronchitis. · If you're a woman who is or will be pregnant someday, quitting smoking means a healthier . · Children who are close to you are less likely to become adult smokers. Where can you learn more? Go to http://stacie-nilda.info/. Enter 052 [id number] 72 11 in the search box to learn more about \"Learning About Benefits From Quitting Smoking. \"  Current as of: May 26, 2016  Content Version: 11.2  © 6936-1509 "[employer]. Care instructions adapted under license by [employer] (which disclaims liability or warranty for this information).  If you have questions about a medical condition or this instruction, always ask your healthcare professional. [address]  any warranty or liability for your use of this information.

## 2017-05-08 NOTE — MR AVS SNAPSHOT
Visit Information Date & Time Provider Department Dept. Phone Encounter #  
 5/8/2017  1:00 PM [facility] [phone] [id number] Follow-up Instructions Return in about 4 weeks (around 6/5/2017), or if symptoms worsen or fail to improve. Upcoming Health Maintenance Date Due Hepatitis C Screening 1960 Pneumococcal 19-64 Medium Risk (1 of 1 - PPSV23) 3/18/1981 DTaP/Tdap/Td series (1 - Tdap) 3/18/1983 PAP AKA CERVICAL CYTOLOGY 3/18/1983 BREAST CANCER SCRN MAMMOGRAM 3/18/2012 FOBT Q 1 YEAR AGE 50-75 3/18/2012 INFLUENZA AGE 9 TO ADULT 8/1/2017 Allergies as of 5/8/2017  Review Complete On: 5/8/2017 By: Gina Madsen NP Severity Noted Reaction Type Reactions Pcn [Penicillins]  01/02/2017    Rash Current Immunizations  Never Reviewed No immunizations on file. Not reviewed this visit You Were Diagnosed With   
  
 Codes Comments Encounter to establish care    -  Primary ICD-10-CM: Z76.89 
ICD-9-CM: V65.8 Depression, unspecified depression type     ICD-10-CM: F32.9 ICD-9-CM: 269 Adult ADHD     ICD-10-CM: F90.9 ICD-9-CM: 314.01 Essential hypertension     ICD-10-CM: I10 
ICD-9-CM: 401.9 Hyperlipidemia, unspecified hyperlipidemia type     ICD-10-CM: E78.5 ICD-9-CM: 272.4 Vitals BP Pulse Temp Resp Height(growth percentile) Weight(growth percentile) 116/76 (BP 1 Location: Left arm, BP Patient Position: Sitting) 98 97.7 °F (36.5 °C) (Oral) 18 5' 4\" (1.626 m) 170 lb 6.4 oz (77.3 kg) SpO2 BMI OB Status Smoking Status 95% 29.25 kg/m2 Postmenopausal Current Every Day Smoker BMI and BSA Data Body Mass Index Body Surface Area  
 29.25 kg/m 2 1.87 m 2 Preferred Pharmacy Pharmacy Name Phone [address] [place] [address], [facility] [phone] Your Updated Medication List  
  
   
 This list is accurate as of: 5/8/17  2:18 PM.  Always use your most recent med list.  
  
  
  
  
 atorvastatin 20 mg tablet Commonly known as:  LIPITOR Take 1 Tab by mouth nightly. RIKY LOW STRENGTH PO Take  by mouth. Desvenlafaxine 100 mg Tb24 Commonly known as:  PRISTIQ Take 0.5 Tabs by mouth daily. dextroamphetamine-amphetamine 20 mg tablet Commonly known as:  ADDERALL Take 1 Tab (20 mg total) by mouth two (2) times a day. Max Daily Amount: 40 mg  
  
 doxepin 50 mg capsule Commonly known as:  SINEquan Take 1 to 3 tablets by mouth everyday at bedtime as needed  
  
 losartan 50 mg tablet Commonly known as:  COZAAR Take 1 Tab by mouth daily. VITAMIN D3 1,000 unit Cap Generic drug:  cholecalciferol Take  by mouth daily. Prescriptions Printed Refills  
 dextroamphetamine-amphetamine (ADDERALL) 20 mg tablet 0 Sig: Take 1 Tab (20 mg total) by mouth two (2) times a day. Max Daily Amount: 40 mg  
 Class: Print Route: Oral  
  
Prescriptions Sent to Pharmacy Refills  
 doxepin (SINEQUAN) 50 mg capsule 2 Sig: Take 1 to 3 tablets by mouth everyday at bedtime as needed Class: Normal  
 Pharmacy: [address] Ph #: [phone] Desvenlafaxine (PRISTIQ) 100 mg Tb24 1 Sig: Take 0.5 Tabs by mouth daily. Class: Normal  
 Pharmacy: [address] Ph #: [phone] Route: Oral  
 losartan (COZAAR) 50 mg tablet 3 Sig: Take 1 Tab by mouth daily. Class: Normal  
 Pharmacy: [address] [place] [address] Ph #: [phone] Route: Oral  
 atorvastatin (LIPITOR) 20 mg tablet 3 Sig: Take 1 Tab by mouth nightly. Class: Normal  
 Pharmacy: [address] [place] [address] Ph #: [phone] Route: Oral  
  
Follow-up Instructions Return in about 4 weeks (around 6/5/2017), or if symptoms worsen or fail to improve. Patient Instructions Learning About Attention Deficit Hyperactivity Disorder (ADHD) in Adults What is ADHD? Attention deficit hyperactivity disorder (ADHD) is a condition in which people have a hard time paying attention. Adults with ADHD also may be more active than normal. They tend to act without thinking. ADHD may make it harder for them to focus, get organized, and finish tasks. ADHD most often starts in childhood and lasts into adulthood. Many adults don't know that they have ADHD until their children are diagnosed. Then they begin to see their own symptoms. Doctors don't know what causes ADHD. But it tends to run in families. What are the symptoms? The most common types of ADHD symptoms in adults are attention problems and hyperactivity. Attention problems Adults with ADHD often find it hard to: · Finish tasks that don't interest them or aren't easy. But they may become obsessed with activities that they find interesting and enjoy. · Keep relationships. · Focus their attention on conversations, reading materials, or jobs. They may change jobs a lot. · Remember things. They may misplace or lose things. · Pay attention. They are easily distracted. They find it hard to focus on one task. · Think before they act. They may make quick decisions. They may act before they think about the effect of their actions. Hyperactivity Adults with ADHD may: · Fidget. They may swing their legs, shift in their seats, or tap their fingers. · Move around a lot. They may feel \"revved up\" or on the go. They may not be able to slow down until they are very tired. · Find it hard to relax. They may feel restless and find it hard to do quiet things like read or watch TV. How does ADHD affect daily life? ADHD in adults may affect: · Job performance.  They may find it hard to organize their work, manage their time, and focus on one task at a time. They may forget, misplace, or lose things. They may quit their jobs out of boredom. · Relationships. Adults with ADHD may find it hard to focus their attention on conversations. It is hard for them to \"read\" the behavior and moods of others and express their own feelings. · Temper. They may get easily frustrated. This often can make it harder for them to deal with stress. These adults may overreact and have a short, quick temper. · The ability to solve problems. Adults who have a hard time waiting for things they want may act before they think about the effect of their actions. They may take part in risky behaviors. These include unprotected sex, unsafe driving, alcohol and drug use, or unwise business ventures. How is ADHD treated? ADHD can be treated with medicines, behavior training, or counseling. Or it may be a combination of these treatments. Medicines Stimulant medicines are most often used to treat ADHD. These may include: · Amphetamines (such as Adderall and Dexedrine). · Methylphenidate (such as Concerta, Daytrana, Focalin, Metadate, and Ritalin). Other medicines that may be used are: · Atomoxetine, such as Strattera, a nonstimulant medicine for ADHD. · Antihypertensives. These include clonidine (such as Catapres) and guanfacine (such as Tenex). · Antidepressants, which include bupropion (Wellbutrin). Behavior training Behavior training can help adults with ADHD learn how to: · Get organized. A daily organizer or planner can help these adults organize their daily tasks. They can write down appointments and other things they need to remember. · Decrease distractions. They can set up their work or home environment so that there are fewer things that will distract them. They may find using headphones or a \"white noise\" machine helpful. College students can arrange a quiet living situation. They may need a single dorm room. · Work on relationships. Social skills training can help adults with ADHD relate to family, friends, and coworkers. Couples counseling or family therapy can also help improve relationships. Counseling Counseling is not meant to treat inattention, hyperactivity, or impulsiveness. But it can help with some of the problems that go along with ADHD. These include not getting along well with others and having problems following rules. Where can you learn more? Go to http://stacie-nilda.info/. Enter T886 in the search box to learn more about \"Learning About Attention Deficit Hyperactivity Disorder (ADHD) in Adults. \" Current as of: July 26, 2016 Content Version: 11.2 © 2229-5439 [employer]. Care instructions adapted under license by [employer] (which disclaims liability or warranty for this information). If you have questions about a medical condition or this instruction, always ask your healthcare professional. [place] [address] any warranty or liability for your use of this information. Learning About Benefits From Quitting Smoking How does quitting smoking make you healthier? If you're thinking about quitting smoking, you may have a few reasons to be smoke-free. Your health may be one of them. · When you quit smoking, you lower your risks for cancer, lung disease, heart attack, stroke, blood vessel disease, and blindness from macular degeneration. · When you're smoke-free, you get sick less often, and you heal faster. You are less likely to get colds, flu, bronchitis, and pneumonia. · As a nonsmoker, you may find that your mood is better and you are less stressed. When and how will you feel healthier? Quitting has real health benefits that start from day 1 of being smoke-free. And the longer you stay smoke-free, the healthier you get and the better you feel. The first hours · After just 20 minutes, your blood pressure and heart rate go down. That means there's less stress on your heart and blood vessels. · Within 12 hours, the level of carbon monoxide in your blood drops back to normal. That makes room for more oxygen. With more oxygen in your body, you may notice that you have more energy than when you smoked. After 2 weeks · Your lungs start to work better. · Your risk of heart attack starts to drop. After 1 month · When your lungs are clear, you cough less and breathe deeper, so it's easier to be active. · Your sense of taste and smell return. That means you can enjoy food more than you have since you started smoking. Over the years · After 1 year, your risk of heart disease is half what it would be if you kept smoking. · After 5 years, your risk of stroke starts to shrink. Within a few years after that, it's about the same as if you'd never smoked. · After 10 years, your risk of dying from lung cancer is cut by about half. And your risk for many other types of cancer is lower too. How would quitting help others in your life? When you quit smoking, you improve the health of everyone who now breathes in your smoke. · Their heart, lung, and cancer risks drop, much like yours. · They are sick less. For babies and small children, living smoke-free means they're less likely to have ear infections, pneumonia, and bronchitis. · If you're a woman who is or will be pregnant someday, quitting smoking means a healthier . · Children who are close to you are less likely to become adult smokers. Where can you learn more? Go to http://stacie-nilda.info/. Enter 052 [id number] [id number] 11 in the search box to learn more about \"Learning About Benefits From Quitting Smoking. \" Current as of: May 26, 2016 Content Version: 11.2 © 7343-6205 [employer].  Care instructions adapted under license by [employer] (which disclaims liability or warranty for this information). If you have questions about a medical condition or this instruction, always ask your healthcare professional. [address] any warranty or liability for your use of this information. Introducing [facility]! Deidre Mcclelland introduces boolino patient portal. Now you can access parts of your medical record, email your doctor's office, and request medication refills online. 1. In your internet browser, go to https://[employer]. [employer] 2. Click on the First Time User? Click Here link in the Sign In box. You will see the New Member Sign Up page. 3. Enter your boolino Access Code exactly as it appears below. You will not need to use this code after youve completed the sign-up process. If you do not sign up before the expiration date, you must request a new code. · boolino Access Code: [email or web address] Expires: 8/1/2017  4:05 PM 
 
4. Enter the last four digits of your Social Security Number (xxxx) and Date of Birth (mm/dd/yyyy) as indicated and click Submit. You will be taken to the next sign-up page. 5. Create a boolino ID. This will be your boolino login ID and cannot be changed, so think of one that is secure and easy to remember. 6. Create a boolino password. You can change your password at any time. 7. Enter your Password Reset Question and Answer. This can be used at a later time if you forget your password. 8. Enter your e-mail address. You will receive e-mail notification when new information is available in [address] 9. Click Sign Up. You can now view and download portions of your medical record. 10. Click the Download Summary menu link to download a portable copy of your medical information. If you have questions, please visit the Frequently Asked Questions section of the boolino website. Remember, boolino is NOT to be used for urgent needs. For medical emergencies, dial 911. Now available from your iPhone and Android! Please provide this summary of care documentation to your next provider. Your primary care clinician is listed as LAKEISHA EDOUARD. If you have any questions after today's visit, please call [phone].

## 2017-05-08 NOTE — LETTER
Patient: Blanka Velazco  
: 1960 Date: 2017 INFORMED CONSENT FOR CONTROLLED MEDICATION TREATMENT Your provider has recommended use of controlled medication for your treatment. It is your providers obligation to provide you with the information you need to decide whether you want to consent to the use of controlled medication prior to beginning this treatment plan. Read this information closely to understand the benefits and risks of treatment with controlled medication. You are encouraged and expected to ask questions of your provider. 1.  BENEFITS The following are some of the benefits to treatment with controlled medication: 
a. Improvement in quality of life with decrease symptoms. b.   Improvement in ability to perform home, school or work duties with decrease  symptoms. c.   The expected length of controlled medication treatment for current medical complaint and related symptoms. smiley.   Marco Waters should be aware of the estimated cost of each controlled medication prescription for short term or long term treatment. 2.  RISKS The following are some of the potential side effects to treatment with controlled medication. Understand that long-term use of controlled medication carries the risk of unsuccessful results, complications, injury or even death, from both known and unforeseen causes, and no warranty or guarantee is made as to result or cure. a.   Potential side effects of controlled medication include: 
Stimulants: Potential side effects of stimulant medication include but are not limited to loss of appetite, anorexia, GI upset, emotional lability, nervousness, fever, dizziness, hypertension, tachycardia, dry mouth, headache,  exhaustion and decreased performance as medication wears off, lethargy, mental confusion, paranoid thoughts to naomi psychosis, abnormal dyskinetic movements or tics,  sleep disturbance, hyperhidrosis, seizures, impact on emotional state and behavioral performance, and potential to develop addiction and death from overdose if not used as recommended. b.  Driving and Work Safety can be affected due to controlled medication may cause sleepiness, clouded thinking, decreased concentration, slower reflexes, or in coordination, all of which may create a danger to the patient and others when driving or operating certain type of machinery. You should avoid, if possible, driving or engaging in other potentially dangerous work or other activities until the effects of the controlled medication no longer create such dangers. Ingesting combination of substances, such as alcohol, benzodiazepines, narcotics, stimulants, sedative/hypnotics and some cold remedies, at the same time can potentially increase cognitive and motor impairment. c. Pregnancy is a time when controlled medications are not recommended due to risk to fetus harm. d.   Potential for Overdose is a risk with long term use of controlled medication by respiratory depression which may result in serious harm or death. You and your family should be watchful for the following warning signs of overmedication: 1.  intoxicated behavior, such as confusion, slurred speech or stumbling; 2.  feeling dizzy or faint; 
3.  acting very drowsy or groggy; 4.  unusual snoring, gasping, or snorting during sleep; and/or 5.  difficulty waking up from sleep or difficulty in staying awake. e. Response to Overdose is for you or family member to [address] 911 or an emergency service immediately if experiencing or observing any of the following conditions: 1. patient cannot be aroused or waken, or unable to talk after being awakened; 2. patient has shortness of breath, slow or light breathing or stopped breathing; 3. gurgling noises coming from the patients mouth or throat;  
4. patients body is limp or seems lifeless;  
5. patients face is pale or clammy;  
 6. patients fingernails or lips are turning blue or purple; and or 7. patients heartbeat is slow, unusual or stopped 3. PROPER STORAGE Proper storage and safe keeping of controlled medication is important. There is a potential risk for partners, family members or others to improperly obtain the controlled medication if not stored in a safe manner. Keep the medication in the original container. Store in a locked cabinet or other secure storage unit, that is cool, dry and out of direct sunlight, such as:  an existing safe, cut-proof travel bag, portable lock box designed for travel, or a locking medical box. Do not store medication in an unlocked medicine cabinet, in automobile, or in a refrigerator or freezer unless specifically recommended by the prescriber or pharmacy. 4. PROPER DISPOSAL It is important to safely dispose of any unused medication once no longer needed or past expiration date. Take the unused medication to a designated pharmacy or other designated facility for disposal, or turn in as part of an approved governmental drug take-back program.  Personally remove any identifying information, including the prescription number, from an empty container. 5. PROPER USE Carefully follow instructions for use of medications according to the following recommendations: 
      a.  Be aware of timing of doses, whether to take the medication with or without food          and any foods or other medications to avoid while taking the medication. b.   If you have low or impaired vision you should wear glasses when taking                       medication and not take the medication in the dark. c.   Read the prescription container label each time to confirm the dosage. d.   Never use the medication after the expiration date. e.   Never share the medication with others. f.   Verlon Snuffer not take the medication with alcohol or other nonprescribed sedatives. g.   You should not take the medication to help you sleep unless the medication is for treatment of that specific problem. 
h.   Never break, crush, or chew the medication unless discussed and cleared by your provider or pharmacist. 
i.    Know that external heat, fever and exertion can increase the absorption of transdermal products which can lead to potentially fatal overdose. 
j.    Immediately contact the physicians office to report any adverse reaction 
k. It is illegal to share, sell or give away controlled medications.

## 2017-05-08 NOTE — PROGRESS NOTES
Chief Complaint   Patient presents with    New Patient       HPI:  Ritu Rubalcava is a 56y.o. year old female who is a new patient and is here to establish care. She was previous followed by Lindsay Lowe in [address] [place] [address]. [place] to [place] in [place] and needs to have a PCP that is closer. ADHD  Taking 1-1.5 Adderall 20 mg per day. Has been on Adderall for \"several years\". Says that she feels focused on this dose. Notes that if she does not take the medication she \"cannot get anything done, I am all over the place and cannot focus long enough to complete anything\". She denies any weight loss or anorexia. Has a mild tremor in her hands that she says is chronic, \"have had since I was a teen\". Depression-  Controlled on medication, has been on Pristiq for \"a long time\". Denies SI/HI. Uses Doxepin for insomnia, which she says works well. HTN/Hyperlipidemia: taking medication daily. Diet and Lifestyle: generally follows a low fat low cholesterol diet, not attempting to follow a low sodium diet, sedentary, smoker 1/2 to 1 pack per day, caffeine intake 1-2 cups per day, alcohol intake none, recovering addict. clean for 126 days  Home BP Monitoring: is not measured at home. Pertinent ROS: taking medications as instructed, no medication side effects noted, no TIA's, no chest pain on exertion, no dyspnea on exertion, no swelling of ankles, no palpitations. OrthoBalinda Noelle" her right ankle 1/2/17 and had to have surgery, orthopedic Dr Gabriela Fang. Currently doing PT and is in a ortho post-op boot.         HEALTH MAINTENANCE  Pneumonia vaccine: unsure  Tdap vaccine: unsure, thinks that she is up to date  Shingles vaccine:no  Fasting lab work: unsure, within the last year  Mammogram: states that she thinks she is due, had negative mammogram about 1-2 year ago  Pap: UTD per patient  Colonoscopy: UTD per andriy      The following sections were reviewed & updated as appropriate: PMH, PL, PSH, and SH. Patient Active Problem List   Diagnosis Code    Closed trimalleolar fracture of right ankle S82.851A    Closed right trimalleolar fracture S82.851A    Hypertension I10    Depression F32.9    ADHD (attention deficit hyperactivity disorder) F90.9    Hyperlipidemia E78.5    Controlled substance agreement signed Z79.892      Past Medical History:   Diagnosis Date    ADHD (attention deficit hyperactivity disorder)     Depression     Hyperlipidemia     Hypertension     Ill-defined condition     high cholesterol    Psychiatric disorder     depression, ADHD     Past Surgical History:   Procedure Laterality Date    HX ANKLE FRACTURE TX Right 01/03/2017    Shattered her ankle and was hospitalized     HX GYN      D&C X2    HX OTHER SURGICAL      colonoscopy       Social History     Social History    Marital status:      Spouse name: N/A    Number of children: N/A    Years of education: N/A     Social History Main Topics    Smoking status: Current Every Day Smoker     Packs/day: 0.50    Smokeless tobacco: Never Used    Alcohol use No    Drug use: No    Sexual activity: Yes     Partners: Male     Other Topics Concern    None     Social History Narrative         Prior to Admission medications    Medication Sig Start Date End Date Taking? Authorizing Provider   ASPIRIN (RIKY LOW STRENGTH PO) Take  by mouth. Yes Historical Provider   doxepin (SINEQUAN) 50 mg capsule Take 50 mg by mouth nightly. Take 1 to 3 tablets by mouth everyday at bedtime as needed   Yes Historical Provider   losartan (COZAAR) 50 mg tablet Take  by mouth daily. Yes Historical Provider   dextroamphetamine-amphetamine (ADDERALL) 20 mg tablet Take 20 mg by mouth. Yes Historical Provider   cholecalciferol (VITAMIN D3) 1,000 unit cap Take  by mouth daily. Yes Historical Provider   Desvenlafaxine (PRISTIQ) 100 mg Tb24 Take 50 mg by mouth.    Yes Phys Other, MD   atorvastatin (LIPITOR) 10 mg tablet Take  by mouth daily. Yes Phys Other, MD          Allergies   Allergen Reactions    Pcn [Penicillins] Rash            ROS  Gen: no fatigue, fever, chills  Eyes: no excessive tearing, itching, or discharge  Nose: no rhinorrhea, no sinus pain  Mouth: no oral lesions, no sore throat  Resp: no shortness of breath, no wheezing, no cough  CV: no chest pain, no paroxysmal nocturnal dyspnea  Abd: no nausea, no heartburn, no diarrhea, no constipation, no abdominal pain  Neuro: no headaches, no syncope or presyncopal episodes  Endo: no polyuria, no polydipsia  Heme: no lymphadenopathy, no easy bruising or bleeding      Physical Exam     Visit Vitals    /76 (BP 1 Location: Left arm, BP Patient Position: Sitting)    Pulse 98    Temp 97.7 °F (36.5 °C) (Oral)    Resp 18    Ht 5' 4\" (1.626 m)    Wt 170 lb 6.4 oz (77.3 kg)    SpO2 95%    BMI 29.25 kg/m2   Gen: alert, oriented, no acute distress  Head: normocephalic, atraumatic  Ears: external auditory canals clear, TMs without erythema or effusion  Eyes: pupils equal round reactive to light, sclera clear, conjunctiva clear  Nose: normal turbinates, no rhinorrhea  Oral: moist mucus membranes, no oral lesions, no pharyngeal inflammation or exudate  Neck: supple, no lymphadenopathy  Resp: no increased work of breathing, lungs clear to ausculation bilaterally  CV: S1, S2 normal, no murmurs, rubs, or gallops. Abd: soft, not tender, not distended. No hepatosplenomegaly. Normal bowel sounds. No hernias. Neuro: cranial nerves intact, normal strength and movement in all extremities, reflexes and sensation intact and symmetric. Skin: no lesion or rash          Assessment & Plan:  Differential diagnosis and treatment options reviewed with patient who is in agreement with treatment plan as outlined below. ICD-10-CM ICD-9-CM    1. Encounter to establish care Z76.89 V65.8    2.  Depression, unspecified depression type F32.9 311 doxepin (SINEQUAN) 50 mg capsule      Desvenlafaxine (PRISTIQ) 100 mg Tb24   3. Adult ADHD F90.9 314.01 dextroamphetamine-amphetamine (ADDERALL) 20 mg tablet   4. Essential hypertension I10 401.9 losartan (COZAAR) 50 mg tablet   5. Hyperlipidemia, unspecified hyperlipidemia type E78.5 272.4 atorvastatin (LIPITOR) 20 mg tablet   6. Controlled substance agreement signed Z79.899 V58.69        Will get records from old PCP. Discussed that combination use of Pristiq, Adderall and Doxepin may have interactions with one another per medication review but she says that she has been on these three medications for a long time and has not had any side effects. There were no contraindications to taking stimulant medications identified today. I agreed to refill Adderall 20 mg po QAM and may repeat dose in afternoon if needed. Discussed regulations of controlled substance - Control substance agreement reviewed and implemented/signed. Reviewed benefits and potential side effects of stimulant medication include but are not limited to loss of appetite, anorexia, GI upset, emotional lability, nervousness, fever, dizziness, hypertension, tachycardia, dry mouth, headache,  exhaustion and decreased performance as medication wears off, lethargy, mental confusion, paranoid thoughts to naomi psychosis, abnormal dyskinetic movements or tics,  sleep disturbance, hyperhidrosis, seizures, impact on emotional state and behavioral performance, and potential to develop addiction and death from overdose if not used as recommended. [place]  reviewed. No discrepancy in compliance was noted. Handout, written ADHD medication management plan, and internet resources were provided as well with his After Visit Summary. Smoking cessation encouraged. Discussed BMI and healthy weight. Encouraged patient to work to implement changes including diet high in raw fruits and vegetables, lean protein and good fats. Limit refined, processed carbohydrates and sugar. Encouraged regular exercise. Duration of today's visit was >30 minutes, with greater than 50% being counseling and care planning. Follow up 1-3 months. Verbal and written instructions (see AVS) provided. Patient expresses understanding and agreement of diagnosis and treatment plan.

## 2017-05-08 NOTE — LETTER
AGREEMENT for controlled medication treatment Jaky Pall, have agreed to a course of treatment that includes taking controlled medication. For this treatment I designate _____________________________________ as the [facility] Provider.  The purpose of this agreement is to prevent misunderstandings about controlled medications I may be taking for treatment, and to comply with applicable laws. I understand this agreement is essential to the trust and confidence necessary in a provider-patient relationship and that the designated provider will treat me in accordance with the statements below. As part of my treatment I agree to the followin.  USE 
a. I will take the controlled medication as instructed by the designated provider and avoid improper use of controlled medications. b.   I will not share, sell or trade controlled medication with anyone as this is a criminal offense.  
c.   I will not use any illegal controlled substance, including marijuana, cocaine, etc. as this is a criminal offense. 2.  PROVIDERS 
a. I will only obtain controlled medication from the designated provider. b.   I will not attempt to obtain the same or similar controlled medications, such as opioid pain medication, stimulants, anti-anxiety or hypnotics from any other provider as this is a criminal offense. 
c.   I will inform the designated provider about all other licensed professionals providing medical care to me and authorize communication between all providers to coordinate care, particularly prescribing or dispensing of controlled medications. 3.  PHARMACY 
a.   I will only fill controlled medication prescriptions at the approved pharmacy as listed below. 4.  OFFICE VISITS 
a. I agree to attend scheduled office visit appointments. b.   I am aware my office visits may be monthly or as determined necessary by the designated provider. c.   I will communicate fully with the designated provider about the character and intensity of my symptoms, the effect of the symptoms on my daily life, and how well the controlled medication is helping to relieve the cause of my symptoms. 5.  REFILLS OR CALL-IN PRESCRIPTIONS OF CONTROLLED MEDICATIONS 
a. I agree that refills of my prescriptions for controlled medications will be made at the time of an office visit during regular office hours. No refills will be available during evenings or on weekends. Abusive or inappropriate behavior related to medication refills will not be tolerated. b.   I will not call the office repeatedly to inquire about my controlled medication. I understand that medications will be written on the due date and not before. Calling the office repeatedly will be considered harassment, and I may be discharged from the practice. c.   Controlled medications may not be called in for refill, but doing so is at the discretion of the designated provider. d.   I am aware that only I must  prescriptions for controlled medications at the office but the designated provider may allow a designee to  the prescription from the office under very specific circumstance that may develop. 6.  TOXICOLOGY SCREENING 
a. I agree to random urine toxicology screenings in order to comply with government and [address] [place] [address] regulations. I understand that I will be financially responsible for any charges incurred for the urine toxicology screening, which may not be covered by my insurance. Failure to do so will be considered non-compliance and I may be discharged. b. In some cases an oral swab or hair sample may be substituted for a urine screen. This is at the discretion of the designated provider.   I understand that I will be financially responsible for any charges incurred as well. 
c.   I understand that if the urine toxicology does not show my medications prescribed to me by the designated provider, or it shows any illegal substance or any other medications NOT prescribed by the designated providers, I may be discharged from this practice at the discretion of the designated provider and no further controlled medications will be prescribed or follow-up appointments scheduled. Also, if any illegal substances are detected on the urine toxicology, this information may be provided to local law enforcement. 7. PILL COUNTS 
a. I am aware I may be called at any time to come into the office for a count of all my remaining controlled medications in order to help the designated provider understand the rate at which I use my controlled medications and to more effectively adjust dosage. b. I agree that I will use my medication at a rate NO greater than the prescribed rate and that use of my medication at a greater rate will result in my being without medication for the period of time until next expected due date. c. I will bring in the containers with the medication prescribed by all providers, including the designated provider, to each office visit even if there is no medication remaining. All controlled medication will be in the original containers from the pharmacy for each medication. Failure to do so will be considered non-compliance and I may be discharged from the practice. 8.  LOSS OR THEFT OF MEDICATION 
a. I will safeguard my controlled medication from loss or theft. b.   Lost or stolen controlled medication may not be replaced. This includes a prescription that has not yet been filled at the pharmacy. c.   In the event my controlled medications are stolen or lost, I will notify the designated providers office immediately. If such event occurs during the night, weekend or holiday, I will leave a detailed message on the answering machine or answering service at the number listed above. d.   I will file and produce an official police report for any effort to replace controlled medications prescribed. 9.  AGENCY COLLABORATION I authorize the designated provider and the authorized pharmacy/pharmacist to cooperate fully with any city, state, or federal law enforcement agency in the investigation of any possible misuse, sale or other diversion of my controlled medication. 10.  TREATMENT I understand that if I violate any of the conditions, my controlled medication and/or treatment will be terminated. If the violation involves obtaining controlled substances and/or dangerous drugs from another source, the incident may be reported to other medical facilities and authorities, including law enforcement. In this case, the designated provider may taper off the medication over a period of several days, as necessary, to avoid withdrawal symptoms or will suggest alternate treatment facilities. Also, a drug dependence treatment program may be recommended. 11.  AGREEMENT I agree to follow these guidelines that have been fully explained to me. All of my questions and concerns regarding treatment have been adequately answered. A copy of this document has been given to me. I agree to use ______________________________ Authorized Pharmacy located at _________________________________________________________________ Telephone ________________________________for filling ALL controlled medication prescriptions. This agreement is entered into on 5/8/2017 Patient signature__________________________________________________________ Legal Guardian signature___________________________________________________ Provider signature_________________________________________________________ Witness signature_________________________________________________________

## 2017-05-09 PROBLEM — G47.00 INSOMNIA: Status: ACTIVE | Noted: 2017-05-09

## 2017-05-30 ENCOUNTER — TELEPHONE (OUTPATIENT)
Dept: FAMILY MEDICINE CLINIC | Age: 57
End: 2017-05-30

## 2017-05-30 DIAGNOSIS — M67.441 GANGLION OF FLEXOR TENDON SHEATH OF RIGHT THUMB: Primary | ICD-10-CM

## 2017-05-30 NOTE — TELEPHONE ENCOUNTER
Pt states that she spoke with Shadia Duggan NP about seeing a hand specialist for a growth on her right thumb. Pt would like referral to see Dr. Ramírez Meier.

## 2017-06-12 ENCOUNTER — OFFICE VISIT (OUTPATIENT)
Dept: FAMILY MEDICINE CLINIC | Age: 57
End: 2017-06-12

## 2017-06-12 VITALS
SYSTOLIC BLOOD PRESSURE: 112 MMHG | HEART RATE: 84 BPM | DIASTOLIC BLOOD PRESSURE: 74 MMHG | HEIGHT: 64 IN | TEMPERATURE: 98.5 F | WEIGHT: 171.6 LBS | RESPIRATION RATE: 16 BRPM | BODY MASS INDEX: 29.3 KG/M2 | OXYGEN SATURATION: 95 %

## 2017-06-12 DIAGNOSIS — Z86.79 HISTORY OF CAROTID STENOSIS: ICD-10-CM

## 2017-06-12 DIAGNOSIS — E78.5 HYPERLIPIDEMIA, UNSPECIFIED HYPERLIPIDEMIA TYPE: ICD-10-CM

## 2017-06-12 DIAGNOSIS — F32.89 OTHER DEPRESSION: ICD-10-CM

## 2017-06-12 DIAGNOSIS — Z11.59 NEED FOR HEPATITIS C SCREENING TEST: ICD-10-CM

## 2017-06-12 DIAGNOSIS — E55.9 VITAMIN D DEFICIENCY: ICD-10-CM

## 2017-06-12 DIAGNOSIS — F17.200 SMOKER: ICD-10-CM

## 2017-06-12 DIAGNOSIS — Z13.29 SCREENING FOR THYROID DISORDER: ICD-10-CM

## 2017-06-12 DIAGNOSIS — Z12.39 SPECIAL SCREENING EXAMINATION FOR NEOPLASM OF BREAST: ICD-10-CM

## 2017-06-12 DIAGNOSIS — R82.90 ABNORMAL URINALYSIS: ICD-10-CM

## 2017-06-12 DIAGNOSIS — F90.9 ATTENTION DEFICIT HYPERACTIVITY DISORDER (ADHD), UNSPECIFIED ADHD TYPE: ICD-10-CM

## 2017-06-12 DIAGNOSIS — I10 ESSENTIAL HYPERTENSION: Primary | ICD-10-CM

## 2017-06-12 DIAGNOSIS — Z51.89 ENCOUNTER FOR MONITORING OF PATIENT COMPLIANCE IN DRUG TREATMENT PROGRAM: ICD-10-CM

## 2017-06-12 LAB
BILIRUB UR QL STRIP: NEGATIVE
GLUCOSE UR-MCNC: NEGATIVE MG/DL
KETONES P FAST UR STRIP-MCNC: NEGATIVE MG/DL
PH UR STRIP: 5.5 [PH] (ref 4.6–8)
PROT UR QL STRIP: NORMAL MG/DL
SP GR UR STRIP: 1.03 (ref 1–1.03)
UA UROBILINOGEN AMB POC: NORMAL (ref 0.2–1)
URINALYSIS CLARITY POC: NORMAL
URINALYSIS COLOR POC: YELLOW
URINE BLOOD POC: NORMAL
URINE LEUKOCYTES POC: NORMAL
URINE NITRITES POC: NEGATIVE

## 2017-06-12 NOTE — PROGRESS NOTES
Chief Complaint   Patient presents with    Cholesterol Problem     Fasting lab work   [facility]     Patient is due for a Mammogram.  Patient is unsure of date of last pap smear. Patient states \"I think I am due for a carotid doppler\".

## 2017-06-12 NOTE — PROGRESS NOTES
Subjective:      Chief Complaint   Patient presents with    Cholesterol Problem     Fasting lab work   Jamie Hinojosa is a 56 y.o. female with hypertension and hyperlipidemia, here for follow up and fasting labs. Hypertension/Hyperlipidemia ROS: taking medications as instructed, no medication side effects noted, patient does not perform home BP monitoring, no TIA's, no chest pain on exertion, no dyspnea on exertion, no swelling of ankles, no palpitations,headaches, shortness of breath, vision change. she generally follows a low fat low cholesterol diet, generally follows a low sodium diet, sedentary, smoker 1 pack per day, caffeine intake 1-2 cups per day. Depression and ADD  Taking medication as directed. No changes in doses. Taking 1-1.5 Adderall 20 mg per day for ADD and 50 mg Pristiq. States that she is well controlled on current medications and doses. Denies depression, SI/HI. No recent hospitalizations since last visit. Past Medical History:   Diagnosis Date    ADHD (attention deficit hyperactivity disorder)     Depression     Hyperlipidemia     Hypertension     Ill-defined condition     high cholesterol    Psychiatric disorder     depression, ADHD     Past Surgical History:   Procedure Laterality Date    HX ANKLE FRACTURE TX Right 01/03/2017    Shattered her ankle and was hospitalized     HX GYN      D&C X2    HX OTHER SURGICAL      colonoscopy     Social History   Substance Use Topics    Smoking status: Current Every Day Smoker     Packs/day: 0.50    Smokeless tobacco: Never Used    Alcohol use No     family history includes Diabetes in her father; Heart Disease in her father. Current Outpatient Prescriptions on File Prior to Visit   Medication Sig    ASPIRIN (RIKY LOW STRENGTH PO) Take  by mouth.  cholecalciferol (VITAMIN D3) 1,000 unit cap Take  by mouth daily.     doxepin (SINEQUAN) 50 mg capsule Take 1 to 3 tablets by mouth everyday at bedtime as needed  Desvenlafaxine (PRISTIQ) 100 mg Tb24 Take 0.5 Tabs by mouth daily.  dextroamphetamine-amphetamine (ADDERALL) 20 mg tablet Take 1 Tab (20 mg total) by mouth two (2) times a day. Max Daily Amount: 40 mg    losartan (COZAAR) 50 mg tablet Take 1 Tab by mouth daily.  atorvastatin (LIPITOR) 20 mg tablet Take 1 Tab by mouth nightly. No current facility-administered medications on file prior to visit. Allergies   Allergen Reactions    Pcn [Penicillins] Rash       ROS:   History obtained from the patient   Neurological: no paresthesias, weakness, or dizzyness  GEN: no weight loss, weight gain, fatigue or night sweats  CV: no PND, orthopnea, or palpitations, no chest pain  Resp: no dyspnea on exertion, no cough  Abd: no nausea, vomiting or diarrhea, no bloody or black stools  Endocrine: no hair loss, excessive thirst or polyuria    Nurses note reviewed    Objective:     Visit Vitals    /74 (BP 1 Location: Left arm, BP Patient Position: Sitting)    Pulse 84    Temp 98.5 °F (36.9 °C) (Oral)    Resp 16    Ht 5' 4\" (1.626 m)    Wt 171 lb 9.6 oz (77.8 kg)    SpO2 95%    BMI 29.46 kg/m2     General:   alert, cooperative and no distress   Eyes: conjunctivae/sclerae clear. PERRL, EOM's intact   Ears: External auditory canals clear, tympanic membranes clear   Sinuses/Nose: No maxillary or frontal tenderness. Rhinorrhea present. Mouth:  No oral lesions, no pharyngeal erythema, no exudates   Neck: Trachea midline, no thyromegaly, no bruits   Heart: S1 and S2 normal,no murmurs noted    Lungs: Clear to auscultation bilaterally, no increased work of breathing   Abdomen: Soft, nontender.   Normal bowel sounds   Extremities: No edema or cyanosis       Results for orders placed or performed in visit on 06/12/17   AMB POC URINALYSIS DIP STICK AUTO W/O MICRO   Result Value Ref Range    Color (UA POC) Yellow     Clarity (UA POC) Cloudy     Glucose (UA POC) Negative Negative    Bilirubin (UA POC) Negative Negative    Ketones (UA POC) Negative Negative    Specific gravity (UA POC) 1.030 1.001 - 1.035    Blood (UA POC) 1+ Negative    pH (UA POC) 5.5 4.6 - 8.0    Protein (UA POC) Trace Negative mg/dL    Urobilinogen (UA POC) 0.2 mg/dL 0.2 - 1    Nitrites (UA POC) Negative Negative    Leukocyte esterase (UA POC) 1+ Negative         Assessment/Plan:   Differential diagnosis and treatment options reviewed with patient who is in agreement with treatment plan as outlined below. ICD-10-CM ICD-9-CM    1. Essential hypertension I10 401.9 AMB POC URINALYSIS DIP STICK AUTO W/O MICRO      METABOLIC PANEL, COMPREHENSIVE      CBC WITH AUTOMATED DIFF   2. Hyperlipidemia, unspecified hyperlipidemia type C18.6 228.3 METABOLIC PANEL, COMPREHENSIVE      CBC WITH AUTOMATED DIFF      LIPID PANEL   3. Attention deficit hyperactivity disorder (ADHD), unspecified ADHD type F90.9 314.01 AMB POC URINALYSIS DIP STICK AUTO W/O MICRO      COMPLIANCE DRUG SCREEN/PRESCRIPTION MONITORING   4. Other depression F32.89 311    5. Need for hepatitis C screening test Z11.59 V73.89 HEPATITIS C AB   6. Vitamin D deficiency E55.9 268.9 VITAMIN D, 25 HYDROXY   7. Screening for thyroid disorder Z13.29 V77.0 TSH 3RD GENERATION   8. Special screening examination for neoplasm of breast Z12.39 V76.10 ASHLY MAMMO BI SCREENING INCL CAD   9. History of carotid stenosis Z86.79 V12.59 DUPLEX CAROTID BILATERAL   10. Smoker F17.200 305.1    11. Abnormal urinalysis R82.90 791.9 CULTURE, URINE     States that she has history of carotid stenosis \"that they have been watching with routine carotid ultrasounds and I am due to have one now\". Still have not received old medical records yet. Routine fasting labs today. No medication changes at this time.  reviewed. Mammogram ordered. She declines vaccines at this time. Says she is UTD with Tdap. Discussed BMI and healthy weight.  Encouraged patient to work to implement changes including diet high in raw fruits and vegetables, lean protein and good fats. Limit refined, processed carbohydrates and sugar. Encouraged regular exercise. Smoking cessation encouraged. Follow up in three months or sooner if needed. Verbal and written instructions (see AVS) provided. Patient expresses understanding and agreement of diagnosis and treatment plan.

## 2017-06-12 NOTE — PATIENT INSTRUCTIONS
DASH Diet: Care Instructions  Your Care Instructions  The DASH diet is an eating plan that can help lower your blood pressure. DASH stands for Dietary Approaches to Stop Hypertension. Hypertension is high blood pressure. The DASH diet focuses on eating foods that are high in calcium, potassium, and magnesium. These nutrients can lower blood pressure. The foods that are highest in these nutrients are fruits, vegetables, low-fat dairy products, nuts, seeds, and legumes. But taking calcium, potassium, and magnesium supplements instead of eating foods that are high in those nutrients does not have the same effect. The DASH diet also includes whole grains, fish, and poultry. The DASH diet is one of several lifestyle changes your doctor may recommend to lower your high blood pressure. Your doctor may also want you to decrease the amount of sodium in your diet. Lowering sodium while following the DASH diet can lower blood pressure even further than just the DASH diet alone. Follow-up care is a key part of your treatment and safety. Be sure to make and go to all appointments, and call your doctor if you are having problems. It's also a good idea to know your test results and keep a list of the medicines you take. How can you care for yourself at home? Following the DASH diet  · Eat 4 to 5 servings of fruit each day. A serving is 1 medium-sized piece of fruit, ½ cup chopped or canned fruit, 1/4 cup dried fruit, or 4 ounces (½ cup) of fruit juice. Choose fruit more often than fruit juice. · Eat 4 to 5 servings of vegetables each day. A serving is 1 cup of lettuce or raw leafy vegetables, ½ cup of chopped or cooked vegetables, or 4 ounces (½ cup) of vegetable juice. Choose vegetables more often than vegetable juice. · Get 2 to 3 servings of low-fat and fat-free dairy each day. A serving is 8 ounces of milk, 1 cup of yogurt, or 1 ½ ounces of cheese. · Eat 6 to 8 servings of grains each day.  A serving is 1 slice of bread, 1 ounce of dry cereal, or ½ cup of cooked rice, pasta, or cooked cereal. Try to choose whole-grain products as much as possible. · Limit lean meat, poultry, and fish to 2 servings each day. A serving is 3 ounces, about the size of a deck of cards. · Eat 4 to 5 servings of nuts, seeds, and legumes (cooked dried beans, lentils, and split peas) each week. A serving is 1/3 cup of nuts, 2 tablespoons of seeds, or ½ cup of cooked beans or peas. · Limit fats and oils to 2 to 3 servings each day. A serving is 1 teaspoon of vegetable oil or 2 tablespoons of salad dressing. · Limit sweets and added sugars to 5 servings or less a week. A serving is 1 tablespoon jelly or jam, ½ cup sorbet, or 1 cup of lemonade. · Eat less than 2,300 milligrams (mg) of sodium a day. If you limit your sodium to 1,500 mg a day, you can lower your blood pressure even more. Tips for success  · Start small. Do not try to make dramatic changes to your diet all at once. You might feel that you are missing out on your favorite foods and then be more likely to not follow the plan. Make small changes, and stick with them. Once those changes become habit, add a few more changes. · Try some of the following:  ¨ Make it a goal to eat a fruit or vegetable at every meal and at snacks. This will make it easy to get the recommended amount of fruits and vegetables each day. ¨ Try yogurt topped with fruit and nuts for a snack or healthy dessert. ¨ Add lettuce, tomato, cucumber, and onion to sandwiches. ¨ Combine a ready-made pizza crust with low-fat mozzarella cheese and lots of vegetable toppings. Try using tomatoes, squash, spinach, broccoli, carrots, cauliflower, and onions. ¨ Have a variety of cut-up vegetables with a low-fat dip as an appetizer instead of chips and dip. ¨ Sprinkle sunflower seeds or chopped almonds over salads. Or try adding chopped walnuts or almonds to cooked vegetables. ¨ Try some vegetarian meals using beans and peas. Add garbanzo or kidney beans to salads. Make burritos and tacos with mashed carson beans or black beans. Where can you learn more? Go to http://stacie-nilda.info/. Enter I120 in the search box to learn more about \"DASH Diet: Care Instructions. \"  Current as of: March 23, 2016  Content Version: 11.2  © 0652-1191 [employer]. Care instructions adapted under license by [employer] (which disclaims liability or warranty for this information). If you have questions about a medical condition or this instruction, always ask your healthcare professional. [place] [address] any warranty or liability for your use of this information. High Cholesterol: Care Instructions  Your Care Instructions  Cholesterol is a type of fat in your blood. It is needed for many body functions, such as making new cells. Cholesterol is made by your body. It also comes from food you eat. High cholesterol means that you have too much of the fat in your blood. This raises your risk of a heart attack and stroke. LDL and HDL are part of your total cholesterol. LDL is the \"bad\" cholesterol. High LDL can raise your risk for heart disease, heart attack, and stroke. HDL is the \"good\" cholesterol. It helps clear bad cholesterol from the body. High HDL is linked with a lower risk of heart disease, heart attack, and stroke. Your cholesterol levels help your doctor find out your risk for having a heart attack or stroke. You and your doctor can talk about whether you need to lower your risk and what treatment is best for you. A heart-healthy lifestyle along with medicines can help lower your cholesterol and your risk. The way you choose to lower your risk will depend on how high your risk is for heart attack and stroke. It will also depend on how you feel about taking medicines. Follow-up care is a key part of your treatment and safety.  Be sure to make and go to all appointments, and call your doctor if you are having problems. It's also a good idea to know your test results and keep a list of the medicines you take. How can you care for yourself at home? · Eat a variety of foods every day. Good choices include fruits, vegetables, whole grains (like oatmeal), dried beans and peas, nuts and seeds, soy products (like tofu), and fat-free or low-fat dairy products. · Replace butter, margarine, and hydrogenated or partially hydrogenated oils with olive and canola oils. (Canola oil margarine without trans fat is fine.)  · Replace red meat with fish, poultry, and soy protein (like tofu). · Limit processed and packaged foods like chips, crackers, and cookies. · Bake, broil, or steam foods. Don't motta them. · Be physically active. Get at least 30 minutes of exercise on most days of the week. Walking is a good choice. You also may want to do other activities, such as running, swimming, cycling, or playing tennis or team sports. · Stay at a healthy weight or lose weight by making the changes in eating and physical activity listed above. Losing just a small amount of weight, even 5 to 10 pounds, can reduce your risk for having a heart attack or stroke. · Do not smoke. When should you call for help? Watch closely for changes in your health, and be sure to contact your doctor if:  · You need help making lifestyle changes. · You have questions about your medicine. Where can you learn more? Go to http://stacie-nilda.info/. Enter H025 in the search box to learn more about \"High Cholesterol: Care Instructions. \"  Current as of: January 27, 2016  Content Version: 11.2  © 6119-2023 [employer]. Care instructions adapted under license by [employer] (which disclaims liability or warranty for this information).  If you have questions about a medical condition or this instruction, always ask your healthcare professional. [employer] disclaims any warranty or liability for your use of this information. High Blood Pressure: Care Instructions  Your Care Instructions  If your blood pressure is usually above 140/90, you have high blood pressure, or hypertension. That means the top number is 140 or higher or the bottom number is 90 or higher, or both. Despite what a lot of people think, high blood pressure usually doesn't cause headaches or make you feel dizzy or lightheaded. It usually has no symptoms. But it does increase your risk for heart attack, stroke, and kidney or eye damage. The higher your blood pressure, the more your risk increases. Your doctor will give you a goal for your blood pressure. Your goal will be based on your health and your age. An example of a goal is to keep your blood pressure below 140/90. Lifestyle changes, such as eating healthy and being active, are always important to help lower blood pressure. You might also take medicine to reach your blood pressure goal.  Follow-up care is a key part of your treatment and safety. Be sure to make and go to all appointments, and call your doctor if you are having problems. It's also a good idea to know your test results and keep a list of the medicines you take. How can you care for yourself at home? Medical treatment  · If you stop taking your medicine, your blood pressure will go back up. You may take one or more types of medicine to lower your blood pressure. Be safe with medicines. Take your medicine exactly as prescribed. Call your doctor if you think you are having a problem with your medicine. · Talk to your doctor before you start taking aspirin every day. Aspirin can help certain people lower their risk of a heart attack or stroke. But taking aspirin isn't right for everyone, because it can cause serious bleeding. · See your doctor regularly. You may need to see the doctor more often at first or until your blood pressure comes down.   · If you are taking blood pressure medicine, talk to your doctor before you take decongestants or anti-inflammatory medicine, such as ibuprofen. Some of these medicines can raise blood pressure. · Learn how to check your blood pressure at home. Lifestyle changes  · Stay at a healthy weight. This is especially important if you put on weight around the waist. Losing even 10 pounds can help you lower your blood pressure. · If your doctor recommends it, get more exercise. Walking is a good choice. Bit by bit, increase the amount you walk every day. Try for at least 30 minutes on most days of the week. You also may want to swim, bike, or do other activities. · Avoid or limit alcohol. Talk to your doctor about whether you can drink any alcohol. · Try to limit how much sodium you eat to less than 2,300 milligrams (mg) a day. Your doctor may ask you to try to eat less than 1,500 mg a day. · Eat plenty of fruits (such as bananas and oranges), vegetables, legumes, whole grains, and low-fat dairy products. · Lower the amount of saturated fat in your diet. Saturated fat is found in animal products such as milk, cheese, and meat. Limiting these foods may help you lose weight and also lower your risk for heart disease. · Do not smoke. Smoking increases your risk for heart attack and stroke. If you need help quitting, talk to your doctor about stop-smoking programs and medicines. These can increase your chances of quitting for good. When should you call for help? Call 911 anytime you think you may need emergency care. This may mean having symptoms that suggest that your blood pressure is causing a serious heart or blood vessel problem. Your blood pressure may be over 180/110. For example, call 911 if:  · You have symptoms of a heart attack. These may include:  ¨ Chest pain or pressure, or a strange feeling in the chest.  ¨ Sweating. ¨ Shortness of breath. ¨ Nausea or vomiting.   ¨ Pain, pressure, or a strange feeling in the back, neck, jaw, or upper belly or in one or both shoulders or arms. ¨ Lightheadedness or sudden weakness. ¨ A fast or irregular heartbeat. · You have symptoms of a stroke. These may include:  ¨ Sudden numbness, tingling, weakness, or loss of movement in your face, arm, or leg, especially on only one side of your body. ¨ Sudden vision changes. ¨ Sudden trouble speaking. ¨ Sudden confusion or trouble understanding simple statements. ¨ Sudden problems with walking or balance. ¨ A sudden, severe headache that is different from past headaches. · You have severe back or belly pain. Do not wait until your blood pressure comes down on its own. Get help right away. Call your doctor now or seek immediate care if:  · Your blood pressure is much higher than normal (such as 180/110 or higher), but you don't have symptoms. · You think high blood pressure is causing symptoms, such as:  ¨ Severe headache. ¨ Blurry vision. Watch closely for changes in your health, and be sure to contact your doctor if:  · Your blood pressure measures 140/90 or higher at least 2 times. That means the top number is 140 or higher or the bottom number is 90 or higher, or both. · You think you may be having side effects from your blood pressure medicine. · Your blood pressure is usually normal, but it goes above normal at least 2 times. Where can you learn more? Go to http://stacie-nilda.info/. Enter Z980 in the search box to learn more about \"High Blood Pressure: Care Instructions. \"  Current as of: August 8, 2016  Content Version: 11.2  © 6154-9196 [employer]. Care instructions adapted under license by [employer] (which disclaims liability or warranty for this information). If you have questions about a medical condition or this instruction, always ask your healthcare professional. [address] any warranty or liability for your use of this information.

## 2017-06-12 NOTE — MR AVS SNAPSHOT
Visit Information Date & Time Provider Department Dept. Phone Encounter #  
 6/12/2017  7:45 AM Ryanne Terry NP [facility] [place] [id number] [phone] [id number] Follow-up Instructions Return in about 3 months (around 9/12/2017), or if symptoms worsen or fail to improve. Upcoming Health Maintenance Date Due Hepatitis C Screening 1960 BREAST CANCER SCRN MAMMOGRAM 3/18/1980 PAP AKA CERVICAL CYTOLOGY 3/18/1983 INFLUENZA AGE 9 TO ADULT 8/1/2017 DTaP/Tdap/Td series (2 - Td) 6/12/2027 COLONOSCOPY 6/12/2027 Allergies as of 6/12/2017  Review Complete On: 6/12/2017 By: Ryanne Terry NP Severity Noted Reaction Type Reactions Pcn [Penicillins]  01/02/2017    Rash Current Immunizations  Never Reviewed No immunizations on file. Not reviewed this visit You Were Diagnosed With   
  
 Codes Comments Essential hypertension    -  Primary ICD-10-CM: I10 
ICD-9-CM: 401.9 Hyperlipidemia, unspecified hyperlipidemia type     ICD-10-CM: E78.5 ICD-9-CM: 272.4 Attention deficit hyperactivity disorder (ADHD), unspecified ADHD type     ICD-10-CM: F90.9 ICD-9-CM: 314.01 Other depression     ICD-10-CM: F32.89 ICD-9-CM: 774 Need for hepatitis C screening test     ICD-10-CM: Z11.59 
ICD-9-CM: V73.89 Vitamin D deficiency     ICD-10-CM: E55.9 ICD-9-CM: 268.9 Screening for thyroid disorder     ICD-10-CM: Z13.29 ICD-9-CM: V77.0 Special screening examination for neoplasm of breast     ICD-10-CM: Z12.39 
ICD-9-CM: V76.10 History of carotid stenosis     ICD-10-CM: Z86.79 
ICD-9-CM: V12.59 Smoker     ICD-10-CM: F33.573 ICD-9-CM: 305.1 Vitals BP Pulse Temp Resp Height(growth percentile) Weight(growth percentile) 112/74 (BP 1 Location: Left arm, BP Patient Position: Sitting) 84 98.5 °F (36.9 °C) (Oral) 16 5' 4\" (1.626 m) 171 lb 9.6 oz (77.8 kg) SpO2 BMI OB Status Smoking Status 95% 29.46 kg/m2 Postmenopausal Current Every Day Smoker BMI and BSA Data Body Mass Index Body Surface Area  
 29.46 kg/m 2 1.87 m 2 Preferred Pharmacy Pharmacy Name Phone [facility], [address] [phone] Your Updated Medication List  
  
   
This list is accurate as of: 6/12/17  8:23 AM.  Always use your most recent med list.  
  
  
  
  
 atorvastatin 20 mg tablet Commonly known as:  LIPITOR Take 1 Tab by mouth nightly. RIKY LOW STRENGTH PO Take  by mouth. Desvenlafaxine 100 mg Tb24 Commonly known as:  PRISTIQ Take 0.5 Tabs by mouth daily. dextroamphetamine-amphetamine 20 mg tablet Commonly known as:  ADDERALL Take 1 Tab (20 mg total) by mouth two (2) times a day. Max Daily Amount: 40 mg  
  
 doxepin 50 mg capsule Commonly known as:  SINEquan Take 1 to 3 tablets by mouth everyday at bedtime as needed  
  
 losartan 50 mg tablet Commonly known as:  COZAAR Take 1 Tab by mouth daily. VITAMIN D3 1,000 unit Cap Generic drug:  cholecalciferol Take  by mouth daily. We Performed the Following AMB POC URINALYSIS DIP STICK AUTO W/O MICRO [19984 CPT(R)] CBC WITH AUTOMATED DIFF [75280 CPT(R)] HEPATITIS C AB [90591 CPT(R)] LIPID PANEL [24027 CPT(R)] METABOLIC PANEL, COMPREHENSIVE [93325 CPT(R)] TSH 3RD GENERATION [46950 CPT(R)] VITAMIN D, 25 HYDROXY [id number] CPT(R)] Follow-up Instructions Return in about 3 months (around 9/12/2017), or if symptoms worsen or fail to improve. To-Do List   
 06/12/2017 Imaging:  DUPLEX CAROTID BILATERAL   
  
 06/12/2017 Imaging:  ASHLY MAMMO BI SCREENING INCL CAD Patient Instructions DASH Diet: Care Instructions Your Care Instructions The DASH diet is an eating plan that can help lower your blood pressure. DASH stands for Dietary Approaches to Stop Hypertension.  Hypertension is high blood pressure. The DASH diet focuses on eating foods that are high in calcium, potassium, and magnesium. These nutrients can lower blood pressure. The foods that are highest in these nutrients are fruits, vegetables, low-fat dairy products, nuts, seeds, and legumes. But taking calcium, potassium, and magnesium supplements instead of eating foods that are high in those nutrients does not have the same effect. The DASH diet also includes whole grains, fish, and poultry. The DASH diet is one of several lifestyle changes your doctor may recommend to lower your high blood pressure. Your doctor may also want you to decrease the amount of sodium in your diet. Lowering sodium while following the DASH diet can lower blood pressure even further than just the DASH diet alone. Follow-up care is a key part of your treatment and safety. Be sure to make and go to all appointments, and call your doctor if you are having problems. It's also a good idea to know your test results and keep a list of the medicines you take. How can you care for yourself at home? Following the DASH diet · Eat 4 to 5 servings of fruit each day. A serving is 1 medium-sized piece of fruit, ½ cup chopped or canned fruit, 1/4 cup dried fruit, or 4 ounces (½ cup) of fruit juice. Choose fruit more often than fruit juice. · Eat 4 to 5 servings of vegetables each day. A serving is 1 cup of lettuce or raw leafy vegetables, ½ cup of chopped or cooked vegetables, or 4 ounces (½ cup) of vegetable juice. Choose vegetables more often than vegetable juice. · Get 2 to 3 servings of low-fat and fat-free dairy each day. A serving is 8 ounces of milk, 1 cup of yogurt, or 1 ½ ounces of cheese. · Eat 6 to 8 servings of grains each day. A serving is 1 slice of bread, 1 ounce of dry cereal, or ½ cup of cooked rice, pasta, or cooked cereal. Try to choose whole-grain products as much as possible. · Limit lean meat, poultry, and fish to 2 servings each day. A serving is 3 ounces, about the size of a deck of cards. · Eat 4 to 5 servings of nuts, seeds, and legumes (cooked dried beans, lentils, and split peas) each week. A serving is 1/3 cup of nuts, 2 tablespoons of seeds, or ½ cup of cooked beans or peas. · Limit fats and oils to 2 to 3 servings each day. A serving is 1 teaspoon of vegetable oil or 2 tablespoons of salad dressing. · Limit sweets and added sugars to 5 servings or less a week. A serving is 1 tablespoon jelly or jam, ½ cup sorbet, or 1 cup of lemonade. · Eat less than 2,300 milligrams (mg) of sodium a day. If you limit your sodium to 1,500 mg a day, you can lower your blood pressure even more. Tips for success · Start small. Do not try to make dramatic changes to your diet all at once. You might feel that you are missing out on your favorite foods and then be more likely to not follow the plan. Make small changes, and stick with them. Once those changes become habit, add a few more changes. · Try some of the following: ¨ Make it a goal to eat a fruit or vegetable at every meal and at snacks. This will make it easy to get the recommended amount of fruits and vegetables each day. ¨ Try yogurt topped with fruit and nuts for a snack or healthy dessert. ¨ Add lettuce, tomato, cucumber, and onion to sandwiches. ¨ Combine a ready-made pizza crust with low-fat mozzarella cheese and lots of vegetable toppings. Try using tomatoes, squash, spinach, broccoli, carrots, cauliflower, and onions. ¨ Have a variety of cut-up vegetables with a low-fat dip as an appetizer instead of chips and dip. ¨ Sprinkle sunflower seeds or chopped almonds over salads. Or try adding chopped walnuts or almonds to cooked vegetables. ¨ Try some vegetarian meals using beans and peas. Add garbanzo or kidney beans to salads. Make burritos and tacos with mashed carson beans or black beans. Where can you learn more? Go to http://stacie-nilda.info/. Enter W319 in the search box to learn more about \"DASH Diet: Care Instructions. \" Current as of: March 23, 2016 Content Version: 11.2 © 9020-0167 [employer]. Care instructions adapted under license by [facility] (which disclaims liability or warranty for this information). If you have questions about a medical condition or this instruction, always ask your healthcare professional. [address] any warranty or liability for your use of this information. High Cholesterol: Care Instructions Your Care Instructions Cholesterol is a type of fat in your blood. It is needed for many body functions, such as making new cells. Cholesterol is made by your body. It also comes from food you eat. High cholesterol means that you have too much of the fat in your blood. This raises your risk of a heart attack and stroke. LDL and HDL are part of your total cholesterol. LDL is the \"bad\" cholesterol. High LDL can raise your risk for heart disease, heart attack, and stroke. HDL is the \"good\" cholesterol. It helps clear bad cholesterol from the body. High HDL is linked with a lower risk of heart disease, heart attack, and stroke. Your cholesterol levels help your doctor find out your risk for having a heart attack or stroke. You and your doctor can talk about whether you need to lower your risk and what treatment is best for you. A heart-healthy lifestyle along with medicines can help lower your cholesterol and your risk. The way you choose to lower your risk will depend on how high your risk is for heart attack and stroke. It will also depend on how you feel about taking medicines. Follow-up care is a key part of your treatment and safety. Be sure to make and go to all appointments, and call your doctor if you are having problems. It's also a good idea to know your test results and keep a list of the medicines you take. How can you care for yourself at home? · Eat a variety of foods every day. Good choices include fruits, vegetables, whole grains (like oatmeal), dried beans and peas, nuts and seeds, soy products (like tofu), and fat-free or low-fat dairy products. · Replace butter, margarine, and hydrogenated or partially hydrogenated oils with olive and canola oils. (Canola oil margarine without trans fat is fine.) · Replace red meat with fish, poultry, and soy protein (like tofu). · Limit processed and packaged foods like chips, crackers, and cookies. · Bake, broil, or steam foods. Don't motta them. · Be physically active. Get at least 30 minutes of exercise on most days of the week. Walking is a good choice. You also may want to do other activities, such as running, swimming, cycling, or playing tennis or team sports. · Stay at a healthy weight or lose weight by making the changes in eating and physical activity listed above. Losing just a small amount of weight, even 5 to 10 pounds, can reduce your risk for having a heart attack or stroke. · Do not smoke. When should you call for help? Watch closely for changes in your health, and be sure to contact your doctor if: 
· You need help making lifestyle changes. · You have questions about your medicine. Where can you learn more? Go to http://stacie-nilda.info/. Enter T276 in the search box to learn more about \"High Cholesterol: Care Instructions. \" Current as of: January 27, 2016 Content Version: 11.2 © 4160-1595 [employer]. Care instructions adapted under license by [employer] (which disclaims liability or warranty for this information). If you have questions about a medical condition or this instruction, always ask your healthcare professional. [address] any warranty or liability for your use of this information. High Blood Pressure: Care Instructions Your Care Instructions If your blood pressure is usually above 140/90, you have high blood pressure, or hypertension. That means the top number is 140 or higher or the bottom number is 90 or higher, or both. Despite what a lot of people think, high blood pressure usually doesn't cause headaches or make you feel dizzy or lightheaded. It usually has no symptoms. But it does increase your risk for heart attack, stroke, and kidney or eye damage. The higher your blood pressure, the more your risk increases. Your doctor will give you a goal for your blood pressure. Your goal will be based on your health and your age. An example of a goal is to keep your blood pressure below 140/90. Lifestyle changes, such as eating healthy and being active, are always important to help lower blood pressure. You might also take medicine to reach your blood pressure goal. 
Follow-up care is a key part of your treatment and safety. Be sure to make and go to all appointments, and call your doctor if you are having problems. It's also a good idea to know your test results and keep a list of the medicines you take. How can you care for yourself at home? Medical treatment · If you stop taking your medicine, your blood pressure will go back up. You may take one or more types of medicine to lower your blood pressure. Be safe with medicines. Take your medicine exactly as prescribed. Call your doctor if you think you are having a problem with your medicine. · Talk to your doctor before you start taking aspirin every day. Aspirin can help certain people lower their risk of a heart attack or stroke. But taking aspirin isn't right for everyone, because it can cause serious bleeding. · See your doctor regularly. You may need to see the doctor more often at first or until your blood pressure comes down. · If you are taking blood pressure medicine, talk to your doctor before you take decongestants or anti-inflammatory medicine, such as ibuprofen. Some of these medicines can raise blood pressure. · Learn how to check your blood pressure at home. Lifestyle changes · Stay at a healthy weight. This is especially important if you put on weight around the waist. Losing even 10 pounds can help you lower your blood pressure. · If your doctor recommends it, get more exercise. Walking is a good choice. Bit by bit, increase the amount you walk every day. Try for at least 30 minutes on most days of the week. You also may want to swim, bike, or do other activities. · Avoid or limit alcohol. Talk to your doctor about whether you can drink any alcohol. · Try to limit how much sodium you eat to less than 2,300 milligrams (mg) a day. Your doctor may ask you to try to eat less than 1,500 mg a day. · Eat plenty of fruits (such as bananas and oranges), vegetables, legumes, whole grains, and low-fat dairy products. · Lower the amount of saturated fat in your diet. Saturated fat is found in animal products such as milk, cheese, and meat. Limiting these foods may help you lose weight and also lower your risk for heart disease. · Do not smoke. Smoking increases your risk for heart attack and stroke. If you need help quitting, talk to your doctor about stop-smoking programs and medicines. These can increase your chances of quitting for good. When should you call for help? Call 911 anytime you think you may need emergency care. This may mean having symptoms that suggest that your blood pressure is causing a serious heart or blood vessel problem. Your blood pressure may be over 180/110. For example, call 911 if: 
· You have symptoms of a heart attack. These may include: ¨ Chest pain or pressure, or a strange feeling in the chest. 
¨ Sweating. ¨ Shortness of breath. ¨ Nausea or vomiting. ¨ Pain, pressure, or a strange feeling in the back, neck, jaw, or upper belly or in one or both shoulders or arms. ¨ Lightheadedness or sudden weakness. ¨ A fast or irregular heartbeat. · You have symptoms of a stroke. These may include: 
¨ Sudden numbness, tingling, weakness, or loss of movement in your face, arm, or leg, especially on only one side of your body. ¨ Sudden vision changes. ¨ Sudden trouble speaking. ¨ Sudden confusion or trouble understanding simple statements. ¨ Sudden problems with walking or balance. ¨ A sudden, severe headache that is different from past headaches. · You have severe back or belly pain. Do not wait until your blood pressure comes down on its own. Get help right away. Call your doctor now or seek immediate care if: 
· Your blood pressure is much higher than normal (such as 180/110 or higher), but you don't have symptoms. · You think high blood pressure is causing symptoms, such as: ¨ Severe headache. ¨ Blurry vision. Watch closely for changes in your health, and be sure to contact your doctor if: 
· Your blood pressure measures 140/90 or higher at least 2 times. That means the top number is 140 or higher or the bottom number is 90 or higher, or both. · You think you may be having side effects from your blood pressure medicine. · Your blood pressure is usually normal, but it goes above normal at least 2 times. Where can you learn more? Go to http://stacie-nilda.info/. Enter F162 in the search box to learn more about \"High Blood Pressure: Care Instructions. \" Current as of: August 8, 2016 Content Version: 11.2 © 7490-4674 [employer]. Care instructions adapted under license by [employer] (which disclaims liability or warranty for this information). If you have questions about a medical condition or this instruction, always ask your healthcare professional. [address] any warranty or liability for your use of this information. Introducing \[facility] [place] [facility]\"" & HEALTH SERVICES!    
 Neris Cleverly introduces [employer] patient portal. Now you can access parts of your medical record, email your doctor's office, and request medication refills online. 1. In your internet browser, go to [email or web address] 2. Click on the First Time User? Click Here link in the Sign In box. You will see the New Member Sign Up page. 3. Enter your [employer] Access Code exactly as it appears below. You will not need to use this code after youve completed the sign-up process. If you do not sign up before the expiration date, you must request a new code. · [employer] Access Code: [email or web address] Expires: 8/1/2017  4:05 PM 
 
4. Enter the last four digits of your Social Security Number (xxxx) and Date of Birth (mm/dd/yyyy) as indicated and click Submit. You will be taken to the next sign-up page. 5. Create a [employer] ID. This will be your [employer] login ID and cannot be changed, so think of one that is secure and easy to remember. 6. Create a [employer] password. You can change your password at any time. 7. Enter your Password Reset Question and Answer. This can be used at a later time if you forget your password. 8. Enter your e-mail address. You will receive e-mail notification when new information is available in [address] 9. Click Sign Up. You can now view and download portions of your medical record. 10. Click the Download Summary menu link to download a portable copy of your medical information. If you have questions, please visit the Frequently Asked Questions section of the [employer] website. Remember, [employer] is NOT to be used for urgent needs. For medical emergencies, dial 911. Now available from your iPhone and Android! Please provide this summary of care documentation to your next provider. Your primary care clinician is listed as LAKEISHA EDOUARD. If you have any questions after today's visit, please call [phone].

## 2017-06-13 LAB
25(OH)D3+25(OH)D2 SERPL-MCNC: 34.8 NG/ML (ref 30–100)
ALBUMIN SERPL-MCNC: 4.2 G/DL (ref 3.5–5.5)
ALBUMIN/GLOB SERPL: 1.6 {RATIO} (ref 1.2–2.2)
ALP SERPL-CCNC: 104 IU/L (ref 39–117)
ALT SERPL-CCNC: 19 IU/L (ref 0–32)
AST SERPL-CCNC: 16 IU/L (ref 0–40)
BASOPHILS # BLD AUTO: 0 X10E3/UL (ref 0–0.2)
BASOPHILS NFR BLD AUTO: 0 %
BILIRUB SERPL-MCNC: 0.8 MG/DL (ref 0–1.2)
BUN SERPL-MCNC: 12 MG/DL (ref 6–24)
BUN/CREAT SERPL: 15 (ref 9–23)
CALCIUM SERPL-MCNC: 9.3 MG/DL (ref 8.7–10.2)
CHLORIDE SERPL-SCNC: 103 MMOL/L (ref 96–106)
CHOLEST SERPL-MCNC: 172 MG/DL (ref 100–199)
CO2 SERPL-SCNC: 19 MMOL/L (ref 18–29)
CREAT SERPL-MCNC: 0.8 MG/DL (ref 0.57–1)
EOSINOPHIL # BLD AUTO: 0 X10E3/UL (ref 0–0.4)
EOSINOPHIL NFR BLD AUTO: 0 %
ERYTHROCYTE [DISTWIDTH] IN BLOOD BY AUTOMATED COUNT: 14.5 % (ref 12.3–15.4)
GLOBULIN SER CALC-MCNC: 2.6 G/DL (ref 1.5–4.5)
GLUCOSE SERPL-MCNC: 137 MG/DL (ref 65–99)
HCT VFR BLD AUTO: 40.6 % (ref 34–46.6)
HCV AB S/CO SERPL IA: <0.1 S/CO RATIO (ref 0–0.9)
HDLC SERPL-MCNC: 38 MG/DL
HGB BLD-MCNC: 13.6 G/DL (ref 11.1–15.9)
IMM GRANULOCYTES # BLD: 0 X10E3/UL (ref 0–0.1)
IMM GRANULOCYTES NFR BLD: 0 %
INTERPRETATION, 910389: NORMAL
LDLC SERPL CALC-MCNC: 96 MG/DL (ref 0–99)
LYMPHOCYTES # BLD AUTO: 2 X10E3/UL (ref 0.7–3.1)
LYMPHOCYTES NFR BLD AUTO: 25 %
MCH RBC QN AUTO: 28 PG (ref 26.6–33)
MCHC RBC AUTO-ENTMCNC: 33.5 G/DL (ref 31.5–35.7)
MCV RBC AUTO: 84 FL (ref 79–97)
MONOCYTES # BLD AUTO: 0.7 X10E3/UL (ref 0.1–0.9)
MONOCYTES NFR BLD AUTO: 9 %
NEUTROPHILS # BLD AUTO: 5.1 X10E3/UL (ref 1.4–7)
NEUTROPHILS NFR BLD AUTO: 66 %
PLATELET # BLD AUTO: 276 X10E3/UL (ref 150–379)
POTASSIUM SERPL-SCNC: 4.3 MMOL/L (ref 3.5–5.2)
PROT SERPL-MCNC: 6.8 G/DL (ref 6–8.5)
RBC # BLD AUTO: 4.85 X10E6/UL (ref 3.77–5.28)
SODIUM SERPL-SCNC: 140 MMOL/L (ref 134–144)
TRIGL SERPL-MCNC: 192 MG/DL (ref 0–149)
TSH SERPL DL<=0.005 MIU/L-ACNC: 4.06 UIU/ML (ref 0.45–4.5)
VLDLC SERPL CALC-MCNC: 38 MG/DL (ref 5–40)
WBC # BLD AUTO: 7.9 X10E3/UL (ref 3.4–10.8)

## 2017-06-14 LAB — BACTERIA UR CULT: NORMAL

## 2017-06-18 LAB — DRUGS UR: NORMAL

## 2017-06-26 DIAGNOSIS — F90.9 ADULT ADHD: ICD-10-CM

## 2017-06-26 NOTE — TELEPHONE ENCOUNTER
Chief Complaint   Patient presents with    Medication Refill     Last refill:                7 weeks ago (5/8/2017)       dextroamphetamine-amphetamine (ADDERALL) 20 mg tablet       Take 1 Tab (20 mg total) by mouth two (2) times a day.  Max Daily Amount: 40 mg       Dispense: 60 Tab     Refills: 0     Start: 5/8/2017     By: Umberto Lopez NP       Future Appointments:  6/28/2017  11:00 AM   VASCULAR ROOM 2 [address]      Last Appointment With Me:  6/12/2017      Last Appointment My Department:  6/12/2017    : reviewed

## 2017-06-27 RX ORDER — DEXTROAMPHETAMINE SACCHARATE, AMPHETAMINE ASPARTATE, DEXTROAMPHETAMINE SULFATE AND AMPHETAMINE SULFATE 5; 5; 5; 5 MG/1; MG/1; MG/1; MG/1
20 TABLET ORAL 2 TIMES DAILY
Qty: 60 TAB | Refills: 0 | Status: SHIPPED | OUTPATIENT
Start: 2017-06-27 | End: 2017-08-17 | Stop reason: SDUPTHER

## 2017-06-28 ENCOUNTER — HOSPITAL ENCOUNTER (OUTPATIENT)
Dept: VASCULAR SURGERY | Age: 57
Discharge: HOME | End: 2017-06-28
Attending: NURSE PRACTITIONER
Payer: COMMERCIAL

## 2017-06-28 DIAGNOSIS — I65.23 STENOSIS OF BOTH INTERNAL CAROTID ARTERIES: ICD-10-CM

## 2017-06-28 DIAGNOSIS — Z86.79 HISTORY OF CAROTID STENOSIS: ICD-10-CM

## 2017-06-28 DIAGNOSIS — I67.89 CEREBRAL MICROVASCULAR DISEASE: ICD-10-CM

## 2017-06-28 PROCEDURE — 93880 EXTRACRANIAL BILAT STUDY: CPT

## 2017-06-28 NOTE — PROGRESS NOTES
[facility] Vascular  Preliminary Report:  Carotid Duplex Scan    Right:  Mild plaque noted in the right carotid system. Right ICA velocities suggest less than 50% diameter reduction. Right vertebral artery flow is antegrade. Left:  Moderate plaque noted in the left carotid system. Left ICA velocities suggest 50-69% diameter reduction. Left vertebral artery flow is antegrade. Final report to follow.

## 2017-06-28 NOTE — PROCEDURES
[facility]  *** FINAL REPORT ***    Name: Mallory Asencio  MRN: [id number]    Outpatient  : 18 Mar 1960  HIS Order #: [id number]  [address] [facility] Visit #: [id number]  Date: 2017    TYPE OF TEST: Cerebrovascular Duplex    REASON FOR TEST  Known carotid stenosis    Right Carotid:-             Proximal               Mid                 Distal  cm/s  Systolic  Diastolic  Systolic  Diastolic  Systolic  Diastolic  CCA:     88.2                                      66.0  Bulb:  ECA:     78.0  ICA:     74.0                 63.0                 64.0  ICA/CCA:  1.1    ICA Stenosis: <50%    Right Vertebral:-  Finding: Antegrade  Sys:       35.0  Hayley:    Right Subclavian: Normal    Left Carotid:-            Proximal                Mid                 Distal  cm/s  Systolic  Diastolic  Systolic  Diastolic  Systolic  Diastolic  CCA:     34.9                                      81.0  Bulb:  ECA:     45.0  ICA:    157.0      53.0       46.0                 59.0  ICA/CCA:  1.9    ICA Stenosis: 50-69%    Left Vertebral:-  Finding: Antegrade  Sys:       35.0  Hayley:    Left Subclavian: Normal    INTERPRETATION/FINDINGS  PROCEDURE:  Carotid Duplex Examination using B-mode, color and  spectral Doppler of the extracranial cerebrovascular arteries. 1. <50% stenosis in the right internal carotid artery. 2. 50-69% stenosis in the left internal carotid artery. 3. No significant stenosis in the external carotid arteries  bilaterally. 4. Antegrade flow in both vertebral arteries. 5. Normal flow in both subclavian arteries. Plaque Morphology:  1. Heterogeneous plaque in the bulb and right ICA. 2. Heterogeneous plaque in the bulb and left ICA. ADDITIONAL COMMENTS    I have personally reviewed the data relevant to the interpretation of  this  study. TECHNOLOGIST: Donato Buckner. LICHA Shoemaker, RDMS  Signed: 2017 11:55 AM    PHYSICIAN: Kevin Stokes MD  Signed: 2017 04:42 PM

## 2017-07-03 ENCOUNTER — TELEPHONE (OUTPATIENT)
Dept: FAMILY MEDICINE CLINIC | Age: 57
End: 2017-07-03

## 2017-07-03 NOTE — TELEPHONE ENCOUNTER
Pt returned call and states that she had mammogram at [facility] and request was sent over there today.

## 2017-07-07 NOTE — PROGRESS NOTES
Carotid duplex stable, some stenosis noted in the left ICA.   Will continue to monitor with routine US

## 2017-07-10 DIAGNOSIS — F32.A DEPRESSION, UNSPECIFIED DEPRESSION TYPE: ICD-10-CM

## 2017-07-10 RX ORDER — DESVENLAFAXINE SUCCINATE 50 MG/1
50 TABLET, EXTENDED RELEASE ORAL DAILY
Qty: 30 TAB | Refills: 3 | Status: SHIPPED | OUTPATIENT
Start: 2017-07-10 | End: 2017-07-12 | Stop reason: SDUPTHER

## 2017-07-11 DIAGNOSIS — F32.A DEPRESSION, UNSPECIFIED DEPRESSION TYPE: ICD-10-CM

## 2017-07-11 RX ORDER — DESVENLAFAXINE SUCCINATE 50 MG/1
50 TABLET, EXTENDED RELEASE ORAL DAILY
Qty: 30 TAB | Refills: 3 | Status: CANCELLED | OUTPATIENT
Start: 2017-07-11

## 2017-07-11 RX ORDER — DESVENLAFAXINE SUCCINATE 50 MG/1
TABLET, EXTENDED RELEASE ORAL
Qty: 30 TAB | Refills: 1 | OUTPATIENT
Start: 2017-07-11

## 2017-07-11 NOTE — TELEPHONE ENCOUNTER
Pt returned call and was notified about the mix up with pharmacies. Pt states that she is really upset about this and is still really upset because we have not received her mammogram report from [facility]. I have faxed a request to Le[facility] Meier for mammo.

## 2017-07-11 NOTE — TELEPHONE ENCOUNTER
Script for prestiq called in to [facility]. Will cancel mail order. Pt is returning call to [facility].

## 2017-07-11 NOTE — TELEPHONE ENCOUNTER
I have called Prestiq into [facility] as pt requested it was called in for Brand name. I also called [address] [facility] and was told that the generic for Prestiq was already shipped out this morning at 9:46am.  Hugh Hurt states they have a NO return policy. Message left for pt to return my call.

## 2017-07-11 NOTE — TELEPHONE ENCOUNTER
Sent this refill to her home delivery yesterday. Does she want it to go to [facility] or home delivery? If she wants to go to [facility] then we can cancel the home delivery.    Thanks

## 2017-07-12 ENCOUNTER — TELEPHONE (OUTPATIENT)
Dept: FAMILY MEDICINE CLINIC | Age: 57
End: 2017-07-12

## 2017-07-12 DIAGNOSIS — F32.A DEPRESSION, UNSPECIFIED DEPRESSION TYPE: ICD-10-CM

## 2017-07-12 RX ORDER — DESVENLAFAXINE SUCCINATE 50 MG/1
50 TABLET, EXTENDED RELEASE ORAL DAILY
Qty: 30 TAB | Refills: 3 | Status: SHIPPED | OUTPATIENT
Start: 2017-07-12 | End: 2017-11-29 | Stop reason: SDUPTHER

## 2017-07-14 ENCOUNTER — DOCUMENTATION ONLY (OUTPATIENT)
Dept: FAMILY MEDICINE CLINIC | Age: 57
End: 2017-07-14

## 2017-08-07 ENCOUNTER — ANESTHESIA EVENT (OUTPATIENT)
Dept: SURGERY | Age: 57
End: 2017-08-07
Payer: COMMERCIAL

## 2017-08-08 ENCOUNTER — HOSPITAL ENCOUNTER (OUTPATIENT)
Age: 57
Setting detail: OUTPATIENT SURGERY
Discharge: HOME | End: 2017-08-08
Attending: ORTHOPAEDIC SURGERY | Admitting: ORTHOPAEDIC SURGERY
Payer: COMMERCIAL

## 2017-08-08 ENCOUNTER — ANESTHESIA (OUTPATIENT)
Dept: SURGERY | Age: 57
End: 2017-08-08
Payer: COMMERCIAL

## 2017-08-08 VITALS
OXYGEN SATURATION: 96 % | DIASTOLIC BLOOD PRESSURE: 72 MMHG | WEIGHT: 173 LBS | RESPIRATION RATE: 19 BRPM | HEART RATE: 89 BPM | HEIGHT: 64 IN | SYSTOLIC BLOOD PRESSURE: 110 MMHG | TEMPERATURE: 97.9 F | BODY MASS INDEX: 29.53 KG/M2

## 2017-08-08 PROBLEM — M18.11 PRIMARY OSTEOARTHRITIS OF FIRST CARPOMETACARPAL JOINT OF RIGHT HAND: Status: ACTIVE | Noted: 2017-08-08

## 2017-08-08 PROCEDURE — 77030018850 HC STOCK ANTIEMB THG COVD -A: Performed by: ORTHOPAEDIC SURGERY

## 2017-08-08 PROCEDURE — 74011250636 HC RX REV CODE- 250/636

## 2017-08-08 PROCEDURE — 77030002916 HC SUT ETHLN J&J -A: Performed by: ORTHOPAEDIC SURGERY

## 2017-08-08 PROCEDURE — 74011250636 HC RX REV CODE- 250/636: Performed by: ANESTHESIOLOGY

## 2017-08-08 PROCEDURE — 76060000062 HC AMB SURG ANES 1 TO 1.5 HR: Performed by: ORTHOPAEDIC SURGERY

## 2017-08-08 PROCEDURE — 77030018836 HC SOL IRR NACL ICUM -A: Performed by: ORTHOPAEDIC SURGERY

## 2017-08-08 PROCEDURE — 77030018788 HC NDL SUT ANCH -A: Performed by: ORTHOPAEDIC SURGERY

## 2017-08-08 PROCEDURE — 76030000001 HC AMB SURG OR TIME 1 TO 1.5: Performed by: ORTHOPAEDIC SURGERY

## 2017-08-08 PROCEDURE — 76210000040 HC AMBSU PH I REC FIRST 0.5 HR: Performed by: ORTHOPAEDIC SURGERY

## 2017-08-08 PROCEDURE — 74011000250 HC RX REV CODE- 250

## 2017-08-08 PROCEDURE — 74011000250 HC RX REV CODE- 250: Performed by: ANESTHESIOLOGY

## 2017-08-08 PROCEDURE — 74011250636 HC RX REV CODE- 250/636: Performed by: ORTHOPAEDIC SURGERY

## 2017-08-08 PROCEDURE — 76210000050 HC AMBSU PH II REC 0.5 TO 1 HR: Performed by: ORTHOPAEDIC SURGERY

## 2017-08-08 PROCEDURE — 77030013079 HC BLNKT BAIR HGGR 3M -A: Performed by: NURSE ANESTHETIST, CERTIFIED REGISTERED

## 2017-08-08 PROCEDURE — 77030010509 HC AIRWY LMA MSK TELE -A: Performed by: NURSE ANESTHETIST, CERTIFIED REGISTERED

## 2017-08-08 PROCEDURE — 77030020255 HC SOL INJ LR 1000ML BG: Performed by: ORTHOPAEDIC SURGERY

## 2017-08-08 RX ORDER — SODIUM CHLORIDE, SODIUM LACTATE, POTASSIUM CHLORIDE, CALCIUM CHLORIDE 600; 310; 30; 20 MG/100ML; MG/100ML; MG/100ML; MG/100ML
25 INJECTION, SOLUTION INTRAVENOUS CONTINUOUS
Status: DISCONTINUED | OUTPATIENT
Start: 2017-08-08 | End: 2017-08-08 | Stop reason: HOSPADM

## 2017-08-08 RX ORDER — CLINDAMYCIN PHOSPHATE 900 MG/50ML
900 INJECTION INTRAVENOUS ONCE
Status: COMPLETED | OUTPATIENT
Start: 2017-08-08 | End: 2017-08-08

## 2017-08-08 RX ORDER — PROPOFOL 10 MG/ML
INJECTION, EMULSION INTRAVENOUS AS NEEDED
Status: DISCONTINUED | OUTPATIENT
Start: 2017-08-08 | End: 2017-08-08 | Stop reason: HOSPADM

## 2017-08-08 RX ORDER — SODIUM CHLORIDE 0.9 % (FLUSH) 0.9 %
5-10 SYRINGE (ML) INJECTION AS NEEDED
Status: DISCONTINUED | OUTPATIENT
Start: 2017-08-08 | End: 2017-08-08 | Stop reason: HOSPADM

## 2017-08-08 RX ORDER — MORPHINE SULFATE 10 MG/ML
2 INJECTION, SOLUTION INTRAMUSCULAR; INTRAVENOUS
Status: DISCONTINUED | OUTPATIENT
Start: 2017-08-08 | End: 2017-08-08 | Stop reason: HOSPADM

## 2017-08-08 RX ORDER — DEXAMETHASONE SODIUM PHOSPHATE 4 MG/ML
INJECTION, SOLUTION INTRA-ARTICULAR; INTRALESIONAL; INTRAMUSCULAR; INTRAVENOUS; SOFT TISSUE AS NEEDED
Status: DISCONTINUED | OUTPATIENT
Start: 2017-08-08 | End: 2017-08-08 | Stop reason: HOSPADM

## 2017-08-08 RX ORDER — HYDROMORPHONE HYDROCHLORIDE 1 MG/ML
.2-.5 INJECTION, SOLUTION INTRAMUSCULAR; INTRAVENOUS; SUBCUTANEOUS ONCE
Status: DISCONTINUED | OUTPATIENT
Start: 2017-08-08 | End: 2017-08-08 | Stop reason: HOSPADM

## 2017-08-08 RX ORDER — ONDANSETRON 2 MG/ML
4 INJECTION INTRAMUSCULAR; INTRAVENOUS AS NEEDED
Status: DISCONTINUED | OUTPATIENT
Start: 2017-08-08 | End: 2017-08-08 | Stop reason: HOSPADM

## 2017-08-08 RX ORDER — LIDOCAINE HYDROCHLORIDE 10 MG/ML
0.1 INJECTION, SOLUTION EPIDURAL; INFILTRATION; INTRACAUDAL; PERINEURAL AS NEEDED
Status: DISCONTINUED | OUTPATIENT
Start: 2017-08-08 | End: 2017-08-08 | Stop reason: HOSPADM

## 2017-08-08 RX ORDER — MIDAZOLAM HYDROCHLORIDE 1 MG/ML
INJECTION, SOLUTION INTRAMUSCULAR; INTRAVENOUS
Status: DISCONTINUED
Start: 2017-08-08 | End: 2017-08-08 | Stop reason: HOSPADM

## 2017-08-08 RX ORDER — LIDOCAINE HYDROCHLORIDE 20 MG/ML
INJECTION, SOLUTION EPIDURAL; INFILTRATION; INTRACAUDAL; PERINEURAL AS NEEDED
Status: DISCONTINUED | OUTPATIENT
Start: 2017-08-08 | End: 2017-08-08 | Stop reason: HOSPADM

## 2017-08-08 RX ORDER — DIPHENHYDRAMINE HYDROCHLORIDE 50 MG/ML
12.5 INJECTION, SOLUTION INTRAMUSCULAR; INTRAVENOUS AS NEEDED
Status: DISCONTINUED | OUTPATIENT
Start: 2017-08-08 | End: 2017-08-08 | Stop reason: HOSPADM

## 2017-08-08 RX ORDER — DIPHENHYDRAMINE HYDROCHLORIDE 50 MG/ML
INJECTION, SOLUTION INTRAMUSCULAR; INTRAVENOUS AS NEEDED
Status: DISCONTINUED | OUTPATIENT
Start: 2017-08-08 | End: 2017-08-08 | Stop reason: HOSPADM

## 2017-08-08 RX ORDER — OXYCODONE AND ACETAMINOPHEN 5; 325 MG/1; MG/1
1 TABLET ORAL
Qty: 30 TAB | Refills: 0 | Status: SHIPPED | OUTPATIENT
Start: 2017-08-08 | End: 2017-11-29 | Stop reason: ALTCHOICE

## 2017-08-08 RX ORDER — FENTANYL CITRATE 50 UG/ML
25 INJECTION, SOLUTION INTRAMUSCULAR; INTRAVENOUS
Status: DISCONTINUED | OUTPATIENT
Start: 2017-08-08 | End: 2017-08-08 | Stop reason: HOSPADM

## 2017-08-08 RX ORDER — MIDAZOLAM HYDROCHLORIDE 1 MG/ML
INJECTION, SOLUTION INTRAMUSCULAR; INTRAVENOUS AS NEEDED
Status: DISCONTINUED | OUTPATIENT
Start: 2017-08-08 | End: 2017-08-08 | Stop reason: HOSPADM

## 2017-08-08 RX ORDER — OXYCODONE AND ACETAMINOPHEN 5; 325 MG/1; MG/1
1 TABLET ORAL ONCE
Status: DISCONTINUED | OUTPATIENT
Start: 2017-08-08 | End: 2017-08-08 | Stop reason: HOSPADM

## 2017-08-08 RX ORDER — CLINDAMYCIN PHOSPHATE 900 MG/50ML
INJECTION INTRAVENOUS
Status: DISCONTINUED
Start: 2017-08-08 | End: 2017-08-08 | Stop reason: HOSPADM

## 2017-08-08 RX ORDER — SODIUM CHLORIDE 0.9 % (FLUSH) 0.9 %
5-10 SYRINGE (ML) INJECTION EVERY 8 HOURS
Status: DISCONTINUED | OUTPATIENT
Start: 2017-08-08 | End: 2017-08-08 | Stop reason: HOSPADM

## 2017-08-08 RX ORDER — ONDANSETRON 2 MG/ML
INJECTION INTRAMUSCULAR; INTRAVENOUS AS NEEDED
Status: DISCONTINUED | OUTPATIENT
Start: 2017-08-08 | End: 2017-08-08 | Stop reason: HOSPADM

## 2017-08-08 RX ADMIN — PROPOFOL 200 MG: 10 INJECTION, EMULSION INTRAVENOUS at 13:31

## 2017-08-08 RX ADMIN — DIPHENHYDRAMINE HYDROCHLORIDE 12.5 MG: 50 INJECTION, SOLUTION INTRAMUSCULAR; INTRAVENOUS at 13:48

## 2017-08-08 RX ADMIN — LIDOCAINE HYDROCHLORIDE 100 MG: 20 INJECTION, SOLUTION EPIDURAL; INFILTRATION; INTRACAUDAL; PERINEURAL at 13:31

## 2017-08-08 RX ADMIN — SODIUM CHLORIDE, SODIUM LACTATE, POTASSIUM CHLORIDE, AND CALCIUM CHLORIDE 25 ML/HR: 600; 310; 30; 20 INJECTION, SOLUTION INTRAVENOUS at 11:22

## 2017-08-08 RX ADMIN — MIDAZOLAM HYDROCHLORIDE 3 MG: 1 INJECTION, SOLUTION INTRAMUSCULAR; INTRAVENOUS at 12:22

## 2017-08-08 RX ADMIN — MIDAZOLAM HYDROCHLORIDE 2 MG: 1 INJECTION, SOLUTION INTRAMUSCULAR; INTRAVENOUS at 13:19

## 2017-08-08 RX ADMIN — SODIUM CHLORIDE, SODIUM LACTATE, POTASSIUM CHLORIDE, AND CALCIUM CHLORIDE: 600; 310; 30; 20 INJECTION, SOLUTION INTRAVENOUS at 13:40

## 2017-08-08 RX ADMIN — ONDANSETRON 4 MG: 2 INJECTION INTRAMUSCULAR; INTRAVENOUS at 13:48

## 2017-08-08 RX ADMIN — LIDOCAINE HYDROCHLORIDE 0.1 ML: 10 INJECTION, SOLUTION EPIDURAL; INFILTRATION; INTRACAUDAL; PERINEURAL at 11:22

## 2017-08-08 RX ADMIN — CLINDAMYCIN PHOSPHATE 900 MG: 18 INJECTION, SOLUTION INTRAVENOUS at 13:19

## 2017-08-08 RX ADMIN — DEXAMETHASONE SODIUM PHOSPHATE 4 MG: 4 INJECTION, SOLUTION INTRA-ARTICULAR; INTRALESIONAL; INTRAMUSCULAR; INTRAVENOUS; SOFT TISSUE at 13:48

## 2017-08-08 NOTE — ANESTHESIA PREPROCEDURE EVALUATION
Anesthetic History   No history of anesthetic complications            Review of Systems / Medical History  Patient summary reviewed, nursing notes reviewed and pertinent labs reviewed    Pulmonary  Within defined limits                 Neuro/Psych         Psychiatric history ( depression)    Comments: ADHD Cardiovascular    Hypertension          Hyperlipidemia    Exercise tolerance: >4 METS     GI/Hepatic/Renal  Within defined limits              Endo/Other  Within defined limits           Other Findings              Physical Exam    Airway  Mallampati: I  TM Distance: 4 - 6 cm  Neck ROM: normal range of motion   Mouth opening: Normal     Cardiovascular    Rhythm: regular  Rate: normal      Pertinent negatives: No murmur   Dental    Dentition: Full upper dentures and Full lower dentures     Pulmonary  Breath sounds clear to auscultation               Abdominal  GI exam deferred       Other Findings            Anesthetic Plan    ASA: 2  Anesthesia type: MAC and regional - supraclavicular block            Anesthetic plan and risks discussed with: Patient

## 2017-08-08 NOTE — DISCHARGE INSTRUCTIONS
[facility]  Post-operative instructions for Rachel Chery    Your first postop appointment with Hand Therapy has been scheduled for Monday, August 21, 2017 at  [facility] [address], [address] [facility] [address]  Nannette Dowd [place]  Phone: [phone]  Fax: [phone]  Hand Therapy  Phone: [phone]  Fax: [phone] second postop appointment with Dr Signa Osler is scheduled for Wednesday, August 30 at 3 pm    You have just had surgery by Dr. Cindy Manuel, Board-certified Orthopaedic Surgeon and Fellowship-trained hand surgeon, and Director of the [address] [place] [address]. Please follow these instructions for a safe and speedy recovery:    1. Surgical bandage: Leave the bandage in place until we remove it. Please keep it clean and dry. To shower or bathe, apply a plastic bag or GLAD Press'n Seal® plastic wrap around the bandage or simply sponge bathe. 2. Elevation: Hand swelling is best prevented by keeping your hand elevated above the level of your heart at all times, night and day. The opposite, dangling your hand below your waist, will cause additional pain, swelling, and later stiffness. You can elevate the hand in a sling or by propping it on a pillow at night. Occasionally, we will provide you with a custom-made foam block for elevating the arm. Ice compresses may help but do not rep[lace elevation. Frequently, extreme pain is caused by a tight bandage, which should be loosened. If pain is severe and progressive, call us at [phone] during the day (ask for immediate connection to Dr. Signa Osler Team) or during the night (ask for the on-call physician). 3. Medication: You will be provided with an appropriate pain medication (over-the-counter or prescription). Please fill this at a pharmacy promptly so you will have it available when all local anesthetic wears off.  Take this to relieve pain as directed on the bottle. Please refrain from driving, drinking alcohol, and making important medical decisions while taking the medication. Please call us if you need something stronger. Medication changes or refills must be made before 5pm or through your pharmacy. I want to thank you for choosing us for your hand care needs. My staff and I are committed to providing all our customers with the highest quality hand surgery and subsequent hand therapy care as possible. We want your recovery to be comfortable. If you have clinical non-emergent questions about your surgery or other hand conditions please call [phone] and ask for my team. Your call will be returned within 24 hours. For more information regarding your recent hand surgery and recovery you can also visit my website. We look forward to seeing you again. Have  Healthy  Hands        [facility] [facility]    CLIFTON Granado, [address]  DEEP VEIN THROMBOSIS AND PULMONARY EMBOLUS    SURGICAL PATIENTS  Surgical patients are the #1 risk for DVT and PE. WHAT IS DVT? WHAT IS PE?  DVT is a serious condition where blood clots develop deep in the veins of the legs. PE occurs when a blood clot breaks loose from the wall of a vein and travels to the lungs blocking the pulmonary artery or one of its branches impairing blood flow from the heart, which could result in death.   RISK FACTORS   Surgery lasting longer than 45 minutes   History of inflammatory bowel disease   Oral contraceptive or hormone replacement therapy   Immobilization   Varicose veins / swollen legs   Smoking    CHF / Acute MI / Irregular heart beat   Family history of thrombosis   General anesthesia greater than 2 hours   Obesity   Infection of less than one month   Less than 1 month postpartum   COPD / Pneumonia   Arthroscopic surgery   Malignancy / cancer   Spine surgery   Blood abnormalities   Stroke / Paralysis / Coma    SIGNS AND SYMPTOMS OF DEEP VEIN TROMBOSIS  Usually occurs in one leg, above or below the knee   Swelling - one calf or thigh may be larger than the other   Feeling increased warmth in the area of the leg that is swollen or painful   Leg pain, which may increase when standing or walking   Swelling along the vein of the leg   When swollen areas is pressed with a finger, a depression may remain   Tenderness of the leg that may be confined to one area   Change in leg color (bluish or red)    SIGNS AND SYMPTOMS OF PULMONARY EMBOLUS   Chest pain that gets worse with deep breath, coughing or chest movement   Coughing up blood   Sweating   Shortness of breath or difficulty breathing   Rapid heart beat   Lightheadedness    HOW TO REDUCE THE POSSIBLE RISK OF DVT   Exercise - simple activities as rotating ankles and wrists, wiggling toes and fingers, tightening and relaxing muscles in calves and thighs promotes circulation while recovering from surgery. Please do these exercises every hour during waking hours   CLAUDETTE hose - If you have been given white support hose while having surgery, wear them home. You may remove them for half an hour every 8-hour period and stop wearing them 48 hours after surgery or as prescribed by your physician. CLAUDETTE hose may be reused for air travel or extended car travel   Take mediation as prescribed by your physician (Lovenox, Coumadin, Aspirin)   Resume your normal activities as soon as your doctor advises you to do so.  Remember, when traveling, to [place] your legs frequently. Wear non skid shoes when CLAUDETTE hose are on. They are very slippery! PATIENTS WHO BELIEVE THEY MAY BE EXPERIENCING SIGNS AND SYMPTOMS OF DVT OR PE SHOULD SEEK MEDICAL HELP IMMEDIATELY           DO NOT TAKE TYLENOL/ACETAMINOPHEN WITH PERCOCET, [address], [place]. TAKE NARCOTIC PAIN MEDICATIONS WITH FOOD, and if given 2 pain narcotics do NOT take together!    Narcotics tend to be constipating and we recommend taking a stool softener such as Colace or Miralax (follow package instructions). If you were given prescriptions, please review the written information on the prescribed medications. DO NOT DRIVE WHILE TAKING NARCOTIC PAIN MEDICATIONS. DISCHARGE SUMMARY from Nurse    The following personal items collected during your admission are returned to you:   Dental Appliance: Dental Appliances: With patient, Uppers, Lowers  Vision: Visual Aid: Glasses (placed in PACU)  Hearing Aid:    Jewelry: Jewelry: None  Clothing: Clothing: Other (comment) (belonging bag)  Other Valuables: Other Valuables: None  Valuables sent to safe:        PATIENT INSTRUCTIONS:    After General Anesthesia or Intravenous Sedation, for 24 hours or while taking prescription Narcotics:  · Someone should be with you for the next 24 hours. · For your own safety, a responsible adult must drive you home. · Limit your activities  · Recommended activity: Rest today, up with assistance today. Do not climb stairs or shower unattended for the next 24 hours. · Start with a soft bland diet and advance as tolerated (no nausea) to regular diet. · If you have a sore throat some things that may help are: fluids, warm salt water gargle, or throat lozenges. If this does not improve after several days please follow up with your family physician. · Do not drive and operate hazardous machinery  · Do not make important personal or business decisions  · Do  not drink alcoholic beverages  · If you have not urinated within 8 hours after discharge, please contact your surgeon on call.       Report the following to your surgeon:  · Excessive pain, swelling, redness or odor of or around the surgical area  · Temperature over 100.5  · Nausea and vomiting lasting longer than 4 hours or if unable to take medications  · Any signs of decreased circulation or nerve impairment to extremity: change in color, persistent  numbness, tingling, coldness or increase pain    · If you received an upper extremity nerve block, please wear your sling until the block has worn off, then refer to your surgeons post-operative instructions. If you have had a shoulder block or a block near your collar bone, you may have              symptoms such as:          1. Mild shortness of breath        2. A hoarse voice        3. Blurry vision        4. Unequal pupils        5. Drooping of your face on the same side as the nerve block. These symptoms will disappear as the nerve block wears off. · If you received a lower extremity nerve block, please use your crutches, walker, or cane until the nerve block has worn off, then refer to your surgeons post-operative instructions.    · You will receive a Post Operative Call from one of the Recovery Room Nurses on the day after your surgery to check on you. It is very important for us to know how you are recovering after your surgery. If you have an issue please call your surgeon, do not wait for the post operative call. · You may receive an e-mail or letter in the mail from [employer] regarding your experience with us in the Ambulatory Surgery Unit. Your feedback is valuable to us and we appreciate your participation in the survey. ·   · If the above instructions are not adequate, please contact Herlinda Vick RN, Suzanne anesthesia Nurse Manager or our Anesthesiologist, at [phone]. If you are having problems after your surgery, call your physician at his office number. ·   · We wish youre a speedy recovery ? What to do at Home:    *  Please give a list of your current medications to your Primary Care Provider. *  Please update this list whenever your medications are discontinued, doses are      changed, or new medications (including over-the-counter products) are added. *  Please carry medication information at all times in case of emergency situations.           These are general instructions for a healthy lifestyle:    No smoking/ No tobacco products/ Avoid exposure to second hand smoke    Surgeon General's Warning:  Quitting smoking now greatly reduces serious risk to your health. Obesity, smoking, and sedentary lifestyle greatly increases your risk for illness    A healthy diet, regular physical exercise & weight monitoring are important for maintaining a healthy lifestyle    You may be retaining fluid if you have a history of heart failure or if you experience any of the following symptoms:  Weight gain of 3 pounds or more overnight or 5 pounds in a week, increased swelling in our hands or feet or shortness of breath while lying flat in bed. Please call your doctor as soon as you notice any of these symptoms; do not wait until your next office visit. Recognize signs and symptoms of STROKE:    B - Balance  E-  Eyes    F-  Face looks uneven    A-  Arms unable to move or move even    S-  Speech slurred or non-existent    T-  Time-call 911 as soon as signs and symptoms begin-DO NOT go       Back to bed or wait to see if you get better-TIME IS BRAIN. If you have not had your influenza or pneumococcal vaccines, please follow up with your primary care physician. The discharge information has been reviewed with the patient and caregiver. The patient and caregiver verbalized understanding.

## 2017-08-08 NOTE — ANESTHESIA POSTPROCEDURE EVALUATION
Post-Anesthesia Evaluation and Assessment    Patient: Kristyn Cnotreras MRN: [id number]  SSN: [id number]    YOB: 1960  Age: 56 y.o. Sex: female       Cardiovascular Function/Vital Signs  Visit Vitals    /72    Pulse 89    Temp 36.6 °C (97.9 °F)    Resp 19    Ht 5' 4\" (1.626 m)    Wt 78.5 kg (173 lb)    SpO2 96%    BMI 29.7 kg/m2       Patient is status post MAC, regional anesthesia for Procedure(s):  RIGHT THUMB CARPOMETACARPAL ARTHROPLASTY. Nausea/Vomiting: None    Postoperative hydration reviewed and adequate. Pain:  Pain Scale 1: Numeric (0 - 10) (08/08/17 1519)  Pain Intensity 1: 0 (08/08/17 1519)   Managed. Has right supraclavicular block; sling postop until block resolves. Neurological Status:   Neuro (WDL): Exceptions to WDL (08/08/17 1519)  Neuro  Neurologic State: Alert (08/08/17 1519)  RUE Motor Response: Numbness (nerve block) (08/08/17 1519)  RLE Motor Response: Numbness (08/08/17 1444)   At baseline    Mental Status and Level of Consciousness: Arousable    Pulmonary Status:   O2 Device: Room air (08/08/17 1519)   Adequate oxygenation and airway patent    Complications related to anesthesia: None    Post-anesthesia assessment completed.  No concerns    Signed By: Lisa Asher MD     August 8, 2017

## 2017-08-08 NOTE — ANESTHESIA PROCEDURE NOTES
Peripheral Block    Start time: 8/8/2017 12:19 PM  End time: 8/8/2017 12:37 PM  Performed by: Corinne Severe  Authorized by: Corinne Severe       Pre-procedure: Indications: at surgeon's request and primary anesthetic    Preanesthetic Checklist: patient identified, risks and benefits discussed, site marked, timeout performed, anesthesia consent given and patient being monitored    Timeout Time: 12:21          Block Type:   Block Type:  Supraclavicular  Laterality:  Right  Monitoring:  Standard ASA monitoring, responsive to questions and oxygen  Injection Technique:  Single shot  Procedures: ultrasound guided    Patient Position: supine  Prep: chlorhexidine    Location:  Supraclavicular  Needle Type:  Stimuplex  Needle Gauge:  22 G  Needle Localization:  Ultrasound guidance  Medication Injected:  0.5%  ropivacaine  Volume (mL):  25    Assessment:  Number of attempts:  1  Injection Assessment:  Incremental injection every 5 mL, no paresthesia, ultrasound image on chart, local visualized surrounding nerve on ultrasound, negative aspiration for blood and no intravascular symptoms  Patient tolerance:  Patient tolerated the procedure well with no immediate complications  Pt appeared to have 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 Carefully placed LA above & below vessel using US guidance.

## 2017-08-08 NOTE — PERIOP NOTES
Monica Mo  1960  [id number]    Situation:  Verbal report given from: jomar leonard and morgan crna  Procedure: Procedure(s):  RIGHT THUMB CARPOMETACARPAL ARTHROPLASTY    Background:    Preoperative diagnosis: OSTEOARTHRITIS FIRST CARPAL METACARPAL JOINT RIGHT HAND    Postoperative diagnosis: OSTEOARTHRITIS FIRST CARPAL METACARPAL JOINT RIGHT HAND    :  Dr. Shiva Oquendo    Assistant(s): Circ-1: Christina Bennett RN  Scrub Tech-1: Arsh Crowe  Scrub Tech-2: Guerline Sanchez  Surg Asst-1: Rebekah Nichole    Specimens: * No specimens in log *    Assessment:  Intra-procedure medications         Anesthesia gave intra-procedure sedation and medications, see anesthesia flow sheet     Intravenous fluids: LR@ KVO     Vital signs stable       Recommendation:    Permission to share finding with family or friend yes

## 2017-08-08 NOTE — IP AVS SNAPSHOT
[address] [facility] 
[phone] Patient: Art Keating MRN: [id number] ESM:[id number]/[id number] You are allergic to the following Allergen Reactions Pcn (Penicillins) Swelling Recent Documentation Height Weight BMI OB Status Smoking Status 1.626 m 78.5 kg 29.7 kg/m2 Postmenopausal Current Every Day Smoker Emergency Contacts Name Discharge Info Relation Home Work Mobile Ray,Duane DISCHARGE CAREGIVER [3] Spouse [3] [phone] About your hospitalization You were admitted on:  August 8, 2017 You last received care in the:  [facility] PACU You were discharged on:  August 8, 2017 Unit phone number:  [phone] Why you were hospitalized Your primary diagnosis was:  Primary Osteoarthritis Of First Carpometacarpal Joint Of Right Hand Providers Seen During Your Hospitalizations Provider Role Specialty Primary office phone Andrew Rabago MD Attending Provider Orthopedic Surgery [phone] Your Primary Care Physician (PCP) Primary Care Physician Office Phone Office Fax Casandra, [facility] Follow-up Information Follow up With Details Comments Contact Info Anselmo Sotelo NP   [address] [place] [address] [address] [place] [address] [address] [facility] [address] [phone] Current Discharge Medication List  
  
START taking these medications Dose & Instructions Dispensing Information Comments Morning Noon Evening Bedtime  
 oxyCODONE-acetaminophen 5-325 mg per tablet Commonly known as:  PERCOCET Your last dose was: Your next dose is:    
   
   
 Dose:  1 Tab Take 1 Tab by mouth every four (4) hours as needed for Pain. Max Daily Amount: 6 Tabs. Quantity:  30 Tab Refills:  0 CONTINUE these medications which have NOT CHANGED Dose & Instructions Dispensing Information Comments Morning Noon Evening Bedtime  
 atorvastatin 20 mg tablet Commonly known as:  LIPITOR Your last dose was: Your next dose is:    
   
   
 Dose:  20 mg Take 1 Tab by mouth nightly. Quantity:  90 Tab Refills:  3 RIKY LOW STRENGTH PO Your last dose was: Your next dose is:    
   
   
 Dose:  81 mg Take 81 mg by mouth daily. Refills:  0  
     
   
   
   
  
 dextroamphetamine-amphetamine 20 mg tablet Commonly known as:  ADDERALL Your last dose was: Your next dose is:    
   
   
 Dose:  20 mg Take 1 Tab (20 mg total) by mouth two (2) times a dayEarliest Fill Date: 6/27/17. Max Daily Amount: 40 mg  
 Quantity:  60 Tab Refills:  0  
     
   
   
   
  
 doxepin 50 mg capsule Commonly known as:  SINEquan Your last dose was: Your next dose is: Take 1 to 3 tablets by mouth everyday at bedtime as needed Quantity:  90 Cap Refills:  2  
     
   
   
   
  
 losartan 50 mg tablet Commonly known as:  COZAAR Your last dose was: Your next dose is:    
   
   
 Dose:  50 mg Take 1 Tab by mouth daily. Quantity:  90 Tab Refills:  3 PRISTIQ 50 mg ER tablet Generic drug:  desvenlafaxine succinate Your last dose was: Your next dose is:    
   
   
 Dose:  50 mg Take 1 Tab by mouth daily. Medically necessary to dispense brand only Quantity:  30 Tab Refills:  3 VITAMIN D3 1,000 unit Cap Generic drug:  cholecalciferol Your last dose was: Your next dose is: Take  by mouth daily. Refills:  0 Where to Get Your Medications Information on where to get these meds will be given to you by the nurse or doctor. ! Ask your nurse or doctor about these medications  
  oxyCODONE-acetaminophen 5-325 mg per tablet Discharge Instructions [facility] Post-operative instructions for [employer] Your first postop appointment with Hand Therapy has been scheduled for Monday, August 21, 2017 at  [facility] [address], [address] Nannette Dowd [place]  Phone: [phone]  Fax: [phone] Hand Therapy  Phone: [phone]  Fax: [phone] Your second postop appointment with Dr Guerline Calvo is scheduled for Wednesday, August 30 at 3 pm 
 
You have just had surgery by Dr. Darin Fry, Board-certified Orthopaedic Surgeon and Fellowship-trained hand surgeon, and Director of the [address] [place] [address]. Please follow these instructions for a safe and speedy recovery: 1. Surgical bandage: Leave the bandage in place until we remove it. Please keep it clean and dry. To shower or bathe, apply a plastic bag or GLAD Press'n Seal® plastic wrap around the bandage or simply sponge bathe. 2. Elevation: Hand swelling is best prevented by keeping your hand elevated above the level of your heart at all times, night and day. The opposite, dangling your hand below your waist, will cause additional pain, swelling, and later stiffness. You can elevate the hand in a sling or by propping it on a pillow at night. Occasionally, we will provide you with a custom-made foam block for elevating the arm. Ice compresses may help but do not rep[lace elevation. Frequently, extreme pain is caused by a tight bandage, which should be loosened. If pain is severe and progressive, call us at [phone] during the day (ask for immediate connection to Dr. Guerline Calvo Team) or during the night (ask for the on-call physician). 3. Medication: You will be provided with an appropriate pain medication (over-the-counter or prescription).  Please fill this at a pharmacy promptly so you will have it available when all local anesthetic wears off. Take this to relieve pain as directed on the bottle. Please refrain from driving, drinking alcohol, and making important medical decisions while taking the medication. Please call us if you need something stronger. Medication changes or refills must be made before 5pm or through your pharmacy. I want to thank you for choosing us for your hand care needs. My staff and I are committed to providing all our customers with the highest quality hand surgery and subsequent hand therapy care as possible. We want your recovery to be comfortable. If you have clinical non-emergent questions about your surgery or other hand conditions please call [phone] and ask for my team. Your call will be returned within 24 hours. For more information regarding your recent hand surgery and recovery you can also visit my website. We look forward to seeing you again. Have Healthy Hands        [employer] [address] ASU 
DEEP VEIN THROMBOSIS AND PULMONARY EMBOLUS 
 
SURGICAL PATIENTS Surgical patients are the #1 risk for DVT and PE. WHAT IS DVT? WHAT IS PE? 
DVT is a serious condition where blood clots develop deep in the veins of the legs. PE occurs when a blood clot breaks loose from the wall of a vein and travels to the lungs blocking the pulmonary artery or one of its branches impairing blood flow from the heart, which could result in death. RISK FACTORS 
? Surgery lasting longer than 45 minutes ? History of inflammatory bowel disease 
? Oral contraceptive or hormone replacement therapy ? Immobilization ? Varicose veins / swollen legs ? Smoking  
? CHF / Acute MI / Irregular heart beat ? Family history of thrombosis ? General anesthesia greater than 2 hours ? Obesity ? Infection of less than one month ? Less than 1 month postpartum 
? COPD / Pneumonia ? Arthroscopic surgery ? Malignancy / cancer ? Spine surgery ? Blood abnormalities ? Stroke / Paralysis / Coma SIGNS AND SYMPTOMS OF DEEP VEIN TROMBOSIS Usually occurs in one leg, above or below the knee ? Swelling  one calf or thigh may be larger than the other ? Feeling increased warmth in the area of the leg that is swollen or painful ? Leg pain, which may increase when standing or walking ? Swelling along the vein of the leg ? When swollen areas is pressed with a finger, a depression may remain ? Tenderness of the leg that may be confined to one area ? Change in leg color (bluish or red) SIGNS AND SYMPTOMS OF PULMONARY EMBOLUS 
? Chest pain that gets worse with deep breath, coughing or chest movement ? Coughing up blood ? Sweating ? Shortness of breath or difficulty breathing ? Rapid heart beat ? Lightheadedness HOW TO REDUCE THE POSSIBLE RISK OF DVT ? Exercise  simple activities as rotating ankles and wrists, wiggling toes and fingers, tightening and relaxing muscles in calves and thighs promotes circulation while recovering from surgery. Please do these exercises every hour during waking hours ? CLAUDETTE hose  If you have been given white support hose while having surgery, wear them home. You may remove them for half an hour every 8-hour period and stop wearing them 48 hours after surgery or as prescribed by your physician. CLAUDETTE hose may be reused for air travel or extended car travel ? Take mediation as prescribed by your physician (Lovenox, Coumadin, Aspirin) ? Resume your normal activities as soon as your doctor advises you to do so. 
? Remember, when traveling, to [place] your legs frequently. Wear non skid shoes when CLAUDETTE hose are on. They are very slippery! PATIENTS WHO BELIEVE THEY MAY BE EXPERIENCING SIGNS AND SYMPTOMS OF DVT OR PE SHOULD SEEK MEDICAL HELP IMMEDIATELY 
 
 
 
  
DO NOT TAKE TYLENOL/ACETAMINOPHEN WITH PERCOCET, [address], [place] OR [place]. TAKE NARCOTIC PAIN MEDICATIONS WITH FOOD, and if given 2 pain narcotics do NOT take together! Narcotics tend to be constipating and we recommend taking a stool softener such as Colace or Miralax (follow package instructions). If you were given prescriptions, please review the written information on the prescribed medications. DO NOT DRIVE WHILE TAKING NARCOTIC PAIN MEDICATIONS. DISCHARGE SUMMARY from Nurse The following personal items collected during your admission are returned to you:  
Dental Appliance: Dental Appliances: With patient, Uppers, Lowers Vision: Visual Aid: Glasses (placed in PACU) Hearing Aid:   
Jewelry: Jewelry: None Clothing: Clothing: Other (comment) (belonging bag) Other Valuables: Other Valuables: None Valuables sent to safe:   
 
 
PATIENT INSTRUCTIONS: 
 
After General Anesthesia or Intravenous Sedation, for 24 hours or while taking prescription Narcotics: · Someone should be with you for the next 24 hours. · For your own safety, a responsible adult must drive you home. · Limit your activities · Recommended activity: Rest today, up with assistance today. Do not climb stairs or shower unattended for the next 24 hours. · Start with a soft bland diet and advance as tolerated (no nausea) to regular diet. · If you have a sore throat some things that may help are: fluids, warm salt water gargle, or throat lozenges. If this does not improve after several days please follow up with your family physician. · Do not drive and operate hazardous machinery · Do not make important personal or business decisions · Do  not drink alcoholic beverages · If you have not urinated within 8 hours after discharge, please contact your surgeon on call. Report the following to your surgeon: 
· Excessive pain, swelling, redness or odor of or around the surgical area · Temperature over 100.5 · Nausea and vomiting lasting longer than 4 hours or if unable to take medications · Any signs of decreased circulation or nerve impairment to extremity: change in color, persistent  numbness, tingling, coldness or increase pain · If you received an upper extremity nerve block, please wear your sling until the block has worn off, then refer to your surgeons post-operative instructions. If you have had a shoulder block or a block near your collar bone, you may have              symptoms such as: 1. Mild shortness of breath 2. A hoarse voice 3. Blurry vision 4. Unequal pupils 5. Drooping of your face on the same side as the nerve block. These symptoms will disappear as the nerve block wears off. · If you received a lower extremity nerve block, please use your crutches, walker, or cane until the nerve block has worn off, then refer to your surgeons post-operative instructions. ?  
· You will receive a Post Operative Call from one of the Recovery Room Nurses on the day after your surgery to check on you. It is very important for us to know how you are recovering after your surgery. If you have an issue please call your surgeon, do not wait for the post operative call. · You may receive an e-mail or letter in the mail from [place] regarding your experience with us in the Ambulatory Surgery Unit. Your feedback is valuable to us and we appreciate your participation in the survey. ·  
· If the above instructions are not adequate, please contact Andres Bush RN, Suzanne anesthesia Nurse Manager or our Anesthesiologist, at [phone]. If you are having problems after your surgery, call your physician at his office number. ·  
· We wish youre a speedy recovery ? What to do at Home: *  Please give a list of your current medications to your Primary Care Provider. *  Please update this list whenever your medications are discontinued, doses are 
    changed, or new medications (including over-the-counter products) are added. *  Please carry medication information at all times in case of emergency situations. These are general instructions for a healthy lifestyle: No smoking/ No tobacco products/ Avoid exposure to second hand smoke Surgeon General's Warning:  Quitting smoking now greatly reduces serious risk to your health. Obesity, smoking, and sedentary lifestyle greatly increases your risk for illness A healthy diet, regular physical exercise & weight monitoring are important for maintaining a healthy lifestyle You may be retaining fluid if you have a history of heart failure or if you experience any of the following symptoms:  Weight gain of 3 pounds or more overnight or 5 pounds in a week, increased swelling in our hands or feet or shortness of breath while lying flat in bed. Please call your doctor as soon as you notice any of these symptoms; do not wait until your next office visit. Recognize signs and symptoms of STROKE: 
 
B - Balance E-  Eyes F-  Face looks uneven A-  Arms unable to move or move even S-  Speech slurred or non-existent T-  Time-call 911 as soon as signs and symptoms begin-DO NOT go Back to bed or wait to see if you get better-TIME IS BRAIN. If you have not had your influenza or pneumococcal vaccines, please follow up with your primary care physician. The discharge information has been reviewed with the patient and caregiver. The patient and caregiver verbalized understanding. Discharge Orders None Introducing [facility]! Magdalene Mitchell introduces [employer] patient portal. Now you can access parts of your medical record, email your doctor's office, and request medication refills online. 1. In your internet browser, go to [email or web address] 2. Click on the First Time User? Click Here link in the Sign In box. You will see the New Member Sign Up page. 3. Enter your [employer] Access Code exactly as it appears below. You will not need to use this code after youve completed the sign-up process. If you do not sign up before the expiration date, you must request a new code. · [employer] Access Code: [email or web address] Expires: 11/5/2017 12:23 PM 
 
4. Enter the last four digits of your Social Security Number (xxxx) and Date of Birth (mm/dd/yyyy) as indicated and click Submit. You will be taken to the next sign-up page. 5. Create a [employer] ID. This will be your [employer] login ID and cannot be changed, so think of one that is secure and easy to remember. 6. Create a [employer] password. You can change your password at any time. 7. Enter your Password Reset Question and Answer. This can be used at a later time if you forget your password. 8. Enter your e-mail address. You will receive e-mail notification when new information is available in [address] 9. Click Sign Up. You can now view and download portions of your medical record. 10. Click the Download Summary menu link to download a portable copy of your medical information. If you have questions, please visit the Frequently Asked Questions section of the [employer] website. Remember, [employer] is NOT to be used for urgent needs. For medical emergencies, dial 911. Now available from your iPhone and Android! General Information Please provide this summary of care documentation to your next provider. Patient Signature:  ____________________________________________________________ Date:  ____________________________________________________________  
  
Lynette Singhncer Provider Signature:  ____________________________________________________________ Date:  ____________________________________________________________

## 2017-08-08 NOTE — PERIOP NOTES
1420 [facility] Leonel Stevens at bedside speaking with pt. Pt awake and alert, denies complaints. 12 Dr. Jatin Glasgow at bedside, speaking with pt and . Discharge instructions reviewed with pt and , verbalized understanding. 1517 IV d/c'd. Assisted pt dressing. Sling applied. Pt is awake and alert, oriented x4. Denies complaints. 1526 assisted to wheelchair. Pt denies complaints.

## 2017-08-08 NOTE — PERIOP NOTES
Dr. Anne Little performed a right supraclavicular nerve block in pre-op with the U/S machine. Pt. Tolerated well. She was given 3mg of versed IV for sedation. VSS. Pt. Was on cardiac monitoring, pulse oximetry and 3L NC O2.  Pt. Was drowsy post block, but aroused easily. Will continue to monitor.

## 2017-08-08 NOTE — OP NOTES
PATIENT NAME:  Windy Cole    SURGEON:    Stanly Mcardle., M.D.    DATE OF SURGERY: 8/8/2017     LOCATION: [place] [address]     PREOPERATIVE DIAGNOSIS:  Right thumb carpometacarpal osteoarthritis    POSTOPERATIVE DIAGNOSIS:  Same    PROCEDURE:   Right thumb carpometacarpal interposition arthroplasty                              Right flexor carpi radialis tendon sling suspension       ANESTHESIA:  Brachial Plexus block/IV sedation     BLOOD LOSS:  Minimal    COMPLICATIONS: none     TOURNIQUET TIME:  50 minutes     OPERATIVE INDICATIONS:  This 56 y.o. old woman has developed progressive painful right thumb carpometacarpal osteoarthritis, which has become unresponsive to conservative treatment. The patient has now elected to undergo a thumb carpometacarpal interposition arthroplasty with additional tendon sling suspension, using a transferred FCR tendon. I have explained the nature of such a surgery, the usual outcome, and also the risk for an outcome with residual pain or instability of the metacarpal. I have outlined additional risks of surgery, including but not exclusive to bleeding, pain, infection, numbness, or even the potential need for revision surgery. The patient elects to proceed today. PROCEDURE: The patient was brought to the operating room and placed on the table in the supine position. The right hand and arm were prepped and draped in a sterile field. The operative site was confirmed during the timeout. The patient was given an appropriate dose of IV Ancef. The arm was exsanguinated and the brachial tourniquet was inflated to 250 mm Hg. A timeout was called and the operative site was confirmed. A dorsal inverted Y incision was made over the thumb Aia 16 area and skin flaps were elevated. Dissection was carried down protecting the sensory nerve branches.  The carpometacarpal joint was then exposed in the following fashion: after protecting the radial artery in the proximal snuffbox, a T-shaped arthrotomy was made and 3 capsular flaps were sharply elevated from the trapezium until it was well-exposed. Next, the trapezium was hemisected and both portions were sharply excised. An oblique gouge hole and tunnel were then made in the base of the thumb metacarpal for the subsequent passage of the tendon transfer. Then a small transverse incision was made in the mid-volar forearm and the fascia was opened over the flexor carpi radialis tendon at its musculotendinous junction. The entire tendon was divided and the distally based portion was passed along its sheath to the wrist level and then distally into the trapeziectomy defect, where it remained attached to the base of the index metacarpal. This tendon slip was then passed through the tunnel in the thumb metacarpal and was sutured to the dorsal capsule, creating a sling suspension of the thumb metacarpal.The remaining FCR tendon was rolled into an anchovie and stabilized with Ethibond suture, then embedded into the defect using a mini-juggerknot anchor in the trapezoid to anchor the anchovied wad of tendon. The capsular flaps were then imbricated over the interposed wad with 3-0 Ethibond sutures, holding it snugly in place such that a stable arthroplasty was achieved. The tourniquet was deflated and hemostasis was attained. Then the skin was closed with 4-0 nylon mattress sutures at the forearm and thenar incisions. A well-padded thumb spica dressing was applied as well as a thumb spica splint. The patient tolerated the procedure uneventfully and was discharged to the recovery area in good condition.

## 2017-08-09 NOTE — PERIOP NOTES
8/9/17 at 1041-Spoke with pt for followup post-op call. Pt states peripheral nerve block began wearing off around 8 pm last night and she began pain medicine as prescribed and has been keeping arm elevated as instructed. She states\" have been up all night with pain 10/10\". Instructed pt to call [address] [place] [address] office as soon as she hangs up to make aware of pain level.

## 2017-08-17 DIAGNOSIS — F90.9 ADULT ADHD: ICD-10-CM

## 2017-08-18 ENCOUNTER — DOCUMENTATION ONLY (OUTPATIENT)
Dept: FAMILY MEDICINE CLINIC | Age: 57
End: 2017-08-18

## 2017-08-18 RX ORDER — DEXTROAMPHETAMINE SACCHARATE, AMPHETAMINE ASPARTATE, DEXTROAMPHETAMINE SULFATE AND AMPHETAMINE SULFATE 5; 5; 5; 5 MG/1; MG/1; MG/1; MG/1
20 TABLET ORAL 2 TIMES DAILY
Qty: 60 TAB | Refills: 0 | Status: SHIPPED | OUTPATIENT
Start: 2017-08-18 | End: 2017-10-05 | Stop reason: SDUPTHER

## 2017-08-18 NOTE — PROGRESS NOTES
Pts name changed from Chari Wu to Broderick Mata when she was admitted for surgery. Pt does not go by the name of Bebeto Small and hasn't in 4 years. It appears that she has multiple charts and it was merged into 1. Graciela-Practice manager notified of this and is working on fixing it. In the mean time a hand written script with the correct name of Chari Wu for Adderall 20mg BID written by Fanny Beltrán NP and left at  for pt to .

## 2017-10-05 DIAGNOSIS — F90.9 ADULT ADHD: ICD-10-CM

## 2017-10-05 RX ORDER — DEXTROAMPHETAMINE SACCHARATE, AMPHETAMINE ASPARTATE, DEXTROAMPHETAMINE SULFATE AND AMPHETAMINE SULFATE 5; 5; 5; 5 MG/1; MG/1; MG/1; MG/1
20 TABLET ORAL 2 TIMES DAILY
Qty: 60 TAB | Refills: 0 | Status: SHIPPED | OUTPATIENT
Start: 2017-10-05 | End: 2017-11-27 | Stop reason: SDUPTHER

## 2017-10-05 NOTE — TELEPHONE ENCOUNTER
Chief Complaint   Patient presents with    Medication Refill     Last refill:   6 weeks ago (8/18/2017)        dextroamphetamine-amphetamine (ADDERALL) 20 mg tablet       Take 1 Tab (20 mg total) by mouth two (2) times a dayEarliest Fill Date: 8/18/17.  Max Daily Amount: 40 mg       Dispense: 60 Tab     Refills: 0     Start: 8/18/2017     By: Rosa Boogie NP       Last Appointment With Me:  6/12/2017      Last Appointment My Department:  6/12/2017    :  Last filled on 6/28/17

## 2017-10-05 NOTE — TELEPHONE ENCOUNTER
Can we find out where she is getting her prescriptions filled? The last prescription should be in the  and it is not.

## 2017-10-05 NOTE — TELEPHONE ENCOUNTER
Last office visit 6-12-17.  pulled today. Last refill according to [facility] is 8-18-17. Last refill showing in  is from 6-28-17.

## 2017-10-09 ENCOUNTER — LAB ONLY (OUTPATIENT)
Dept: FAMILY MEDICINE CLINIC | Age: 57
End: 2017-10-09

## 2017-10-09 DIAGNOSIS — F90.9 ATTENTION DEFICIT HYPERACTIVITY DISORDER (ADHD), UNSPECIFIED ADHD TYPE: Primary | ICD-10-CM

## 2017-10-16 LAB — DRUGS UR: NORMAL

## 2017-10-24 DIAGNOSIS — F32.A DEPRESSION, UNSPECIFIED DEPRESSION TYPE: ICD-10-CM

## 2017-10-24 DIAGNOSIS — G47.00 INSOMNIA, UNSPECIFIED TYPE: ICD-10-CM

## 2017-10-24 RX ORDER — DOXEPIN HYDROCHLORIDE 50 MG/1
CAPSULE ORAL
Qty: 90 CAP | Refills: 2 | Status: SHIPPED | OUTPATIENT
Start: 2017-10-24 | End: 2018-04-03 | Stop reason: SDUPTHER

## 2017-10-24 NOTE — TELEPHONE ENCOUNTER
Chief Complaint   Patient presents with    Medication Refill      Last refill:   5 months ago (5/8/2017)        doxepin (SINEQUAN) 50 mg capsule       Take 1 to 3 tablets by mouth everyday at bedtime as needed       Dispense: 90 Cap     Refills: 2     Start: 5/8/2017     By: Alton García NP       Future Appointments:  No future appointments.      Last Appointment With Me:  Visit date not found      Last Appointment My Department:  6/12/2017

## 2017-11-27 DIAGNOSIS — F90.9 ADULT ADHD: ICD-10-CM

## 2017-11-27 RX ORDER — DEXTROAMPHETAMINE SACCHARATE, AMPHETAMINE ASPARTATE, DEXTROAMPHETAMINE SULFATE AND AMPHETAMINE SULFATE 5; 5; 5; 5 MG/1; MG/1; MG/1; MG/1
20 TABLET ORAL 2 TIMES DAILY
Qty: 60 TAB | Refills: 0 | Status: SHIPPED | OUTPATIENT
Start: 2017-11-27 | End: 2018-01-24 | Stop reason: SDUPTHER

## 2017-11-29 ENCOUNTER — OFFICE VISIT (OUTPATIENT)
Dept: FAMILY MEDICINE CLINIC | Age: 57
End: 2017-11-29

## 2017-11-29 VITALS
SYSTOLIC BLOOD PRESSURE: 141 MMHG | RESPIRATION RATE: 22 BRPM | HEIGHT: 64 IN | WEIGHT: 176.4 LBS | HEART RATE: 106 BPM | TEMPERATURE: 98.6 F | OXYGEN SATURATION: 96 % | BODY MASS INDEX: 30.11 KG/M2 | DIASTOLIC BLOOD PRESSURE: 91 MMHG

## 2017-11-29 DIAGNOSIS — I10 HYPERTENSION, UNSPECIFIED TYPE: ICD-10-CM

## 2017-11-29 DIAGNOSIS — F32.A DEPRESSION, UNSPECIFIED DEPRESSION TYPE: ICD-10-CM

## 2017-11-29 DIAGNOSIS — F90.9 ATTENTION DEFICIT HYPERACTIVITY DISORDER (ADHD), UNSPECIFIED ADHD TYPE: ICD-10-CM

## 2017-11-29 DIAGNOSIS — Z71.6 ENCOUNTER FOR SMOKING CESSATION COUNSELING: ICD-10-CM

## 2017-11-29 DIAGNOSIS — R73.09 ELEVATED GLUCOSE: ICD-10-CM

## 2017-11-29 DIAGNOSIS — Z00.00 ENCOUNTER FOR WELLNESS EXAMINATION IN ADULT: Primary | ICD-10-CM

## 2017-11-29 LAB — HBA1C MFR BLD HPLC: 5.6 %

## 2017-11-29 RX ORDER — IBUPROFEN 200 MG
1 TABLET ORAL EVERY 24 HOURS
Qty: 30 PATCH | Refills: 0 | Status: SHIPPED | OUTPATIENT
Start: 2017-11-29 | End: 2017-12-11 | Stop reason: DRUGHIGH

## 2017-11-29 RX ORDER — DESVENLAFAXINE SUCCINATE 50 MG/1
50 TABLET, EXTENDED RELEASE ORAL DAILY
Qty: 30 TAB | Refills: 3 | Status: SHIPPED | OUTPATIENT
Start: 2017-11-29 | End: 2018-04-03 | Stop reason: SDUPTHER

## 2017-11-29 NOTE — PROGRESS NOTES
Chief Complaint   Patient presents with    Physical       S: Ciro Turner is a 56 y.o. female  who presents for wellness exam, no pap needed. Last pap- 9/1/16 and normal  Last Mammogram- 1/2016 in [facility]   Colonoscopy- UTD  Routine Labs reviewed    No LMP recorded. Patient is postmenopausal.    Denies any systemic symptoms including fever, myalgias, chills, weakness, weight loss and fatigue. Denies respiratory symptoms including cough, SOB, or wheezing. Denies any cardiac symptoms including chest pain, tightness or discomfort or syncope. ROS is otherwise negative. Nutrition:  Not following a regular diet, but generally eats healthy  Physical excercise:  No formal exercise. Social:  Denies any recent stressors. Tobacco: smokes cigarettes 1/2 pack per day, would like to try patch for smoking cessation. Admits that she smokes marijuana \"hit off a joint\" at night to help her relax. Long discussion regarding her last Tox screen. being positive and that this violates her controlled substance and that she will not be prescribed controlled substance if any future positive urine drug screen for THC. Alcohol:  Occasionally on weekends and occasional mix drink during the week. She says about 4-5 drinks per month. Adult ADHD/Depression  Has been on Adderall \"for years\". Taking medication as directed. No changes in doses. Taking 1-1.5 Adderall 20 mg per day for ADD and 50 mg Pristiq. States that she is well controlled on current medications and doses. Denies depression, SI/HI. Denies difficulty sleeping. Hypertension/Hyperlipidemia ROS: taking medications as instructed, no medication side effects noted, patient does not perform home BP monitoring, no TIA's, no chest pain on exertion, no dyspnea on exertion, no swelling of ankles, no palpitations,headaches, shortness of breath, vision change.   she generally follows a low fat low cholesterol diet, generally follows a low sodium diet, sedentary, smoker 1 pack per day, caffeine intake 1-2 cups per day. She did not take medications today. Right thumb surgery- Right thumb carpometacarpal interposition arthroplasty and  Right flexor carpi radialis tendon sling suspension done on  8/8/17 by Dr Unique Ghosh at [facility]. No other hospitalizations or ER admission except for what is listed above. Past Medical History:   Diagnosis Date    ADHD (attention deficit hyperactivity disorder)     Adverse effect of anesthesia     patient states she was in the ICU s/p ankle surgery on a ventilator;1/ 2017    Depression     Hyperlipidemia     Hypertension     Ill-defined condition     high cholesterol    Ill-defined condition     genital herpes    Psychiatric disorder     depression, ADHD     Past Surgical History:   Procedure Laterality Date    HX ANKLE FRACTURE TX Right 01/03/2017    Shattered her ankle and was hospitalized     HX GYN      D&C X2    HX OTHER SURGICAL      colonoscopy     Allergies   Allergen Reactions    Pcn [Penicillins] Swelling     Current Outpatient Prescriptions   Medication Sig Dispense Refill    dextroamphetamine-amphetamine (ADDERALL) 20 mg tablet Take 1 Tab (20 mg total) by mouth two (2) times a dayEarliest Fill Date: 11/27/17. Max Daily Amount: 40 mg 60 Tab 0    doxepin (SINEQUAN) 50 mg capsule Take 1 to 3 tablets by mouth everyday at bedtime as needed 90 Cap 2    PRISTIQ 50 mg ER tablet Take 1 Tab by mouth daily. Medically necessary to dispense brand only 30 Tab 3    ASPIRIN (RIKY LOW STRENGTH PO) Take 81 mg by mouth daily.  cholecalciferol (VITAMIN D3) 1,000 unit cap Take  by mouth daily.  losartan (COZAAR) 50 mg tablet Take 1 Tab by mouth daily. 90 Tab 3    atorvastatin (LIPITOR) 20 mg tablet Take 1 Tab by mouth nightly. 90 Tab 3    oxyCODONE-acetaminophen (PERCOCET) 5-325 mg per tablet Take 1 Tab by mouth every four (4) hours as needed for Pain. Max Daily Amount: 6 Tabs.  30 Tab 0         Agree with nurses note. O: VS:   Visit Vitals    BP (!) 141/91 (BP 1 Location: Left arm, BP Patient Position: Sitting)    Pulse (!) 106    Temp 98.6 °F (37 °C) (Oral)    Resp 22    Ht 5' 4\" (1.626 m)    Wt 176 lb 6.4 oz (80 kg)    SpO2 96%    BMI 30.28 kg/m2     PAIN: No complaints of pain today. GENERAL: Razia Lewis is sitting on the table in no acute distress. Non-toxic. Well nourished. Well developed. Appropriately groomed. She is friendly, social and cooperative. HEAD:  Normocephalic. Atraumatic. Non tender sinuses x 4. EYE: PERRLA. EOMs intact. Sclera anicteric without injection. No drainage or discharge. EARS: Hearing intact bilaterally. External ear canals normal without evidence of blood or swelling. Bilateral TM's intact, pearly grey with landmarks visible. No erythema or effusion. NOSE: Patent. Nasal turbinates pink. No polyps noted. No erythema. No discharge. MOUTH: mucous membranes pink and moist. Posterior pharynx normal with cobblestone appearance. No erythema, white exudate or obstruction. NECK: supple. Midline trachea. No thyromegaly noted. RESP: Breath sounds are symmetrical bilaterally. Unlabored without SOB. Speaking in full sentences. Clear to auscultation bilaterally anteriorly and posteriorly with diminished sounds. No wheezes. No rales or rhonchi. CV: normal rate. Regular rhythm. S1, S2 audible. No murmur noted. No rubs, clicks or gallops noted. ABDOMEN: Flat without bulges or pulsations. Soft and nondistended. No tenderness on palpation. No masses or organomegaly. No rebound, rigidity or guarding. Bowel sounds normal x 4 quadrants. BACK: No visible deformities or curvature. Full ROM. No pain on palpation of the spinous processes in the cervical, thoracic, lumbar, sacral regions. No CVA tenderness. NEURO:  awake, alert and oriented to person, place, and time and event. Clear speech. Muscle strength is +5/5 x 4 extremities. Steady gait.    MUSC: Intact x 4 extremities. Full ROM x 4 extremities. No pain with movement. HEME/LYMPH: peripheral pulses palpable 2+ x 4 extremities. No peripheral edema is noted. No cervical adenopathy noted. SKIN: clean dry and intact throughout. No rashes, erythema, ecchymosis, lacerations, abrasions, suspicious moles noted. PSYCH: appropriate behavior, dress and thought processes. Good eye contact. Clear and coherent speech. Full affect. Good insight. Results for orders placed or performed in visit on 11/29/17   AMB POC HEMOGLOBIN A1C   Result Value Ref Range    Hemoglobin A1c (POC) 5.6 %         Assessment: Wellness examination    Plan:  Differential diagnosis and treatment options reviewed with patient who is in agreement with treatment plan as outlined below. ICD-10-CM ICD-9-CM    1. Encounter for wellness examination in adult Z00.00 V70.0    2. Depression, unspecified depression type F32.9 311 PRISTIQ 50 mg ER tablet   3. Elevated glucose R73.09 790.29 AMB POC HEMOGLOBIN A1C   4. Attention deficit hyperactivity disorder (ADHD), unspecified ADHD type F90.9 314.01    5. Encounter for smoking cessation counseling Z71.6 V65.42 nicotine (NICODERM CQ) 14 mg/24 hr patch    Z72.0 305.1      BP elevated today, \"I am worked up and didn't take my medicine yet\". No change in medication at this time, BP controlled at last visit. DASH diet discussed  Elevated glucose, HgbA1c pre-diabetic. Discussed watching diet and exercise to prevent developing DM  Discussed BMI and healthy weight. Encouraged patient to work to implement changes including diet high in raw fruits and vegetables, lean protein and good fats. Limit refined, processed carbohydrates and sugar. Encouraged regular exercise. Discussed at length controlled substance contract and her positive drug screening for marijuana, I had mailed her a letter with these results last month.   She disclosed at her initial visit with me this year that she smoked marijuana and at that time I discussed with her that she would not be able to smoke marijuana and be prescribed controlled substance and contract was signed. She states that she does not recall that part of the conversation, she thought that as long as she was upfront with what she was doing then there would not be a conflict. I showed her the contract that she signed and the part on the contract that states no marijuana. I reprinted this contract and gave her another copy. She is distraught about this because she \"has smoked weed for several years, I started in [place] and I do not know if I can give it up, I only use it at night to help me relax\". Discussed how this is a controlled substance and that the prescribing of it is should be monitored very carefully. Drug usage is also monitored by our practice and the [facility], since there has been a lot of abuse and diversion of controlled substances. She is advised that she must not to take unprescribed medications/illegal substances such as benzodiazepines, marijuana, cocaine/heroin or narcotics (not prescribed or authorized by this provider/provider at this practice) while being prescribed Adderall. If she has another positive drug screen, her contract will be terminated and she will not be prescribed controlled substances. She states that she understands (does not agree but says she understands). Smoking cessation discussed and encouraged.   Nicoderm patches prescribed per her request.   Discussed health maintenance screening guidelines  Advised on nutrition, physical activity, weight management, tobacco, alcohol and safety  Reviewed and given written instruction, see below      [address] [place] [address] [address] (Included in AVS):  · Attain and/or maintain a healthy weight (BMI between 18 and 30)  · Attain and/or maintain regular physical activity for 30 minutes 5 days/week   · Eat at least 5 servings of fruits and vegetables daily, preferably fresh  · Limit fast food and prepared foods  · Attain and/or maintain healthy blood pressure   · Attain and/or maintain no nicotine/tobacco use status  · Annual flu shot (or nasal mist as appropriate)  · Stay up-to-date with immunizations including tetanus, pertussis, HPV, & pneumonia  · Annual eye exam   · Biannual dental visits  · Annual skin cancer screening  · Annual gynecologic visit for women over age 24  · Annual prostate exam for black men starting at age 36, and for other races starting at age 48 (unless indicated earlier by history)  · Colonscopy every 8 years after age 48 (unless indicated more frequently by history)  · Consider daily 81mg aspirin for men over age 39 and women over age 54  · Annual screening labs: thyroid tests (TSH and T4), complete blood count, lipid panel (cholesterol), hemoglobin A1c (diabetes screening), comprehensive metabolic panel (includes kidney function, liver function, and electrolytes), vitamin D level, urinalysis (with urine culture and microalbumin as medically indicated)  · Consider annual testing for sexually transmitted infections including HIV  · Screening bone density testing in all women over age 72    Follow up three months or sooner if needed. Verbal and written instructions (see AVS) provided. Patient expresses understanding and agreement of diagnosis and treatment plan.

## 2017-11-29 NOTE — PATIENT INSTRUCTIONS
Well Visit, Women 48 to 72: Care Instructions  Your Care Instructions    Physical exams can help you stay healthy. Your doctor has checked your overall health and may have suggested ways to take good care of yourself. He or she also may have recommended tests. At home, you can help prevent illness with healthy eating, regular exercise, and other steps. Follow-up care is a key part of your treatment and safety. Be sure to make and go to all appointments, and call your doctor if you are having problems. It's also a good idea to know your test results and keep a list of the medicines you take. How can you care for yourself at home? · Reach and stay at a healthy weight. This will lower your risk for many problems, such as obesity, diabetes, heart disease, and high blood pressure. · Get at least 30 minutes of exercise on most days of the week. Walking is a good choice. You also may want to do other activities, such as running, swimming, cycling, or playing tennis or team sports. · Do not smoke. Smoking can make health problems worse. If you need help quitting, talk to your doctor about stop-smoking programs and medicines. These can increase your chances of quitting for good. · Protect your skin from too much sun. When you're outdoors from 10 a.m. to 4 p.m., stay in the shade or cover up with clothing and a hat with a wide brim. Wear sunglasses that block UV rays. Even when it's cloudy, put broad-spectrum sunscreen (SPF 30 or higher) on any exposed skin. · See a dentist one or two times a year for checkups and to have your teeth cleaned. · Wear a seat belt in the car. · Limit alcohol to 1 drink a day. Too much alcohol can cause health problems. Follow your doctor's advice about when to have certain tests. These tests can spot problems early. · Cholesterol.  Your doctor will tell you how often to have this done based on your age, family history, or other things that can increase your risk for heart attack and stroke. · Blood pressure. Have your blood pressure checked during a routine doctor visit. Your doctor will tell you how often to check your blood pressure based on your age, your blood pressure results, and other factors. · Mammogram. Ask your doctor how often you should have a mammogram, which is an X-ray of your breasts. A mammogram can spot breast cancer before it can be felt and when it is easiest to treat. · Pap test and pelvic exam. Ask your doctor how often you should have a Pap test. You may not need to have a Pap test as often as you used to. · Vision. Have your eyes checked every year or two or as often as your doctor suggests. Some experts recommend that you have yearly exams for glaucoma and other age-related eye problems starting at age 48. · Hearing. Tell your doctor if you notice any change in your hearing. You can have tests to find out how well you hear. · Diabetes. Ask your doctor whether you should have tests for diabetes. · Colon cancer. You should begin tests for colon cancer at age 48. You may have one of several tests. Your doctor will tell you how often to have tests based on your age and risk. Risks include whether you already had a precancerous polyp removed from your colon or whether your parents, sisters and brothers, or children have had colon cancer. · Thyroid disease. Talk to your doctor about whether to have your thyroid checked as part of a regular physical exam. Women have an increased chance of a thyroid problem. · Osteoporosis. You should begin tests for bone density at age 72. If you are younger than 72, ask your doctor whether you have factors that may increase your risk for this disease. You may want to have this test before age 72. · Heart attack and stroke risk. At least every 4 to 6 years, you should have your risk for heart attack and stroke assessed.  Your doctor uses factors such as your age, blood pressure, cholesterol, and whether you smoke or have diabetes to show what your risk for a heart attack or stroke is over the next 10 years. When should you call for help? Watch closely for changes in your health, and be sure to contact your doctor if you have any problems or symptoms that concern you. Where can you learn more? Go to http://stacie-nilda.info/. Enter U683 in the search box to learn more about \"Well Visit, Women 50 to 72: Care Instructions. \"  Current as of: May 12, 2017  Content Version: 11.4  © 2647-6636 [employer]. Care instructions adapted under license by [employer] (which disclaims liability or warranty for this information). If you have questions about a medical condition or this instruction, always ask your healthcare professional. [address] any warranty or liability for your use of this information. Stopping Smoking: Care Instructions  Your Care Instructions    Cigarette smokers crave the nicotine in cigarettes. Giving it up is much harder than simply changing a habit. Your body has to stop craving the nicotine. It is hard to quit, but you can do it. There are many tools that people use to quit smoking. You may find that combining tools works best for you. There are several steps to quitting. First you get ready to quit. Then you get support to help you. After that, you learn new skills and behaviors to become a nonsmoker. For many people, a necessary step is getting and using medicine. Your doctor will help you set up the plan that best meets your needs. You may want to attend a smoking cessation program to help you quit smoking. When you choose a program, look for one that has proven success. Ask your doctor for ideas. You will greatly increase your chances of success if you take medicine as well as get counseling or join a cessation program.  Some of the changes you feel when you first quit tobacco are uncomfortable.  Your body will miss the nicotine at first, and you may feel short-tempered and grumpy. You may have trouble sleeping or concentrating. Medicine can help you deal with these symptoms. You may struggle with changing your smoking habits and rituals. The last step is the tricky one: Be prepared for the smoking urge to continue for a time. This is a lot to deal with, but keep at it. You will feel better. Follow-up care is a key part of your treatment and safety. Be sure to make and go to all appointments, and call your doctor if you are having problems. It's also a good idea to know your test results and keep a list of the medicines you take. How can you care for yourself at home? · Ask your family, friends, and coworkers for support. You have a better chance of quitting if you have help and support. · Join a support group, such as Nicotine Anonymous, for people who are trying to quit smoking. · Consider signing up for a smoking cessation program, such as the American Lung Association's Freedom from Smoking program.  · Set a quit date. Pick your date carefully so that it is not right in the middle of a big deadline or stressful time. Once you quit, do not even take a puff. Get rid of all ashtrays and lighters after your last cigarette. Clean your house and your clothes so that they do not smell of smoke. · Learn how to be a nonsmoker. Think about ways you can avoid those things that make you reach for a cigarette. ¨ Avoid situations that put you at greatest risk for smoking. For some people, it is hard to have a drink with friends without smoking. For others, they might skip a coffee break with coworkers who smoke. ¨ Change your daily routine. Take a different route to work or eat a meal in a different place. · Cut down on stress. Calm yourself or release tension by doing an activity you enjoy, such as reading a book, taking a hot bath, or gardening.   · Talk to your doctor or pharmacist about nicotine replacement therapy, which replaces the nicotine in your body. You still get nicotine but you do not use tobacco. Nicotine replacement products help you slowly reduce the amount of nicotine you need. These products come in several forms, many of them available over-the-counter:  ¨ Nicotine patches  ¨ Nicotine gum and lozenges  ¨ Nicotine inhaler  · Ask your doctor about bupropion (Wellbutrin) or varenicline (Chantix), which are prescription medicines. They do not contain nicotine. They help you by reducing withdrawal symptoms, such as stress and anxiety. · Some people find hypnosis, acupuncture, and massage helpful for ending the smoking habit. · Eat a healthy diet and get regular exercise. Having healthy habits will help your body move past its craving for nicotine. · Be prepared to keep trying. Most people are not successful the first few times they try to quit. Do not get mad at yourself if you smoke again. Make a list of things you learned and think about when you want to try again, such as next week, next month, or next year. Where can you learn more? Go to http://stacie-nilda.info/. Enter Y520 in the search box to learn more about \"Stopping Smoking: Care Instructions. \"  Current as of: March 20, 2017  Content Version: 11.4  © 3597-4213 [employer], [employer]. Care instructions adapted under license by [employer] (which disclaims liability or warranty for this information). If you have questions about a medical condition or this instruction, always ask your healthcare professional. [place] [address] any warranty or liability for your use of this information.

## 2017-11-29 NOTE — MR AVS SNAPSHOT
Visit Information Date & Time Provider Department Dept. Phone Encounter #  
 11/29/2017  9:30 AM Jannet King NP [facility] [address] [address] [phone] [id number] Follow-up Instructions Return in about 3 months (around 2/28/2018), or if symptoms worsen or fail to improve. Upcoming Health Maintenance Date Due  
 BREAST CANCER SCRN MAMMOGRAM 3/18/1980 PAP AKA CERVICAL CYTOLOGY 9/1/2019 DTaP/Tdap/Td series (2 - Td) 6/12/2027 COLONOSCOPY 6/12/2027 Allergies as of 11/29/2017  Review Complete On: 11/29/2017 By: Jannet King NP Severity Noted Reaction Type Reactions Pcn [Penicillins]  01/02/2017    Swelling Current Immunizations  Never Reviewed No immunizations on file. Not reviewed this visit You Were Diagnosed With   
  
 Codes Comments Encounter for wellness examination in adult    -  Primary ICD-10-CM: Z00.00 ICD-9-CM: V70.0 Depression, unspecified depression type     ICD-10-CM: F32.9 ICD-9-CM: 416 Elevated glucose     ICD-10-CM: R73.09 
ICD-9-CM: 790.29 Attention deficit hyperactivity disorder (ADHD), unspecified ADHD type     ICD-10-CM: F90.9 ICD-9-CM: 314.01 Encounter for smoking cessation counseling     ICD-10-CM: Z71.6, Z72.0 ICD-9-CM: V65.42, 305.1 Vitals BP Pulse Temp Resp Height(growth percentile) Weight(growth percentile) (!) 141/91 (BP 1 Location: Left arm, BP Patient Position: Sitting) (!) 106 98.6 °F (37 °C) (Oral) 22 5' 4\" (1.626 m) 176 lb 6.4 oz (80 kg) SpO2 BMI OB Status Smoking Status 96% 30.28 kg/m2 Postmenopausal Current Every Day Smoker Vitals History BMI and BSA Data Body Mass Index Body Surface Area  
 30.28 kg/m 2 1.9 m 2 Preferred Pharmacy Pharmacy Name Phone [facility] [phone] Your Updated Medication List  
  
   
 This list is accurate as of: 17 11:00 AM.  Always use your most recent med list.  
  
  
  
  
 atorvastatin 20 mg tablet Commonly known as:  LIPITOR Take 1 Tab by mouth nightly. RIKY LOW STRENGTH PO Take 81 mg by mouth daily. dextroamphetamine-amphetamine 20 mg tablet Commonly known as:  ADDERALL Take 1 Tab (20 mg total) by mouth two (2) times a dayEarliest Fill Date: 17. Max Daily Amount: 40 mg  
  
 doxepin 50 mg capsule Commonly known as:  SINEquan Take 1 to 3 tablets by mouth everyday at bedtime as needed  
  
 losartan 50 mg tablet Commonly known as:  COZAAR Take 1 Tab by mouth daily. nicotine 14 mg/24 hr patch Commonly known as:  NICODERM CQ  
1 Patch by TransDERmal route every twenty-four (24) hours for 30 days. PRISTIQ 50 mg ER tablet Generic drug:  desvenlafaxine succinate Take 1 Tab by mouth daily. Medically necessary to dispense brand only VITAMIN D3 1,000 unit Cap Generic drug:  cholecalciferol Take  by mouth daily. Prescriptions Sent to Pharmacy Refills PRISTIQ 50 mg ER tablet 3 Sig: Take 1 Tab by mouth daily. Medically necessary to dispense brand only Class: Normal  
 Pharmacy: [address] Ph #: [phone] Route: Oral  
 nicotine (NICODERM CQ) 14 mg/24 hr patch 0 Si Patch by TransDERmal route every twenty-four (24) hours for 30 days. Class: Normal  
 Pharmacy: [address] Ph #: [phone] Route: TransDERmal  
  
We Performed the Following AMB POC HEMOGLOBIN A1C [94693 CPT(R)] Follow-up Instructions Return in about 3 months (around 2018), or if symptoms worsen or fail to improve. Patient Instructions Well Visit, Women 48 to 72: Care Instructions Your Care Instructions Physical exams can help you stay healthy. Your doctor has checked your overall health and may have suggested ways to take good care of yourself. He or she also may have recommended tests. At home, you can help prevent illness with healthy eating, regular exercise, and other steps. Follow-up care is a key part of your treatment and safety. Be sure to make and go to all appointments, and call your doctor if you are having problems. It's also a good idea to know your test results and keep a list of the medicines you take. How can you care for yourself at home? · Reach and stay at a healthy weight. This will lower your risk for many problems, such as obesity, diabetes, heart disease, and high blood pressure. · Get at least 30 minutes of exercise on most days of the week. Walking is a good choice. You also may want to do other activities, such as running, swimming, cycling, or playing tennis or team sports. · Do not smoke. Smoking can make health problems worse. If you need help quitting, talk to your doctor about stop-smoking programs and medicines. These can increase your chances of quitting for good. · Protect your skin from too much sun. When you're outdoors from 10 a.m. to 4 p.m., stay in the shade or cover up with clothing and a hat with a wide brim. Wear sunglasses that block UV rays. Even when it's cloudy, put broad-spectrum sunscreen (SPF 30 or higher) on any exposed skin. · See a dentist one or two times a year for checkups and to have your teeth cleaned. · Wear a seat belt in the car. · Limit alcohol to 1 drink a day. Too much alcohol can cause health problems. Follow your doctor's advice about when to have certain tests. These tests can spot problems early. · Cholesterol. Your doctor will tell you how often to have this done based on your age, family history, or other things that can increase your risk for heart attack and stroke. · Blood pressure. Have your blood pressure checked during a routine doctor visit. Your doctor will tell you how often to check your blood pressure based on your age, your blood pressure results, and other factors. · Mammogram. Ask your doctor how often you should have a mammogram, which is an X-ray of your breasts. A mammogram can spot breast cancer before it can be felt and when it is easiest to treat. · Pap test and pelvic exam. Ask your doctor how often you should have a Pap test. You may not need to have a Pap test as often as you used to. · Vision. Have your eyes checked every year or two or as often as your doctor suggests. Some experts recommend that you have yearly exams for glaucoma and other age-related eye problems starting at age 48. · Hearing. Tell your doctor if you notice any change in your hearing. You can have tests to find out how well you hear. · Diabetes. Ask your doctor whether you should have tests for diabetes. · Colon cancer. You should begin tests for colon cancer at age 48. You may have one of several tests. Your doctor will tell you how often to have tests based on your age and risk. Risks include whether you already had a precancerous polyp removed from your colon or whether your parents, sisters and brothers, or children have had colon cancer. · Thyroid disease. Talk to your doctor about whether to have your thyroid checked as part of a regular physical exam. Women have an increased chance of a thyroid problem. · Osteoporosis. You should begin tests for bone density at age 72. If you are younger than 72, ask your doctor whether you have factors that may increase your risk for this disease. You may want to have this test before age 72. · Heart attack and stroke risk. At least every 4 to 6 years, you should have your risk for heart attack and stroke assessed.  Your doctor uses factors such as your age, blood pressure, cholesterol, and whether you smoke or have diabetes to show what your risk for a heart attack or stroke is over the next 10 years. When should you call for help? Watch closely for changes in your health, and be sure to contact your doctor if you have any problems or symptoms that concern you. Where can you learn more? Go to http://stacie-nilda.info/. Enter B890 in the search box to learn more about \"Well Visit, Women 50 to 72: Care Instructions. \" Current as of: May 12, 2017 Content Version: 11.4 © 0315-5477 [employer]. Care instructions adapted under license by [employer] (which disclaims liability or warranty for this information). If you have questions about a medical condition or this instruction, always ask your healthcare professional. [address] any warranty or liability for your use of this information. Stopping Smoking: Care Instructions Your Care Instructions Cigarette smokers crave the nicotine in cigarettes. Giving it up is much harder than simply changing a habit. Your body has to stop craving the nicotine. It is hard to quit, but you can do it. There are many tools that people use to quit smoking. You may find that combining tools works best for you. There are several steps to quitting. First you get ready to quit. Then you get support to help you. After that, you learn new skills and behaviors to become a nonsmoker. For many people, a necessary step is getting and using medicine. Your doctor will help you set up the plan that best meets your needs. You may want to attend a smoking cessation program to help you quit smoking. When you choose a program, look for one that has proven success. Ask your doctor for ideas.  You will greatly increase your chances of success if you take medicine as well as get counseling or join a cessation program. 
Some of the changes you feel when you first quit tobacco are uncomfortable. Your body will miss the nicotine at first, and you may feel short-tempered and grumpy. You may have trouble sleeping or concentrating. Medicine can help you deal with these symptoms. You may struggle with changing your smoking habits and rituals. The last step is the tricky one: Be prepared for the smoking urge to continue for a time. This is a lot to deal with, but keep at it. You will feel better. Follow-up care is a key part of your treatment and safety. Be sure to make and go to all appointments, and call your doctor if you are having problems. It's also a good idea to know your test results and keep a list of the medicines you take. How can you care for yourself at home? · Ask your family, friends, and coworkers for support. You have a better chance of quitting if you have help and support. · Join a support group, such as Nicotine Anonymous, for people who are trying to quit smoking. · Consider signing up for a smoking cessation program, such as the American Lung Association's Freedom from Smoking program. 
· Set a quit date. Pick your date carefully so that it is not right in the middle of a big deadline or stressful time. Once you quit, do not even take a puff. Get rid of all ashtrays and lighters after your last cigarette. Clean your house and your clothes so that they do not smell of smoke. · Learn how to be a nonsmoker. Think about ways you can avoid those things that make you reach for a cigarette. ¨ Avoid situations that put you at greatest risk for smoking. For some people, it is hard to have a drink with friends without smoking. For others, they might skip a coffee break with coworkers who smoke. ¨ Change your daily routine. Take a different route to work or eat a meal in a different place. · Cut down on stress. Calm yourself or release tension by doing an activity you enjoy, such as reading a book, taking a hot bath, or gardening. · Talk to your doctor or pharmacist about nicotine replacement therapy, which replaces the nicotine in your body. You still get nicotine but you do not use tobacco. Nicotine replacement products help you slowly reduce the amount of nicotine you need. These products come in several forms, many of them available over-the-counter: ¨ Nicotine patches ¨ Nicotine gum and lozenges ¨ Nicotine inhaler · Ask your doctor about bupropion (Wellbutrin) or varenicline (Chantix), which are prescription medicines. They do not contain nicotine. They help you by reducing withdrawal symptoms, such as stress and anxiety. · Some people find hypnosis, acupuncture, and massage helpful for ending the smoking habit. · Eat a healthy diet and get regular exercise. Having healthy habits will help your body move past its craving for nicotine. · Be prepared to keep trying. Most people are not successful the first few times they try to quit. Do not get mad at yourself if you smoke again. Make a list of things you learned and think about when you want to try again, such as next week, next month, or next year. Where can you learn more? Go to http://stacieVeaconnilda.info/. Enter A567 in the search box to learn more about \"Stopping Smoking: Care Instructions. \" Current as of: March 20, 2017 Content Version: 11.4 © 6910-9193 [employer]. Care instructions adapted under license by [employer] (which disclaims liability or warranty for this information). If you have questions about a medical condition or this instruction, always ask your healthcare professional. [place] [address] any warranty or liability for your use of this information. Introducing [facility]! [facility] introduces [employer] patient portal. Now you can access parts of your medical record, email your doctor's office, and request medication refills online.    
 
1. In your internet browser, go to [email or web address] 2. Click on the First Time User? Click Here link in the Sign In box. You will see the New Member Sign Up page. 3. Enter your [employer] Access Code exactly as it appears below. You will not need to use this code after youve completed the sign-up process. If you do not sign up before the expiration date, you must request a new code. · [employer] Access Code: [email or web address] Expires: 2/27/2018 11:00 AM 
 
4. Enter the last four digits of your Social Security Number (xxxx) and Date of Birth (mm/dd/yyyy) as indicated and click Submit. You will be taken to the next sign-up page. 5. Create a [employer] ID. This will be your [employer] login ID and cannot be changed, so think of one that is secure and easy to remember. 6. Create a [employer] password. You can change your password at any time. 7. Enter your Password Reset Question and Answer. This can be used at a later time if you forget your password. 8. Enter your e-mail address. You will receive e-mail notification when new information is available in [address] 9. Click Sign Up. You can now view and download portions of your medical record. 10. Click the Download Summary menu link to download a portable copy of your medical information. If you have questions, please visit the Frequently Asked Questions section of the [employer] website. Remember, [employer] is NOT to be used for urgent needs. For medical emergencies, dial 911. Now available from your iPhone and Android! Please provide this summary of care documentation to your next provider. Your primary care clinician is listed as LAKEISHA EDOUARD. If you have any questions after today's visit, please call [phone].

## 2017-11-29 NOTE — PROGRESS NOTES
Chief Complaint   Patient presents with    Physical     1. Have you been to the ER, urgent care clinic since your last visit? Hospitalized since your last visit? Yes When: [facility] for surgery on right thumb    2. Have you seen or consulted any other health care providers outside of the [address] [place] [address] since your last visit? Include any pap smears or colon screening. No         Pt declines flu vaccine. Pt declines urine sample.

## 2017-12-07 ENCOUNTER — TELEPHONE (OUTPATIENT)
Dept: FAMILY MEDICINE CLINIC | Age: 57
End: 2017-12-07

## 2017-12-07 NOTE — TELEPHONE ENCOUNTER
Patient calling to speak to nurse about nicotene patch. She says that she thinks Juanita skipped the first step patch.

## 2017-12-08 NOTE — TELEPHONE ENCOUNTER
Pt states that she is ready to quit smoking but has not drastically cut back yet. She is currently still smoking about 1/2 pack a day.

## 2017-12-08 NOTE — TELEPHONE ENCOUNTER
She could try the lower dose patch but she said that she had drastically cut back on smoking already, so I did not feel the need to give her the highest dose. Can you confirm with her how much she is smoking per day?

## 2017-12-11 RX ORDER — IBUPROFEN 200 MG
1 TABLET ORAL EVERY 24 HOURS
Qty: 30 PATCH | Refills: 0 | Status: SHIPPED | OUTPATIENT
Start: 2017-12-11 | End: 2018-01-10

## 2018-01-24 DIAGNOSIS — F90.9 ADULT ADHD: ICD-10-CM

## 2018-01-25 RX ORDER — DEXTROAMPHETAMINE SACCHARATE, AMPHETAMINE ASPARTATE, DEXTROAMPHETAMINE SULFATE AND AMPHETAMINE SULFATE 5; 5; 5; 5 MG/1; MG/1; MG/1; MG/1
20 TABLET ORAL 2 TIMES DAILY
Qty: 60 TAB | Refills: 0 | Status: SHIPPED | OUTPATIENT
Start: 2018-01-25 | End: 2018-03-13 | Stop reason: SDUPTHER

## 2018-01-31 ENCOUNTER — CLINICAL SUPPORT (OUTPATIENT)
Dept: FAMILY MEDICINE CLINIC | Age: 58
End: 2018-01-31

## 2018-01-31 DIAGNOSIS — Z51.89 ENCOUNTER FOR MONITORING OF PATIENT COMPLIANCE IN DRUG TREATMENT PROGRAM: Primary | ICD-10-CM

## 2018-02-06 LAB — DRUGS UR: NORMAL

## 2018-02-28 ENCOUNTER — OFFICE VISIT (OUTPATIENT)
Dept: FAMILY MEDICINE CLINIC | Age: 58
End: 2018-02-28

## 2018-02-28 VITALS
TEMPERATURE: 97.9 F | SYSTOLIC BLOOD PRESSURE: 137 MMHG | DIASTOLIC BLOOD PRESSURE: 89 MMHG | HEART RATE: 94 BPM | OXYGEN SATURATION: 97 % | HEIGHT: 64 IN | BODY MASS INDEX: 29.02 KG/M2 | WEIGHT: 170 LBS | RESPIRATION RATE: 16 BRPM

## 2018-02-28 DIAGNOSIS — J06.9 UPPER RESPIRATORY TRACT INFECTION, UNSPECIFIED TYPE: ICD-10-CM

## 2018-02-28 DIAGNOSIS — J02.9 SORE THROAT: Primary | ICD-10-CM

## 2018-02-28 LAB
S PYO AG THROAT QL: NEGATIVE
VALID INTERNAL CONTROL?: YES

## 2018-02-28 RX ORDER — AZITHROMYCIN 250 MG/1
TABLET, FILM COATED ORAL
Qty: 6 TAB | Refills: 0 | Status: SHIPPED | OUTPATIENT
Start: 2018-02-28 | End: 2018-03-05

## 2018-02-28 NOTE — PROGRESS NOTES
Chief Complaint   Patient presents with    Sore Throat     Patient is here to be seen for sore throat x 3 days.

## 2018-02-28 NOTE — LETTER
NOTIFICATION RETURN TO WORK / SCHOOL 
 
2/28/2018 1:47 PM 
 
Ms. Gloria Colin [address]  [id number] To Whom It May Concern: 
 
Gloria Colin is currently under the care of [address] [address]. She was seen today, February 28, and will return to work on Saturday, March 3, 2018. If there are questions or concerns please have the patient contact our office.  
 
 
 
Sincerely, 
 
 
Colonel Mariana MD

## 2018-02-28 NOTE — PROGRESS NOTES
ESTER Tony is a 56 y.o. female who complains of sore throat, swollen glands, nasal blockage and productive cough for 3 days. She denies a history of dizziness, fatigue, fevers, nausea, shortness of breath and vomiting and denies a history of asthma. Patient does smoke cigarettes.     Respiratory ROS: no cough, shortness of breath, or wheezing    ROS:  Per HPI    Allergies   Allergen Reactions    Pcn [Penicillins] Swelling           Past Medical History:   Diagnosis Date    ADHD (attention deficit hyperactivity disorder)     Adverse effect of anesthesia     patient states she was in the ICU s/p ankle surgery on a ventilator;1/ 2017    Depression     Hyperlipidemia     Hypertension     Ill-defined condition     high cholesterol    Ill-defined condition     genital herpes    Psychiatric disorder     depression, ADHD       History   Smoking Status    Current Every Day Smoker    Packs/day: 0.25   Smokeless Tobacco    Never Used       Social History     Social History    Marital status:      Spouse name: N/A    Number of children: N/A    Years of education: N/A     Social History Main Topics    Smoking status: Current Every Day Smoker     Packs/day: 0.25    Smokeless tobacco: Never Used    Alcohol use 1.2 oz/week     2 Shots of liquor per week      Comment: on occasion    Drug use: Yes     Special: Marijuana      Comment: last used 11/28/17    Sexual activity: Yes     Partners: Male     Other Topics Concern    None     Social History Narrative       Family History   Problem Relation Age of Onset    Diabetes Father     Heart Disease Father          PE:  Visit Vitals    /89 (BP 1 Location: Left arm, BP Patient Position: Sitting)    Pulse 94    Temp 97.9 °F (36.6 °C) (Oral)    Resp 16    Ht 5' 4\" (1.626 m)    Wt 170 lb (77.1 kg)    SpO2 97%    BMI 29.18 kg/m2     Gen: alert, oriented, no acute distress  Head: normocephalic, atraumatic  Ears: external auditory canals clear, TMs normal bilaterally  Eyes:  sclera clear, conjunctiva clear  Nose: Sinuses nontender to palpation. Normal turbinates. No nasal drainage. Oral: moist mucus membranes, no oral lesions, no pharyngeal exudate or erythema  Neck:  No LAD  Resp: Normal work of breathing, lungs CTAB, no w/r/r  CV: S1, S2 normal.  No murmurs, rubs, or gallops. Results for orders placed or performed in visit on 02/28/18   AMB POC RAPID STREP A   Result Value Ref Range    VALID INTERNAL CONTROL POC Yes     Group A Strep Ag Negative Negative         ASSESSMENT:   1. Sore throat    2. Upper respiratory tract infection, unspecified type          PLAN:  Symptomatic therapy suggested: push fluids, rest, use ibuprofen, aspirin prn and return office visit prn if symptoms persist or worsen. Call or return to clinic prn if these symptoms worsen or fail to improve as anticipated. Delayed Rx given to patient, advised to start taking if no improvement in symptoms in the next 2-3 days. Orders Placed This Encounter    AMB POC RAPID STREP A    azithromycin (ZITHROMAX) 250 mg tablet     Sig: Take 2 tablets today, then take 1 tablet daily     Dispense:  6 Tab     Refill:  0       Verbal and written instructions (see AVS) provided.  Patient expresses understanding of diagnosis and treatment plan.     Wiley Denis MD

## 2018-03-13 DIAGNOSIS — F90.9 ADULT ADHD: ICD-10-CM

## 2018-03-14 RX ORDER — DEXTROAMPHETAMINE SACCHARATE, AMPHETAMINE ASPARTATE, DEXTROAMPHETAMINE SULFATE AND AMPHETAMINE SULFATE 5; 5; 5; 5 MG/1; MG/1; MG/1; MG/1
20 TABLET ORAL 2 TIMES DAILY
Qty: 60 TAB | Refills: 0 | Status: SHIPPED | OUTPATIENT
Start: 2018-03-14 | End: 2018-05-16 | Stop reason: SDUPTHER

## 2018-04-03 DIAGNOSIS — F32.A DEPRESSION, UNSPECIFIED DEPRESSION TYPE: ICD-10-CM

## 2018-04-03 DIAGNOSIS — G47.00 INSOMNIA, UNSPECIFIED TYPE: ICD-10-CM

## 2018-04-03 DIAGNOSIS — E78.5 HYPERLIPIDEMIA, UNSPECIFIED HYPERLIPIDEMIA TYPE: ICD-10-CM

## 2018-04-03 DIAGNOSIS — I10 ESSENTIAL HYPERTENSION: ICD-10-CM

## 2018-04-03 NOTE — TELEPHONE ENCOUNTER
Pt is calling wanting refills on med all but adderall, aspirin and vitamin D she is wanting Dr to add refills to the rest of the med she doesn't want this called in to either pharmacy and she doesn't want this in scripts she states Dr Rema Méndez can just add refills to med and she will make sure it gets where it needs to go can this be done

## 2018-04-04 RX ORDER — DESVENLAFAXINE SUCCINATE 50 MG/1
50 TABLET, EXTENDED RELEASE ORAL DAILY
Qty: 30 TAB | Refills: 3 | Status: SHIPPED | OUTPATIENT
Start: 2018-04-04 | End: 2018-08-06 | Stop reason: SDUPTHER

## 2018-04-04 RX ORDER — DOXEPIN HYDROCHLORIDE 50 MG/1
CAPSULE ORAL
Qty: 270 CAP | Refills: 3 | Status: SHIPPED | OUTPATIENT
Start: 2018-04-04 | End: 2018-09-11 | Stop reason: SDUPTHER

## 2018-04-04 RX ORDER — LOSARTAN POTASSIUM 50 MG/1
50 TABLET ORAL DAILY
Qty: 90 TAB | Refills: 3 | Status: SHIPPED | OUTPATIENT
Start: 2018-04-04 | End: 2019-04-15 | Stop reason: SDUPTHER

## 2018-04-04 RX ORDER — ATORVASTATIN CALCIUM 20 MG/1
20 TABLET, FILM COATED ORAL
Qty: 90 TAB | Refills: 3 | Status: SHIPPED | OUTPATIENT
Start: 2018-04-04 | End: 2019-04-15 | Stop reason: SDUPTHER

## 2018-04-12 ENCOUNTER — TELEPHONE (OUTPATIENT)
Dept: FAMILY MEDICINE CLINIC | Age: 58
End: 2018-04-12

## 2018-04-12 RX ORDER — ACYCLOVIR 200 MG/1
200 CAPSULE ORAL
Qty: 50 CAP | Refills: 0 | Status: SHIPPED | OUTPATIENT
Start: 2018-04-12 | End: 2018-10-03 | Stop reason: SDUPTHER

## 2018-04-12 NOTE — TELEPHONE ENCOUNTER
Pt is calling requesting Juanita to refill a med she hasn't filled before and she states it is    Acyclovir 200 mg cap takes 4 a day for 10 days when you have a flare up

## 2018-05-16 DIAGNOSIS — F90.9 ADULT ADHD: ICD-10-CM

## 2018-05-16 RX ORDER — DEXTROAMPHETAMINE SACCHARATE, AMPHETAMINE ASPARTATE, DEXTROAMPHETAMINE SULFATE AND AMPHETAMINE SULFATE 5; 5; 5; 5 MG/1; MG/1; MG/1; MG/1
20 TABLET ORAL 2 TIMES DAILY
Qty: 60 TAB | Refills: 0 | Status: SHIPPED | OUTPATIENT
Start: 2018-05-16 | End: 2018-07-12 | Stop reason: SDUPTHER

## 2018-05-16 NOTE — TELEPHONE ENCOUNTER
Message from [facility]    Patient needs a refill of Adderall. Her number is [phone]. Requested Prescriptions     Pending Prescriptions Disp Refills    dextroamphetamine-amphetamine (ADDERALL) 20 mg tablet 60 Tab 0     Sig: Take 1 Tab (20 mg total) by mouth two (2) times a day.   Max Daily Amount: 40 mg

## 2018-07-12 DIAGNOSIS — F90.9 ADULT ADHD: ICD-10-CM

## 2018-07-13 RX ORDER — DEXTROAMPHETAMINE SACCHARATE, AMPHETAMINE ASPARTATE, DEXTROAMPHETAMINE SULFATE AND AMPHETAMINE SULFATE 5; 5; 5; 5 MG/1; MG/1; MG/1; MG/1
20 TABLET ORAL 2 TIMES DAILY
Qty: 60 TAB | Refills: 0 | Status: SHIPPED | OUTPATIENT
Start: 2018-07-13 | End: 2018-09-18 | Stop reason: SDUPTHER

## 2018-08-06 DIAGNOSIS — F32.A DEPRESSION, UNSPECIFIED DEPRESSION TYPE: ICD-10-CM

## 2018-08-06 RX ORDER — DESVENLAFAXINE SUCCINATE 50 MG/1
50 TABLET, EXTENDED RELEASE ORAL DAILY
Qty: 30 TAB | Refills: 0 | Status: SHIPPED | OUTPATIENT
Start: 2018-08-06 | End: 2018-09-10 | Stop reason: SDUPTHER

## 2018-09-10 DIAGNOSIS — F32.A DEPRESSION, UNSPECIFIED DEPRESSION TYPE: ICD-10-CM

## 2018-09-10 RX ORDER — DESVENLAFAXINE SUCCINATE 50 MG/1
50 TABLET, EXTENDED RELEASE ORAL DAILY
Qty: 30 TAB | Refills: 0 | Status: SHIPPED | OUTPATIENT
Start: 2018-09-10 | End: 2018-10-03 | Stop reason: SDUPTHER

## 2018-09-10 NOTE — TELEPHONE ENCOUNTER
Pt is calling requesting a refill on her med     Requested Prescriptions     Pending Prescriptions Disp Refills    PRISTIQ 50 mg ER tablet 30 Tab 0     Sig: Take 1 Tab by mouth daily.  Medically necessary to dispense brand only

## 2018-09-11 DIAGNOSIS — G47.00 INSOMNIA, UNSPECIFIED TYPE: ICD-10-CM

## 2018-09-11 DIAGNOSIS — F32.A DEPRESSION, UNSPECIFIED DEPRESSION TYPE: ICD-10-CM

## 2018-09-11 RX ORDER — DOXEPIN HYDROCHLORIDE 50 MG/1
CAPSULE ORAL
Qty: 60 CAP | Refills: 0 | Status: SHIPPED | OUTPATIENT
Start: 2018-09-11 | End: 2018-10-03 | Stop reason: SDUPTHER
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
## 2018-09-11 NOTE — TELEPHONE ENCOUNTER
Pt is calling requesting a refill on med needs done today if possible    Requested Prescriptions     Pending Prescriptions Disp Refills    doxepin (SINEQUAN) 50 mg capsule 270 Cap 3     Sig: Take 1 to 3 tablets by mouth everyday at bedtime as needed

## 2018-09-12 ENCOUNTER — DOCUMENTATION ONLY (OUTPATIENT)
Dept: FAMILY MEDICINE CLINIC | Age: 58
End: 2018-09-12

## 2018-09-18 ENCOUNTER — TELEPHONE (OUTPATIENT)
Dept: FAMILY MEDICINE CLINIC | Age: 58
End: 2018-09-18

## 2018-09-18 DIAGNOSIS — F90.9 ADULT ADHD: ICD-10-CM

## 2018-09-18 RX ORDER — DEXTROAMPHETAMINE SACCHARATE, AMPHETAMINE ASPARTATE, DEXTROAMPHETAMINE SULFATE AND AMPHETAMINE SULFATE 5; 5; 5; 5 MG/1; MG/1; MG/1; MG/1
20 TABLET ORAL 2 TIMES DAILY
Qty: 60 TAB | Refills: 0 | Status: SHIPPED | OUTPATIENT
Start: 2018-09-18 | End: 2018-10-15 | Stop reason: SDUPTHER

## 2018-09-18 NOTE — TELEPHONE ENCOUNTER
Pt is calling for a refill on her med    Requested Prescriptions     Pending Prescriptions Disp Refills    dextroamphetamine-amphetamine (ADDERALL) 20 mg tablet 60 Tab 0     Sig: Take 1 Tab (20 mg total) by mouth two (2) times a day.   Max Daily Amount: 40 mg

## 2018-09-18 NOTE — TELEPHONE ENCOUNTER
Called pt and informed her that her rx is ready for .  Also scheduled her for an appt for 9/28/18 @ 1:30pm for follow-up

## 2018-10-03 ENCOUNTER — OFFICE VISIT (OUTPATIENT)
Dept: FAMILY MEDICINE CLINIC | Age: 58
End: 2018-10-03

## 2018-10-03 VITALS
RESPIRATION RATE: 16 BRPM | BODY MASS INDEX: 29.19 KG/M2 | SYSTOLIC BLOOD PRESSURE: 118 MMHG | HEART RATE: 97 BPM | WEIGHT: 171 LBS | DIASTOLIC BLOOD PRESSURE: 76 MMHG | OXYGEN SATURATION: 94 % | TEMPERATURE: 98 F | HEIGHT: 64 IN

## 2018-10-03 DIAGNOSIS — Z51.89 ENCOUNTER FOR PATIENT COMPLIANCE MONITORING IN DRUG TREATMENT PROGRAM: ICD-10-CM

## 2018-10-03 DIAGNOSIS — E78.5 HYPERLIPIDEMIA, UNSPECIFIED HYPERLIPIDEMIA TYPE: ICD-10-CM

## 2018-10-03 DIAGNOSIS — I10 HYPERTENSION, UNSPECIFIED TYPE: ICD-10-CM

## 2018-10-03 DIAGNOSIS — F90.9 ATTENTION DEFICIT HYPERACTIVITY DISORDER (ADHD), UNSPECIFIED ADHD TYPE: Primary | ICD-10-CM

## 2018-10-03 DIAGNOSIS — Z86.19 HISTORY OF HERPES GENITALIS: ICD-10-CM

## 2018-10-03 DIAGNOSIS — G47.00 INSOMNIA, UNSPECIFIED TYPE: ICD-10-CM

## 2018-10-03 DIAGNOSIS — E55.9 VITAMIN D INSUFFICIENCY: ICD-10-CM

## 2018-10-03 DIAGNOSIS — Z13.29 SCREENING FOR THYROID DISORDER: ICD-10-CM

## 2018-10-03 DIAGNOSIS — Z12.39 SPECIAL SCREENING EXAMINATION FOR NEOPLASM OF BREAST: ICD-10-CM

## 2018-10-03 DIAGNOSIS — F32.A DEPRESSION, UNSPECIFIED DEPRESSION TYPE: ICD-10-CM

## 2018-10-03 DIAGNOSIS — Z86.79 HISTORY OF CAROTID STENOSIS: ICD-10-CM

## 2018-10-03 RX ORDER — ACYCLOVIR 200 MG/1
200 CAPSULE ORAL
Qty: 50 CAP | Refills: 2 | Status: SHIPPED | OUTPATIENT
Start: 2018-10-03 | End: 2019-05-02 | Stop reason: SDUPTHER

## 2018-10-03 RX ORDER — DESVENLAFAXINE SUCCINATE 50 MG/1
50 TABLET, EXTENDED RELEASE ORAL DAILY
Qty: 30 TAB | Refills: 3 | Status: SHIPPED | OUTPATIENT
Start: 2018-10-03 | End: 2019-01-22 | Stop reason: SDUPTHER

## 2018-10-03 RX ORDER — DOXEPIN HYDROCHLORIDE 50 MG/1
CAPSULE ORAL
Qty: 270 CAP | Refills: 3 | Status: SHIPPED | OUTPATIENT
Start: 2018-10-03 | End: 2019-11-14 | Stop reason: SDUPTHER

## 2018-10-03 NOTE — MR AVS SNAPSHOT
[address] [facility] 
 
 
 [address] [place] [address] 
[phone] Patient: Sampson Melo MRN: [id number] Visit Information Date & Time Provider Department Dept. Phone Encounter #  
 10/3/2018  1:30 PM Barbara Pritchett, [address] [facility] 91 [phone] [id number] Follow-up Instructions Return in about 3 months (around 1/3/2019), or if symptoms worsen or fail to improve. Upcoming Health Maintenance Date Due  
 BREAST CANCER SCRN MAMMOGRAM 3/18/1980 Shingrix Vaccine Age 50> (1 of 2) 3/18/2012 Influenza Age 5 to Adult 3/31/2019* PAP AKA CERVICAL CYTOLOGY 9/1/2019 DTaP/Tdap/Td series (2 - Td) 6/12/2027 COLONOSCOPY 6/12/2027 *Topic was postponed. The date shown is not the original due date. Allergies as of 10/3/2018  Review Complete On: 10/3/2018 By: Barbara Pritchett NP Severity Noted Reaction Type Reactions Pcn [Penicillins]  01/02/2017    Swelling Current Immunizations  Never Reviewed No immunizations on file. Not reviewed this visit You Were Diagnosed With   
  
 Codes Comments Attention deficit hyperactivity disorder (ADHD), unspecified ADHD type    -  Primary ICD-10-CM: F90.9 ICD-9-CM: 314.01 Encounter for patient compliance monitoring in drug treatment program     ICD-10-CM: Z51.89 ICD-9-CM: V72.85 Depression, unspecified depression type     ICD-10-CM: F32.9 ICD-9-CM: 185 Insomnia, unspecified type     ICD-10-CM: G47.00 ICD-9-CM: 780.52 Special screening examination for neoplasm of breast     ICD-10-CM: Z12.31 
ICD-9-CM: V76.10 History of carotid stenosis     ICD-10-CM: Z86.79 
ICD-9-CM: V12.59 History of herpes genitalis     ICD-10-CM: Z86.19 ICD-9-CM: V12.09 Vitamin D insufficiency     ICD-10-CM: E55.9 ICD-9-CM: 268.9 Hyperlipidemia, unspecified hyperlipidemia type     ICD-10-CM: E78.5 ICD-9-CM: 272.4 Hypertension, unspecified type     ICD-10-CM: I10 
ICD-9-CM: 401.9 Screening for thyroid disorder     ICD-10-CM: Z13.29 ICD-9-CM: V77.0 Vitals BP Pulse Temp Resp Height(growth percentile) Weight(growth percentile) 118/76 (BP 1 Location: Left arm, BP Patient Position: Sitting) 97 98 °F (36.7 °C) (Oral) 16 5' 4\" (1.626 m) 171 lb (77.6 kg) SpO2 BMI OB Status Smoking Status 94% 29.35 kg/m2 Postmenopausal Current Every Day Smoker Vitals History BMI and BSA Data Body Mass Index Body Surface Area  
 29.35 kg/m 2 1.87 m 2 Preferred Pharmacy Pharmacy Name Phone [facility] [phone] Your Updated Medication List  
  
   
This list is accurate as of 10/3/18  2:19 PM.  Always use your most recent med list.  
  
  
  
  
 acyclovir 200 mg capsule Commonly known as:  ZOVIRAX Take 1 Cap by mouth every four (4) hours (while awake) for 10 days. atorvastatin 20 mg tablet Commonly known as:  LIPITOR Take 1 Tab by mouth nightly. RIKY LOW STRENGTH PO Take 81 mg by mouth daily. dextroamphetamine-amphetamine 20 mg tablet Commonly known as:  ADDERALL Take 1 Tab (20 mg total) by mouth two (2) times a dayEarliest Fill Date: 9/18/18. Max Daily Amount: 40 mg  
  
 doxepin 50 mg capsule Commonly known as:  SINEquan Take 1 to 3 tablets by mouth everyday at bedtime as needed  
  
 losartan 50 mg tablet Commonly known as:  COZAAR Take 1 Tab by mouth daily. PRISTIQ 50 mg ER tablet Generic drug:  desvenlafaxine succinate Take 1 Tab by mouth daily. Medically necessary to dispense brand only VITAMIN D3 1,000 unit Cap Generic drug:  cholecalciferol Take  by mouth daily. Prescriptions Printed Refills  
 acyclovir (ZOVIRAX) 200 mg capsule 2 Sig: Take 1 Cap by mouth every four (4) hours (while awake) for 10 days. Class: Print Route: Oral  
  
Prescriptions Sent to Pharmacy Refills  
 doxepin (SINEQUAN) 50 mg capsule 3 Sig: Take 1 to 3 tablets by mouth everyday at bedtime as needed Class: Normal  
 Pharmacy: [facility] [address] Ph #: [phone] PRISTIQ 50 mg ER tablet 3 Sig: Take 1 Tab by mouth daily. Medically necessary to dispense brand only Class: Normal  
 Pharmacy: [facility], [address] [facility] Ph #: [phone] Route: Oral  
  
We Performed the Following CBC WITH AUTOMATED DIFF [25071 CPT(R)] [facility] MONITORING [[id number] Custom] LIPID PANEL [24812 CPT(R)] METABOLIC PANEL, COMPREHENSIVE [64687 CPT(R)] TSH 3RD GENERATION [21896 CPT(R)] VITAMIN D, 25 HYDROXY [id number] CPT(R)] Follow-up Instructions Return in about 3 months (around 1/3/2019), or if symptoms worsen or fail to improve. To-Do List   
 10/03/2018 Imaging:  DUPLEX CAROTID BILATERAL   
  
 10/03/2018 Imaging:  ASHLY MAMMO BI SCREENING INCL CAD Patient Instructions Attention Deficit Hyperactivity Disorder (ADHD) in Adults: Care Instructions Your Care Instructions Attention deficit hyperactivity disorder, or ADHD, is a condition that makes it hard to pay attention. So you may have problems when you try to focus, get organized, and finish tasks. It might make you more active than other people. Or you might do things without thinking first. 
ADHD is very common. It usually starts in early childhood. Many adults don't realize they have it until their children are diagnosed. Then they become aware of their own symptoms. Doctors don't know what causes ADHD. But it often runs in families. ADHD can be treated with medicines, behavior training, and counseling. Treatment can improve your life. Follow-up care is a key part of your treatment and safety.  Be sure to make and go to all appointments, and call your doctor if you are having problems. It's also a good idea to know your test results and keep a list of the medicines you take. How can you care for yourself at home? · Learn all you can about ADHD. This will help you and your family understand it better. · Take your medicines exactly as prescribed. Call your doctor if you think you are having a problem with your medicine. You will get more details on the specific medicines your doctor prescribes. · If you miss a dose of your medicine, do not take an extra dose. · If your doctor suggests counseling, find a counselor you like and trust. Talk openly and honestly. Be willing to make some changes. · Find a support group for adults with ADHD. Talking to others with the same problems can help you feel better. It can also give you ideas about how to best cope with the condition. · Get rid of distractions at your work space. Keep your desk clean. Try not to face a window or busy hallway. · Use files, planners, and other tools to keep you organized. · Limit use of alcohol, and do not use illegal drugs. People with ADHD tend to become addicted more easily than others. Tell your doctor if you need help to quit. Counseling, support groups, and sometimes medicines can help you stay free of alcohol or drugs. · Get at least 30 minutes of physical activity on most days of the week. Exercise has been shown to help people cope with ADHD. Walking is a good choice. You also may want to do other activities, such as running, swimming, cycling, or playing tennis or team sports. When should you call for help? Watch closely for changes in your health, and be sure to contact your doctor if: 
  · You feel sad a lot or cry all the time.  
  · You have trouble sleeping, or you sleep too much.  
  · You find it hard to concentrate, make decisions, or remember things.  
  · You change how you normally eat.  
  · You feel guilty for no reason. Where can you learn more? Go to http://stacie-nilda.info/. Enter B196 in the search box to learn more about \"Attention Deficit Hyperactivity Disorder (ADHD) in Adults: Care Instructions. \" Current as of: December 7, 2017 Content Version: 11.7 © 6555-3710 [employer]. Care instructions adapted under license by [employer] (which disclaims liability or warranty for this information). If you have questions about a medical condition or this instruction, always ask your healthcare professional. [address] any warranty or liability for your use of this information. Learning About Breast Cancer Screening What is breast cancer screening? Breast cancer occurs when cells that are not normal grow in one or both of your breasts. Screening tests can help find breast cancer early. Cancer is easier to treat when it's found early. Having concerns about breast cancer is common. That's why it's important to talk with your doctor about when to start and how often to get screened for breast cancer. How is breast cancer screening done? Several screening tests can be used to check for breast cancer. · Mammograms check for signs of cancer using X-rays. They can show tumors that are too small for you or your doctor to feel. During a mammogram, a machine squeezes your breasts to make them flatter and easier to X-ray. At least two pictures are taken of each breast. One is taken from the top and one from the side. · 3-D mammograms are also called digital breast tomosynthesis. Your breast is positioned on a flat plate. A top plate is pressed against your breast to keep it in position. The X-ray arm then moves in an arc above the breast and takes many pictures. A computer uses these X-rays to create a three-dimensional image.  
· Clinical breast exams are a doctor's exam. Your doctor carefully feels your breasts and under your arms to check for lumps or other changes. After the screening, your doctor will tell you the results. You will also be told if you need any follow-up tests. When should you get screened? Talk with your doctor about when you should start being tested for breast cancer. How often you get tested and the kind of tests you get will depend on your age and your risk. The guidelines that follow are for women who have an average risk for breast cancer. If you have a higher risk for breast cancer, such as having a family history of breast cancer in multiple relatives or at a young age, your doctor may recommend different screening for you. · Ages 21 to 44: Some experts recommend that women have a clinical breast exam every 3 years, starting at age 21. Ask your doctor how often you should have this test. If you have a high risk for breast cancer, talk with your doctor about when to start yearly mammograms and other screening tests. · Ages 36 and older: Talk with your doctor about how often you should have mammograms and clinical breast exams. What is your risk for breast cancer? If you don't already know your risk of breast cancer, you can ask your doctor about it. You can also look it up at www.cancer.gov/bcrisktool/. If your doctor says that you have a high or very high risk, ask about ways to reduce your risk. These could include getting extra screening, taking medicine, or having surgery. If you have a strong family history of breast cancer, ask your doctor about genetic testing. What steps can you take to stay healthy? Some things that increase your risk of breast cancer, such as your age and being female, cannot be controlled. But you can do some things to stay as healthy as you can. · Learn what your breasts normally look and feel like. If you notice any changes, tell your doctor. · Drink alcohol wisely. Your risk goes up the more you drink.  For the best health, women should have no more than 1 drink a day or 7 drinks a week. · If you smoke, quit. When you quit smoking, you lower your chances of getting many types of cancer. You can also do your best to eat well, be active, and stay at a healthy weight. Eating healthy foods and being active every day, as well as staying at a healthy weight, may help prevent cancer. Where can you learn more? Go to http://stacie-nilda.info/. Enter S237 in the search box to learn more about \"Learning About Breast Cancer Screening. \" Current as of: May 12, 2017 Content Version: 11.7 © 7089-2129 [employer]. Care instructions adapted under license by [employer] (which disclaims liability or warranty for this information). If you have questions about a medical condition or this instruction, always ask your healthcare professional. [place] [address] any warranty or liability for your use of this information. Introducing [address]! [address] [facility] introduces E-Drive [employer] patient portal. Now you can access parts of your medical record, email your doctor's office, and request medication refills online. 1. In your internet browser, go to [email or web address] 2. Click on the First Time User? Click Here link in the Sign In box. You will see the New Member Sign Up page. 3. Enter your E-Drive [employer] Access Code exactly as it appears below. You will not need to use this code after youve completed the sign-up process. If you do not sign up before the expiration date, you must request a new code. · E-Drive [employer] Access Code: [email or web address] Expires: 1/1/2019  2:19 PM 
 
4. Enter the last four digits of your Social Security Number (xxxx) and Date of Birth (mm/dd/yyyy) as indicated and click Submit. You will be taken to the next sign-up page. 5. Create a E-Drive Autos ID.  This will be your E-Drive Autos login ID and cannot be changed, so think of one that is secure and easy to remember. 6. Create a Reesio password. You can change your password at any time. 7. Enter your Password Reset Question and Answer. This can be used at a later time if you forget your password. 8. Enter your e-mail address. You will receive e-mail notification when new information is available in [address] 9. Click Sign Up. You can now view and download portions of your medical record. 10. Click the Download Summary menu link to download a portable copy of your medical information. If you have questions, please visit the Frequently Asked Questions section of the Reesio website. Remember, Reesio is NOT to be used for urgent needs. For medical emergencies, dial 911. Now available from your iPhone and Android! Please provide this summary of care documentation to your next provider. Your primary care clinician is listed as LAKEISHA EDOUARD. If you have any questions after today's visit, please call [phone].

## 2018-10-03 NOTE — PATIENT INSTRUCTIONS
Attention Deficit Hyperactivity Disorder (ADHD) in Adults: Care Instructions Your Care Instructions Attention deficit hyperactivity disorder, or ADHD, is a condition that makes it hard to pay attention. So you may have problems when you try to focus, get organized, and finish tasks. It might make you more active than other people. Or you might do things without thinking first. 
ADHD is very common. It usually starts in early childhood. Many adults don't realize they have it until their children are diagnosed. Then they become aware of their own symptoms. Doctors don't know what causes ADHD. But it often runs in families. ADHD can be treated with medicines, behavior training, and counseling. Treatment can improve your life. Follow-up care is a key part of your treatment and safety. Be sure to make and go to all appointments, and call your doctor if you are having problems. It's also a good idea to know your test results and keep a list of the medicines you take. How can you care for yourself at home? · Learn all you can about ADHD. This will help you and your family understand it better. · Take your medicines exactly as prescribed. Call your doctor if you think you are having a problem with your medicine. You will get more details on the specific medicines your doctor prescribes. · If you miss a dose of your medicine, do not take an extra dose. · If your doctor suggests counseling, find a counselor you like and trust. Talk openly and honestly. Be willing to make some changes. · Find a support group for adults with ADHD. Talking to others with the same problems can help you feel better. It can also give you ideas about how to best cope with the condition. · Get rid of distractions at your work space. Keep your desk clean. Try not to face a window or busy hallway. · Use files, planners, and other tools to keep you organized. · Limit use of alcohol, and do not use illegal drugs.  People with ADHD tend to become addicted more easily than others. Tell your doctor if you need help to quit. Counseling, support groups, and sometimes medicines can help you stay free of alcohol or drugs. · Get at least 30 minutes of physical activity on most days of the week. Exercise has been shown to help people cope with ADHD. Walking is a good choice. You also may want to do other activities, such as running, swimming, cycling, or playing tennis or team sports. When should you call for help? Watch closely for changes in your health, and be sure to contact your doctor if: 
  · You feel sad a lot or cry all the time.  
  · You have trouble sleeping, or you sleep too much.  
  · You find it hard to concentrate, make decisions, or remember things.  
  · You change how you normally eat.  
  · You feel guilty for no reason. Where can you learn more? Go to http://staciezeenworldnilda.info/. Enter B196 in the search box to learn more about \"Attention Deficit Hyperactivity Disorder (ADHD) in Adults: Care Instructions. \" Current as of: December 7, 2017 Content Version: 11.7 © 4004-2170 [employer]. Care instructions adapted under license by [employer] (which disclaims liability or warranty for this information). If you have questions about a medical condition or this instruction, always ask your healthcare professional. [address] any warranty or liability for your use of this information. Learning About Breast Cancer Screening What is breast cancer screening? Breast cancer occurs when cells that are not normal grow in one or both of your breasts. Screening tests can help find breast cancer early. Cancer is easier to treat when it's found early. Having concerns about breast cancer is common. That's why it's important to talk with your doctor about when to start and how often to get screened for breast cancer. How is breast cancer screening done? Several screening tests can be used to check for breast cancer. · Mammograms check for signs of cancer using X-rays. They can show tumors that are too small for you or your doctor to feel. During a mammogram, a machine squeezes your breasts to make them flatter and easier to X-ray. At least two pictures are taken of each breast. One is taken from the top and one from the side. · 3-D mammograms are also called digital breast tomosynthesis. Your breast is positioned on a flat plate. A top plate is pressed against your breast to keep it in position. The X-ray arm then moves in an arc above the breast and takes many pictures. A computer uses these X-rays to create a three-dimensional image. · Clinical breast exams are a doctor's exam. Your doctor carefully feels your breasts and under your arms to check for lumps or other changes. After the screening, your doctor will tell you the results. You will also be told if you need any follow-up tests. When should you get screened? Talk with your doctor about when you should start being tested for breast cancer. How often you get tested and the kind of tests you get will depend on your age and your risk. The guidelines that follow are for women who have an average risk for breast cancer. If you have a higher risk for breast cancer, such as having a family history of breast cancer in multiple relatives or at a young age, your doctor may recommend different screening for you. · Ages 21 to 44: Some experts recommend that women have a clinical breast exam every 3 years, starting at age 21. Ask your doctor how often you should have this test. If you have a high risk for breast cancer, talk with your doctor about when to start yearly mammograms and other screening tests. · Ages 36 and older: Talk with your doctor about how often you should have mammograms and clinical breast exams. What is your risk for breast cancer? If you don't already know your risk of breast cancer, you can ask your doctor about it. You can also look it up at www.cancer.gov/bcrisktool/. If your doctor says that you have a high or very high risk, ask about ways to reduce your risk. These could include getting extra screening, taking medicine, or having surgery. If you have a strong family history of breast cancer, ask your doctor about genetic testing. What steps can you take to stay healthy? Some things that increase your risk of breast cancer, such as your age and being female, cannot be controlled. But you can do some things to stay as healthy as you can. · Learn what your breasts normally look and feel like. If you notice any changes, tell your doctor. · Drink alcohol wisely. Your risk goes up the more you drink. For the best health, women should have no more than 1 drink a day or 7 drinks a week. · If you smoke, quit. When you quit smoking, you lower your chances of getting many types of cancer. You can also do your best to eat well, be active, and stay at a healthy weight. Eating healthy foods and being active every day, as well as staying at a healthy weight, may help prevent cancer. Where can you learn more? Go to http://stacie-nilda.info/. Enter M087 in the search box to learn more about \"Learning About Breast Cancer Screening. \" Current as of: May 12, 2017 Content Version: 11.7 © 7705-3613 [employer]. Care instructions adapted under license by [employer] (which disclaims liability or warranty for this information). If you have questions about a medical condition or this instruction, always ask your healthcare professional. [address] any warranty or liability for your use of this information.

## 2018-10-03 NOTE — PROGRESS NOTES
Chief Complaint Patient presents with  Medication Refill 1. Have you been to the ER, urgent care clinic since your last visit? Hospitalized since your last visit? No 
 
2. Have you seen or consulted any other health care providers outside of the [address] [place] [address] since your last visit? Include any pap smears or colon screening. No  
 
Pt declined flu vaccine this visit. Pristiq and adderral go to [facility], all other rx go to [facility].

## 2018-10-03 NOTE — PROGRESS NOTES
Chief Complaint Patient presents with  Medication Refill Brooke Patterson is a 66 y.o. female comes in today  for medication follow-up. Current medication(s)  Pristiq, Adderall Has been on Adderall \"for years\". Taking medication as directed.  No changes in doses. Taking 1-1.5 pills of Adderall 20 mg per day, rarely taking whole 2 pills per day. She is taking 50 mg Pristiq daily.  States that she is well controlled on current medications and doses. Denies depression, SI/HI 
ADHD COMPLIANCE: all of the time Changes since last visit none Sleep: 
Has problems with sleep? No, controlled on medication. Gets depressed, anxious, or irritable/has mood swings? No, controlled on medication. She denies any SI/HI. Eating habits: 
Eats regular meals including adequate fruits and vegetables: yes Decreased ETOH use, says she only drinks now on the weekends. Smoking about 1 pack per day. Tried using patches but then started a new job and needed to smoke to maintain her stress level she says. No recreational drugs HTN Taking medications daily. No side effects per patient. She is not checking BP at home. No palpitations, dizziness, headache, SOB/FUENTES, vision changes, CP or swelling in the legs. States she tries to watch her salt intake. She does drink coffee during the day. History of herpes, no current outbreak. States that she may get an outbreak 2-3 times per year. Request printed prescription for acyclovir that she can fill should she have an outbreak. Past Medical History:  
Diagnosis Date  ADHD (attention deficit hyperactivity disorder)  Adverse effect of anesthesia   
 patient states she was in the ICU s/p ankle surgery on a ventilator;1/ 2017  Depression  Hyperlipidemia  Hypertension  Ill-defined condition   
 high cholesterol  Ill-defined condition   
 genital herpes  Psychiatric disorder   
 depression, ADHD Past Surgical History: Procedure Laterality Date  HX ANKLE FRACTURE TX Right 01/03/2017 Shattered her ankle and was hospitalized  HX GYN    
 D&C X2  
 HX OTHER SURGICAL    
 colonoscopy Social History Social History  Marital status:  Spouse name: N/A  
 Number of children: N/A  
 Years of education: N/A Occupational History  Not on file. Social History Main Topics  Smoking status: Current Every Day Smoker Packs/day: 0.25  Smokeless tobacco: Never Used  Alcohol use 1.2 oz/week 2 Shots of liquor per week Comment: on occasion  Drug use: Yes Special: Marijuana Comment: last used 11/28/17  Sexual activity: Yes  
  Partners: Male Other Topics Concern  Not on file Social History Narrative Family History Problem Relation Age of Onset  Diabetes Father  Heart Disease Father Allergies Allergen Reactions  Pcn [Penicillins] Swelling Current Outpatient Prescriptions Medication Sig Dispense Refill  doxepin (SINEQUAN) 50 mg capsule Take 1 to 3 tablets by mouth everyday at bedtime as needed 270 Cap 3  
 acyclovir (ZOVIRAX) 200 mg capsule Take 1 Cap by mouth every four (4) hours (while awake) for 10 days. 50 Cap 2  
 PRISTIQ 50 mg ER tablet Take 1 Tab by mouth daily. Medically necessary to dispense brand only 30 Tab 3  
 dextroamphetamine-amphetamine (ADDERALL) 20 mg tablet Take 1 Tab (20 mg total) by mouth two (2) times a dayEarliest Fill Date: 9/18/18. Max Daily Amount: 40 mg 60 Tab 0  
 atorvastatin (LIPITOR) 20 mg tablet Take 1 Tab by mouth nightly. 90 Tab 3  
 losartan (COZAAR) 50 mg tablet Take 1 Tab by mouth daily. 90 Tab 3  ASPIRIN (RIKY LOW STRENGTH PO) Take 81 mg by mouth daily.  cholecalciferol (VITAMIN D3) 1,000 unit cap Take  by mouth daily. ROS:  
Review of Systems - General ROS: negative for - fatigue, sleep disturbance, weight gain or weight loss Psychological ROS: negative for anxiety, depression, mood changes, no other symptoms reported. Respiratory ROS: negative for - shortness of breath Cardiovascular ROS: negative for - chest pain, irregular heartbeat, loss of consciousness or palpitations Gastrointestinal ROS: negative for -  appetite loss, change in bowel habits, diarrhea or nausea/vomiting, abdominal pain Musculoskeletal ROS: negative for - gait disturbance, joint pain, muscle pain or muscular weakness Neurological ROS: negative for - behavioral changes, confusion, dizziness, gait disturbance, headaches, impaired coordination/balance, speech problems, visual changes or weakness Rest of 12 point ROS otherwise negative. PE: 
Vital Signs:  
Visit Vitals  /76 (BP 1 Location: Left arm, BP Patient Position: Sitting)  Pulse 97  Temp 98 °F (36.7 °C) (Oral)  Resp 16  
 Ht 5' 4\" (1.626 m)  Wt 171 lb (77.6 kg)  SpO2 94%  BMI 29.35 kg/m2 Constitutional:  Alert and active. Cooperative. In no distress. HEENT: Normocephalic, pink conjunctivae, anicteric sclerae, fundoscopy unremarkable, ear canals and tympanic membranes clear with good mobility, no rhinorrhea, oropharynx clear. Neck: Supple, no cervical lymphadenopathy. No masses or thyroid gland enlargement. Lungs: No retractions, clear to auscultation, no rales or wheezing. Heart:  Normal rate, regular rhythm, S1 normal and S2 normal.  No murmur heard. Abdomen:  Soft, good bowel sounds, non-tender, no masses or hepatosplenomegaly. Musculoskeletal: No gross deformities, good pulses. No joint edema. Neurologic: Normal gait, no focal deficits noted. DTR's +2. Negative Romberg. No tremors. Normal muscle tone and bulk, normal strength in all extremities b/l and symmetrically. Normal finger to nose and diadochokinesia Skin: No rashes or lesions. Psych: Oriented, appropriate mood and affect. Assessment/Plan: ICD-10-CM ICD-9-CM 1. Attention deficit hyperactivity disorder (ADHD), unspecified ADHD type F90.9 314.01 COMPLIANCE DRUG SCREEN/PRESCRIPTION MONITORING 2. Encounter for patient compliance monitoring in drug treatment program Z51.89 V72.85 COMPLIANCE DRUG SCREEN/PRESCRIPTION MONITORING 3. Depression, unspecified depression type F32.9 311 doxepin (SINEQUAN) 50 mg capsule PRISTIQ 50 mg ER tablet 4. Insomnia, unspecified type G47.00 780.52 doxepin (SINEQUAN) 50 mg capsule 5. Special screening examination for neoplasm of breast Z12.31 V76.10 ASHLY MAMMO BI SCREENING INCL CAD 6. History of carotid stenosis Z86.79 V12.59 DUPLEX CAROTID BILATERAL 7. History of herpes genitalis Z86.19 V12.09 acyclovir (ZOVIRAX) 200 mg capsule 8. Vitamin D insufficiency E55.9 268.9 VITAMIN D, 25 HYDROXY 9. Hyperlipidemia, unspecified hyperlipidemia type E78.5 272.4 LIPID PANEL  
   METABOLIC PANEL, COMPREHENSIVE 10. Hypertension, unspecified type Y00 614.4 METABOLIC PANEL, COMPREHENSIVE  
   CBC WITH AUTOMATED DIFF 11. Screening for thyroid disorder Z13.29 V77.0 TSH 3RD GENERATION Blood pressure at goal, no change in therapy. Routine lab work ordered, will call with those results. Continue Adderall; reviewed benefits and side effects. She is not due yet for refill, Compliance urine drug screening done today. Depression controlled, continue doxepin and Pristiq. Routine screening mammogram ordered. Annual carotid duplex ordered, she has a history of mild to moderate carotid stenosis. She is taking daily aspirin and atorvastatin as directed. Smoking cessation discussed and encouraged. Discussed BMI and healthy weight. Encouraged patient to work to implement changes including diet high in raw fruits and vegetables, lean protein and good fats. Limit refined, processed carbohydrates and sugar. Encouraged regular exercise. She declines flu, shingles and pneumonia vaccines. Verbal and written instructions (see AVS) provided. Patient expresses understanding and agreement of diagnosis and treatment plan. Follow-up Disposition: 
Return in about 3 months (around 1/3/2019), or if symptoms worsen or fail to improve.   
 
Sebastian Barboza NP

## 2018-10-04 LAB
25(OH)D3+25(OH)D2 SERPL-MCNC: 23.5 NG/ML (ref 30–100)
ALBUMIN SERPL-MCNC: 4.7 G/DL (ref 3.5–5.5)
ALBUMIN/GLOB SERPL: 2.1 {RATIO} (ref 1.2–2.2)
ALP SERPL-CCNC: 101 IU/L (ref 39–117)
ALT SERPL-CCNC: 24 IU/L (ref 0–32)
AST SERPL-CCNC: 20 IU/L (ref 0–40)
BASOPHILS # BLD AUTO: 0 X10E3/UL (ref 0–0.2)
BASOPHILS NFR BLD AUTO: 0 %
BILIRUB SERPL-MCNC: 0.9 MG/DL (ref 0–1.2)
BUN SERPL-MCNC: 7 MG/DL (ref 6–24)
BUN/CREAT SERPL: 9 (ref 9–23)
CALCIUM SERPL-MCNC: 9.5 MG/DL (ref 8.7–10.2)
CHLORIDE SERPL-SCNC: 101 MMOL/L (ref 96–106)
CHOLEST SERPL-MCNC: 155 MG/DL (ref 100–199)
CO2 SERPL-SCNC: 23 MMOL/L (ref 20–29)
CREAT SERPL-MCNC: 0.77 MG/DL (ref 0.57–1)
EOSINOPHIL # BLD AUTO: 0 X10E3/UL (ref 0–0.4)
EOSINOPHIL NFR BLD AUTO: 0 %
ERYTHROCYTE [DISTWIDTH] IN BLOOD BY AUTOMATED COUNT: 13.2 % (ref 12.3–15.4)
GLOBULIN SER CALC-MCNC: 2.2 G/DL (ref 1.5–4.5)
GLUCOSE SERPL-MCNC: 100 MG/DL (ref 65–99)
HCT VFR BLD AUTO: 43 % (ref 34–46.6)
HDLC SERPL-MCNC: 42 MG/DL
HGB BLD-MCNC: 14.2 G/DL (ref 11.1–15.9)
IMM GRANULOCYTES # BLD: 0 X10E3/UL (ref 0–0.1)
IMM GRANULOCYTES NFR BLD: 1 %
INTERPRETATION, 910389: NORMAL
LDLC SERPL CALC-MCNC: 67 MG/DL (ref 0–99)
LYMPHOCYTES # BLD AUTO: 1.9 X10E3/UL (ref 0.7–3.1)
LYMPHOCYTES NFR BLD AUTO: 23 %
MCH RBC QN AUTO: 30.9 PG (ref 26.6–33)
MCHC RBC AUTO-ENTMCNC: 33 G/DL (ref 31.5–35.7)
MCV RBC AUTO: 94 FL (ref 79–97)
MONOCYTES # BLD AUTO: 0.7 X10E3/UL (ref 0.1–0.9)
MONOCYTES NFR BLD AUTO: 8 %
NEUTROPHILS # BLD AUTO: 5.8 X10E3/UL (ref 1.4–7)
NEUTROPHILS NFR BLD AUTO: 68 %
PLATELET # BLD AUTO: 261 X10E3/UL (ref 150–379)
POTASSIUM SERPL-SCNC: 4 MMOL/L (ref 3.5–5.2)
PROT SERPL-MCNC: 6.9 G/DL (ref 6–8.5)
RBC # BLD AUTO: 4.59 X10E6/UL (ref 3.77–5.28)
SODIUM SERPL-SCNC: 139 MMOL/L (ref 134–144)
TRIGL SERPL-MCNC: 232 MG/DL (ref 0–149)
TSH SERPL DL<=0.005 MIU/L-ACNC: 1.4 UIU/ML (ref 0.45–4.5)
VLDLC SERPL CALC-MCNC: 46 MG/DL (ref 5–40)
WBC # BLD AUTO: 8.5 X10E3/UL (ref 3.4–10.8)

## 2018-10-10 LAB — DRUGS UR: NORMAL

## 2018-10-15 DIAGNOSIS — F90.9 ADULT ADHD: ICD-10-CM

## 2018-10-15 RX ORDER — DEXTROAMPHETAMINE SACCHARATE, AMPHETAMINE ASPARTATE, DEXTROAMPHETAMINE SULFATE AND AMPHETAMINE SULFATE 5; 5; 5; 5 MG/1; MG/1; MG/1; MG/1
20 TABLET ORAL 2 TIMES DAILY
Qty: 60 TAB | Refills: 0 | Status: SHIPPED | OUTPATIENT
Start: 2018-10-15 | End: 2018-12-17 | Stop reason: SDUPTHER

## 2018-10-15 NOTE — TELEPHONE ENCOUNTER
Pt is calling requesting a refill on med   Requested Prescriptions     Pending Prescriptions Disp Refills    dextroamphetamine-amphetamine (ADDERALL) 20 mg tablet 60 Tab 0     Sig: Take 1 Tab (20 mg total) by mouth two (2) times a day.   Max Daily Amount: 40 mg

## 2018-10-15 NOTE — PROGRESS NOTES
Labs look pretty good. No changes in medication at this time. Continue to work on diet and exercise. Her vitamin d was a little low. She should be taking a daily over the counter vitamin d supplement of at least 2000 units per day. If she is already taking the 2000 units per day then she should increase to 4000 units per day. Let me know if she has any questions Thanks

## 2018-11-07 ENCOUNTER — HOSPITAL ENCOUNTER (OUTPATIENT)
Dept: VASCULAR SURGERY | Age: 58
Discharge: HOME | End: 2018-11-07
Attending: NURSE PRACTITIONER
Payer: COMMERCIAL

## 2018-11-07 ENCOUNTER — HOSPITAL ENCOUNTER (OUTPATIENT)
Dept: MAMMOGRAPHY | Age: 58
Discharge: HOME | End: 2018-11-07
Attending: NURSE PRACTITIONER
Payer: COMMERCIAL

## 2018-11-07 DIAGNOSIS — Z86.79 HISTORY OF CAROTID STENOSIS: ICD-10-CM

## 2018-11-07 DIAGNOSIS — Z12.39 SPECIAL SCREENING EXAMINATION FOR NEOPLASM OF BREAST: ICD-10-CM

## 2018-11-07 PROCEDURE — 77067 SCR MAMMO BI INCL CAD: CPT

## 2018-11-07 PROCEDURE — 93880 EXTRACRANIAL BILAT STUDY: CPT

## 2018-11-07 NOTE — PROCEDURES
[facility]  *** FINAL REPORT ***    Name: Gail Kapoor  MRN: [id number]    Outpatient  : 18 Mar 1960  HIS Order #: [id number]  [id number] [facility] Visit #: [id number]  Date: 2018    TYPE OF TEST: Cerebrovascular Duplex    REASON FOR TEST  Known carotid stenosis    Right Carotid:-             Proximal               Mid                 Distal  cm/s  Systolic  Diastolic  Systolic  Diastolic  Systolic  Diastolic  CCA:     41.7      26.0                            61.0      27.0  Bulb:  ECA:     59.0      19.0  ICA:     59.0      21.0       91.0      32.0       84.0      36.0  ICA/CCA:  1.0       0.8    ICA Stenosis: <50%    Right Vertebral:-  Finding: Antegrade  Sys:       26.0  Hayley:       16.0    Right Subclavian: Normal    Left Carotid:-            Proximal                Mid                 Distal  cm/s  Systolic  Diastolic  Systolic  Diastolic  Systolic  Diastolic  CCA:     20.1      25.0                            77.0      27.0  Bulb:  ECA:     48.0      15.0  ICA:     90.0      31.0       61.0      24.0       46.0      17.0  ICA/CCA:  1.2       1.1    ICA Stenosis: <50%    Left Vertebral:-  Finding: Antegrade  Sys:       43.0  Hayley:       19.0    Left Subclavian: Normal    INTERPRETATION/FINDINGS  PROCEDURE:  Carotid Duplex Examination using B-mode, color and  spectral Doppler of the extracranial cerebrovascular arteries. 1. Bilateral <50% stenosis of the internal carotid arteries. 2. No significant stenosis in the external carotid arteries  bilaterally. 3. Antegrade flow in both vertebral arteries. 4. Normal flow in both subclavian arteries. Plaque Morphology:  1. Calcific plaque in the bulb and right ICA. 2. Heterogeneous plaque in the left ICA. ADDITIONAL COMMENTS    I have personally reviewed the data relevant to the interpretation of  this  study. TECHNOLOGIST: Aime Natarajan RVT  Signed: 2018 01:23 PM    PHYSICIAN: Trey Gallegos.  Marito Zarco MD  Signed: 2018 05:30 PM

## 2018-11-28 ENCOUNTER — DOCUMENTATION ONLY (OUTPATIENT)
Dept: FAMILY MEDICINE CLINIC | Age: 58
End: 2018-11-28

## 2018-11-28 NOTE — PROGRESS NOTES
HISTORY OF PRESENT ILLNESS  Janine Peralta is a 66 y.o. female. Called and spoke with patient concerning her EOB from her insurance company on her Drug screening. Patient knows that a drug screening needs to be completed but her insurance has picked up the entire cost in the past, she was seeing if the coding was incorrect. I told her that it looked like Paty Rice NP has ordered it the same way in the past.  Ms. Lucie Ch is going to contact her insurance company about the extra cost to her for this type of testing.         ROS na    Physical Exam na    ASSESSMENT and PLAN  na

## 2018-12-17 DIAGNOSIS — F90.9 ADULT ADHD: ICD-10-CM

## 2018-12-17 RX ORDER — DEXTROAMPHETAMINE SACCHARATE, AMPHETAMINE ASPARTATE, DEXTROAMPHETAMINE SULFATE AND AMPHETAMINE SULFATE 5; 5; 5; 5 MG/1; MG/1; MG/1; MG/1
20 TABLET ORAL 2 TIMES DAILY
Qty: 60 TAB | Refills: 0 | Status: SHIPPED | OUTPATIENT
Start: 2018-12-17 | End: 2019-02-13 | Stop reason: SDUPTHER

## 2018-12-17 NOTE — TELEPHONE ENCOUNTER
Pt is calling requesting a refill on her med    Requested Prescriptions     Pending Prescriptions Disp Refills    dextroamphetamine-amphetamine (ADDERALL) 20 mg tablet 60 Tab 0     Sig: Take 1 Tab (20 mg total) by mouth two (2) times a day.   Max Daily Amount: 40 mg

## 2019-01-15 ENCOUNTER — TELEPHONE (OUTPATIENT)
Dept: FAMILY MEDICINE CLINIC | Age: 59
End: 2019-01-15

## 2019-01-15 NOTE — TELEPHONE ENCOUNTER
Checked with KWARDEN and everything is straight with the pristiq. Call Ms. Le and let her know via personal voice mail

## 2019-01-21 DIAGNOSIS — F32.A DEPRESSION, UNSPECIFIED DEPRESSION TYPE: ICD-10-CM

## 2019-01-22 DIAGNOSIS — F32.A DEPRESSION, UNSPECIFIED DEPRESSION TYPE: ICD-10-CM

## 2019-01-22 RX ORDER — DESVENLAFAXINE SUCCINATE 50 MG/1
50 TABLET, EXTENDED RELEASE ORAL DAILY
Qty: 90 TAB | Refills: 3 | Status: CANCELLED | OUTPATIENT
Start: 2019-01-22

## 2019-01-22 RX ORDER — DESVENLAFAXINE SUCCINATE 50 MG/1
50 TABLET, EXTENDED RELEASE ORAL DAILY
Qty: 90 TAB | Refills: 3 | Status: SHIPPED | OUTPATIENT
Start: 2019-01-22 | End: 2019-11-14 | Stop reason: SDUPTHER

## 2019-02-13 DIAGNOSIS — F90.9 ADULT ADHD: ICD-10-CM

## 2019-02-13 RX ORDER — DEXTROAMPHETAMINE SACCHARATE, AMPHETAMINE ASPARTATE, DEXTROAMPHETAMINE SULFATE AND AMPHETAMINE SULFATE 5; 5; 5; 5 MG/1; MG/1; MG/1; MG/1
20 TABLET ORAL 2 TIMES DAILY
Qty: 60 TAB | Refills: 0 | Status: SHIPPED | OUTPATIENT
Start: 2019-02-13 | End: 2019-04-17 | Stop reason: SDUPTHER

## 2019-04-15 DIAGNOSIS — I10 ESSENTIAL HYPERTENSION: ICD-10-CM

## 2019-04-15 DIAGNOSIS — E78.5 HYPERLIPIDEMIA, UNSPECIFIED HYPERLIPIDEMIA TYPE: ICD-10-CM

## 2019-04-16 RX ORDER — LOSARTAN POTASSIUM 50 MG/1
TABLET ORAL
Qty: 90 TAB | Refills: 3 | Status: SHIPPED | OUTPATIENT
Start: 2019-04-16 | End: 2020-03-30

## 2019-04-16 RX ORDER — ATORVASTATIN CALCIUM 20 MG/1
TABLET, FILM COATED ORAL
Qty: 90 TAB | Refills: 3 | Status: SHIPPED | OUTPATIENT
Start: 2019-04-16 | End: 2020-05-28

## 2019-04-17 ENCOUNTER — OFFICE VISIT (OUTPATIENT)
Dept: FAMILY MEDICINE CLINIC | Age: 59
End: 2019-04-17

## 2019-04-17 VITALS
WEIGHT: 171 LBS | RESPIRATION RATE: 18 BRPM | HEART RATE: 108 BPM | DIASTOLIC BLOOD PRESSURE: 84 MMHG | SYSTOLIC BLOOD PRESSURE: 129 MMHG | HEIGHT: 64 IN | OXYGEN SATURATION: 95 % | BODY MASS INDEX: 29.19 KG/M2 | TEMPERATURE: 98.3 F

## 2019-04-17 DIAGNOSIS — Z51.89 ENCOUNTER FOR PATIENT COMPLIANCE MONITORING IN DRUG TREATMENT PROGRAM: ICD-10-CM

## 2019-04-17 DIAGNOSIS — F90.9 ADULT ADHD: ICD-10-CM

## 2019-04-17 DIAGNOSIS — I10 ESSENTIAL HYPERTENSION: Primary | ICD-10-CM

## 2019-04-17 RX ORDER — DEXTROAMPHETAMINE SACCHARATE, AMPHETAMINE ASPARTATE, DEXTROAMPHETAMINE SULFATE AND AMPHETAMINE SULFATE 5; 5; 5; 5 MG/1; MG/1; MG/1; MG/1
20 TABLET ORAL 2 TIMES DAILY
Qty: 60 TAB | Refills: 0 | Status: SHIPPED | OUTPATIENT
Start: 2019-04-17 | End: 2019-06-10 | Stop reason: SDUPTHER

## 2019-04-17 NOTE — PROGRESS NOTES
Subjective: Chief Complaint Patient presents with  Medication Refill HPI: 
Almaz Ojeda is a 64 y.o. female  presents for follow up appointment for adult ADHD. Current medication(s)  Pristiq, Adderall Has been on Adderall \"for years\". Taking medication as directed.  No changes in doses. Taking 1-1.5 pills of Adderall 20 mg per day, rarely taking whole 2 pills per day. She is taking 50 mg Pristiq daily.  States that she is well controlled on current medications and doses. Denies depression, SI/HI 
ADHD COMPLIANCE: all of the time Changes since last visit none Smoking 1 pack per day. No marijuana or other illegal substances per patient. No ETOH for 104 days. Doing keto diet for past 3 weeks. HTN Taking medications daily. No side effects per patient. She is not checking BP at home. No palpitations, dizziness, headache, SOB/FUENTES, vision changes, CP or swelling in the legs. States she tries to watch her salt intake. She does drink coffee during the day. No hospital, ER or specialist visits since last primary care visit except as noted above. Past Medical History:  
Diagnosis Date  ADHD (attention deficit hyperactivity disorder)  Adverse effect of anesthesia   
 patient states she was in the ICU s/p ankle surgery on a ventilator;1/ 2017  Depression  Hyperlipidemia  Hypertension  Ill-defined condition   
 high cholesterol  Ill-defined condition   
 genital herpes  Psychiatric disorder   
 depression, ADHD Social History Tobacco Use  Smoking status: Current Every Day Smoker Packs/day: 0.25  Smokeless tobacco: Never Used Substance Use Topics  Alcohol use: Yes Alcohol/week: 1.2 oz Types: 2 Shots of liquor per week Comment: on occasion  Drug use: Yes Types: Marijuana Comment: last used 11/28/17 Outpatient Medications Marked as Taking for the 4/17/19 encounter (Office Visit) with Love Hassan NP Medication Sig Dispense Refill  atorvastatin (LIPITOR) 20 mg tablet TAKE 1 TABLET BY MOUTH NIGHTLY 90 Tab 3  
 losartan (COZAAR) 50 mg tablet TAKE 1 TABLET BY MOUTH DAILY 90 Tab 3  
 dextroamphetamine-amphetamine (ADDERALL) 20 mg tablet Take 1 Tab (20 mg total) by mouth two (2) times a dayEarliest Fill Date: 2/13/19. Max Daily Amount: 40 mg 60 Tab 0  
 PRISTIQ 50 mg ER tablet Take 1 Tab by mouth daily. Medically necessary to dispense brand only 90 Tab 3  
 doxepin (SINEQUAN) 50 mg capsule Take 1 to 3 tablets by mouth everyday at bedtime as needed 270 Cap 3  
 ASPIRIN (RIKY LOW STRENGTH PO) Take 81 mg by mouth daily.  cholecalciferol (VITAMIN D3) 1,000 unit cap Take  by mouth daily. Allergies Allergen Reactions  Pcn [Penicillins] Swelling Health Maintenance reviewed ROS: 
Gen: no fatigue, no fever, no chills, no unexplained weight loss or weight gain Eyes: no excessive tearing, itching, or discharge Nose: no rhinorrhea, no sinus pain Mouth: no oral lesions, no sore throat, no difficulty swallowing Resp: no shortness of breath, no wheezing, no cough CV: no chest pain, no orthopnea, no paroxysmal nocturnal dyspnea, no lower extremity edema, no palpitations Abd: no nausea, no heartburn, no diarrhea, no constipation, no abdominal pain Neuro: no headaches, no syncope or presyncopal episodes Endo: no polyuria, no polydipsia. : no hematuria, no dysuria, no frequency, no incontinence Heme: no lymphadenopathy, no easy bruising or bleeding, no night sweats MSK: no joint pain or swelling PE: 
Visit Vitals /84 (BP 1 Location: Right arm, BP Patient Position: Sitting) Pulse (!) 108 Temp 98.3 °F (36.8 °C) Resp 18 Ht 5' 4\" (1.626 m) Wt 171 lb (77.6 kg) SpO2 95% BMI 29.35 kg/m² Gen: alert, oriented, no acute distress Head: normocephalic, atraumatic Ears: external auditory canals clear, TMs without erythema or effusion Eyes: pupils equal round reactive to light, sclera clear, conjunctiva clear Oral: moist mucus membranes, no oral lesions, no pharyngeal inflammation or exudate Neck: symmetric normal sized thyroid, no carotid bruits, no jugular vein distention Resp: no increase work of breathing, lungs clear to ausculation bilaterally, no wheezing, rales or rhonchi CV: S1, S2 normal.  No murmurs, rubs, or gallops. Abd: soft, not tender, not distended. No hepatosplenomegaly. Normal bowel sounds. No hernias. No abdominal or renal bruits. Neuro: cranial nerves intact, normal strength and movement in all extremities, and sensation intact and symmetric. Skin: no lesion or rash Extremities: no cyanosis or edema Assessment/Plan: 
Differential diagnosis and treatment options reviewed with patient who is in agreement with treatment plan as outlined below. ICD-10-CM ICD-9-CM 1. Essential hypertension I10 401.9 2. Adult ADHD F90.9 314.01 COMPLIANCE DRUG SCREEN/PRESCRIPTION MONITORING  
   dextroamphetamine-amphetamine (ADDERALL) 20 mg tablet 3. Encounter for patient compliance monitoring in drug treatment program Z51.89 V72.85 COMPLIANCE DRUG SCREEN/PRESCRIPTION MONITORING  
 
BP at goal.  No change in medications. ADHD: doing well on current treatment. Continue Adderall; reviewed benefits and side effects. Compliance urine drug screening done today Depression controlled, continue doxepin and Pristiq Smoking cessation encouraged. Praised on sobriety. Discussed BMI and healthy weight. Encouraged patient to work to implement changes including diet high in raw fruits and vegetables, lean protein and good fats. Limit refined, processed carbohydrates and sugar. Encouraged regular exercise. Recommended regular cardiovascular exercise 3-6 times per week for 30-60 minutes daily. Follow up in three months or sooner if needed. I have discussed the diagnosis with the patient and the intended plan as seen in the above orders. The patient has received an after-visit summary and questions were answered concerning future plans. I have discussed medication side effects and warnings with the patient as well. The patient verbalizes understanding and agreement with the plan.

## 2019-04-17 NOTE — LETTER
Name:Fadumo Le DBI:[birth year] MR #:[id number] Provider Name:Gabriela Bennett NP  
*WIXC-616* BSMG-491 (5/16) Page 1 of 5 Initial [employer] CONTROLLED SUBSTANCE AGREEMENT I may be prescribed medications that are controlled substances as part  of my treatment plan for management of my medical condition(s). The goal of my treatment plan is to maintain and/or improve my health and wellbeing. Because controlled substances have an increased risk of abuse or harm, continual re-evaluation is needed determine if the goals of my treatment plan are being met for my safety and the safety of others. Marcelina Juan  am entering into this Controlled Substance Agreement with my provider, Alfie Macdonald NP at [place] [address] . I understand that successful treatment requires mutual trust and honesty between me and my provider. I understand that there are state and federal laws and regulations which apply to the medications that my provider may prescribe that must be followed. I understand there are risks and benefits ts of taking the medicines that my provider may prescribe. I understand and agree that following this Agreement is necessary in continuing my provider-patient relationship and success of my treatment plan. As a part of my treatment plan, I agree to the following: COMMUNICATION: 
 
1. I will communicate fully with my provider about my medical condition(s), including the effect on my daily life and how well my medications are helping. I will tell my provider all of the medications that I take for any reason, including medications I receive from another health care provider, and will notify my provider about all issues, problems or concerns, including any side effects, which may be related to my medications. I understand that this information allows my provider to adjust my treatment plan to help manage my medical condition.  I understand that this information will become part of my permanent medical record. 2. I will notify my provider if I have a history of alcohol/drug misuse/addiction or if I have had treatment for alcohol/drug addiction in the past, or if I have a new problem with or concern about alcohol/drug use/addiction, because this increases the likelihood of high risk behaviors and may lead to serious medical conditions. 3. Females Only: I will notify my provider if I am or become pregnant, or if I intend to become pregnant, or if I intend to breastfeed. I understand that communication of these issues with my provider is important, due to possible effects my medication could have on an unborn fetus or breastfeeding child. Name:Fadumo Le FUT:[birth year] MR #:[id number] Provider Name:Landon Bennett, EDWARD  
*OECZ-579* BSMG-491 (5/16) Page 2 of 5 Initial [employer] MISUSE OF MEDICATIONS / DRUGS: 
 
1. I agree to take all controlled substances as prescribed, and will not misuse or abuse any controlled substances prescribed by my provider. For my safety, I will not increase the amount of medicine I take without first talking with and getting permission from my provider. 2. If I have a medical emergency, another health care provider may prescribe me medication. If I seek emergency treatment, I will notify my provider within seventy-two (72) hours. 3. I understand that my provider may discuss my use and/or possible misuse/abuse of controlled substances and alcohol, as appropriate, with any health care provider involved in my care, pharmacist or legal authority. ILLEGAL DRUGS: 
 
1. I will not use illegal drugs of any kind, including but not limited to marijuana, heroin, cocaine, or any prescription drug which is not prescribed to me. DRUG DIVERSION / PRESCRIPTION FRAUD: 
 
1. I will not share, sell, trade, give away, or otherwise misuse my prescriptions or medications. 2. I will not alter any prescriptions provided to me by my provider. SINGLE PROVIDER: 
 
1. I agree that all controlled substances that I take will be prescribed only by my provider (or his/her covering provider) under this Agreement. This agreement does not prevent me from seeking emergency medical treatment or receiving pain management related to a surgery. PROTECTING MEDICATIONS: 
 
1. I am responsible for keeping my prescriptions and medications in a safe and secure place including safeguarding them from loss or theft. I understand that lost, stolen or damaged/destroyed prescriptions or medications will not be replaced. Name:Fadumo Le RCA:[birth year] MR #:[id number] Provider Name:Luisa Bennett, EDWARD  
*WGDH-[id number]* BSMG-491 (5/16) Page 3 of 5 Initial [employer] PRESCRIPTION RENEWALS/REFILLS: 
 
1. I will follow my controlled substance medication schedule as prescribed by my provider. 2. I understand and agree that I will make any requests for renewals or refills of my prescriptions only at the time of an office visit or during my providers regular office hours subject to the prescription refill requirements of the individual practice. 3. I understand that my provider may not call in prescriptions for controlled substances to my pharmacy. 4. I understand that my provider may adjust or discontinue these medications as deemed appropriate for my medical treatment plan. This Agreement does not guarantee the prescription of controlled medications. 5. I agree that if my medications are adjusted or discontinued, I will properly dispose of any remaining medications. I understand that I will be required to dispose of any remaining controlled medications prior to being provided with any prescriptions for other controlled medications.  
 
 
1. I authorize my provider and my pharmacy to cooperate fully with any local, state, or federal law enforcement agency in the investigation of any possible misuse, sale, or other diversion of my controlled substance prescriptions or medications. RISKS: 
 
 
1. I understand that if I do not adhere to this Agreement in any way, my provider may change my prescriptions, stop prescribing controlled substances or end our provider-patient relationship. 2. If my provider decides to stop prescribing medication, or decides to end our provider-patient relationship,my provider may require that I taper my medications slowly. If necessary, my provider may also provide a prescription for other medications to treat my withdrawal symptoms. UNDERSTANDING THIS AGREEMENT: 
 
I understand that my provider may adjust or stop my prescriptions for controlled substances based on my medical condition and my treatment plan. I understand that this Agreement does not guarantee that I will be prescribed medications or controlled substances. I understand that controlled substances may be just one part 
of my treatment plan.  
 
My initial on each page and my signature below shows that I have read each page of this Agreement, I have had an opportunity to ask questions, and all of my questions have been answered to my satisfaction by my provider. By signing below, I agree to comply with this Agreement, and I understand that if I do not follow the Agreements listed above, my provider may stop 
 
 
 
_________________________________________  Date/Time 4/17/2019 1:16 PM   
             (Patient Signature)

## 2019-04-17 NOTE — PROGRESS NOTES
. 
Chief Complaint Patient presents with  Medication Refill [facility] 1. Have you been to the ER, urgent care clinic since your last visit? Hospitalized since your last visit? no 
 
2. Have you seen or consulted any other health care providers outside of the [address] [place] [address] since your last visit? Include any pap smears or colon screening. No 
 
. Health Maintenance Due Topic Date Due  Pneumococcal 0-64 years (1 of 1 - PPSV23) [birth year]/1968  Shingrix Vaccine Age 50> (1 of 2) 03/18/2012 [facility] Genia Ivy

## 2019-04-17 NOTE — PATIENT INSTRUCTIONS
Attention Deficit Hyperactivity Disorder (ADHD) in Adults: Care Instructions Your Care Instructions Attention deficit hyperactivity disorder, or ADHD, is a condition that makes it hard to pay attention. So you may have problems when you try to focus, get organized, and finish tasks. It might make you more active than other people. Or you might do things without thinking first. 
ADHD is very common. It usually starts in early childhood. Many adults don't realize they have it until their children are diagnosed. Then they become aware of their own symptoms. Doctors don't know what causes ADHD. But it often runs in families. ADHD can be treated with medicines, behavior training, and counseling. Treatment can improve your life. Follow-up care is a key part of your treatment and safety. Be sure to make and go to all appointments, and call your doctor if you are having problems. It's also a good idea to know your test results and keep a list of the medicines you take. How can you care for yourself at home? · Learn all you can about ADHD. This will help you and your family understand it better. · Take your medicines exactly as prescribed. Call your doctor if you think you are having a problem with your medicine. You will get more details on the specific medicines your doctor prescribes. · If you miss a dose of your medicine, do not take an extra dose. · If your doctor suggests counseling, find a counselor you like and trust. Talk openly and honestly. Be willing to make some changes. · Find a support group for adults with ADHD. Talking to others with the same problems can help you feel better. It can also give you ideas about how to best cope with the condition. · Get rid of distractions at your work space. Keep your desk clean. Try not to face a window or busy hallway. · Use files, planners, and other tools to keep you organized. · Limit use of alcohol, and do not use illegal drugs.  People with ADHD tend to become addicted more easily than others. Tell your doctor if you need help to quit. Counseling, support groups, and sometimes medicines can help you stay free of alcohol or drugs. · Get at least 30 minutes of physical activity on most days of the week. Exercise has been shown to help people cope with ADHD. Walking is a good choice. You also may want to do other activities, such as running, swimming, cycling, or playing tennis or team sports. When should you call for help? Watch closely for changes in your health, and be sure to contact your doctor if: 
  · You feel sad a lot or cry all the time.  
  · You have trouble sleeping, or you sleep too much.  
  · You find it hard to concentrate, make decisions, or remember things.  
  · You change how you normally eat.  
  · You feel guilty for no reason. Where can you learn more? Go to http://stacieMobshopnilda.info/. Enter B196 in the search box to learn more about \"Attention Deficit Hyperactivity Disorder (ADHD) in Adults: Care Instructions. \" Current as of: September 11, 2018 Content Version: 11.9 © 2044-9638 [employer]. Care instructions adapted under license by [employer] (which disclaims liability or warranty for this information). If you have questions about a medical condition or this instruction, always ask your healthcare professional. [address] any warranty or liability for your use of this information. DASH Diet: Care Instructions Your Care Instructions The DASH diet is an eating plan that can help lower your blood pressure. DASH stands for Dietary Approaches to Stop Hypertension. Hypertension is high blood pressure. The DASH diet focuses on eating foods that are high in calcium, potassium, and magnesium. These nutrients can lower blood pressure.  The foods that are highest in these nutrients are fruits, vegetables, low-fat dairy products, nuts, seeds, and legumes. But taking calcium, potassium, and magnesium supplements instead of eating foods that are high in those nutrients does not have the same effect. The DASH diet also includes whole grains, fish, and poultry. The DASH diet is one of several lifestyle changes your doctor may recommend to lower your high blood pressure. Your doctor may also want you to decrease the amount of sodium in your diet. Lowering sodium while following the DASH diet can lower blood pressure even further than just the DASH diet alone. Follow-up care is a key part of your treatment and safety. Be sure to make and go to all appointments, and call your doctor if you are having problems. It's also a good idea to know your test results and keep a list of the medicines you take. How can you care for yourself at home? Following the DASH diet · Eat 4 to 5 servings of fruit each day. A serving is 1 medium-sized piece of fruit, ½ cup chopped or canned fruit, 1/4 cup dried fruit, or 4 ounces (½ cup) of fruit juice. Choose fruit more often than fruit juice. · Eat 4 to 5 servings of vegetables each day. A serving is 1 cup of lettuce or raw leafy vegetables, ½ cup of chopped or cooked vegetables, or 4 ounces (½ cup) of vegetable juice. Choose vegetables more often than vegetable juice. · Get 2 to 3 servings of low-fat and fat-free dairy each day. A serving is 8 ounces of milk, 1 cup of yogurt, or 1 ½ ounces of cheese. · Eat 6 to 8 servings of grains each day. A serving is 1 slice of bread, 1 ounce of dry cereal, or ½ cup of cooked rice, pasta, or cooked cereal. Try to choose whole-grain products as much as possible. · Limit lean meat, poultry, and fish to 2 servings each day. A serving is 3 ounces, about the size of a deck of cards. · Eat 4 to 5 servings of nuts, seeds, and legumes (cooked dried beans, lentils, and split peas) each week.  A serving is 1/3 cup of nuts, 2 tablespoons of seeds, or ½ cup of cooked beans or peas. · Limit fats and oils to 2 to 3 servings each day. A serving is 1 teaspoon of vegetable oil or 2 tablespoons of salad dressing. · Limit sweets and added sugars to 5 servings or less a week. A serving is 1 tablespoon jelly or jam, ½ cup sorbet, or 1 cup of lemonade. · Eat less than 2,300 milligrams (mg) of sodium a day. If you limit your sodium to 1,500 mg a day, you can lower your blood pressure even more. Tips for success · Start small. Do not try to make dramatic changes to your diet all at once. You might feel that you are missing out on your favorite foods and then be more likely to not follow the plan. Make small changes, and stick with them. Once those changes become habit, add a few more changes. · Try some of the following: ? Make it a goal to eat a fruit or vegetable at every meal and at snacks. This will make it easy to get the recommended amount of fruits and vegetables each day. ? Try yogurt topped with fruit and nuts for a snack or healthy dessert. ? Add lettuce, tomato, cucumber, and onion to sandwiches. ? Combine a ready-made pizza crust with low-fat mozzarella cheese and lots of vegetable toppings. Try using tomatoes, squash, spinach, broccoli, carrots, cauliflower, and onions. ? Have a variety of cut-up vegetables with a low-fat dip as an appetizer instead of chips and dip. ? Sprinkle sunflower seeds or chopped almonds over salads. Or try adding chopped walnuts or almonds to cooked vegetables. ? Try some vegetarian meals using beans and peas. Add garbanzo or kidney beans to salads. Make burritos and tacos with mashed carson beans or black beans. Where can you learn more? Go to http://stacie-nilda.info/. Enter I779 in the search box to learn more about \"DASH Diet: Care Instructions. \" Current as of: July 22, 2018 Content Version: 11.9 © 8140-7878 [employer].  Care instructions adapted under license by [address] (which disclaims liability or warranty for this information). If you have questions about a medical condition or this instruction, always ask your healthcare professional. [address] any warranty or liability for your use of this information. Stopping Smoking: Care Instructions Your Care Instructions Cigarette smokers crave the nicotine in cigarettes. Giving it up is much harder than simply changing a habit. Your body has to stop craving the nicotine. It is hard to quit, but you can do it. There are many tools that people use to quit smoking. You may find that combining tools works best for you. There are several steps to quitting. First you get ready to quit. Then you get support to help you. After that, you learn new skills and behaviors to become a nonsmoker. For many people, a necessary step is getting and using medicine. Your doctor will help you set up the plan that best meets your needs. You may want to attend a smoking cessation program to help you quit smoking. When you choose a program, look for one that has proven success. Ask your doctor for ideas. You will greatly increase your chances of success if you take medicine as well as get counseling or join a cessation program. 
Some of the changes you feel when you first quit tobacco are uncomfortable. Your body will miss the nicotine at first, and you may feel short-tempered and grumpy. You may have trouble sleeping or concentrating. Medicine can help you deal with these symptoms. You may struggle with changing your smoking habits and rituals. The last step is the tricky one: Be prepared for the smoking urge to continue for a time. This is a lot to deal with, but keep at it. You will feel better. Follow-up care is a key part of your treatment and safety.  Be sure to make and go to all appointments, and call your doctor if you are having problems. It's also a good idea to know your test results and keep a list of the medicines you take. How can you care for yourself at home? · Ask your family, friends, and coworkers for support. You have a better chance of quitting if you have help and support. · Join a support group, such as Nicotine Anonymous, for people who are trying to quit smoking. · Consider signing up for a smoking cessation program, such as the American Lung Association's Freedom from Smoking program. 
· Get text messaging support. Go to the website at www.smokefree. gov to sign up for the [facility] program. 
· Set a quit date. Pick your date carefully so that it is not right in the middle of a big deadline or stressful time. Once you quit, do not even take a puff. Get rid of all ashtrays and lighters after your last cigarette. Clean your house and your clothes so that they do not smell of smoke. · Learn how to be a nonsmoker. Think about ways you can avoid those things that make you reach for a cigarette. ? Avoid situations that put you at greatest risk for smoking. For some people, it is hard to have a drink with friends without smoking. For others, they might skip a coffee break with coworkers who smoke. ? Change your daily routine. Take a different route to work or eat a meal in a different place. · Cut down on stress. Calm yourself or release tension by doing an activity you enjoy, such as reading a book, taking a hot bath, or gardening. · Talk to your doctor or pharmacist about nicotine replacement therapy, which replaces the nicotine in your body. You still get nicotine but you do not use tobacco. Nicotine replacement products help you slowly reduce the amount of nicotine you need. These products come in several forms, many of them available over-the-counter: ? Nicotine patches ? Nicotine gum and lozenges ? Nicotine inhaler · Ask your doctor about bupropion (Wellbutrin) or varenicline (Chantix), which are prescription medicines. They do not contain nicotine. They help you by reducing withdrawal symptoms, such as stress and anxiety. · Some people find hypnosis, acupuncture, and massage helpful for ending the smoking habit. · Eat a healthy diet and get regular exercise. Having healthy habits will help your body move past its craving for nicotine. · Be prepared to keep trying. Most people are not successful the first few times they try to quit. Do not get mad at yourself if you smoke again. Make a list of things you learned and think about when you want to try again, such as next week, next month, or next year. Where can you learn more? Go to http://stacieOrderDynamicsnilda.info/. Enter K607 in the search box to learn more about \"Stopping Smoking: Care Instructions. \" Current as of: September 26, 2018 Content Version: 11.9 © 1185-4010 [employer]. Care instructions adapted under license by [employer] (which disclaims liability or warranty for this information). If you have questions about a medical condition or this instruction, always ask your healthcare professional. [address] any warranty or liability for your use of this information.

## 2019-04-22 LAB — DRUGS UR: NORMAL

## 2019-05-02 ENCOUNTER — TELEPHONE (OUTPATIENT)
Dept: FAMILY MEDICINE CLINIC | Age: 59
End: 2019-05-02

## 2019-05-02 DIAGNOSIS — Z86.19 HISTORY OF HERPES GENITALIS: ICD-10-CM

## 2019-05-02 RX ORDER — ACYCLOVIR 200 MG/1
200 CAPSULE ORAL
Qty: 50 CAP | Refills: 2 | Status: SHIPPED | OUTPATIENT
Start: 2019-05-02 | End: 2019-09-09 | Stop reason: SDUPTHER

## 2019-05-02 NOTE — TELEPHONE ENCOUNTER
From [employer]:    Pt is requesting a refill for Rx Acyclovir 200mg at [facility] - INPATIENT [phone]).  Best contact is [phone].     acyclovir (ZOVIRAX) 200 mg capsule [[id number]]  ENDED

## 2019-06-10 ENCOUNTER — OFFICE VISIT (OUTPATIENT)
Dept: FAMILY MEDICINE CLINIC | Age: 59
End: 2019-06-10

## 2019-06-10 VITALS
DIASTOLIC BLOOD PRESSURE: 78 MMHG | OXYGEN SATURATION: 98 % | HEIGHT: 64 IN | BODY MASS INDEX: 28.68 KG/M2 | TEMPERATURE: 98 F | HEART RATE: 96 BPM | RESPIRATION RATE: 16 BRPM | WEIGHT: 168 LBS | SYSTOLIC BLOOD PRESSURE: 126 MMHG

## 2019-06-10 DIAGNOSIS — F90.9 ADULT ADHD: ICD-10-CM

## 2019-06-10 DIAGNOSIS — I10 ESSENTIAL HYPERTENSION: Primary | ICD-10-CM

## 2019-06-10 RX ORDER — DEXTROAMPHETAMINE SACCHARATE, AMPHETAMINE ASPARTATE, DEXTROAMPHETAMINE SULFATE AND AMPHETAMINE SULFATE 5; 5; 5; 5 MG/1; MG/1; MG/1; MG/1
20 TABLET ORAL 2 TIMES DAILY
Qty: 60 TAB | Refills: 0 | Status: SHIPPED | OUTPATIENT
Start: 2019-06-10 | End: 2019-07-30 | Stop reason: SDUPTHER

## 2019-06-10 NOTE — PATIENT INSTRUCTIONS

## 2019-06-10 NOTE — PROGRESS NOTES
Subjective:     Chief Complaint   Patient presents with    Medication Refill     Adderall         HPI:   Griselda York is a 64 y.o. female  presents for follow up appointment for ADHD    Adderall 20mg 1-2 times daily. No CP, dizziness, HA, no palpitations. Feels ADHD symptoms controlled on current dose/medication. No recreational drugs, no ETOH regularly. Smoking about 1/2 pack per day. HTN  Taking medications daily.  No side effects per patient. Jo Stern is not checking BP at home.   No palpitations, dizziness, headache, SOB/FUENTES, vision changes, CP or swelling in the legs. States she tries to watch her salt intake.  She does drink coffee during the day. No hospital, ER or specialist visits since last primary care visit except as noted above. Past Medical History:   Diagnosis Date    ADHD (attention deficit hyperactivity disorder)     Adverse effect of anesthesia     patient states she was in the ICU s/p ankle surgery on a ventilator;1/ 2017    Depression     Hyperlipidemia     Hypertension     Ill-defined condition     high cholesterol    Ill-defined condition     genital herpes    Psychiatric disorder     depression, ADHD       Social History     Tobacco Use    Smoking status: Current Every Day Smoker     Packs/day: 0.25    Smokeless tobacco: Never Used   Substance Use Topics    Alcohol use: Yes     Alcohol/week: 1.2 oz     Types: 2 Shots of liquor per week     Comment: on occasion    Drug use: Yes     Types: Marijuana     Comment: last used 11/28/17       Outpatient Medications Marked as Taking for the 6/10/19 encounter (Office Visit) with Juanita Bennett NP   Medication Sig Dispense Refill    dextroamphetamine-amphetamine (ADDERALL) 20 mg tablet Take 1 Tab by mouth two (2) times a day.  Max Daily Amount: 40 mg. 60 Tab 0    atorvastatin (LIPITOR) 20 mg tablet TAKE 1 TABLET BY MOUTH NIGHTLY 90 Tab 3    losartan (COZAAR) 50 mg tablet TAKE 1 TABLET BY MOUTH DAILY 90 Tab 3    PRISTIQ 50 mg ER tablet Take 1 Tab by mouth daily. Medically necessary to dispense brand only 90 Tab 3    doxepin (SINEQUAN) 50 mg capsule Take 1 to 3 tablets by mouth everyday at bedtime as needed 270 Cap 3    ASPIRIN (RIKY LOW STRENGTH PO) Take 81 mg by mouth daily.  cholecalciferol (VITAMIN D3) 1,000 unit cap Take  by mouth daily. Allergies   Allergen Reactions    Pcn [Penicillins] Swelling       Health Maintenance reviewed       ROS:  Gen: no fatigue, no fever, no chills, no unexplained weight loss or weight gain  Eyes: no excessive tearing, itching, or discharge  Nose: no rhinorrhea, no sinus pain  Mouth: no oral lesions, no sore throat, no difficulty swallowing  Resp: no shortness of breath, no wheezing, no cough  CV: no chest pain, no orthopnea, no paroxysmal nocturnal dyspnea, no lower extremity edema, no palpitations  Abd: no nausea, no heartburn, no diarrhea, no constipation, no abdominal pain  Neuro: no headaches, no syncope or presyncopal episodes  Endo: no polyuria, no polydipsia.     : no hematuria, no dysuria, no frequency, no incontinence  Heme: no lymphadenopathy, no easy bruising or bleeding, no night sweats  MSK: no joint pain or swelling    PE:  Visit Vitals  /78 (BP 1 Location: Left arm, BP Patient Position: Sitting)   Pulse 96   Temp 98 °F (36.7 °C) (Oral)   Resp 16   Ht 5' 4\" (1.626 m)   Wt 168 lb (76.2 kg)   SpO2 98%   BMI 28.84 kg/m²     Gen: alert, oriented, no acute distress  Head: normocephalic, atraumatic  Ears: external auditory canals clear, TMs without erythema or effusion  Eyes: pupils equal round reactive to light, sclera clear, conjunctiva clear  Oral: moist mucus membranes, no oral lesions, no pharyngeal inflammation or exudate  Neck: symmetric normal sized thyroid, no carotid bruits, no jugular vein distention  Resp: no increase work of breathing, lungs clear to ausculation bilaterally, no wheezing, rales or rhonchi  CV: S1, S2 normal.  No murmurs, rubs, or gallops. Abd: soft, not tender, not distended. No hepatosplenomegaly. Normal bowel sounds. No hernias. No abdominal or renal bruits. Neuro: cranial nerves intact, normal strength and movement in all extremities, reflexes and sensation intact and symmetric. Skin: no lesion or rash  Extremities: no cyanosis or edema        Assessment/Plan:  Differential diagnosis and treatment options reviewed with patient who is in agreement with treatment plan as outlined below. ICD-10-CM ICD-9-CM    1. Essential hypertension I10 401.9    2. Adult ADHD F90.9 314.01 dextroamphetamine-amphetamine (ADDERALL) 20 mg tablet     BP at goal.  No change in medications. ADHD: doing well on current treatment. Continue Adderall; reviewed benefits and side effects.    Compliance urine drug screening done at last visit. Discussed BMI and healthy weight. Encouraged patient to work to implement changes including diet high in raw fruits and vegetables, lean protein and good fats. Limit refined, processed carbohydrates and sugar. Encouraged regular exercise. Recommended regular cardiovascular exercise 3-6 times per week for 30-60 minutes daily. Follow up three months or sooner if needed. Smoking cessation discussed and encouraged. I have discussed the diagnosis with the patient and the intended plan as seen in the above orders. The patient has received an after-visit summary and questions were answered concerning future plans. I have discussed medication side effects and warnings with the patient as well. The patient verbalizes understanding and agreement with the plan.

## 2019-06-10 NOTE — PROGRESS NOTES
Chief Complaint   Patient presents with    Medication Refill     Adderall      1. Have you been to the ER, urgent care clinic since your last visit? Hospitalized since your last visit? No    2. Have you seen or consulted any other health care providers outside of the [address] [place] [address] since your last visit? Include any pap smears or colon screening. No       Health maintenance reviewed. Pt informed of health maintenance past due and/or upcoming. Pt verbalized understanding.

## 2019-07-30 DIAGNOSIS — F90.9 ADULT ADHD: ICD-10-CM

## 2019-07-30 NOTE — TELEPHONE ENCOUNTER
Message from Lakeisha Hand:    Elba Gue[facility] Office Pool             Medication Refill     Caller (if not patient):       Relationship of caller (if not patient):       Best contact number(s):[phone]       Name of medication and dosage if known: Adderall 20 mg       Is patient out of this medication (yes/no): No, have 2 pills remaining       Pharmacy name:N/a     Pharmacy listed in chart? (yes/no):   Pharmacy phone number:       Details to clarify the request:Contact pt when Rx is ready for        [facility]

## 2019-07-31 RX ORDER — DEXTROAMPHETAMINE SACCHARATE, AMPHETAMINE ASPARTATE, DEXTROAMPHETAMINE SULFATE AND AMPHETAMINE SULFATE 5; 5; 5; 5 MG/1; MG/1; MG/1; MG/1
20 TABLET ORAL 2 TIMES DAILY
Qty: 60 TAB | Refills: 0 | Status: SHIPPED | OUTPATIENT
Start: 2019-07-31 | End: 2019-09-09 | Stop reason: SDUPTHER

## 2019-09-09 DIAGNOSIS — F90.9 ADULT ADHD: ICD-10-CM

## 2019-09-09 DIAGNOSIS — Z86.19 HISTORY OF HERPES GENITALIS: ICD-10-CM

## 2019-09-09 RX ORDER — ACYCLOVIR 200 MG/1
200 CAPSULE ORAL
Qty: 50 CAP | Refills: 2 | Status: SHIPPED | OUTPATIENT
Start: 2019-09-09 | End: 2019-09-19

## 2019-09-09 RX ORDER — DEXTROAMPHETAMINE SACCHARATE, AMPHETAMINE ASPARTATE, DEXTROAMPHETAMINE SULFATE AND AMPHETAMINE SULFATE 5; 5; 5; 5 MG/1; MG/1; MG/1; MG/1
20 TABLET ORAL 2 TIMES DAILY
Qty: 60 TAB | Refills: 0 | Status: SHIPPED | OUTPATIENT
Start: 2019-09-09 | End: 2019-11-12 | Stop reason: SDUPTHER

## 2019-09-09 NOTE — TELEPHONE ENCOUNTER
Pt is calling requesting a refill on her med she needs one that's not on med she states she has a herpes flare up and she needs  ACYCLOVIR 200 mg and she wants asap because she doesn't want it to get worst    Requested Prescriptions     Pending Prescriptions Disp Refills    dextroamphetamine-amphetamine (ADDERALL) 20 mg tablet 60 Tab 0     Sig: Take 1 Tab by mouth two (2) times a day. Max Daily Amount: 40 mg.

## 2019-09-17 ENCOUNTER — TELEPHONE (OUTPATIENT)
Dept: NEUROLOGY | Age: 59
End: 2019-09-17

## 2019-09-23 ENCOUNTER — OFFICE VISIT (OUTPATIENT)
Dept: NEUROLOGY | Age: 59
End: 2019-09-23

## 2019-09-23 VITALS
OXYGEN SATURATION: 97 % | BODY MASS INDEX: 27.83 KG/M2 | HEART RATE: 85 BPM | WEIGHT: 163 LBS | HEIGHT: 64 IN | SYSTOLIC BLOOD PRESSURE: 122 MMHG | DIASTOLIC BLOOD PRESSURE: 80 MMHG

## 2019-09-23 DIAGNOSIS — R25.1 TREMOR: Primary | ICD-10-CM

## 2019-09-23 NOTE — PATIENT INSTRUCTIONS
Office Policies  o Phone calls/patient messages:  Please allow up to 24 hours for someone in the office to contact you about your call or message. Be mindful your provider may be out of the office or your message may require further review. We encourage you to use [employer] for your messages as this is a faster, more efficient way to communicate with our office  o Medication Refills:  Prescription medications require up to 48 business hours to process. We encourage you to use [employer] for your refills. For controlled medications: Please allow up to 72 business hours to process. Certain medications may require you to  a written prescription at our office. NO narcotic/controlled medications will be prescribed after 4pm Monday through Friday or on weekends  o Form/Paperwork Completion:   We ask that you allow 7-14 business days. You may also download your forms to [employer] to have your doctor print off. A Healthy Lifestyle: Care Instructions  Your Care Instructions    A healthy lifestyle can help you feel good, stay at a healthy weight, and have plenty of energy for both work and play. A healthy lifestyle is something you can share with your whole family. A healthy lifestyle also can lower your risk for serious health problems, such as high blood pressure, heart disease, and diabetes. You can follow a few steps listed below to improve your health and the health of your family. Follow-up care is a key part of your treatment and safety. Be sure to make and go to all appointments, and call your doctor if you are having problems. It's also a good idea to know your test results and keep a list of the medicines you take. How can you care for yourself at home? · Do not eat too much sugar, fat, or fast foods. You can still have dessert and treats now and then. The goal is moderation. · Start small to improve your eating habits.  Pay attention to portion sizes, drink less juice and soda pop, and eat more fruits and vegetables. ? Eat a healthy amount of food. A 3-ounce serving of meat, for example, is about the size of a deck of cards. Fill the rest of your plate with vegetables and whole grains. ? Limit the amount of soda and sports drinks you have every day. Drink more water when you are thirsty. ? Eat at least 5 servings of fruits and vegetables every day. It may seem like a lot, but it is not hard to reach this goal. A serving or helping is 1 piece of fruit, 1 cup of vegetables, or 2 cups of leafy, raw vegetables. Have an apple or some carrot sticks as an afternoon snack instead of a candy bar. Try to have fruits and/or vegetables at every meal.  · Make exercise part of your daily routine. You may want to start with simple activities, such as walking, bicycling, or slow swimming. Try to be active 30 to 60 minutes every day. You do not need to do all 30 to 60 minutes all at once. For example, you can exercise 3 times a day for 10 or 20 minutes. Moderate exercise is safe for most people, but it is always a good idea to talk to your doctor before starting an exercise program.  · Keep moving. Allison Batistaa the lawn, work in the garden, or Patient Communicator. Take the stairs instead of the elevator at work. · If you smoke, quit. People who smoke have an increased risk for heart attack, stroke, cancer, and other lung illnesses. Quitting is hard, but there are ways to boost your chance of quitting tobacco for good. ? Use nicotine gum, patches, or lozenges. ? Ask your doctor about stop-smoking programs and medicines. ? Keep trying. In addition to reducing your risk of diseases in the future, you will notice some benefits soon after you stop using tobacco. If you have shortness of breath or asthma symptoms, they will likely get better within a few weeks after you quit. · Limit how much alcohol you drink. Moderate amounts of alcohol (up to 2 drinks a day for men, 1 drink a day for women) are okay.  But drinking too much can lead to liver problems, high blood pressure, and other health problems. Family health  If you have a family, there are many things you can do together to improve your health. · Eat meals together as a family as often as possible. · Eat healthy foods. This includes fruits, vegetables, lean meats and dairy, and whole grains. · Include your family in your fitness plan. Most people think of activities such as jogging or tennis as the way to fitness, but there are many ways you and your family can be more active. Anything that makes you breathe hard and gets your heart pumping is exercise. Here are some tips:  ? Walk to do errands or to take your child to school or the bus.  ? Go for a family bike ride after dinner instead of watching TV. Where can you learn more? Go to http://stacie-nilda.info/. Enter T431 in the search box to learn more about \"A Healthy Lifestyle: Care Instructions. \"  Current as of: May 28, 2019  Content Version: 12.2  © 4111-5052 [employer]. Care instructions adapted under license by [employer] (which disclaims liability or warranty for this information). If you have questions about a medical condition or this instruction, always ask your healthcare professional. [place] [address] any warranty or liability for your use of this information. Benign Essential Tremor: Care Instructions  Your Care Instructions    Benign essential tremor is a medical term for shaking that you can't control. Your hand or fingers may shake when you lift a cup or point at something. Or your voice may shake when you speak. This type of tremor is not harmful. It is not caused by a stroke or Parkinson's disease. Some things can affect how much you shake. For example, drinking or eating something with caffeine may make tremors worse for a while. Some medicines also can increase tremors. These include antidepressants and too much thyroid replacement. Talk to your doctor if you think one of your medicines makes your tremors worse. If you are self-conscious about your tremors, there are some things you can do to reduce them or make them less noticeable. This includes taking medicine. Follow-up care is a key part of your treatment and safety. Be sure to make and go to all appointments, and call your doctor if you are having problems. It's also a good idea to know your test results and keep a list of the medicines you take. How can you care for yourself at home? · Take your medicines exactly as prescribed. Call your doctor if you think you are having a problem with your medicine. Some medicines that help control tremors have to be taken every day, even if you are not having tremors. You will get more details on the specific medicines your doctor prescribes. · Get plenty of rest.  · Eat a balanced, healthy diet. · Try to reduce stress. Regular exercise and massages may help. · Limit alcohol. Heavy drinking can make your tremors worse. · Avoid drinks or foods with caffeine if they make your tremors worse. These include tea, cola, coffee, and chocolate. · Wear a heavy bracelet or watch. This adds a little weight to your hand. The extra weight may reduce tremors. · Drink from cups or glasses that are only half full. You may also want to try drinking with a straw. When should you call for help? Watch closely for changes in your health, and be sure to contact your doctor if:    · You notice your tremors are getting worse.     · You can't do your everyday activities because of your tremors.     · You are sad and embarrassed about your shaking. Where can you learn more? Go to http://stacie-nilda.info/. Enter B746 in the search box to learn more about \"Benign Essential Tremor: Care Instructions. \"  Current as of: March 28, 2019  Content Version: 12.2  © 0614-3617 [employer].  Care instructions adapted under license by [employer] (which disclaims liability or warranty for this information). If you have questions about a medical condition or this instruction, always ask your healthcare professional. [address] any warranty or liability for your use of this information.

## 2019-09-23 NOTE — PROGRESS NOTES
Neurology Note    Patient ID:  Emily Somers  [id number]  52 y.o.  1960      Date of Consultation:  September 23, 2019    Referring Physician: Dr. Albert Bergeron    Reason for Consultation:  Head shaking    Subjective: my head is shaking       History of Present Illness:   Emily Somers is a 64 y.o. female who was referred from her primary care doctor for an evaluation of head shaking. Patient states that this began approximately in March of this year. She states that it is intermittent and does come and go. When it does occur she cannot control it but it does ultimately stop spontaneously. She is unsure why it does stop but it does. She wonders if it is related to her mind not thinking about it anymore and it will then stop. This can occur anytime day or night. It can occur when she is by herself or when she is in a social situation. This does not impact her sleep. She does not think there is any new medications that were started around this time. There is also no changes in the doses of her medications that she is aware of. She had not had any recent travel or received any new immunizations. There is no recent trauma or events that have occurred. The patient does have a history of ADHD. Her dose of her Adderall had not been changed. Vision has been a bit blurry. No double vision. She  hasn't had eye checked in over 3 years. .    No hearing loss    Speech has been ok. Swallowing: This has been stable with all of her meals. She did have one episode where she felt that she was choking on water that got in her mouth while she was in the shower. She also mentions that occasionally she will get numbness and tingling in her fingertips. This can occur any time and is not necessarily occupationally related. There is no permanent numbness that is present. She is unsure how long this has been intermittently occurring but does not bother her to any extent.   She did have a cramp in her left inner thigh which was new last week. She does have a long-standing history of depression and anxiety and she continues to take Pristiq. There was consideration of stopping this medication after her last primary care doctor visit but she became notably more irritable and stopping the medication and then restarted the medication. She has been on multiple other antianxiety or depression medications in the past.  She can remember the name of Ritalin as 1 of those medications but she is unsure of other medications. This was brought up in regards to certain medication can induce tremors either immediately afterwards or later in life. She does know that her grandfather did have a tremor    She wanted to mention that her jaw broke in 1990 did not know if this could cause a tremor 25 to 30 years later. Past Medical History:   Diagnosis Date    ADHD (attention deficit hyperactivity disorder)     Adverse effect of anesthesia     patient states she was in the ICU s/p ankle surgery on a ventilator;1/ 2017    Depression     Hyperlipidemia     Hypertension     Ill-defined condition     high cholesterol    Ill-defined condition     genital herpes    Psychiatric disorder     depression, ADHD        Past Surgical History:   Procedure Laterality Date    HX ANKLE FRACTURE TX Right 01/03/2017    Shattered her ankle and was hospitalized     HX GYN      D&C X2    HX OTHER SURGICAL      colonoscopy        Family History   Problem Relation Age of Onset    Diabetes Father     Heart Disease Father       Scot Duke had a head tic. Social History     Tobacco Use    Smoking status: Current Every Day Smoker     Packs/day: 0.25    Smokeless tobacco: Never Used   Substance Use Topics    Alcohol use: Yes     Alcohol/week: 2.0 standard drinks     Types: 2 Shots of liquor per week     Comment: on occasion      Used to be a very heavy drinker.      Allergies   Allergen Reactions    Pcn [Penicillins] Swelling Prior to Admission medications    Medication Sig Start Date End Date Taking? Authorizing Provider   dextroamphetamine-amphetamine (ADDERALL) 20 mg tablet Take 1 Tab by mouth two (2) times a day. Max Daily Amount: 40 mg. 9/9/19  Yes Juanita Bennett NP   atorvastatin (LIPITOR) 20 mg tablet TAKE 1 TABLET BY MOUTH NIGHTLY 4/16/19  Yes Juanita Bennett NP   losartan (COZAAR) 50 mg tablet TAKE 1 TABLET BY MOUTH DAILY 4/16/19  Yes Juanita Bennett NP   PRISTIQ 50 mg ER tablet Take 1 Tab by mouth daily. Medically necessary to dispense brand only 1/22/19  Yes Juanita Bennett NP   doxepin (SINEQUAN) 50 mg capsule Take 1 to 3 tablets by mouth everyday at bedtime as needed 10/3/18  Yes Juanita Bennett NP   ASPIRIN (RIKY LOW STRENGTH PO) Take 81 mg by mouth daily. Yes Provider, Historical   cholecalciferol (VITAMIN D3) 1,000 unit cap Take  by mouth daily. Yes Provider, Historical       Review of Systems:    General, constitutional: She did have one bout of dizziness at work a few weeks ago that was short-lived. No further episodes. She did not have any chest pain or palpitations associated with this. Eyes, vision: Blurry  Ears, nose, throat: negative  Cardiovascular, heart: negative  Respiratory: negative  Gastrointestinal: negative  Genitourinary: negative  Musculoskeletal: negative  Skin and integumentary: negative  Psychiatric: negative  Endocrine: negative  Neurological: negative, except for HPI  Hematologic/lymphatic: negative  Allergy/immunology: negative      Objective:     Visit Vitals  /80   Pulse 85   Ht 5' 4\" (1.626 m)   Wt 163 lb (73.9 kg)   SpO2 97%   BMI 27.98 kg/m²       Physical Exam:      General:  appears well nourished in no acute distress  Neck: no carotid bruits  Lungs: clear to auscultation  Heart:  no murmurs, regular rate  Lower extremity: no edema  Skin: intact.   Muscle scars were noted on her arms    Neurological exam:    Awake, alert, oriented to person, place and time    Language was intact. There was no aphasia or dysarthria    Cranial nerves:   II-XII were tested    Perrrla  Fundoscopic examination revealed venous pulsations and no clear abnormalities  Visual fields were full  Eomi, no evidence of nystagmus  There was no ptosis at baseline or fatigable ptosis. Facial sensation:  normal and symmetric  Facial motor: normal and symmetric  Hearing intact  SCM strength intact  Tongue: midline without fasciculations    Motor: Tone normal    No evidence of fasciculations    Strength testing:   deltoid triceps biceps Wrist ext. Wrist flex. intrinsics Hip flex. Hip ext. Knee ext. Knee flex Dorsi flex Plantar flex   Right 5 5 5 5 5 5 5 5 5 5 5 5   Left 5 5 5 5 5 5 5 5 5 5 5 5         Sensory:  Upper extremity: intact to pp, light touch, and vibration > 10 seconds  Lower extremity: intact to pp, light touch, and vibration > 10 seconds    Reflexes:    Right Left  Biceps  2 2  Triceps 2 2  Brachiorad. 2 2  Patella  2 2  Achilles 2 2    Plantar response:  flexor bilaterally      Cerebellar testing: She has an intermittent no-no tremor on stationary positioning. It does start and stop spontaneously. When performing any sort of action the tremor is not apparent. It does appear to be slightly exacerbated when she is concentrating on it. Finger tapping, finger-to-nose, rapid altering movements were normal    Romberg: absent    Gait: steady.   Heel, toe, and tandem gait were normal    Labs:     Lab Results   Component Value Date/Time    Hemoglobin A1c 5.5 01/19/2017 04:49 PM    Sodium 139 10/03/2018 02:10 PM    Potassium 4.0 10/03/2018 02:10 PM    Chloride 101 10/03/2018 02:10 PM    Glucose 100 (H) 10/03/2018 02:10 PM    BUN 7 10/03/2018 02:10 PM    Creatinine 0.77 10/03/2018 02:10 PM    Calcium 9.5 10/03/2018 02:10 PM    WBC 8.5 10/03/2018 02:10 PM    HCT 43.0 10/03/2018 02:10 PM    HGB 14.2 10/03/2018 02:10 PM    PLATELET 653 05/80/3640 02:10 PM       Imaging:    No results found for this or any previous visit. No results found for this or any previous visit. Assessment and Plan:   The patient is a pleasant 29-year-old female who was referred for an evaluation of a tremor. Her neurological examination is notable for a no no essential tremor. The remainder of her neurological examination is normal.  She had normal strength, reflexes, sensory examination, and cerebellar testing. Essential tremor: The differential for this does include idiopathic, hereditary, medication induced, metabolic disturbance. The medication of her list that is most likely to potentially be contributing to this would be Ritalin. Less so her antidepression and antipsychotics but is possible. I would like to do blood work today looking for possible causes from a metabolic standpoint. This will include vitamin B12, vitamin D, thyroid level, comprehensive metabolic panel, and serum immunofixation. I will hold off on their imaging at this time. The rationale was discussed with the patient today. We did talk about medications that can help with the essential tremor. We talked about risks and benefits of those medications. Medications that were discussed include primidone and Topamax. At this time, she would like to hold off on medication and look for other possible etiologies. She will return to clinic in 6 months time and will reevaluate the situation at that time. She plans on continuing her other medications at their current doses. Vascular disease risk factors:     She does have multiple risk factors for vascular disease including hypertension, smoking, high cholesterol, she did have a carotid Doppler study performed last year which showed less than 50% carotid stenosis. She is scheduled to receive this again by her primary care doctor. She will continue on an 81 mg aspirin a day.   We did talk about the importance of smoking cessation today      Patient Active Problem List   Diagnosis Code    Closed trimalleolar fracture of right ankle S82.851A    Closed right trimalleolar fracture S82.851A    Hypertension I10    Depression F32.9    ADHD (attention deficit hyperactivity disorder) F90.9    Hyperlipidemia E78.5    Controlled substance agreement signed Z79.899    Insomnia G47.00    Stenosis of both internal carotid arteries I65.23    Cerebral microvascular disease I67.9    Primary osteoarthritis of first carpometacarpal joint of right hand M18.11      The patient should return to clinic in 6 months    Renewed medication:none today    I spent 60    minutes with the patient  with over 50 % of the time counseling and coordinating the care plan in regards to the diagnosis, diagnostic testing, and treatment plan. The patient had the ability to ask questions and all questions were answered.                   Signed By:  Harman Matias DO FAAN    September 23, 2019

## 2019-09-25 LAB
25(OH)D3+25(OH)D2 SERPL-MCNC: 48.1 NG/ML (ref 30–100)
ALBUMIN SERPL ELPH-MCNC: 3.5 G/DL (ref 2.9–4.4)
ALBUMIN SERPL-MCNC: 4.4 G/DL (ref 3.5–5.5)
ALBUMIN/GLOB SERPL: 1.4 {RATIO} (ref 0.7–1.7)
ALBUMIN/GLOB SERPL: 2.6 {RATIO} (ref 1.2–2.2)
ALP SERPL-CCNC: 87 IU/L (ref 39–117)
ALPHA1 GLOB SERPL ELPH-MCNC: 0.2 G/DL (ref 0–0.4)
ALPHA2 GLOB SERPL ELPH-MCNC: 0.7 G/DL (ref 0.4–1)
ALT SERPL-CCNC: 17 IU/L (ref 0–32)
AST SERPL-CCNC: 19 IU/L (ref 0–40)
B-GLOBULIN SERPL ELPH-MCNC: 0.9 G/DL (ref 0.7–1.3)
BILIRUB SERPL-MCNC: 0.9 MG/DL (ref 0–1.2)
BUN SERPL-MCNC: 7 MG/DL (ref 6–24)
BUN/CREAT SERPL: 8 (ref 9–23)
CALCIUM SERPL-MCNC: 9.2 MG/DL (ref 8.7–10.2)
CHLORIDE SERPL-SCNC: 103 MMOL/L (ref 96–106)
CO2 SERPL-SCNC: 22 MMOL/L (ref 20–29)
CREAT SERPL-MCNC: 0.9 MG/DL (ref 0.57–1)
GAMMA GLOB SERPL ELPH-MCNC: 0.8 G/DL (ref 0.4–1.8)
GLOBULIN SER CALC-MCNC: 1.7 G/DL (ref 1.5–4.5)
GLOBULIN SER-MCNC: 2.6 G/DL (ref 2.2–3.9)
GLUCOSE SERPL-MCNC: 110 MG/DL (ref 65–99)
HBA1C MFR BLD: 5.8 % (ref 4.8–5.6)
IGA SERPL-MCNC: 186 MG/DL (ref 87–352)
IGG SERPL-MCNC: 744 MG/DL (ref 700–1600)
IGM SERPL-MCNC: 69 MG/DL (ref 26–217)
INTERPRETATION SERPL IEP-IMP: NORMAL
M PROTEIN SERPL ELPH-MCNC: NORMAL G/DL
PLEASE NOTE:, 149534: NORMAL
POTASSIUM SERPL-SCNC: 3.8 MMOL/L (ref 3.5–5.2)
PROT SERPL-MCNC: 6.1 G/DL (ref 6–8.5)
SODIUM SERPL-SCNC: 143 MMOL/L (ref 134–144)
VIT B12 SERPL-MCNC: 369 PG/ML (ref 232–1245)

## 2019-10-01 ENCOUNTER — TELEPHONE (OUTPATIENT)
Dept: NEUROLOGY | Age: 59
End: 2019-10-01

## 2019-10-01 NOTE — TELEPHONE ENCOUNTER
----- Message from Checo Matamoros sent at 10/1/2019 10:24 AM EDT -----  Regarding: Dr. Chuy Mae Message/Vendor Calls    Caller's first and last name: Maicol Crocker (pt)      Reason for call: lab results      Callback required yes/no and why: yes      Best contact number(s):[phone]      Details to clarify the request:      Checo Matamoros

## 2019-10-04 NOTE — TELEPHONE ENCOUNTER
Tejinder Walkerscottmaria isabel Mayes,    Thanks for checking. All of your lab tests were essentially normal.  There were no lab abnormalities which would explain your current symptoms. Please keep me updated on how you are doing. Thanks.      Dr. Ezra Alonzo

## 2019-11-12 DIAGNOSIS — F90.9 ADULT ADHD: ICD-10-CM

## 2019-11-12 NOTE — TELEPHONE ENCOUNTER
Requested Prescriptions     Pending Prescriptions Disp Refills    dextroamphetamine-amphetamine (ADDERALL) 20 mg tablet 60 Tab 0     Sig: Take 1 Tab by mouth two (2) times a day. Max Daily Amount: 40 mg. Patient would like a call once ready.

## 2019-11-13 ENCOUNTER — TELEPHONE (OUTPATIENT)
Dept: FAMILY MEDICINE CLINIC | Age: 59
End: 2019-11-13

## 2019-11-13 NOTE — TELEPHONE ENCOUNTER
Pt is calling wanting to speak with Sukumar Craft states it is personal and prefer not to tell  She can be reached at [phone]

## 2019-11-14 ENCOUNTER — OFFICE VISIT (OUTPATIENT)
Dept: FAMILY MEDICINE CLINIC | Age: 59
End: 2019-11-14

## 2019-11-14 VITALS
WEIGHT: 165.2 LBS | TEMPERATURE: 98.2 F | HEART RATE: 87 BPM | BODY MASS INDEX: 28.2 KG/M2 | RESPIRATION RATE: 16 BRPM | DIASTOLIC BLOOD PRESSURE: 72 MMHG | SYSTOLIC BLOOD PRESSURE: 109 MMHG | HEIGHT: 64 IN | OXYGEN SATURATION: 96 %

## 2019-11-14 DIAGNOSIS — Z87.891 PERSONAL HISTORY OF NICOTINE DEPENDENCE: Primary | ICD-10-CM

## 2019-11-14 DIAGNOSIS — G47.00 INSOMNIA, UNSPECIFIED TYPE: ICD-10-CM

## 2019-11-14 DIAGNOSIS — F32.A DEPRESSION, UNSPECIFIED DEPRESSION TYPE: ICD-10-CM

## 2019-11-14 DIAGNOSIS — Z82.49 FAMILY HISTORY OF ABDOMINAL AORTIC ANEURYSM (AAA): ICD-10-CM

## 2019-11-14 RX ORDER — DEXTROAMPHETAMINE SACCHARATE, AMPHETAMINE ASPARTATE, DEXTROAMPHETAMINE SULFATE AND AMPHETAMINE SULFATE 5; 5; 5; 5 MG/1; MG/1; MG/1; MG/1
20 TABLET ORAL 2 TIMES DAILY
Qty: 60 TAB | Refills: 0 | Status: SHIPPED | OUTPATIENT
Start: 2019-11-14 | End: 2019-12-20 | Stop reason: SDUPTHER

## 2019-11-14 RX ORDER — DESVENLAFAXINE SUCCINATE 50 MG/1
50 TABLET, EXTENDED RELEASE ORAL DAILY
Qty: 90 TAB | Refills: 3 | Status: SHIPPED | OUTPATIENT
Start: 2019-11-14 | End: 2019-11-27 | Stop reason: SDUPTHER

## 2019-11-14 RX ORDER — DOXEPIN HYDROCHLORIDE 50 MG/1
CAPSULE ORAL
Qty: 270 CAP | Refills: 3 | Status: SHIPPED | OUTPATIENT
Start: 2019-11-14 | End: 2021-05-14 | Stop reason: SDUPTHER

## 2019-11-14 NOTE — PATIENT INSTRUCTIONS

## 2019-11-14 NOTE — PROGRESS NOTES
Chief Complaint   Patient presents with    Medication Refill         HPI: Gladis Wilkerson comes in today  for ADHD follow-up. Adderall 20mg 1-2 times daily. No CP, dizziness, HA, no palpitations. Feels ADHD symptoms controlled on current dose/medication. No recreational drugs, no ETOH regularly. Smoking about 1/2 pack per day. HTN  Taking medications daily.  No side effects per patient. Chuy Dash is not checking BP at home.   No palpitations, dizziness, headache, SOB/FUENTES, vision changes, CP or swelling in the legs. States she tries to watch her salt intake.  She does drink coffee during the day. She has concerns about potential AAA, says that her father recently  from AAA failed surgical repair. She is also a smoker. Was seen by neurology in September for \"a head julia\". She says that \"he ran a bunch of tests and says that everything is ok\", told her that they could consider trying a medication for her essential tremor if needed. Sleep:  Has problems with sleep? no- controlled on doxepin. Gets depressed, anxious, or irritable/has mood swings? No- controlled on pristiq     Eating habits:  Eats regular meals including adequate fruits and vegetables: yes        ROS:   Review of Systems - General ROS: negative for - fatigue, sleep disturbance, weight gain or weight loss  Psychological ROS: negative for anxiety, depression, mood changes, no other symptoms reported.    Respiratory ROS: negative for - shortness of breath  Cardiovascular ROS: negative for - chest pain, irregular heartbeat, loss of consciousness or palpitations  Gastrointestinal ROS: negative for -  appetite loss, change in bowel habits, diarrhea or nausea/vomiting, abdominal pain   Musculoskeletal ROS: negative for - gait disturbance, joint pain, muscle pain or muscular weakness  Neurological ROS: negative for - behavioral changes, confusion, dizziness, gait disturbance, headaches, impaired coordination/balance, speech problems, visual changes or weakness    Rest of 12 point ROS otherwise negative. PE:  Vital Signs:   Visit Vitals  /72 (BP 1 Location: Left arm, BP Patient Position: Sitting)   Pulse 87   Temp 98.2 °F (36.8 °C) (Oral)   Resp 16   Ht 5' 4\" (1.626 m)   Wt 165 lb 3.2 oz (74.9 kg)   LMP  (LMP Unknown)   SpO2 96%   BMI 28.36 kg/m²     Constitutional:  Alert and active. Cooperative. In no distress. HEENT: Normocephalic, pink conjunctivae, anicteric sclerae, fundoscopy unremarkable, ear canals and tympanic membranes clear with good mobility, no rhinorrhea, oropharynx clear. Neck: Supple, no cervical lymphadenopathy. No masses or thyroid gland enlargement. Lungs: No retractions, clear to auscultation, no rales or wheezing. Heart:  Normal rate, regular rhythm, S1 normal and S2 normal.  No murmur heard. Abdomen:  Soft, good bowel sounds, non-tender, no masses or hepatosplenomegaly. Musculoskeletal: No gross deformities, good pulses. No joint edema. Neurologic: Normal gait, no focal deficits noted. DTR's +2. Negative Romberg. + essential tremor. Normal muscle tone and bulk, normal strength in all extremities b/l and symmetrically. Normal finger to nose and diadochokinesia  Skin: No rashes or lesions. Psych: Oriented, appropriate mood and affect. Assessment/Plan:    Differential diagnosis and treatment options reviewed with patient who is in agreement with treatment plan as outlined below. ICD-10-CM ICD-9-CM    1. Depression, unspecified depression type F32.9 311 PRISTIQ 50 mg ER tablet      doxepin (SINEQUAN) 50 mg capsule   2. Insomnia, unspecified type G47.00 780.52 doxepin (SINEQUAN) 50 mg capsule     BP at goal.  Refilled all other medication. Feels good, has no complaints. Continue Adderall; reviewed benefits and side effects. Reinforced positive reinforcement, behavior and lifestyle modification, good sleep hygiene.  reviewed, no compliance issues noted.   Follow up three months or sooner, will check fasting labs then    Verbal and written instructions (see AVS) provided. Patient expresses understanding and agreement of diagnosis and treatment plan.     Rickey Gimenez NP

## 2019-11-14 NOTE — PROGRESS NOTES
Chief Complaint   Patient presents with    Medication Refill     1. Have you been to the ER, urgent care clinic since your last visit? Hospitalized since your last visit? No    2. Have you seen or consulted any other health care providers outside of the [address] [place] [address] since your last visit? Include any pap smears or colon screening. No    Health maintenance reviewed. Pt informed of health maintenance past due and/or upcoming. Pt verbalized understanding. Health Maintenance Due   Topic Date Due    Shingrix Vaccine Age 49> (1 of 2) 03/18/2012    Influenza Age 5 to Adult  08/01/2019    PAP AKA CERVICAL CYTOLOGY  09/01/2019    BREAST CANCER SCRN MAMMOGRAM  11/07/2019     Pt declined flu vaccine this visit.

## 2019-11-27 ENCOUNTER — TELEPHONE (OUTPATIENT)
Dept: FAMILY MEDICINE CLINIC | Age: 59
End: 2019-11-27

## 2019-11-27 DIAGNOSIS — F32.A DEPRESSION, UNSPECIFIED DEPRESSION TYPE: ICD-10-CM

## 2019-11-27 NOTE — TELEPHONE ENCOUNTER
Left message for pt to return my call. Need to know if she is ok with going on prestique generic form or if she needs to have name brand for insurance coverage.

## 2019-11-27 NOTE — TELEPHONE ENCOUNTER
Called pt and verified name and . Asked pt about going on generic for prestiq, pt voiced that she was ok with this, pt does need a refill for this. Please send rx into pharmacy with note saying it's ok for generic. Please change sig in rx.

## 2019-12-02 RX ORDER — DESVENLAFAXINE SUCCINATE 50 MG/1
50 TABLET, EXTENDED RELEASE ORAL DAILY
Qty: 90 TAB | Refills: 3 | Status: SHIPPED | OUTPATIENT
Start: 2019-12-02 | End: 2020-02-21 | Stop reason: SDUPTHER

## 2019-12-06 ENCOUNTER — DOCUMENTATION ONLY (OUTPATIENT)
Dept: FAMILY MEDICINE CLINIC | Age: 59
End: 2019-12-06

## 2019-12-06 NOTE — PROGRESS NOTES
Kept received multiple faxed from [facility], called pharmacy to voice to them to fill generic pristique due to it being covered by her insurance per [facility] by Adela Hennessy NP, pharmacist voiced understanding.

## 2019-12-20 DIAGNOSIS — F90.9 ADULT ADHD: ICD-10-CM

## 2019-12-20 RX ORDER — DEXTROAMPHETAMINE SACCHARATE, AMPHETAMINE ASPARTATE, DEXTROAMPHETAMINE SULFATE AND AMPHETAMINE SULFATE 5; 5; 5; 5 MG/1; MG/1; MG/1; MG/1
20 TABLET ORAL 2 TIMES DAILY
Qty: 60 TAB | Refills: 0 | Status: SHIPPED | OUTPATIENT
Start: 2019-12-20 | End: 2020-03-02 | Stop reason: SDUPTHER

## 2020-02-21 DIAGNOSIS — F32.A DEPRESSION, UNSPECIFIED DEPRESSION TYPE: ICD-10-CM

## 2020-02-21 RX ORDER — DESVENLAFAXINE SUCCINATE 50 MG/1
50 TABLET, EXTENDED RELEASE ORAL DAILY
Qty: 90 TAB | Refills: 2 | Status: SHIPPED | OUTPATIENT
Start: 2020-02-21 | End: 2020-12-16

## 2020-02-21 RX ORDER — DESVENLAFAXINE SUCCINATE 50 MG/1
50 TABLET, EXTENDED RELEASE ORAL DAILY
Qty: 30 TAB | Refills: 0 | Status: SHIPPED | OUTPATIENT
Start: 2020-02-21 | End: 2020-02-21 | Stop reason: SDUPTHER

## 2020-02-21 NOTE — TELEPHONE ENCOUNTER
Resent 90 days to [facility], although was done in December so not sure why they did not have.   Sent 30 day supply to [facility] per her request

## 2020-02-21 NOTE — TELEPHONE ENCOUNTER
Pt is calling stating she needed a refill on her med states [facility] doesn't have and she is saying she needs a 30 day supply sent to [facility] in [place] she states please check with Carlos Juares because they are saying they never received this in December     Requested Prescriptions     Pending Prescriptions Disp Refills    desvenlafaxine succinate (PRISTIQ) 50 mg ER tablet 90 Tab 3     Sig: Take 1 Tab by mouth daily.

## 2020-03-02 DIAGNOSIS — F90.9 ADULT ADHD: ICD-10-CM

## 2020-03-02 NOTE — TELEPHONE ENCOUNTER
Requested Prescriptions     Pending Prescriptions Disp Refills    dextroamphetamine-amphetamine (ADDERALL) 20 mg tablet 60 Tab 0     Sig: Take 1 Tab by mouth two (2) times a day.  Max Daily Amount: 40 mg.     Talon Payton in [place]

## 2020-03-03 RX ORDER — DEXTROAMPHETAMINE SACCHARATE, AMPHETAMINE ASPARTATE, DEXTROAMPHETAMINE SULFATE AND AMPHETAMINE SULFATE 5; 5; 5; 5 MG/1; MG/1; MG/1; MG/1
20 TABLET ORAL 2 TIMES DAILY
Qty: 60 TAB | Refills: 0 | Status: SHIPPED | OUTPATIENT
Start: 2020-03-03 | End: 2020-04-06 | Stop reason: SDUPTHER

## 2020-03-10 ENCOUNTER — TELEPHONE (OUTPATIENT)
Dept: FAMILY MEDICINE CLINIC | Age: 60
End: 2020-03-10

## 2020-03-28 DIAGNOSIS — I10 ESSENTIAL HYPERTENSION: ICD-10-CM

## 2020-03-30 RX ORDER — LOSARTAN POTASSIUM 50 MG/1
TABLET ORAL
Qty: 90 TAB | Refills: 3 | Status: SHIPPED | OUTPATIENT
Start: 2020-03-30 | End: 2021-04-16 | Stop reason: SDUPTHER

## 2020-04-06 DIAGNOSIS — F90.9 ADULT ADHD: ICD-10-CM

## 2020-04-06 RX ORDER — DEXTROAMPHETAMINE SACCHARATE, AMPHETAMINE ASPARTATE, DEXTROAMPHETAMINE SULFATE AND AMPHETAMINE SULFATE 5; 5; 5; 5 MG/1; MG/1; MG/1; MG/1
20 TABLET ORAL 2 TIMES DAILY
Qty: 60 TAB | Refills: 0 | Status: SHIPPED | OUTPATIENT
Start: 2020-04-06 | End: 2020-06-18 | Stop reason: SDUPTHER

## 2020-04-06 NOTE — TELEPHONE ENCOUNTER
Pt is calling requesting a refill on her med    Requested Prescriptions     Pending Prescriptions Disp Refills    dextroamphetamine-amphetamine (ADDERALL) 20 mg tablet 60 Tab 0     Sig: Take 1 Tab by mouth two (2) times a day. Max Daily Amount: 40 mg.

## 2020-05-28 DIAGNOSIS — E78.5 HYPERLIPIDEMIA, UNSPECIFIED HYPERLIPIDEMIA TYPE: ICD-10-CM

## 2020-05-28 RX ORDER — ATORVASTATIN CALCIUM 20 MG/1
TABLET, FILM COATED ORAL
Qty: 90 TAB | Refills: 3 | Status: SHIPPED | OUTPATIENT
Start: 2020-05-28 | End: 2021-05-14 | Stop reason: SDUPTHER

## 2020-06-18 DIAGNOSIS — F90.9 ADULT ADHD: ICD-10-CM

## 2020-06-18 RX ORDER — DEXTROAMPHETAMINE SACCHARATE, AMPHETAMINE ASPARTATE, DEXTROAMPHETAMINE SULFATE AND AMPHETAMINE SULFATE 5; 5; 5; 5 MG/1; MG/1; MG/1; MG/1
20 TABLET ORAL 2 TIMES DAILY
Qty: 60 TAB | Refills: 0 | Status: SHIPPED | OUTPATIENT
Start: 2020-06-18 | End: 2020-08-12 | Stop reason: SDUPTHER

## 2020-06-18 NOTE — TELEPHONE ENCOUNTER
----- Message from Iker Roel sent at 6/18/2020  9:06 AM EDT ----- Regarding: NP [place]/Refill Best contact number(s): [phone] Name of medication and dosage if known: adderall 20 mg Is patient out of this medication (yes/no): 4 left Pharmacy name: [facility] listed in chart? (yes/no): yes Pharmacy phone number n/a Date of last visit: Thursday, November 14, 2019 01:00

## 2020-07-08 ENCOUNTER — VIRTUAL VISIT (OUTPATIENT)
Dept: FAMILY MEDICINE CLINIC | Age: 60
End: 2020-07-08

## 2020-07-08 DIAGNOSIS — R73.03 PREDIABETES: ICD-10-CM

## 2020-07-08 DIAGNOSIS — E78.5 HYPERLIPIDEMIA, UNSPECIFIED HYPERLIPIDEMIA TYPE: ICD-10-CM

## 2020-07-08 DIAGNOSIS — I10 ESSENTIAL HYPERTENSION: ICD-10-CM

## 2020-07-08 DIAGNOSIS — F90.9 ADULT ADHD: Primary | ICD-10-CM

## 2020-07-08 DIAGNOSIS — Z13.29 SCREENING FOR THYROID DISORDER: ICD-10-CM

## 2020-07-08 NOTE — PROGRESS NOTES
Chief Complaint   Patient presents with    Medication Evaluation     follow-up      Health Maintenance reviewed     1. Have you been to the ER, urgent care clinic since your last visit? Hospitalized since your last visit? No     2. Have you seen or consulted any other health care providers outside of the [address] [place] [address] since your last visit? Include any pap smears or colon screening.   No

## 2020-07-08 NOTE — PROGRESS NOTES
Chief Complaint   Patient presents with    Medication Evaluation     follow-up          Subjective:     Brittney Roblero is a 64 y.o. female who was seen by synchronous (real-time) audio-video technology on 7/8/2020 for Medication Evaluation (follow-up )    Adderall 20mg 1-2 times daily, most of the time just needs once per day.  No CP, dizziness, HA, no palpitations.   Feels ADHD symptoms controlled on current dose/medication.   No recreational drugs, no ETOH regularly.    Smoking about 1/2 pack per day. Sleep:  Has problems with sleep? no- controlled on doxepin, usually one per night. Gets depressed, anxious, or irritable/has mood swings? No- controlled on pristiq      Eating habits:  Eats regular meals including adequate fruits and vegetables: yes     HTN  Taking medications daily.  No side effects per patient. Sarahy Story is not checking BP at home.   No palpitations, dizziness, headache, SOB/FUENTES, vision changes, CP or swelling in the legs. States she tries to watch her salt intake.  She does drink coffee during the day. States \"I feel great\". No complaints. Prior to Admission medications    Medication Sig Start Date End Date Taking? Authorizing Provider   dextroamphetamine-amphetamine (ADDERALL) 20 mg tablet Take 1 Tab by mouth two (2) times a day. Max Daily Amount: 40 mg. 6/18/20  Yes Juanita Bennett NP   atorvastatin (LIPITOR) 20 mg tablet TAKE 1 TABLET BY MOUTH NIGHTLY 5/28/20  Yes Juanita Bennett NP   losartan (COZAAR) 50 mg tablet TAKE 1 TABLET BY MOUTH DAILY 3/30/20  Yes Juanita Bennett NP   desvenlafaxine succinate (PRISTIQ) 50 mg ER tablet Take 1 Tab by mouth daily. 2/21/20  Yes Juanita Bennett NP   doxepin (SINEQUAN) 50 mg capsule Take 1 to 3 tablets by mouth everyday at bedtime as needed 11/14/19  Yes Juanita Bennett NP   ASPIRIN (RIKY LOW STRENGTH PO) Take 81 mg by mouth daily. Yes Provider, Historical   cholecalciferol (VITAMIN D3) 1,000 unit cap Take  by mouth daily.    Yes Provider, Historical     Patient Active Problem List    Diagnosis Date Noted    Primary osteoarthritis of first carpometacarpal joint of right hand 08/08/2017    Stenosis of both internal carotid arteries 06/28/2017    Cerebral microvascular disease 06/28/2017    Insomnia 05/09/2017    Controlled substance agreement signed 05/08/2017    Hypertension     Depression     ADHD (attention deficit hyperactivity disorder)     Hyperlipidemia     Closed trimalleolar fracture of right ankle 01/02/2017    Closed right trimalleolar fracture 01/02/2017     Past Medical History:   Diagnosis Date    ADHD (attention deficit hyperactivity disorder)     Adverse effect of anesthesia     patient states she was in the ICU s/p ankle surgery on a ventilator;1/ 2017    Depression     Hyperlipidemia     Hypertension     Ill-defined condition     high cholesterol    Ill-defined condition     genital herpes    Psychiatric disorder     depression, ADHD     Past Surgical History:   Procedure Laterality Date    HX ANKLE FRACTURE TX Right 01/03/2017    Shattered her ankle and was hospitalized     HX GYN      D&C X2    HX OTHER SURGICAL      colonoscopy       ROS  Gen: no fatigue, fever, chills  Eyes: no excessive tearing, itching, or discharge  Nose: no rhinorrhea, no sinus pain  Mouth: no oral lesions, no sore throat  Resp: no shortness of breath, no wheezing, no cough  CV: no chest pain, no paroxysmal nocturnal dyspnea  Abd: no nausea, no heartburn, no diarrhea, no constipation, no abdominal pain  Neuro: no headaches, no syncope or presyncopal episodes  Endo: no polyuria, no polydipsia  Heme: no lymphadenopathy, no easy bruising or bleeding    Objective:   No flowsheet data found.      [INSTRUCTIONS:  \"[x]\" Indicates a positive item  \"[]\" Indicates a negative item  -- DELETE ALL ITEMS NOT EXAMINED]    Constitutional: [x] Appears well-developed and well-nourished [x] No apparent distress        Mental status: [x] Alert and awake  [x] Oriented to person/place/time [x] Able to follow commands        Eyes:   EOM    [x]  Normal      Sclera  [x]  Normal              Discharge [x]  None visible            Neck: [x] No visualized mass     Pulmonary/Chest: [x] Respiratory effort normal   [x] No visualized signs of difficulty breathing or respiratory distress       Musculoskeletal:   [x] Normal gait with no signs of ataxia         [x] Normal range of motion of neck      Neurological:        [x] No Facial Asymmetry (Cranial nerve 7 motor function) (limited exam due to video visit)          [x] No gaze palsy           Skin:        [x] No significant exanthematous lesions or discoloration noted on facial skin                    Psychiatric:       [x] Normal Affect         [x] No Hallucinations          Assessment & Plan:   Diagnoses and all orders for this visit:    ICD-10-CM ICD-9-CM    1. Adult ADHD F90.9 314.01    2. Hyperlipidemia, unspecified hyperlipidemia type E78.5 272.4 LIPID PANEL   3. Essential hypertension A99 155.3 METABOLIC PANEL, COMPREHENSIVE      CBC WITH AUTOMATED DIFF   4. Screening for thyroid disorder Z13.29 V77.0 TSH 3RD GENERATION   5. Prediabetes R73.03 790.29 HEMOGLOBIN A1C WITH EAG     Will have routine fasting labs done at [address] [place] [address].    1. Adult ADHD-Continue Adderall; reviewed benefits and side effects. Reinforced positive reinforcement, behavior and lifestyle modification, good sleep hygiene.  reviewed, no compliance issues noted    2. Hyperlipidemia, unspecified hyperlipidemia type  -     LIPID PANEL; Future    3. Essential hypertension-will try to check BP at home, should check 1-2 times per month. Goal <140/90. DASH diet discussed   -     METABOLIC PANEL, COMPREHENSIVE; Future  -     CBC WITH AUTOMATED DIFF; Future    4. Screening for thyroid disorder  -     TSH 3RD GENERATION; Future    5. Prediabetes  -     HEMOGLOBIN A1C WITH EAG; Future    Follow up 3 months or sooner if needed.        We discussed the expected course, resolution and complications of the diagnosis(es) in detail. Medication risks, benefits, costs, interactions, and alternatives were discussed as indicated. I advised her to contact the office if her condition worsens, changes or fails to improve as anticipated. She expressed understanding with the diagnosis(es) and plan. Anita Bustamante, who was evaluated through a patient-initiated, synchronous (real-time) audio-video encounter, and/or her healthcare decision maker, is aware that it is a billable service, with coverage as determined by her insurance carrier. She provided verbal consent to proceed: Yes, and patient identification was verified. It was conducted pursuant to the emergency declaration under the [address] [place] [address] authority and the [employer] Act. A caregiver was present when appropriate. Ability to conduct physical exam was limited. I was at home. The patient was at home.       Barb Lopez NP

## 2020-08-12 DIAGNOSIS — F90.9 ADULT ADHD: ICD-10-CM

## 2020-08-13 RX ORDER — DEXTROAMPHETAMINE SACCHARATE, AMPHETAMINE ASPARTATE, DEXTROAMPHETAMINE SULFATE AND AMPHETAMINE SULFATE 5; 5; 5; 5 MG/1; MG/1; MG/1; MG/1
20 TABLET ORAL 2 TIMES DAILY
Qty: 60 TAB | Refills: 0 | Status: SHIPPED | OUTPATIENT
Start: 2020-08-13 | End: 2020-10-11 | Stop reason: SDUPTHER

## 2020-10-11 DIAGNOSIS — F90.9 ADULT ADHD: ICD-10-CM

## 2020-10-12 ENCOUNTER — LAB ONLY (OUTPATIENT)
Dept: FAMILY MEDICINE CLINIC | Age: 60
End: 2020-10-12

## 2020-10-12 DIAGNOSIS — E78.5 HYPERLIPIDEMIA, UNSPECIFIED HYPERLIPIDEMIA TYPE: ICD-10-CM

## 2020-10-12 DIAGNOSIS — I10 ESSENTIAL HYPERTENSION: ICD-10-CM

## 2020-10-12 DIAGNOSIS — Z13.29 SCREENING FOR THYROID DISORDER: ICD-10-CM

## 2020-10-12 DIAGNOSIS — R73.03 PREDIABETES: ICD-10-CM

## 2020-10-13 LAB
ALBUMIN SERPL-MCNC: 3.9 G/DL (ref 3.8–4.9)
ALBUMIN/GLOB SERPL: 1.6 {RATIO} (ref 1.2–2.2)
ALP SERPL-CCNC: 107 IU/L (ref 39–117)
ALT SERPL-CCNC: 19 IU/L (ref 0–32)
AST SERPL-CCNC: 17 IU/L (ref 0–40)
BASOPHILS # BLD AUTO: 0 X10E3/UL (ref 0–0.2)
BASOPHILS NFR BLD AUTO: 1 %
BILIRUB SERPL-MCNC: 1.1 MG/DL (ref 0–1.2)
BUN SERPL-MCNC: 6 MG/DL (ref 6–24)
BUN/CREAT SERPL: 7 (ref 9–23)
CALCIUM SERPL-MCNC: 9.2 MG/DL (ref 8.7–10.2)
CHLORIDE SERPL-SCNC: 106 MMOL/L (ref 96–106)
CHOLEST SERPL-MCNC: 147 MG/DL (ref 100–199)
CO2 SERPL-SCNC: 21 MMOL/L (ref 20–29)
CREAT SERPL-MCNC: 0.87 MG/DL (ref 0.57–1)
EOSINOPHIL # BLD AUTO: 0 X10E3/UL (ref 0–0.4)
EOSINOPHIL NFR BLD AUTO: 1 %
ERYTHROCYTE [DISTWIDTH] IN BLOOD BY AUTOMATED COUNT: 12.3 % (ref 11.7–15.4)
EST. AVERAGE GLUCOSE BLD GHB EST-MCNC: 123 MG/DL
GLOBULIN SER CALC-MCNC: 2.4 G/DL (ref 1.5–4.5)
GLUCOSE SERPL-MCNC: 129 MG/DL (ref 65–99)
HBA1C MFR BLD: 5.9 % (ref 4.8–5.6)
HCT VFR BLD AUTO: 42.6 % (ref 34–46.6)
HDLC SERPL-MCNC: 37 MG/DL
HGB BLD-MCNC: 14.4 G/DL (ref 11.1–15.9)
IMM GRANULOCYTES # BLD AUTO: 0 X10E3/UL (ref 0–0.1)
IMM GRANULOCYTES NFR BLD AUTO: 1 %
INTERPRETATION, 910389: NORMAL
LDLC SERPL CALC-MCNC: 75 MG/DL (ref 0–99)
LYMPHOCYTES # BLD AUTO: 1.5 X10E3/UL (ref 0.7–3.1)
LYMPHOCYTES NFR BLD AUTO: 19 %
MCH RBC QN AUTO: 30.1 PG (ref 26.6–33)
MCHC RBC AUTO-ENTMCNC: 33.8 G/DL (ref 31.5–35.7)
MCV RBC AUTO: 89 FL (ref 79–97)
MONOCYTES # BLD AUTO: 0.6 X10E3/UL (ref 0.1–0.9)
MONOCYTES NFR BLD AUTO: 8 %
NEUTROPHILS # BLD AUTO: 5.4 X10E3/UL (ref 1.4–7)
NEUTROPHILS NFR BLD AUTO: 70 %
PLATELET # BLD AUTO: 250 X10E3/UL (ref 150–450)
POTASSIUM SERPL-SCNC: 4.2 MMOL/L (ref 3.5–5.2)
PROT SERPL-MCNC: 6.3 G/DL (ref 6–8.5)
RBC # BLD AUTO: 4.78 X10E6/UL (ref 3.77–5.28)
SODIUM SERPL-SCNC: 141 MMOL/L (ref 134–144)
TRIGL SERPL-MCNC: 210 MG/DL (ref 0–149)
TSH SERPL DL<=0.005 MIU/L-ACNC: 3.93 UIU/ML (ref 0.45–4.5)
VLDLC SERPL CALC-MCNC: 35 MG/DL (ref 5–40)
WBC # BLD AUTO: 7.5 X10E3/UL (ref 3.4–10.8)

## 2020-10-13 RX ORDER — DEXTROAMPHETAMINE SACCHARATE, AMPHETAMINE ASPARTATE, DEXTROAMPHETAMINE SULFATE AND AMPHETAMINE SULFATE 5; 5; 5; 5 MG/1; MG/1; MG/1; MG/1
20 TABLET ORAL 2 TIMES DAILY
Qty: 60 TAB | Refills: 0 | Status: SHIPPED | OUTPATIENT
Start: 2020-10-13 | End: 2020-12-16 | Stop reason: SDUPTHER

## 2020-10-21 ENCOUNTER — OFFICE VISIT (OUTPATIENT)
Dept: FAMILY MEDICINE CLINIC | Age: 60
End: 2020-10-21
Payer: COMMERCIAL

## 2020-10-21 VITALS
DIASTOLIC BLOOD PRESSURE: 89 MMHG | OXYGEN SATURATION: 98 % | SYSTOLIC BLOOD PRESSURE: 136 MMHG | BODY MASS INDEX: 30.56 KG/M2 | RESPIRATION RATE: 20 BRPM | WEIGHT: 179 LBS | HEART RATE: 96 BPM | HEIGHT: 64 IN | TEMPERATURE: 97.2 F

## 2020-10-21 DIAGNOSIS — Z12.39 ENCOUNTER FOR SCREENING FOR MALIGNANT NEOPLASM OF BREAST, UNSPECIFIED SCREENING MODALITY: ICD-10-CM

## 2020-10-21 DIAGNOSIS — I65.23 BILATERAL CAROTID ARTERY STENOSIS: ICD-10-CM

## 2020-10-21 DIAGNOSIS — Z87.891 PERSONAL HISTORY OF NICOTINE DEPENDENCE: ICD-10-CM

## 2020-10-21 DIAGNOSIS — I10 ESSENTIAL HYPERTENSION: ICD-10-CM

## 2020-10-21 DIAGNOSIS — Z71.6 ENCOUNTER FOR SMOKING CESSATION COUNSELING: ICD-10-CM

## 2020-10-21 DIAGNOSIS — F90.9 ADULT ADHD: Primary | ICD-10-CM

## 2020-10-21 PROCEDURE — 99214 OFFICE O/P EST MOD 30 MIN: CPT | Performed by: NURSE PRACTITIONER

## 2020-10-21 RX ORDER — IBUPROFEN 200 MG
1 TABLET ORAL EVERY 24 HOURS
Qty: 30 PATCH | Refills: 0 | Status: SHIPPED | OUTPATIENT
Start: 2020-10-21 | End: 2020-11-20

## 2020-10-21 RX ORDER — IBUPROFEN 200 MG
1 TABLET ORAL EVERY 24 HOURS
Qty: 30 PATCH | Refills: 0 | Status: SHIPPED | OUTPATIENT
Start: 2020-10-21 | End: 2020-10-21 | Stop reason: SDUPTHER

## 2020-10-21 NOTE — PROGRESS NOTES
Subjective:     Chief Complaint   Patient presents with    Follow-up        HPI:  64 y.o.  presents for follow up appointment. Adderall 20mg 1-2 times daily, most of the time just needs once per day.  No CP, dizziness, HA, no palpitations.   Feels ADHD symptoms controlled on current dose/medication.   Admits that she has used marijuana recently, no other recreational drugs, no ETOH regularly.    Smoking about 1/2- 1 pack per day.   Wants to quit smoking. Wants to try patches.      Sleep:  Has problems with sleep? no- controlled on doxepin, usually one per night.   Gets depressed, anxious, or irritable/has mood swings? No- controlled on pristiq      Eating habits:  Eats regular meals including adequate fruits and vegetables: yes  Admits that since she quit drinking ETOH (three weeks ago)  she has craved more sweets.     HTN  Taking medications daily.  No side effects per patient. Rory Holder is not checking BP at home.   No palpitations, dizziness, headache, SOB/FUENTES, vision changes, CP or swelling in the legs. States she tries to watch her salt intake.  She does drink coffee during the day. No hospital, ER or specialist visits since last primary care visit except as noted above. Past Medical History:   Diagnosis Date    ADHD (attention deficit hyperactivity disorder)     Adverse effect of anesthesia     patient states she was in the ICU s/p ankle surgery on a ventilator;1/ 2017    Depression     Hyperlipidemia     Hypertension     Ill-defined condition     high cholesterol    Ill-defined condition     genital herpes    Psychiatric disorder     depression, ADHD       Social History     Tobacco Use    Smoking status: Current Every Day Smoker     Packs/day: 0.25    Smokeless tobacco: Never Used   Substance Use Topics    Alcohol use:  Yes     Alcohol/week: 2.0 standard drinks     Types: 2 Shots of liquor per week     Comment: on occasion    Drug use: Yes     Types: Marijuana     Comment: last used 11/28/17       Outpatient Medications Marked as Taking for the 10/21/20 encounter (Office Visit) with Sheldon Hernandez NP   Medication Sig Dispense Refill    nicotine (NICODERM CQ) 14 mg/24 hr patch 1 Patch by TransDERmal route every twenty-four (24) hours for 30 days. 30 Patch 0    dextroamphetamine-amphetamine (ADDERALL) 20 mg tablet Take 1 Tab by mouth two (2) times a day. Max Daily Amount: 40 mg. 60 Tab 0    atorvastatin (LIPITOR) 20 mg tablet TAKE 1 TABLET BY MOUTH NIGHTLY 90 Tab 3    losartan (COZAAR) 50 mg tablet TAKE 1 TABLET BY MOUTH DAILY 90 Tab 3    desvenlafaxine succinate (PRISTIQ) 50 mg ER tablet Take 1 Tab by mouth daily. 90 Tab 2    doxepin (SINEQUAN) 50 mg capsule Take 1 to 3 tablets by mouth everyday at bedtime as needed 270 Cap 3    ASPIRIN (RIKY LOW STRENGTH PO) Take 81 mg by mouth daily.  cholecalciferol (VITAMIN D3) 1,000 unit cap Take  by mouth daily. Allergies   Allergen Reactions    Pcn [Penicillins] Swelling       Health Maintenance reviewed. ROS:  Gen: no fatigue, no fever, no chills, no unexplained weight loss or weight gain  Eyes: no excessive tearing, itching, or discharge  Nose: no rhinorrhea, no sinus pain  Mouth: no oral lesions, no sore throat, no difficulty swallowing  Resp: no shortness of breath, no wheezing, no cough  CV: no chest pain, no orthopnea, no paroxysmal nocturnal dyspnea, no lower extremity edema, no palpitations  Abd: no nausea, no heartburn, no diarrhea, no constipation, no abdominal pain  Neuro: no headaches, no syncope or presyncopal episodes  Endo: no polyuria, no polydipsia.     : no hematuria, no dysuria, no frequency, no incontinence  Heme: no lymphadenopathy, no easy bruising or bleeding, no night sweats  MSK: no joint pain or swelling    PE:  Visit Vitals  /89 (BP 1 Location: Left arm, BP Patient Position: At rest)   Pulse 96   Temp 97.2 °F (36.2 °C) (Skin)   Resp 20   Ht 5' 4\" (1.626 m)   Wt 179 lb (81.2 kg)   LMP  (LMP Unknown)   SpO2 98%   BMI 30.73 kg/m²     Gen: alert, oriented, no acute distress  Head: normocephalic, atraumatic  Ears: external auditory canals clear, TMs without erythema or effusion  Eyes: pupils equal round reactive to light, sclera clear, conjunctiva clear  Oral: moist mucus membranes, no oral lesions, no pharyngeal inflammation or exudate  Neck: symmetric normal sized thyroid, no carotid bruits, no jugular vein distention  Resp: no increase work of breathing, lungs clear to ausculation bilaterally, no wheezing, rales or rhonchi  CV: S1, S2 normal.  No murmurs, rubs, or gallops. Abd: soft, not tender, not distended. No hepatosplenomegaly. Normal bowel sounds. No hernias. No abdominal or renal bruits. Neuro: cranial nerves intact, normal strength and movement in all extremities, and sensation intact and symmetric. Skin: no lesion or rash  Extremities: no cyanosis or edema    No results found for this visit on 10/21/20. Assessment/Plan:  Differential diagnosis and treatment options reviewed with patient who is in agreement with treatment plan as outlined below. ICD-10-CM ICD-9-CM    1. Adult ADHD  F90.9 314.01    2. Encounter for smoking cessation counseling  Z71.6 V65.42 nicotine (NICODERM CQ) 14 mg/24 hr patch     305.1 DISCONTINUED: nicotine (NICODERM CQ) 14 mg/24 hr patch   3. Encounter for screening for malignant neoplasm of breast, unspecified screening modality  Z12.39 V76.10 [facility] MAMMO BI SCREENING INCL CAD   4. Essential hypertension  I10 401.9    5. Personal history of nicotine dependence  Z87.891 V15.82    6. Bilateral carotid artery stenosis  I65.23 433.10 DUPLEX CAROTID BILATERAL     433.30    reviewed recent labs. Due for carotid stenosis US  Due for mammogram.   BP at goal.   Continue Adderall; reviewed benefits and side effects.    Reinforced positive reinforcement, behavior and lifestyle modification, good sleep hygiene.  reviewed, no compliance issues noted.   UDS at follow up.    Smoking cessation discussed: The addictiveness of nicotine is the primary barrier for smokers to quit. Nicotine is a potent psychoactive drug that causes physical dependence and tolerance. In the absence of nicotine, a smoker develops cravings for cigarettes and symptoms of the nicotine withdrawal syndrome. Symptoms peak in the first three days of smoking cessation and subside over the next three to four weeks. Symptoms include increased appetite and weight gain, changes in mood (dysphoria or depression), insomnia, irritability, anxiety, difficulty concentrating, and restlessness    Most studies demonstrate increasing quit rates with increasing behavioral support. Offered resource to patient -free telephone quitline (9-280-QUITNOW) that will provide follow-up support and counseling to supplement the brief clinician intervention. Other behavioral counseling options include computer programs, text messaging, web-based interventions, and phone apps. Discussed BMI and healthy weight. Encouraged patient to work to implement changes including diet high in raw fruits and vegetables, lean protein and good fats. Limit refined, processed carbohydrates and sugar. Encouraged regular exercise. Recommended regular cardiovascular exercise 3-6 times per week for 30-60 minutes daily. I have discussed the diagnosis with the patient and the intended plan as seen in the above orders. The patient has received an after-visit summary and questions were answered concerning future plans. I have discussed medication side effects and warnings with the patient as well. The patient verbalizes understanding and agreement with the plan.

## 2020-10-21 NOTE — PROGRESS NOTES
Tammy Richardson is a 64 y.o. female  Chief Complaint   Patient presents with    Follow-up     1. Have you been to the ER, urgent care clinic since your last visit? Hospitalized since your last visit? 2. Have you seen or consulted any other health care providers outside of the [address] [place] [address] since your last visit? Include any pap smears or colon screening.  No  Health Maintenance   Topic Date Due    Pneumococcal 0-64 years (1 of 1 - PPSV23) 03/18/1966    Shingrix Vaccine Age 50> (1 of 2) 03/18/2012    PAP AKA CERVICAL CYTOLOGY  09/01/2019    Flu Vaccine (1) 09/01/2020    Breast Cancer Screen Mammogram  11/07/2020    A1C test (Diabetic or Prediabetic)  10/12/2021    Lipid Screen  10/12/2021    DTaP/Tdap/Td series (3 - Td) 06/12/2027    Colorectal Cancer Screening Combo  06/12/2027    Hepatitis C Screening  Completed     Visit Vitals  /89 (BP 1 Location: Left arm, BP Patient Position: At rest)   Pulse 96   Temp 97.2 °F (36.2 °C) (Skin)   Resp 20   Ht 5' 4\" (1.626 m)   Wt 179 lb (81.2 kg)   SpO2 98%   BMI 30.73 kg/m²

## 2020-10-21 NOTE — PATIENT INSTRUCTIONS
Learning About Benefits From Quitting Smoking  How does quitting smoking make you healthier? If you're thinking about quitting smoking, you may have a few reasons to be smoke-free. Your health may be one of them. · When you quit smoking, you lower your risks for cancer, lung disease, heart attack, stroke, blood vessel disease, and blindness from macular degeneration. · When you're smoke-free, you get sick less often, and you heal faster. You are less likely to get colds, flu, bronchitis, and pneumonia. · As a nonsmoker, you may find that your mood is better and you are less stressed. When and how will you feel healthier? Quitting has real health benefits that start from day 1 of being smoke-free. And the longer you stay smoke-free, the healthier you get and the better you feel. The first hours  · After just 20 minutes, your blood pressure and heart rate go down. That means there's less stress on your heart and blood vessels. · Within 12 hours, the level of carbon monoxide in your blood drops back to normal. That makes room for more oxygen. With more oxygen in your body, you may notice that you have more energy than when you smoked. After 2 weeks  · Your lungs start to work better. · Your risk of heart attack starts to drop. After 1 month  · When your lungs are clear, you cough less and breathe deeper, so it's easier to be active. · Your sense of taste and smell return. That means you can enjoy food more than you have since you started smoking. Over the years  · Over the years, your risks of heart disease, heart attack, and stroke are lower. · After 10 years, your risk of dying from lung cancer is cut by about half. And your risk for many other types of cancer is lower too. How would quitting help others in your life? When you quit smoking, you improve the health of everyone who now breathes in your smoke. · Their heart, lung, and cancer risks drop, much like yours. · They are sick less.  For babies and small children, living smoke-free means they're less likely to have ear infections, pneumonia, and bronchitis. · If you're a woman who is or will be pregnant someday, quitting smoking means a healthier . · Children who are close to you are less likely to become adult smokers. Where can you learn more? Go to http://www.gray.com/  Enter O319 in the search box to learn more about \"Learning About Benefits From Quitting Smoking. \"  Current as of: 2020               Content Version: 12.6  © 6041-7195 [employer]. Care instructions adapted under license by [employer] (which disclaims liability or warranty for this information). If you have questions about a medical condition or this instruction, always ask your healthcare professional. [address] any warranty or liability for your use of this information.

## 2020-10-30 ENCOUNTER — TELEPHONE (OUTPATIENT)
Dept: FAMILY MEDICINE CLINIC | Age: 60
End: 2020-10-30

## 2020-10-30 NOTE — TELEPHONE ENCOUNTER
----- Message from Jordan Liz sent at 10/30/2020  3:14 PM EDT -----  Regarding: NP Donald/Telephone  Caller's first and last name and relationship (if not the patient):   Best contact number(s): [phone]  Whose call is being returned: [facility]  Details to clarify the request: Pt returning Harmony's call; pt believes it's in reference to results.

## 2020-12-16 DIAGNOSIS — F32.A DEPRESSION, UNSPECIFIED DEPRESSION TYPE: ICD-10-CM

## 2020-12-16 DIAGNOSIS — F90.9 ADULT ADHD: ICD-10-CM

## 2020-12-16 RX ORDER — DEXTROAMPHETAMINE SACCHARATE, AMPHETAMINE ASPARTATE, DEXTROAMPHETAMINE SULFATE AND AMPHETAMINE SULFATE 5; 5; 5; 5 MG/1; MG/1; MG/1; MG/1
20 TABLET ORAL 2 TIMES DAILY
Qty: 60 TAB | Refills: 0 | Status: SHIPPED | OUTPATIENT
Start: 2020-12-16 | End: 2021-02-02 | Stop reason: SDUPTHER

## 2020-12-16 RX ORDER — DESVENLAFAXINE SUCCINATE 50 MG/1
TABLET, EXTENDED RELEASE ORAL
Qty: 90 TAB | Refills: 3 | Status: SHIPPED | OUTPATIENT
Start: 2020-12-16 | End: 2021-05-14 | Stop reason: SDUPTHER

## 2020-12-16 NOTE — TELEPHONE ENCOUNTER
Requested Prescriptions     Pending Prescriptions Disp Refills    dextroamphetamine-amphetamine (ADDERALL) 20 mg tablet 60 Tab 0     Sig: Take 1 Tab by mouth two (2) times a day. Max Daily Amount: 40 mg. Last office visit: 10/21/2020     pulled.   Last fill date:  10.13.2020

## 2021-01-31 NOTE — PROGRESS NOTES
MRI responded and will return call after collaboration with his lead due to patients inability to answer MRI questions and inability to get in contact with patients wife for medical history.   Problem: Mobility Impaired (Adult and Pediatric)  Goal: *Acute Goals and Plan of Care (Insert Text)  Physical Therapy Goals  Revised 1/19/2017  1. Patient will move from supine to sit and sit to supine , scoot up and down and roll side to side in bed with modified independence within 7 day(s). 2. Patient will transfer from bed to chair and chair to bed with minimal assistance/contact guard assist using the least restrictive device within 7 day(s). 3. Patient will perform sit to stand with minimal assistance/contact guard assist within 7 day(s). 4. Patient will ambulate with minimal assistance/contact guard assist for 25 feet with the least restrictive device within 7 day(s). All while maintaining NWB status of R LE    Initiated 1/4/2017  1. Patient will move from supine to sit and sit to supine in bed with independence within 7 day(s). 2. Patient will transfer from bed to chair and chair to bed with supervision/set-up using the least restrictive device within 7 day(s). 3. Patient will perform sit to stand with supervision/set-up within 7 day(s). 4. Patient will ambulate with supervision/set-up for 100 feet with the least restrictive device within 7 day(s). 5. Patient will ascend/descend 8 stairs with B handrail(s) with supervision/set-up within 7 day(s). PHYSICAL THERAPY TREATMENT  Patient: Henri Hood (17 y.o. female)  Date: 1/20/2017  Diagnosis: RIGHT ANKLE FRACTURE  Closed right trimalleolar fracture, initial encounter  Right Ankle Fracture <principal problem not specified>  Procedure(s) (LRB):  REMOVAL EXTERNAL FIXATOR AND RIGHT ANKLE OPEN REDUCTION INTERNAL FIXATION, GASTROCNEMIUS RECESSION (Req Large C-Arm, Ti Mini and Small Frag and Tong Clamps) (Right) 17 Days Post-Op  Precautions: NWB, Fall      ASSESSMENT:  Pt was received in supine and cleared by nursing to mobilize. She continues to be very confused and poor attention span. Impulsive during session and easily distracted.  Bed mobility was performed with CGA/min A mainly due to poor initiation and attention rather than actual weakness. Sit<>stand performed several times with min/mod A x 2 with RW which she was unable to maintain NWB on her own. Third person was needed to keep the RLE lifted during gait training. She was able to hop with min A x 2 to get to the chair, but performed with poor safety awareness when she began to sit prematurely before getting in front of the chair. She was left sitting up in the chair at the end of the session to eat breakfast. She is easily distracted and would benefit from a session where only therapy is in the room. Today  entered the room and all she would focus on was him, he attempted to assist with her keeping her RLE elevated in standing but was more of distraction than a benefit. Also tried visual cues to assess, unsuccessful. Recommending SNF if mental status does not clear. Progression toward goals:  [ ]    Improving appropriately and progressing toward goals  [X]    Improving slowly and progressing toward goals  [ ]    Not making progress toward goals and plan of care will be adjusted       PLAN:  Patient continues to benefit from skilled intervention to address the above impairments. Continue treatment per established plan of care. Discharge Recommendations:  Lorenzo Tiwari vs IP   Further Equipment Recommendations for Discharge:  TBD       SUBJECTIVE:   Patient stated yup, sure.  when asked to perform a task, but then would not initiate       OBJECTIVE DATA SUMMARY:   Critical Behavior:  Neurologic State: Confused  Orientation Level: Disoriented to place, Disoriented to situation, Disoriented to time, Oriented to person  Cognition: Impulsive     Functional Mobility Training:  Bed Mobility:  Rolling: Contact guard assistance  Supine to Sit: Contact guard assistance              Transfers:  Sit to Stand: Minimum assistance;Assist x2; Additional time  Stand to Sit: Minimum assistance;Assist x2; Additional time                             Balance:  Sitting: Intact  Standing: Impaired  Standing - Static: Fair;Constant support  Standing - Dynamic : Fair  Ambulation/Gait Training:  Distance (ft): 2 Feet (ft)  Assistive Device: Gait belt;Walker, rolling  Ambulation - Level of Assistance: Moderate assistance;Assist x2 (plus 3rd holding the RLE)        Gait Abnormalities: Antalgic;Decreased step clearance (hop)  Right Side Weight Bearing: Non-weight bearing     Base of Support: Shift to left        Step Length: Left shortened                 Pain:  Pain Scale 1: Visual  Pain Intensity 1: 0  Pain Location 1: Ankle  Pain Orientation 1: Right  Pain Description 1: Aching  Pain Intervention(s) 1: Medication (see MAR)  Activity Tolerance:   Tearful, distracted, confused   Please refer to the flowsheet for vital signs taken during this treatment.   After treatment:   [X]    Patient left in no apparent distress sitting up in chair  [ ]    Patient left in no apparent distress in bed  [X]    Call bell left within reach  [X]    Nursing notified  [ ]    Caregiver present  [X]    Bed alarm activated      COMMUNICATION/COLLABORATION:   The patients plan of care was discussed with: Occupational Therapist and Registered Nurse     Kandy Dexter, PT, DPT   Time Calculation: 31 mins

## 2021-02-02 DIAGNOSIS — F90.9 ADULT ADHD: ICD-10-CM

## 2021-02-03 RX ORDER — DEXTROAMPHETAMINE SACCHARATE, AMPHETAMINE ASPARTATE, DEXTROAMPHETAMINE SULFATE AND AMPHETAMINE SULFATE 5; 5; 5; 5 MG/1; MG/1; MG/1; MG/1
20 TABLET ORAL 2 TIMES DAILY
Qty: 60 TAB | Refills: 0 | Status: SHIPPED | OUTPATIENT
Start: 2021-02-03 | End: 2021-03-22 | Stop reason: SDUPTHER

## 2021-02-17 ENCOUNTER — CLINICAL SUPPORT (OUTPATIENT)
Dept: FAMILY MEDICINE CLINIC | Age: 61
End: 2021-02-17

## 2021-02-17 DIAGNOSIS — F90.9 ADULT ADHD: Primary | ICD-10-CM

## 2021-02-21 LAB — DRUGS UR: NORMAL

## 2021-03-22 DIAGNOSIS — F90.9 ADULT ADHD: ICD-10-CM

## 2021-03-22 NOTE — TELEPHONE ENCOUNTER
Requested Prescriptions     Pending Prescriptions Disp Refills    dextroamphetamine-amphetamine (ADDERALL) 20 mg tablet 60 Tab 0     Sig: Take 1 Tab by mouth two (2) times a day. Max Daily Amount: 40 mg. Last office visit: 2/17/2021     pulled.   Last fill date:  02.03.2021

## 2021-03-23 RX ORDER — DEXTROAMPHETAMINE SACCHARATE, AMPHETAMINE ASPARTATE, DEXTROAMPHETAMINE SULFATE AND AMPHETAMINE SULFATE 5; 5; 5; 5 MG/1; MG/1; MG/1; MG/1
20 TABLET ORAL 2 TIMES DAILY
Qty: 60 TAB | Refills: 0 | Status: SHIPPED | OUTPATIENT
Start: 2021-03-23 | End: 2021-05-14 | Stop reason: SDUPTHER

## 2021-04-16 DIAGNOSIS — I10 ESSENTIAL HYPERTENSION: ICD-10-CM

## 2021-04-16 RX ORDER — LOSARTAN POTASSIUM 50 MG/1
TABLET ORAL
Qty: 90 TAB | Refills: 3 | Status: SHIPPED | OUTPATIENT
Start: 2021-04-16 | End: 2022-03-07

## 2021-04-16 NOTE — TELEPHONE ENCOUNTER
Need more information for which pharmacy she wants. When I type [facility] in multiple locations come up.

## 2021-05-14 ENCOUNTER — VIRTUAL VISIT (OUTPATIENT)
Dept: FAMILY MEDICINE CLINIC | Age: 61
End: 2021-05-14
Payer: COMMERCIAL

## 2021-05-14 DIAGNOSIS — Z87.891 PERSONAL HISTORY OF NICOTINE DEPENDENCE: ICD-10-CM

## 2021-05-14 DIAGNOSIS — F32.A DEPRESSION, UNSPECIFIED DEPRESSION TYPE: ICD-10-CM

## 2021-05-14 DIAGNOSIS — Z82.49 FAMILY HISTORY OF ABDOMINAL AORTIC ANEURYSM (AAA): ICD-10-CM

## 2021-05-14 DIAGNOSIS — G47.00 INSOMNIA, UNSPECIFIED TYPE: ICD-10-CM

## 2021-05-14 DIAGNOSIS — E78.5 HYPERLIPIDEMIA, UNSPECIFIED HYPERLIPIDEMIA TYPE: ICD-10-CM

## 2021-05-14 DIAGNOSIS — F90.9 ADULT ADHD: Primary | ICD-10-CM

## 2021-05-14 PROCEDURE — 99214 OFFICE O/P EST MOD 30 MIN: CPT | Performed by: NURSE PRACTITIONER

## 2021-05-14 RX ORDER — DEXTROAMPHETAMINE SACCHARATE, AMPHETAMINE ASPARTATE, DEXTROAMPHETAMINE SULFATE AND AMPHETAMINE SULFATE 5; 5; 5; 5 MG/1; MG/1; MG/1; MG/1
20 TABLET ORAL 2 TIMES DAILY
Qty: 60 TAB | Refills: 0 | Status: SHIPPED | OUTPATIENT
Start: 2021-05-14 | End: 2021-07-22 | Stop reason: SDUPTHER

## 2021-05-14 RX ORDER — DOXEPIN HYDROCHLORIDE 50 MG/1
CAPSULE ORAL
Qty: 270 CAP | Refills: 3 | Status: SHIPPED | OUTPATIENT
Start: 2021-05-14 | End: 2022-05-16 | Stop reason: SDUPTHER

## 2021-05-14 RX ORDER — ATORVASTATIN CALCIUM 20 MG/1
TABLET, FILM COATED ORAL
Qty: 90 TAB | Refills: 3 | Status: SHIPPED | OUTPATIENT
Start: 2021-05-14 | End: 2022-05-16 | Stop reason: SDUPTHER

## 2021-05-14 RX ORDER — DESVENLAFAXINE SUCCINATE 50 MG/1
TABLET, EXTENDED RELEASE ORAL
Qty: 90 TAB | Refills: 3 | Status: SHIPPED | OUTPATIENT
Start: 2021-05-14 | End: 2022-05-16 | Stop reason: SDUPTHER

## 2021-05-14 NOTE — PROGRESS NOTES
Yin Posada (: 1960) is a 63 y.o. female, established patient, here for evaluation of the following chief complaint(s):   Medication Evaluation         SUBJECTIVE/OBJECTIVE:  HPI    Adderall 20mg 1-2 times daily, most of the time just needs once per day.  No CP, dizziness, HA, no palpitations.   Feels ADHD symptoms controlled on current dose/medication.   Admits that she has used marijuana recently, no other recreational drugs, no ETOH regularly.    Smoking about 1/2 pack per day.   Father and sister both have AAA. Father  of AAA rupture. She would like to have AAA screening done.     Sleep:  Has problems with sleep? no- controlled on doxepin, usually one per night.   Gets depressed, anxious, or irritable/has mood swings? No- controlled on pristiq      Eating habits:  Eats regular meals including adequate fruits and vegetables: yes  Cut way quit drinking liquor, drinks a glass of wine some nights.      HTN  Taking medications daily.  No side effects per patient. [employer] is not checking BP at home.   No palpitations, dizziness, headache, SOB/FUENTES, vision changes, CP or swelling in the legs. States she tries to watch her salt intake.  She does drink coffee during the day. Review of Systems   Gen: no fatigue, fever, chills  Eyes: no excessive tearing, itching, or discharge  Nose: no rhinorrhea, no sinus pain  Mouth: no oral lesions, no sore throat  Resp: no shortness of breath, no wheezing, no cough  CV: no chest pain, no paroxysmal nocturnal dyspnea  Abd: no nausea, no heartburn, no diarrhea, no constipation, no abdominal pain  Neuro: no headaches, no syncope or presyncopal episodes  Endo: no polyuria, no polydipsia  Heme: no lymphadenopathy, no easy bruising or bleeding    No flowsheet data found.     Physical Exam    [INSTRUCTIONS:  \"[x]\" Indicates a positive item  \"[]\" Indicates a negative item  -- DELETE ALL ITEMS NOT EXAMINED]    Constitutional: [x] Appears well-developed and well-nourished [x] No apparent distress      [] Abnormal -     Mental status: [x] Alert and awake  [x] Oriented to person/place/time [x] Able to follow commands    [] Abnormal -     Eyes:   EOM    [x]  Normal    [] Abnormal -   Sclera  [x]  Normal    [] Abnormal -          Discharge [x]  None visible   [] Abnormal -     HENT: [x] Normocephalic, atraumatic  [] Abnormal -   [x] Mouth/Throat: Mucous membranes are moist    External Ears [x] Normal  [] Abnormal -    Neck: [x] No visualized mass [] Abnormal -     Pulmonary/Chest: [x] Respiratory effort normal   [x] No visualized signs of difficulty breathing or respiratory distress        [] Abnormal -      Musculoskeletal:   [x] Normal gait with no signs of ataxia         [x] Normal range of motion of neck        [] Abnormal -     Neurological:        [x] No Facial Asymmetry (Cranial nerve 7 motor function) (limited exam due to video visit)          [x] No gaze palsy        [] Abnormal -          Skin:        [x] No significant exanthematous lesions or discoloration noted on facial skin         [] Abnormal -            Psychiatric:       [x] Normal Affect [] Abnormal -        [x] No Hallucinations      ASSESSMENT/PLAN:  Below is the assessment and plan developed based on review of pertinent labs, studies, and medications. ICD-10-CM ICD-9-CM    1. Adult ADHD  F90.9 314.01 dextroamphetamine-amphetamine (ADDERALL) 20 mg tablet   2. Hyperlipidemia, unspecified hyperlipidemia type  E78.5 272.4 atorvastatin (LIPITOR) 20 mg tablet   3. Depression, unspecified depression type  F32.9 311 doxepin (SINEquan) 50 mg capsule      desvenlafaxine succinate (PRISTIQ) 50 mg ER tablet   4. Insomnia, unspecified type  G47.00 780.52 doxepin (SINEquan) 50 mg capsule   5. Personal history of nicotine dependence  Z87.891 V15.82 US EXAM SCREENING AAA   6. Family history of abdominal aortic aneurysm (AAA)  Z82.49 V17.49 US EXAM SCREENING AAA     Continue Adderall; reviewed benefits and side effects.    Reinforced positive reinforcement, behavior and lifestyle modification, good sleep hygiene.  reviewed, no compliance issues noted. UDS is up to date. Doing well overall. Insomnia and depression controlled. Refills sent to her mail order per her request      Smoking cessation discussed: The addictiveness of nicotine is the primary barrier for smokers to quit. Nicotine is a potent psychoactive drug that causes physical dependence and tolerance. In the absence of nicotine, a smoker develops cravings for cigarettes and symptoms of the nicotine withdrawal syndrome. Symptoms peak in the first three days of smoking cessation and subside over the next three to four weeks. Symptoms include increased appetite and weight gain, changes in mood (dysphoria or depression), insomnia, irritability, anxiety, difficulty concentrating, and restlessness  Would like to restart nicotine patches in future, she will call when she is ready for them. Discussed BMI and healthy weight. Encouraged patient to work to implement changes including diet high in raw fruits and vegetables, lean protein and good fats. Limit refined, processed carbohydrates and sugar. Encouraged regular exercise. Recommended regular cardiovascular exercise 3-6 times per week for 30-60 minutes daily. Return in about 3 months (around 8/14/2021), or if symptoms worsen or fail to improve. Nirali Cline, was evaluated through a synchronous (real-time) audio-video encounter. The patient (or guardian if applicable) is aware that this is a billable service. Verbal consent to proceed has been obtained within the past 12 months. The visit was conducted pursuant to the emergency declaration under the [facility], [address] [place] [address] authority and the [employer] and [employer] General Act. Patient identification was verified, and a caregiver was present when appropriate.  The patient was located in a state where the provider was credentialed to provide care. An electronic signature was used to authenticate this note.   -- Brian Carter NP

## 2021-05-14 NOTE — PROGRESS NOTES
1. Have you been to the ER, urgent care clinic since your last visit? Hospitalized since your last visit? No    2. Have you seen or consulted any other health care providers outside of the [address] [place] [address] since your last visit? Include any pap smears or colon screening.  No    Health Maintenance Due   Topic Date Due    COVID-19 Vaccine (1) Never done    Shingrix Vaccine Age 50> (1 of 2) Never done    PAP AKA CERVICAL CYTOLOGY  09/01/2019    Breast Cancer Screen Mammogram  11/07/2020     Chief Complaint   Patient presents with    Medication Evaluation

## 2021-06-10 ENCOUNTER — TELEPHONE (OUTPATIENT)
Dept: FAMILY MEDICINE CLINIC | Age: 61
End: 2021-06-10

## 2021-06-10 NOTE — TELEPHONE ENCOUNTER
----- Message from Marlana Castleman sent at 6/10/2021  4:51 PM EDT -----  Regarding: NP Polo/Refill  Contact: [phone]  Medication Refill    Caller (if not patient): N/A      Relationship of caller (if not patient): N/A      Best contact number(s): [phone]      Name of medication and dosage if known: Ambien      Is patient out of this medication (yes/no): yes      Pharmacy name: [facility] in [address] [place] [address] listed in chart? (yes/no): Yes  Pharmacy phone number: (phone: [phone])      Details to clarify the request: Requesting Ambien prescription for 7 days, while PT is out of town.        Marlana Castleman

## 2021-06-11 ENCOUNTER — VIRTUAL VISIT (OUTPATIENT)
Dept: FAMILY MEDICINE CLINIC | Age: 61
End: 2021-06-11

## 2021-06-11 NOTE — TELEPHONE ENCOUNTER
Ambien is a controlled substance and cannot be called in without a visit and justification for use. Please advise patient. She is also already on sleep aide, doxepin.   Thanks

## 2021-06-11 NOTE — PROGRESS NOTES
1. Have you been to the ER, urgent care clinic since your last visit? Hospitalized since your last visit? No    2. Have you seen or consulted any other health care providers outside of the [address] [place] [address] since your last visit? Include any pap smears or colon screening.  No     Health Maintenance Due   Topic Date Due    Shingrix Vaccine Age 49> (1 of 2) Never done    PAP AKA CERVICAL CYTOLOGY  09/01/2019    Breast Cancer Screen Mammogram  11/07/2020

## 2021-06-14 ENCOUNTER — VIRTUAL VISIT (OUTPATIENT)
Dept: FAMILY MEDICINE CLINIC | Age: 61
End: 2021-06-14
Payer: COMMERCIAL

## 2021-06-14 DIAGNOSIS — F51.01 PRIMARY INSOMNIA: Primary | ICD-10-CM

## 2021-06-14 PROCEDURE — 99213 OFFICE O/P EST LOW 20 MIN: CPT | Performed by: NURSE PRACTITIONER

## 2021-06-14 RX ORDER — ZOLPIDEM TARTRATE 5 MG/1
5 TABLET ORAL
Qty: 7 TABLET | Refills: 0 | Status: SHIPPED | OUTPATIENT
Start: 2021-06-14 | End: 2021-10-20 | Stop reason: SDUPTHER

## 2021-06-14 NOTE — PROGRESS NOTES
Mercedes Yeager is a 63 y.o. female  Chief Complaint   Patient presents with    Medication Management     wants to start ambien     1. Have you been to the ER, urgent care clinic since your last visit? Hospitalized since your last visit?no    2. Have you seen or consulted any other health care providers outside of the [address] [place] [address] since your last visit? Include any pap smears or colon screening. No  Health Maintenance   Topic Date Due    Shingrix Vaccine Age 49> (1 of 2) Never done    PAP AKA CERVICAL CYTOLOGY  09/01/2019    Breast Cancer Screen Mammogram  11/07/2020    Pneumococcal 0-64 years (1 of 2 - PPSV23) 10/21/2021 (Originally 3/18/1966)    Flu Vaccine (Season Ended) 09/01/2021    A1C test (Diabetic or Prediabetic)  10/12/2021    Lipid Screen  10/12/2021    DTaP/Tdap/Td series (3 - Td or Tdap) 06/12/2027    Colorectal Cancer Screening Combo  06/12/2027    Hepatitis C Screening  Completed    COVID-19 Vaccine  Completed     There were no vitals taken for this visit.

## 2021-06-14 NOTE — PROGRESS NOTES
Solange  (: 1960) is a 63 y.o. female, established patient, here for evaluation of the following chief complaint(s):   Medication Management (wants to start [place])       SUBJECTIVE/OBJECTIVE:  HPI     Inquires about ambien refill. Only wants to use while out of town next week when at her sister's house for her brother in [employer] service, in [place]. Says she was out of town in [place] visiting her mother and had a really hard time sleeping. In the past, she used Etoh to help her rest at night. Not drinking now, sober for 32 days. Says that the doxepin usually only takes one at  night but occasionally has to take 3 (to help with insomnia) as needed which makes her feel \"hungover\" the next day  Used to have [place] that she would only use when traveling because she has a hard time sleeping when out of town. Has not used in a while but with her not drinking anymore and with having to be at her [address] at her [address], she is sure that she will not be able to sleep. Requests small amount of ambien to help her sleep while out of town. Review of Systems   Gen: no fatigue, fever, chills  Eyes: no excessive tearing, itching, or discharge  Nose: no rhinorrhea, no sinus pain  Mouth: no oral lesions, no sore throat  Resp: no shortness of breath, no wheezing, no cough  CV: no chest pain, no paroxysmal nocturnal dyspnea  Abd: no nausea, no heartburn, no diarrhea, no constipation, no abdominal pain  Neuro: no headaches, no syncope or presyncopal episodes  Endo: no polyuria, no polydipsia  Heme: no lymphadenopathy, no easy bruising or bleeding      No flowsheet data found.     Physical Exam    [INSTRUCTIONS:  \"[x]\" Indicates a positive item  \"[]\" Indicates a negative item  -- DELETE ALL ITEMS NOT EXAMINED]    Constitutional: [x] Appears well-developed and well-nourished [x] No apparent distress      [] Abnormal -     Mental status: [x] Alert and awake  [x] Oriented to person/place/time [x] Able to follow commands    [] Abnormal -     Eyes:   EOM    [x]  Normal    [] Abnormal -   Sclera  [x]  Normal    [] Abnormal -          Discharge [x]  None visible   [] Abnormal -     HENT: [x] Normocephalic, atraumatic  [] Abnormal -   [x] Mouth/Throat: Mucous membranes are moist    External Ears [x] Normal  [] Abnormal -    Neck: [x] No visualized mass [] Abnormal -     Pulmonary/Chest: [x] Respiratory effort normal   [x] No visualized signs of difficulty breathing or respiratory distress        [] Abnormal -      Musculoskeletal:   [x] Normal gait with no signs of ataxia         [x] Normal range of motion of neck        [] Abnormal -     Neurological:        [x] No Facial Asymmetry (Cranial nerve 7 motor function) (limited exam due to video visit)          [x] No gaze palsy        [] Abnormal -          Skin:        [x] No significant exanthematous lesions or discoloration noted on facial skin         [] Abnormal -            Psychiatric:       [x] Normal Affect [] Abnormal -        [x] No Hallucinations              ASSESSMENT/PLAN:  Below is the assessment and plan developed based on review of pertinent labs, studies, and medications. 1. Primary insomnia  -     zolpidem (AMBIEN) 5 mg tablet; Take 1 Tablet by mouth nightly as needed for Sleep. Max Daily Amount: 5 mg., Normal, Disp-7 Tablet, R-0    · Discussed the management of insomnia with the patient  · Emphasized sleep hygiene as an alternative to pharmacologic Rx. · Agreed to fill short duration of ambien. Medication profile discussed with patient.  reviewed. I have discussed the diagnosis with the patient and the intended plan as seen in the above orders. The patient has received an after-visit summary and questions were answered concerning future plans. I have discussed medication side effects and warnings with the patient as well.                             Shaquille Speaks, was evaluated through a synchronous (real-time) audio-video encounter. The patient (or guardian if applicable) is aware that this is a billable service. Verbal consent to proceed has been obtained within the past 12 months. The visit was conducted pursuant to the emergency declaration under the [address] [place] [address], [address] [place] [address] authority and the [employer] General Act. Patient identification was verified, and a caregiver was present when appropriate. The patient was located in a state where the provider was credentialed to provide care. An electronic signature was used to authenticate this note.   -- Madelyn Lopez NP

## 2021-07-22 DIAGNOSIS — F90.9 ADULT ADHD: ICD-10-CM

## 2021-07-23 RX ORDER — DEXTROAMPHETAMINE SACCHARATE, AMPHETAMINE ASPARTATE, DEXTROAMPHETAMINE SULFATE AND AMPHETAMINE SULFATE 5; 5; 5; 5 MG/1; MG/1; MG/1; MG/1
20 TABLET ORAL 2 TIMES DAILY
Qty: 60 TABLET | Refills: 0 | Status: SHIPPED | OUTPATIENT
Start: 2021-07-23 | End: 2021-09-09 | Stop reason: SDUPTHER

## 2021-07-23 NOTE — TELEPHONE ENCOUNTER
Requested Prescriptions     Pending Prescriptions Disp Refills    dextroamphetamine-amphetamine (ADDERALL) 20 mg tablet 60 Tablet 0     Sig: Take 1 Tablet by mouth two (2) times a day. Max Daily Amount: 40 mg. Last office visit: 6/14/2021     pulled.   Last fill date:  05/15/2021

## 2021-09-09 ENCOUNTER — OFFICE VISIT (OUTPATIENT)
Dept: FAMILY MEDICINE CLINIC | Age: 61
End: 2021-09-09
Payer: COMMERCIAL

## 2021-09-09 VITALS
WEIGHT: 165 LBS | OXYGEN SATURATION: 96 % | HEIGHT: 64 IN | RESPIRATION RATE: 17 BRPM | BODY MASS INDEX: 28.17 KG/M2 | DIASTOLIC BLOOD PRESSURE: 70 MMHG | HEART RATE: 98 BPM | SYSTOLIC BLOOD PRESSURE: 109 MMHG

## 2021-09-09 DIAGNOSIS — F90.9 ADULT ADHD: Primary | ICD-10-CM

## 2021-09-09 DIAGNOSIS — I65.23 BILATERAL CAROTID ARTERY STENOSIS: ICD-10-CM

## 2021-09-09 DIAGNOSIS — F51.01 PRIMARY INSOMNIA: ICD-10-CM

## 2021-09-09 DIAGNOSIS — L60.8 TOENAIL DEFORMITY: ICD-10-CM

## 2021-09-09 DIAGNOSIS — H72.03 TYMPANIC MEMBRANE CENTRAL PERFORATION, BILATERAL: ICD-10-CM

## 2021-09-09 PROCEDURE — 99214 OFFICE O/P EST MOD 30 MIN: CPT | Performed by: NURSE PRACTITIONER

## 2021-09-09 RX ORDER — DEXTROAMPHETAMINE SACCHARATE, AMPHETAMINE ASPARTATE, DEXTROAMPHETAMINE SULFATE AND AMPHETAMINE SULFATE 5; 5; 5; 5 MG/1; MG/1; MG/1; MG/1
20 TABLET ORAL 2 TIMES DAILY
Qty: 60 TABLET | Refills: 0 | Status: SHIPPED | OUTPATIENT
Start: 2021-09-09 | End: 2021-10-20 | Stop reason: SDUPTHER

## 2021-09-09 NOTE — LETTER
NOTIFICATION RETURN TO WORK / SCHOOL    9/9/2021 1:27 PM    Ms. Kellie Yap  [address] [address]      To Whom It May Concern:    Kellie Yap is currently under the care of [address] [place] [address]. If there are questions or concerns please have the patient contact our office.         Sincerely,      Riya Frazier NP

## 2021-09-09 NOTE — PATIENT INSTRUCTIONS
DASH Diet: Care Instructions  Your Care Instructions     The DASH diet is an eating plan that can help lower your blood pressure. DASH stands for Dietary Approaches to Stop Hypertension. Hypertension is high blood pressure. The DASH diet focuses on eating foods that are high in calcium, potassium, and magnesium. These nutrients can lower blood pressure. The foods that are highest in these nutrients are fruits, vegetables, low-fat dairy products, nuts, seeds, and legumes. But taking calcium, potassium, and magnesium supplements instead of eating foods that are high in those nutrients does not have the same effect. The DASH diet also includes whole grains, fish, and poultry. The DASH diet is one of several lifestyle changes your doctor may recommend to lower your high blood pressure. Your doctor may also want you to decrease the amount of sodium in your diet. Lowering sodium while following the DASH diet can lower blood pressure even further than just the DASH diet alone. Follow-up care is a key part of your treatment and safety. Be sure to make and go to all appointments, and call your doctor if you are having problems. It's also a good idea to know your test results and keep a list of the medicines you take. How can you care for yourself at home? Following the DASH diet  · Eat 4 to 5 servings of fruit each day. A serving is 1 medium-sized piece of fruit, ½ cup chopped or canned fruit, 1/4 cup dried fruit, or 4 ounces (½ cup) of fruit juice. Choose fruit more often than fruit juice. · Eat 4 to 5 servings of vegetables each day. A serving is 1 cup of lettuce or raw leafy vegetables, ½ cup of chopped or cooked vegetables, or 4 ounces (½ cup) of vegetable juice. Choose vegetables more often than vegetable juice. · Get 2 to 3 servings of low-fat and fat-free dairy each day. A serving is 8 ounces of milk, 1 cup of yogurt, or 1 ½ ounces of cheese. · Eat 6 to 8 servings of grains each day.  A serving is 1 slice of bread, 1 ounce of dry cereal, or ½ cup of cooked rice, pasta, or cooked cereal. Try to choose whole-grain products as much as possible. · Limit lean meat, poultry, and fish to 2 servings each day. A serving is 3 ounces, about the size of a deck of cards. · Eat 4 to 5 servings of nuts, seeds, and legumes (cooked dried beans, lentils, and split peas) each week. A serving is 1/3 cup of nuts, 2 tablespoons of seeds, or ½ cup of cooked beans or peas. · Limit fats and oils to 2 to 3 servings each day. A serving is 1 teaspoon of vegetable oil or 2 tablespoons of salad dressing. · Limit sweets and added sugars to 5 servings or less a week. A serving is 1 tablespoon jelly or jam, ½ cup sorbet, or 1 cup of lemonade. · Eat less than 2,300 milligrams (mg) of sodium a day. If you limit your sodium to 1,500 mg a day, you can lower your blood pressure even more. · Be aware that all of these are the suggested number of servings for people who eat 1,800 to 2,000 calories a day. Your recommended number of servings may be different if you need more or fewer calories. Tips for success  · Start small. Do not try to make dramatic changes to your diet all at once. You might feel that you are missing out on your favorite foods and then be more likely to not follow the plan. Make small changes, and stick with them. Once those changes become habit, add a few more changes. · Try some of the following:  ? Make it a goal to eat a fruit or vegetable at every meal and at snacks. This will make it easy to get the recommended amount of fruits and vegetables each day. ? Try yogurt topped with fruit and nuts for a snack or healthy dessert. ? Add lettuce, tomato, cucumber, and onion to sandwiches. ? Combine a ready-made pizza crust with low-fat mozzarella cheese and lots of vegetable toppings. Try using tomatoes, squash, spinach, broccoli, carrots, cauliflower, and onions. ?  Have a variety of cut-up vegetables with a low-fat dip as an appetizer instead of chips and dip. ? Sprinkle sunflower seeds or chopped almonds over salads. Or try adding chopped walnuts or almonds to cooked vegetables. ? Try some vegetarian meals using beans and peas. Add garbanzo or kidney beans to salads. Make burritos and tacos with mashed carson beans or black beans. Where can you learn more? Go to http://stacie-nilda.info/  Enter H967 in the search box to learn more about \"DASH Diet: Care Instructions. \"  Current as of: August 31, 2020               Content Version: 12.8  © 0648-5442 [employer]. Care instructions adapted under license by [employer] (which disclaims liability or warranty for this information). If you have questions about a medical condition or this instruction, always ask your healthcare professional. [place] [address] any warranty or liability for your use of this information. Insomnia: Care Instructions  Your Care Instructions     Insomnia is the inability to sleep well. It is a common problem for most people at some time. Insomnia may make it hard for you to get to sleep, stay asleep, or sleep as long as you need to. This can make you tired and grouchy during the day. It can also make you forgetful, less effective at work, and unhappy. Insomnia can be caused by conditions such as depression or anxiety. Pain can also affect your ability to sleep. When these problems are solved, the insomnia usually clears up. But sometimes bad sleep habits can cause insomnia. If insomnia is affecting your work or your enjoyment of life, you can take steps to improve your sleep. Follow-up care is a key part of your treatment and safety. Be sure to make and go to all appointments, and call your doctor if you are having problems. It's also a good idea to know your test results and keep a list of the medicines you take. How can you care for yourself at home?   What to avoid   · Do not have drinks with caffeine, such as coffee or black tea, for 8 hours before bed. · Do not smoke or use other types of tobacco near bedtime. Nicotine is a stimulant and can keep you awake. · Avoid drinking alcohol late in the evening, because it can cause you to wake in the middle of the night. · Do not eat a big meal close to bedtime. If you are hungry, eat a light snack. · Do not drink a lot of water close to bedtime, because the need to urinate may wake you up during the night. · Do not read or watch TV in bed. Use the bed only for sleeping and sexual activity. What to try   · Go to bed at the same time every night, and wake up at the same time every morning. Do not take naps during the day. · Keep your bedroom quiet, dark, and cool. · Sleep on a comfortable pillow and mattress. · If watching the clock makes you anxious, turn it facing away from you so you cannot see the time. · If you worry when you lie down, start a worry book. Well before bedtime, write down your worries, and then set the book and your concerns aside. · Try meditation or other relaxation techniques before you go to bed. · If you cannot fall asleep, get up and go to another room until you feel sleepy. Do something relaxing. Repeat your bedtime routine before you go to bed again. · Make your house quiet and calm about an hour before bedtime. Turn down the lights, turn off the TV, log off the computer, and turn down the volume on music. This can help you relax after a busy day. When should you call for help? Watch closely for changes in your health, and be sure to contact your doctor if:    · Your efforts to improve your sleep do not work.     · Your insomnia gets worse.     · You have been feeling down, depressed, or hopeless or have lost interest in things that you usually enjoy. Where can you learn more?   Go to http://www.gray.com/  Enter P513 in the search box to learn more about \"Insomnia: Care Instructions. \"  Current as of: August 31, 2020               Content Version: 12.8  © 4986-4677 [employer], [facility]. Care instructions adapted under license by [employer] (which disclaims liability or warranty for this information). If you have questions about a medical condition or this instruction, always ask your healthcare professional. [place] [address] any warranty or liability for your use of this information.

## 2021-09-09 NOTE — PROGRESS NOTES
Subjective:     Chief Complaint   Patient presents with    Follow-up        HPI:  63 y.o.  presents for follow up appointment. Not drinking still, sober 28 days. Was sober for 32 days then went to a bbq and had a few drinks then stopped drinking again. Working on her diet has lost about 15 lbs    Adderall 20mg 1-2 times daily, most of the time just needs once per day.  No CP, dizziness, HA, no palpitations.   Feels ADHD symptoms controlled on current dose/medication.   Admits that she has used marijuana recently, no other recreational drugs, no ETOH regularly.    Smoking about 1/2 pack per day.   Father and sister both have AAA. Father  of AAA rupture. She would like to have AAA screening done.     Sleep:  Has problems with sleep? no- controlled on doxepin, usually one per night.   When she is out of town she uses [place], which is only time she needs it. Gets depressed, anxious, or irritable/has mood swings? No- controlled on pristiq       Uses ambien only when she is out of town, has a hard time sleeping when out of town. HTN  Taking medications daily.  No side effects per patient. Munira Dueñas is not checking BP at home.   No palpitations, dizziness, headache, SOB/FUENTES, vision changes, CP or swelling in the legs. States she tries to watch her salt intake.  She does drink coffee during the day. Says she was constantly feeling like she had pressure in her ears and kept holding nose and blowing. Thinks that she has wax. Feels fine now. No history of ear tubes. No hearing loss. No hospital, ER or specialist visits since last primary care visit except as noted above.     Past Medical History:   Diagnosis Date    ADHD (attention deficit hyperactivity disorder)     Adverse effect of anesthesia     patient states she was in the ICU s/p ankle surgery on a ventilator;2017    Depression     Hyperlipidemia     Hypertension     Ill-defined condition     high cholesterol    Ill-defined condition     genital herpes    Psychiatric disorder     depression, ADHD       Social History     Tobacco Use    Smoking status: Current Every Day Smoker     Packs/day: 0.25    Smokeless tobacco: Never Used   Vaping Use    Vaping Use: Never used   Substance Use Topics    Alcohol use: Yes     Alcohol/week: 2.0 standard drinks     Types: 2 Shots of liquor per week     Comment: on occasion    Drug use: Yes     Types: Marijuana     Comment: last used 11/28/17       Outpatient Medications Marked as Taking for the 9/9/21 encounter (Office Visit) with Juanita Bennett NP   Medication Sig Dispense Refill    dextroamphetamine-amphetamine (ADDERALL) 20 mg tablet Take 1 Tablet by mouth two (2) times a day. Max Daily Amount: 40 mg. 60 Tablet 0    zolpidem (AMBIEN) 5 mg tablet Take 1 Tablet by mouth nightly as needed for Sleep. Max Daily Amount: 5 mg. 7 Tablet 0    atorvastatin (LIPITOR) 20 mg tablet TAKE 1 TABLET BY MOUTH NIGHTLY 90 Tab 3    doxepin (SINEquan) 50 mg capsule Take 1 to 3 tablets by mouth everyday at bedtime as needed 270 Cap 3    desvenlafaxine succinate (PRISTIQ) 50 mg ER tablet TAKE 1 TABLET BY MOUTH DAILY 90 Tab 3    losartan (COZAAR) 50 mg tablet TAKE 1 TABLET BY MOUTH DAILY 90 Tab 3    ASPIRIN (RIKY LOW STRENGTH PO) Take 81 mg by mouth daily.  cholecalciferol (VITAMIN D3) 1,000 unit cap Take  by mouth daily.          Allergies   Allergen Reactions    Pcn [Penicillins] Swelling       Health Maintenance reviewed       ROS:  Gen: no fatigue, no fever, no chills, no unexplained weight loss or weight gain  Eyes: no excessive tearing, itching, or discharge  Nose: no rhinorrhea, no sinus pain  Mouth: no oral lesions, no sore throat, no difficulty swallowing  Resp: no shortness of breath, no wheezing, no cough  CV: no chest pain, no orthopnea, no paroxysmal nocturnal dyspnea, no lower extremity edema, no palpitations  Abd: no nausea, no heartburn, no diarrhea, no constipation, no abdominal pain  Neuro: no headaches, no syncope or presyncopal episodes  Endo: no polyuria, no polydipsia. : no hematuria, no dysuria, no frequency, no incontinence  Heme: no lymphadenopathy, no easy bruising or bleeding, no night sweats  MSK: no joint pain or swelling    PE:  Visit Vitals  /70 (BP 1 Location: Left upper arm, BP Patient Position: Sitting, BP Cuff Size: Adult long)   Pulse 98   Resp 17   Ht 5' 4\" (1.626 m)   Wt 165 lb (74.8 kg)   LMP  (LMP Unknown)   SpO2 96%   BMI 28.32 kg/m²     Gen: alert, oriented, no acute distress  Head: normocephalic, atraumatic  Ears: external auditory canals clear, bilateral TMs without erythema or effusion but +perforation bilateral   Eyes: pupils equal round reactive to light, sclera clear, conjunctiva clear  Oral: moist mucus membranes, no oral lesions, no pharyngeal inflammation or exudate  Neck: symmetric normal sized thyroid, no carotid bruits, no jugular vein distention  Resp: no increase work of breathing, lungs clear to ausculation bilaterally, no wheezing, rales or rhonchi  CV: S1, S2 normal.  No murmurs, rubs, or gallops. Abd: soft, not tender, not distended. No hepatosplenomegaly. Normal bowel sounds. No hernias. No abdominal or renal bruits. Neuro: cranial nerves intact, normal strength and movement in all extremities, and sensation intact and symmetric. Skin: no lesion or rash  Extremities: no cyanosis or edema  Complaints of right toenail deformity for \"at least 10 years or so, I had a injury to that nail and it never came off and it looks gross\"  Has nail polish on this nail so I cannot observe. No results found for this visit on 09/09/21. Assessment/Plan:  Differential diagnosis and treatment options reviewed with patient who is in agreement with treatment plan as outlined below. ICD-10-CM ICD-9-CM    1. Adult ADHD  F90.9 314.01 dextroamphetamine-amphetamine (ADDERALL) 20 mg tablet   2. Primary insomnia  F51.01 307.42    3.  Tympanic membrane central perforation, bilateral  H72.03 384.21 REFERRAL TO ENT-OTOLARYNGOLOGY   4. Bilateral carotid artery stenosis  I65.23 433.10 DUPLEX CAROTID BILATERAL     433.30    5. Toenail deformity  L60.8 703.9 REFERRAL TO PODIATRY     BP at goal  Praised for Etoh cessation  She will call and schedule mammogram  Continue Adderall; reviewed benefits and side effects.    Reinforced positive reinforcement, behavior and lifestyle modification, good sleep hygiene.  reviewed, no compliance issues noted.  UDS is up to date. Refer to ENT, advised to avoid water in her ears. Discussed BMI and healthy weight. Encouraged patient to work to implement changes including diet high in raw fruits and vegetables, lean protein and good fats. Limit refined, processed carbohydrates and sugar. Encouraged regular exercise. Recommended regular cardiovascular exercise 3-6 times per week for 30-60 minutes daily. I have discussed the diagnosis with the patient and the intended plan as seen in the above orders. The patient has received an after-visit summary and questions were answered concerning future plans. I have discussed medication side effects and warnings with the patient as well. The patient verbalizes understanding and agreement with the plan.

## 2021-09-09 NOTE — PROGRESS NOTES
1. Have you been to the ER, urgent care clinic since your last visit? Hospitalized since your last visit? No    2. Have you seen or consulted any other health care providers outside of the [address] [place] [address] since your last visit? Include any pap smears or colon screening.  No    Health Maintenance Due   Topic Date Due    Cervical cancer screen  Never done    Shingrix Vaccine Age 49> (1 of 2) Never done    Breast Cancer Screen Mammogram  11/07/2020    Flu Vaccine (1) Never done     Chief Complaint   Patient presents with    Follow-up

## 2021-10-08 ENCOUNTER — HOSPITAL ENCOUNTER (OUTPATIENT)
Dept: MAMMOGRAPHY | Age: 61
Discharge: HOME | End: 2021-10-08
Attending: NURSE PRACTITIONER
Payer: COMMERCIAL

## 2021-10-08 DIAGNOSIS — Z12.39 ENCOUNTER FOR SCREENING FOR MALIGNANT NEOPLASM OF BREAST, UNSPECIFIED SCREENING MODALITY: ICD-10-CM

## 2021-10-08 PROCEDURE — 77067 SCR MAMMO BI INCL CAD: CPT

## 2021-10-20 DIAGNOSIS — F51.01 PRIMARY INSOMNIA: ICD-10-CM

## 2021-10-20 DIAGNOSIS — F90.9 ADULT ADHD: ICD-10-CM

## 2021-10-20 NOTE — TELEPHONE ENCOUNTER
Requested Prescriptions     Pending Prescriptions Disp Refills    zolpidem (AMBIEN) 5 mg tablet 7 Tablet 0     Sig: Take 1 Tablet by mouth nightly as needed for Sleep. Max Daily Amount: 5 mg.  dextroamphetamine-amphetamine (ADDERALL) 20 mg tablet 60 Tablet 0     Sig: Take 1 Tablet by mouth two (2) times a day. Max Daily Amount: 40 mg. Last office visit: 9/9/2021     pulled. Last fill date:   Adderall 09/09/2021 and Ambien 06/14/2021

## 2021-10-21 RX ORDER — DEXTROAMPHETAMINE SACCHARATE, AMPHETAMINE ASPARTATE, DEXTROAMPHETAMINE SULFATE AND AMPHETAMINE SULFATE 5; 5; 5; 5 MG/1; MG/1; MG/1; MG/1
20 TABLET ORAL 2 TIMES DAILY
Qty: 60 TABLET | Refills: 0 | Status: SHIPPED | OUTPATIENT
Start: 2021-10-21 | End: 2021-12-14 | Stop reason: SDUPTHER

## 2021-10-21 RX ORDER — ZOLPIDEM TARTRATE 5 MG/1
5 TABLET ORAL
Qty: 7 TABLET | Refills: 0 | Status: SHIPPED | OUTPATIENT
Start: 2021-10-21 | End: 2021-12-14 | Stop reason: SDUPTHER

## 2021-12-14 ENCOUNTER — OFFICE VISIT (OUTPATIENT)
Dept: FAMILY MEDICINE CLINIC | Age: 61
End: 2021-12-14
Payer: COMMERCIAL

## 2021-12-14 VITALS
HEART RATE: 96 BPM | OXYGEN SATURATION: 99 % | HEIGHT: 64 IN | BODY MASS INDEX: 29.19 KG/M2 | WEIGHT: 171 LBS | RESPIRATION RATE: 16 BRPM | TEMPERATURE: 98.4 F | DIASTOLIC BLOOD PRESSURE: 88 MMHG | SYSTOLIC BLOOD PRESSURE: 133 MMHG

## 2021-12-14 DIAGNOSIS — Z13.29 SCREENING FOR THYROID DISORDER: ICD-10-CM

## 2021-12-14 DIAGNOSIS — E55.9 VITAMIN D DEFICIENCY: ICD-10-CM

## 2021-12-14 DIAGNOSIS — I10 ESSENTIAL HYPERTENSION: ICD-10-CM

## 2021-12-14 DIAGNOSIS — F51.01 PRIMARY INSOMNIA: ICD-10-CM

## 2021-12-14 DIAGNOSIS — F90.9 ADULT ADHD: Primary | ICD-10-CM

## 2021-12-14 DIAGNOSIS — R73.03 PREDIABETES: ICD-10-CM

## 2021-12-14 PROCEDURE — 99214 OFFICE O/P EST MOD 30 MIN: CPT | Performed by: NURSE PRACTITIONER

## 2021-12-14 RX ORDER — DEXTROAMPHETAMINE SACCHARATE, AMPHETAMINE ASPARTATE, DEXTROAMPHETAMINE SULFATE AND AMPHETAMINE SULFATE 5; 5; 5; 5 MG/1; MG/1; MG/1; MG/1
20 TABLET ORAL 2 TIMES DAILY
Qty: 60 TABLET | Refills: 0 | Status: SHIPPED | OUTPATIENT
Start: 2021-12-14 | End: 2022-01-19 | Stop reason: SDUPTHER

## 2021-12-14 RX ORDER — ZOLPIDEM TARTRATE 5 MG/1
5 TABLET ORAL
Qty: 7 TABLET | Refills: 0 | Status: SHIPPED | OUTPATIENT
Start: 2021-12-14 | End: 2022-05-10 | Stop reason: SDUPTHER

## 2021-12-14 NOTE — PROGRESS NOTES
Chief Complaint   Patient presents with    Medication Evaluation     Pt states she takes Adderrall, pt states she had a rockstar this morning. 1. Have you been to the ER, urgent care clinic since your last visit? Hospitalized since your last visit? No    2. Have you seen or consulted any other health care providers outside of the [address] [place] [address] since your last visit? Include any pap smears or colon screening.  Yes ENT     Visit Vitals  /88 (BP 1 Location: Right arm, BP Patient Position: Sitting)   Pulse 96   Temp 98.4 °F (36.9 °C) (Oral)   Resp 16   Ht 5' 4\" (1.626 m)   Wt 171 lb (77.6 kg)   LMP  (LMP Unknown)   SpO2 99%   BMI 29.35 kg/m²

## 2021-12-14 NOTE — PROGRESS NOTES
Subjective:     Chief Complaint   Patient presents with    Medication Evaluation        HPI:  Leonel Paul is a 63 y.o. female here for follow up. Adderall 20mg 1-2 times daily, most of the time just needs once per day.  No CP, dizziness, HA, no palpitations.   Feels ADHD symptoms controlled on current dose/medication.   Admits that she has used marijuana recently, no other recreational drugs, no ETOH regularly.    Smoking about 1/2 pack per day.        Sleep:  Has problems with sleep? no- controlled on doxepin, usually one per night.   When she is out of town she uses [place], which is only time she needs it (going to visit her mom in [place] for the holidays)  Gets depressed, anxious, or irritable/has mood swings? No- controlled on pristiq     HTN  Taking medications daily.  No side effects per patient. Franny Sweet is not checking BP at home.   No palpitations, dizziness, headache, SOB/FUENTES, vision changes, CP or swelling in the legs. States she tries to watch her salt intake.  She does drink coffee during the day. No hospital, ER or specialist visits since last primary care visit except as noted above. Past Medical History:   Diagnosis Date    ADHD (attention deficit hyperactivity disorder)     Adverse effect of anesthesia     patient states she was in the ICU s/p ankle surgery on a ventilator;1/ 2017    Depression     Hyperlipidemia     Hypertension     Ill-defined condition     high cholesterol    Ill-defined condition     genital herpes    Menopause     Psychiatric disorder     depression, ADHD       Social History     Tobacco Use    Smoking status: Current Every Day Smoker     Packs/day: 0.25    Smokeless tobacco: Never Used   Vaping Use    Vaping Use: Never used   Substance Use Topics    Alcohol use:  Yes     Alcohol/week: 2.0 standard drinks     Types: 2 Shots of liquor per week     Comment: on occasion    Drug use: Yes     Types: Marijuana     Comment: last used 11/28/17       Outpatient Medications Marked as Taking for the 12/14/21 encounter (Office Visit) with Juanita Bennett NP   Medication Sig Dispense Refill    zolpidem (AMBIEN) 5 mg tablet Take 1 Tablet by mouth nightly as needed for Sleep. Max Daily Amount: 5 mg. 7 Tablet 0    dextroamphetamine-amphetamine (ADDERALL) 20 mg tablet Take 1 Tablet by mouth two (2) times a day. Max Daily Amount: 40 mg. 60 Tablet 0    atorvastatin (LIPITOR) 20 mg tablet TAKE 1 TABLET BY MOUTH NIGHTLY 90 Tab 3    doxepin (SINEquan) 50 mg capsule Take 1 to 3 tablets by mouth everyday at bedtime as needed 270 Cap 3    desvenlafaxine succinate (PRISTIQ) 50 mg ER tablet TAKE 1 TABLET BY MOUTH DAILY 90 Tab 3    losartan (COZAAR) 50 mg tablet TAKE 1 TABLET BY MOUTH DAILY 90 Tab 3    ASPIRIN (RIKY LOW STRENGTH PO) Take 81 mg by mouth daily.  cholecalciferol (VITAMIN D3) 1,000 unit cap Take  by mouth daily. Allergies   Allergen Reactions    Pcn [Penicillins] Swelling       Health Maintenance reviewed - . ROS:  Gen: no fatigue, no fever, no chills, no unexplained weight loss or weight gain  Eyes: no excessive tearing, itching, or discharge  Nose: no rhinorrhea, no sinus pain  Mouth: no oral lesions, no sore throat, no difficulty swallowing  Resp: no shortness of breath, no wheezing, no cough  CV: no chest pain, no orthopnea, no paroxysmal nocturnal dyspnea, no lower extremity edema, no palpitations  Abd: no nausea, no heartburn, no diarrhea, no constipation, no abdominal pain  Neuro: no headaches, no syncope or presyncopal episodes  Endo: no polyuria, no polydipsia.     : no hematuria, no dysuria, no frequency, no incontinence  Heme: no lymphadenopathy, no easy bruising or bleeding, no night sweats  MSK: no joint pain or swelling    PE:  Visit Vitals  /88 (BP 1 Location: Right arm, BP Patient Position: Sitting)   Pulse 96   Temp 98.4 °F (36.9 °C) (Oral)   Resp 16   Ht 5' 4\" (1.626 m)   Wt 171 lb (77.6 kg)   LMP  (LMP Unknown)   SpO2 99% BMI 29.35 kg/m²     Gen: alert, oriented, no acute distress  Head: normocephalic, atraumatic  Ears: external auditory canals clear, TMs without erythema or effusion  Eyes: pupils equal round reactive to light, sclera clear, conjunctiva clear  Oral: moist mucus membranes, no oral lesions, no pharyngeal inflammation or exudate  Neck: symmetric normal sized thyroid, no carotid bruits, no jugular vein distention  Resp: no increase work of breathing, lungs clear to ausculation bilaterally, no wheezing, rales or rhonchi  CV: S1, S2 normal.  No murmurs, rubs, or gallops. Abd: soft, not tender, not distended. No hepatosplenomegaly. Normal bowel sounds. No hernias. No abdominal or renal bruits. Neuro: cranial nerves intact, normal strength and movement in all extremities, and sensation intact and symmetric. Skin: no lesion or rash  Extremities: no cyanosis or edema    No results found for this visit on 12/14/21. Assessment/Plan:  Differential diagnosis and treatment options reviewed with patient who is in agreement with treatment plan as outlined below. ICD-10-CM ICD-9-CM    1. Adult ADHD  E51.5 001.85 METABOLIC PANEL, COMPREHENSIVE      dextroamphetamine-amphetamine (ADDERALL) 20 mg tablet      METABOLIC PANEL, COMPREHENSIVE   2. Prediabetes  R73.03 790.29 CBC WITH AUTOMATED DIFF      HEMOGLOBIN A1C WITH EAG      CBC WITH AUTOMATED DIFF      HEMOGLOBIN A1C WITH EAG   3. Primary insomnia  F51.01 307.42 CBC WITH AUTOMATED DIFF      zolpidem (AMBIEN) 5 mg tablet      CBC WITH AUTOMATED DIFF   4. Essential hypertension  I10 401.9 LIPID PANEL      CBC WITH AUTOMATED DIFF      LIPID PANEL      CBC WITH AUTOMATED DIFF   5. Screening for thyroid disorder  Z13.29 V77.0 TSH 3RD GENERATION      TSH 3RD GENERATION   6. Vitamin D deficiency  E55.9 268.9 VITAMIN D, 25 HYDROXY      VITAMIN D, 25 HYDROXY     BP at goal  Doing well on current Adderall dosing. Continue Adderall; reviewed benefits and side effects.    Reinforced behavior and lifestyle modification, good sleep hygiene.  reviewed, no compliance issues noted.  UDS is up to date. Due in next couple months  Will come in for lab (fasting) visit soon for routine labs. Discussed BMI and healthy weight. Encouraged patient to work to implement changes including diet high in raw fruits and vegetables, lean protein and good fats. Limit refined, processed carbohydrates and sugar. Encouraged regular exercise. Recommended regular cardiovascular exercise 3-6 times per week for 30-60 minutes daily. I have discussed the diagnosis with the patient and the intended plan as seen in the above orders. The patient has received an after-visit summary and questions were answered concerning future plans. I have discussed medication side effects and warnings with the patient as well. The patient verbalizes understanding and agreement with the plan.

## 2021-12-14 NOTE — PATIENT INSTRUCTIONS
DASH Diet: Care Instructions  Your Care Instructions     The DASH diet is an eating plan that can help lower your blood pressure. DASH stands for Dietary Approaches to Stop Hypertension. Hypertension is high blood pressure. The DASH diet focuses on eating foods that are high in calcium, potassium, and magnesium. These nutrients can lower blood pressure. The foods that are highest in these nutrients are fruits, vegetables, low-fat dairy products, nuts, seeds, and legumes. But taking calcium, potassium, and magnesium supplements instead of eating foods that are high in those nutrients does not have the same effect. The DASH diet also includes whole grains, fish, and poultry. The DASH diet is one of several lifestyle changes your doctor may recommend to lower your high blood pressure. Your doctor may also want you to decrease the amount of sodium in your diet. Lowering sodium while following the DASH diet can lower blood pressure even further than just the DASH diet alone. Follow-up care is a key part of your treatment and safety. Be sure to make and go to all appointments, and call your doctor if you are having problems. It's also a good idea to know your test results and keep a list of the medicines you take. How can you care for yourself at home? Following the DASH diet  · Eat 4 to 5 servings of fruit each day. A serving is 1 medium-sized piece of fruit, ½ cup chopped or canned fruit, 1/4 cup dried fruit, or 4 ounces (½ cup) of fruit juice. Choose fruit more often than fruit juice. · Eat 4 to 5 servings of vegetables each day. A serving is 1 cup of lettuce or raw leafy vegetables, ½ cup of chopped or cooked vegetables, or 4 ounces (½ cup) of vegetable juice. Choose vegetables more often than vegetable juice. · Get 2 to 3 servings of low-fat and fat-free dairy each day. A serving is 8 ounces of milk, 1 cup of yogurt, or 1 ½ ounces of cheese. · Eat 6 to 8 servings of grains each day.  A serving is 1 slice of bread, 1 ounce of dry cereal, or ½ cup of cooked rice, pasta, or cooked cereal. Try to choose whole-grain products as much as possible. · Limit lean meat, poultry, and fish to 2 servings each day. A serving is 3 ounces, about the size of a deck of cards. · Eat 4 to 5 servings of nuts, seeds, and legumes (cooked dried beans, lentils, and split peas) each week. A serving is 1/3 cup of nuts, 2 tablespoons of seeds, or ½ cup of cooked beans or peas. · Limit fats and oils to 2 to 3 servings each day. A serving is 1 teaspoon of vegetable oil or 2 tablespoons of salad dressing. · Limit sweets and added sugars to 5 servings or less a week. A serving is 1 tablespoon jelly or jam, ½ cup sorbet, or 1 cup of lemonade. · Eat less than 2,300 milligrams (mg) of sodium a day. If you limit your sodium to 1,500 mg a day, you can lower your blood pressure even more. · Be aware that all of these are the suggested number of servings for people who eat 1,800 to 2,000 calories a day. Your recommended number of servings may be different if you need more or fewer calories. Tips for success  · Start small. Do not try to make dramatic changes to your diet all at once. You might feel that you are missing out on your favorite foods and then be more likely to not follow the plan. Make small changes, and stick with them. Once those changes become habit, add a few more changes. · Try some of the following:  ? Make it a goal to eat a fruit or vegetable at every meal and at snacks. This will make it easy to get the recommended amount of fruits and vegetables each day. ? Try yogurt topped with fruit and nuts for a snack or healthy dessert. ? Add lettuce, tomato, cucumber, and onion to sandwiches. ? Combine a ready-made pizza crust with low-fat mozzarella cheese and lots of vegetable toppings. Try using tomatoes, squash, spinach, broccoli, carrots, cauliflower, and onions. ?  Have a variety of cut-up vegetables with a low-fat dip as an appetizer instead of chips and dip. ? Sprinkle sunflower seeds or chopped almonds over salads. Or try adding chopped walnuts or almonds to cooked vegetables. ? Try some vegetarian meals using beans and peas. Add garbanzo or kidney beans to salads. Make burritos and tacos with mashed carson beans or black beans. Where can you learn more? Go to http://www.solorzano.com/  Enter H967 in the search box to learn more about \"DASH Diet: Care Instructions. \"  Current as of: April 29, 2021               Content Version: 13.0  © 3897-8198 [employer]. Care instructions adapted under license by [employer] (which disclaims liability or warranty for this information). If you have questions about a medical condition or this instruction, always ask your healthcare professional. [address] any warranty or liability for your use of this information.

## 2022-01-19 DIAGNOSIS — F90.9 ADULT ADHD: ICD-10-CM

## 2022-01-19 RX ORDER — DEXTROAMPHETAMINE SACCHARATE, AMPHETAMINE ASPARTATE, DEXTROAMPHETAMINE SULFATE AND AMPHETAMINE SULFATE 5; 5; 5; 5 MG/1; MG/1; MG/1; MG/1
20 TABLET ORAL 2 TIMES DAILY
Qty: 60 TABLET | Refills: 0 | Status: SHIPPED | OUTPATIENT
Start: 2022-01-19 | End: 2022-04-09 | Stop reason: SDUPTHER

## 2022-01-19 NOTE — TELEPHONE ENCOUNTER
Requested Prescriptions     Pending Prescriptions Disp Refills    dextroamphetamine-amphetamine (ADDERALL) 20 mg tablet 60 Tablet 0     Sig: Take 1 Tablet by mouth two (2) times a day. Max Daily Amount: 40 mg. Last office visit: 12/14/2021     pulled.   Last fill date:  12/14/2021

## 2022-03-06 DIAGNOSIS — I10 ESSENTIAL HYPERTENSION: ICD-10-CM

## 2022-03-07 RX ORDER — LOSARTAN POTASSIUM 50 MG/1
TABLET ORAL
Qty: 90 TABLET | Refills: 3 | Status: SHIPPED | OUTPATIENT
Start: 2022-03-07 | End: 2022-05-16 | Stop reason: SDUPTHER

## 2022-03-19 PROBLEM — S82.851A CLOSED TRIMALLEOLAR FRACTURE OF RIGHT ANKLE: Status: ACTIVE | Noted: 2017-01-02

## 2022-03-19 PROBLEM — Z79.899 CONTROLLED SUBSTANCE AGREEMENT SIGNED: Status: ACTIVE | Noted: 2017-05-08

## 2022-03-19 PROBLEM — S82.851A CLOSED RIGHT TRIMALLEOLAR FRACTURE: Status: ACTIVE | Noted: 2017-01-02

## 2022-03-19 PROBLEM — M18.11 PRIMARY OSTEOARTHRITIS OF FIRST CARPOMETACARPAL JOINT OF RIGHT HAND: Status: ACTIVE | Noted: 2017-08-08

## 2022-03-20 PROBLEM — I65.23 STENOSIS OF BOTH INTERNAL CAROTID ARTERIES: Status: ACTIVE | Noted: 2017-06-28

## 2022-03-20 PROBLEM — G47.00 INSOMNIA: Status: ACTIVE | Noted: 2017-05-09

## 2022-03-20 PROBLEM — I67.89 CEREBRAL MICROVASCULAR DISEASE: Status: ACTIVE | Noted: 2017-06-28

## 2022-04-09 DIAGNOSIS — F90.9 ADULT ADHD: ICD-10-CM

## 2022-04-12 RX ORDER — DEXTROAMPHETAMINE SACCHARATE, AMPHETAMINE ASPARTATE, DEXTROAMPHETAMINE SULFATE AND AMPHETAMINE SULFATE 5; 5; 5; 5 MG/1; MG/1; MG/1; MG/1
20 TABLET ORAL 2 TIMES DAILY
Qty: 60 TABLET | Refills: 0 | Status: SHIPPED | OUTPATIENT
Start: 2022-04-12 | End: 2022-05-16 | Stop reason: SDUPTHER

## 2022-04-18 ENCOUNTER — TELEPHONE (OUTPATIENT)
Dept: FAMILY MEDICINE CLINIC | Age: 62
End: 2022-04-18

## 2022-04-26 ENCOUNTER — APPOINTMENT (OUTPATIENT)
Dept: FAMILY MEDICINE CLINIC | Age: 62
End: 2022-04-26

## 2022-04-28 LAB
25(OH)D3+25(OH)D2 SERPL-MCNC: 46.4 NG/ML (ref 30–100)
ALBUMIN SERPL-MCNC: 4.2 G/DL (ref 3.8–4.9)
ALBUMIN/GLOB SERPL: 1.9 {RATIO} (ref 1.2–2.2)
ALP SERPL-CCNC: 111 IU/L (ref 44–121)
ALT SERPL-CCNC: 19 IU/L (ref 0–32)
AST SERPL-CCNC: 15 IU/L (ref 0–40)
BASOPHILS # BLD AUTO: 0 X10E3/UL (ref 0–0.2)
BASOPHILS NFR BLD AUTO: 0 %
BILIRUB SERPL-MCNC: 0.7 MG/DL (ref 0–1.2)
BUN SERPL-MCNC: 10 MG/DL (ref 8–27)
BUN/CREAT SERPL: 11 (ref 12–28)
CALCIUM SERPL-MCNC: 9.2 MG/DL (ref 8.7–10.3)
CHLORIDE SERPL-SCNC: 103 MMOL/L (ref 96–106)
CHOLEST SERPL-MCNC: 145 MG/DL (ref 100–199)
CO2 SERPL-SCNC: 20 MMOL/L (ref 20–29)
CREAT SERPL-MCNC: 0.92 MG/DL (ref 0.57–1)
EGFR: 71 ML/MIN/1.73
EOSINOPHIL # BLD AUTO: 0 X10E3/UL (ref 0–0.4)
EOSINOPHIL NFR BLD AUTO: 0 %
ERYTHROCYTE [DISTWIDTH] IN BLOOD BY AUTOMATED COUNT: 12.5 % (ref 11.7–15.4)
EST. AVERAGE GLUCOSE BLD GHB EST-MCNC: 134 MG/DL
GLOBULIN SER CALC-MCNC: 2.2 G/DL (ref 1.5–4.5)
GLUCOSE SERPL-MCNC: 161 MG/DL (ref 65–99)
HBA1C MFR BLD: 6.3 % (ref 4.8–5.6)
HCT VFR BLD AUTO: 43.1 % (ref 34–46.6)
HDLC SERPL-MCNC: 31 MG/DL
HGB BLD-MCNC: 14.5 G/DL (ref 11.1–15.9)
IMM GRANULOCYTES # BLD AUTO: 0 X10E3/UL (ref 0–0.1)
IMM GRANULOCYTES NFR BLD AUTO: 0 %
IMP & REVIEW OF LAB RESULTS: NORMAL
LDLC SERPL CALC-MCNC: 74 MG/DL (ref 0–99)
LYMPHOCYTES # BLD AUTO: 1.6 X10E3/UL (ref 0.7–3.1)
LYMPHOCYTES NFR BLD AUTO: 21 %
MCH RBC QN AUTO: 30.6 PG (ref 26.6–33)
MCHC RBC AUTO-ENTMCNC: 33.6 G/DL (ref 31.5–35.7)
MCV RBC AUTO: 91 FL (ref 79–97)
MONOCYTES # BLD AUTO: 0.5 X10E3/UL (ref 0.1–0.9)
MONOCYTES NFR BLD AUTO: 7 %
NEUTROPHILS # BLD AUTO: 5.6 X10E3/UL (ref 1.4–7)
NEUTROPHILS NFR BLD AUTO: 72 %
PLATELET # BLD AUTO: 262 X10E3/UL (ref 150–450)
POTASSIUM SERPL-SCNC: 4.3 MMOL/L (ref 3.5–5.2)
PROT SERPL-MCNC: 6.4 G/DL (ref 6–8.5)
RBC # BLD AUTO: 4.74 X10E6/UL (ref 3.77–5.28)
SODIUM SERPL-SCNC: 139 MMOL/L (ref 134–144)
TRIGL SERPL-MCNC: 243 MG/DL (ref 0–149)
TSH SERPL DL<=0.005 MIU/L-ACNC: 4.64 UIU/ML (ref 0.45–4.5)
VLDLC SERPL CALC-MCNC: 40 MG/DL (ref 5–40)
WBC # BLD AUTO: 7.9 X10E3/UL (ref 3.4–10.8)

## 2022-05-02 ENCOUNTER — TELEPHONE (OUTPATIENT)
Dept: FAMILY MEDICINE CLINIC | Age: 62
End: 2022-05-02

## 2022-05-02 DIAGNOSIS — F51.01 PRIMARY INSOMNIA: ICD-10-CM

## 2022-05-02 RX ORDER — ZOLPIDEM TARTRATE 5 MG/1
5 TABLET ORAL
Qty: 7 TABLET | Refills: 0 | Status: CANCELLED | OUTPATIENT
Start: 2022-05-02

## 2022-05-02 NOTE — PROGRESS NOTES
Sent to [employer] but please see when we can reschedule her from today  Blood sugar was a little elevated but HgbA1c is still in prediabetic range at 6.3 (although up a little from last check at 5.9), triglycerides are a little elevated as well. Continue to work on diet and exercise. Increase water intake and fiber intake, decrease fried or fatty or high carb/sugar foods.   We can discuss more at follow up when we reschedule

## 2022-05-02 NOTE — TELEPHONE ENCOUNTER
----- Message from [facility] AVELINO sent at 5/2/2022 10:31 AM EDT -----  Subject: Message to Provider    QUESTIONS  Information for Provider? Patient was returning [facility] phone call and   would like a call back. ---------------------------------------------------------------------------  --------------  Armando Mckinney INFO  What is the best way for the office to contact you? OK to leave message on   voicemail  Preferred Call Back Phone Number?  [phone]  ---------------------------------------------------------------------------  --------------  SCRIPT ANSWERS  undefined

## 2022-05-05 LAB
DRUGS UR: NORMAL
SPECIMEN STATUS REPORT, ROLRST: NORMAL

## 2022-05-10 RX ORDER — ZOLPIDEM TARTRATE 5 MG/1
5 TABLET ORAL
Qty: 7 TABLET | Refills: 0 | Status: SHIPPED | OUTPATIENT
Start: 2022-05-10 | End: 2022-05-16 | Stop reason: ALTCHOICE

## 2022-05-16 ENCOUNTER — OFFICE VISIT (OUTPATIENT)
Dept: FAMILY MEDICINE CLINIC | Age: 62
End: 2022-05-16
Payer: COMMERCIAL

## 2022-05-16 VITALS
OXYGEN SATURATION: 95 % | WEIGHT: 169 LBS | HEIGHT: 64 IN | SYSTOLIC BLOOD PRESSURE: 132 MMHG | DIASTOLIC BLOOD PRESSURE: 88 MMHG | RESPIRATION RATE: 20 BRPM | HEART RATE: 96 BPM | TEMPERATURE: 98.2 F | BODY MASS INDEX: 28.85 KG/M2

## 2022-05-16 DIAGNOSIS — I10 ESSENTIAL HYPERTENSION: ICD-10-CM

## 2022-05-16 DIAGNOSIS — R73.03 PRE-DIABETES: Primary | ICD-10-CM

## 2022-05-16 DIAGNOSIS — E78.5 HYPERLIPIDEMIA, UNSPECIFIED HYPERLIPIDEMIA TYPE: ICD-10-CM

## 2022-05-16 DIAGNOSIS — G47.00 INSOMNIA, UNSPECIFIED TYPE: ICD-10-CM

## 2022-05-16 DIAGNOSIS — F90.9 ADULT ADHD: ICD-10-CM

## 2022-05-16 DIAGNOSIS — F32.A DEPRESSION, UNSPECIFIED DEPRESSION TYPE: ICD-10-CM

## 2022-05-16 PROCEDURE — 99214 OFFICE O/P EST MOD 30 MIN: CPT | Performed by: NURSE PRACTITIONER

## 2022-05-16 RX ORDER — DEXTROAMPHETAMINE SACCHARATE, AMPHETAMINE ASPARTATE, DEXTROAMPHETAMINE SULFATE AND AMPHETAMINE SULFATE 5; 5; 5; 5 MG/1; MG/1; MG/1; MG/1
20 TABLET ORAL 2 TIMES DAILY
Qty: 60 TABLET | Refills: 0 | Status: SHIPPED | OUTPATIENT
Start: 2022-05-16 | End: 2022-07-25 | Stop reason: SDUPTHER

## 2022-05-16 RX ORDER — ATORVASTATIN CALCIUM 20 MG/1
TABLET, FILM COATED ORAL
Qty: 90 TABLET | Refills: 3 | Status: SHIPPED | OUTPATIENT
Start: 2022-05-16

## 2022-05-16 RX ORDER — METFORMIN HYDROCHLORIDE 500 MG/1
500 TABLET, EXTENDED RELEASE ORAL
Qty: 90 TABLET | Refills: 1 | Status: SHIPPED | OUTPATIENT
Start: 2022-05-16

## 2022-05-16 RX ORDER — METFORMIN HYDROCHLORIDE 500 MG/1
500 TABLET, EXTENDED RELEASE ORAL
Qty: 90 TABLET | Refills: 1 | Status: SHIPPED | OUTPATIENT
Start: 2022-05-16 | End: 2022-05-16 | Stop reason: SDUPTHER

## 2022-05-16 RX ORDER — DESVENLAFAXINE SUCCINATE 50 MG/1
TABLET, EXTENDED RELEASE ORAL
Qty: 90 TABLET | Refills: 3 | Status: SHIPPED | OUTPATIENT
Start: 2022-05-16 | End: 2022-06-14

## 2022-05-16 RX ORDER — DOXEPIN HYDROCHLORIDE 50 MG/1
CAPSULE ORAL
Qty: 270 CAPSULE | Refills: 3 | Status: SHIPPED | OUTPATIENT
Start: 2022-05-16

## 2022-05-16 RX ORDER — LOSARTAN POTASSIUM 50 MG/1
TABLET ORAL
Qty: 90 TABLET | Refills: 3 | Status: SHIPPED | OUTPATIENT
Start: 2022-05-16

## 2022-05-16 NOTE — PROGRESS NOTES
Chief Complaint   Patient presents with    Follow-up     labwork     1. Have you been to the ER, urgent care clinic since your last visit? Hospitalized since your last visit? NO  2. Have you seen or consulted any other health care providers outside of the [address] [place] [address] since your last visit? Include any pap smears or colon screening.  NO    Health Maintenance Due   Topic Date Due    Pneumococcal 0-64 years (1 - PCV) Never done    Cervical cancer screen  Never done    Shingrix Vaccine Age 50> (1 of 2) Never done    Depression Monitoring  07/08/2021

## 2022-05-16 NOTE — PROGRESS NOTES
Subjective:     Chief Complaint   Patient presents with    Follow-up     labwork        HPI:  63 y.o.  presents for follow up appointment. Adderall 20mg 1-2 times daily, most of the time just needs once per day.  No CP, dizziness, HA, no palpitations.   Feels ADHD symptoms controlled on current dose/medication.       Sleep:  Has problems with sleep? no- controlled on doxepin, usually one per night.   When she is out of town she uses [facility], which is only time she needs it (goes to visit her mom in [place])    [address] depressed, anxious, or irritable/has mood swings? No- controlled on pristiq      HTN/hyperlipidemia  Taking medications daily.  No side effects per patient. Toni Esteves is not checking BP at home.   No palpitations, dizziness, headache, SOB/FUENTES, vision changes, CP or swelling in the legs. States she tries to watch her salt intake.        Recent labs reviewed. Pre-DM  Admits that since she quit drinking she has craved sugars. No hospital, ER or specialist visits since last primary care visit except as noted above. Past Medical History:   Diagnosis Date    ADHD (attention deficit hyperactivity disorder)     Adverse effect of anesthesia     patient states she was in the ICU s/p ankle surgery on a ventilator;1/ 2017    Depression     Hyperlipidemia     Hypertension     Ill-defined condition     high cholesterol    Ill-defined condition     genital herpes    Menopause     Psychiatric disorder     depression, ADHD       Social History     Tobacco Use    Smoking status: Current Every Day Smoker     Packs/day: 0.25    Smokeless tobacco: Never Used   Vaping Use    Vaping Use: Never used   Substance Use Topics    Alcohol use:  Yes     Alcohol/week: 2.0 standard drinks     Types: 2 Shots of liquor per week     Comment: on occasion    Drug use: Yes     Types: Marijuana     Comment: last used 11/28/17       Outpatient Medications Marked as Taking for the 5/16/22 encounter (Office Visit) with Donald Matteo John NP   Medication Sig Dispense Refill    metFORMIN ER (GLUCOPHAGE XR) 500 mg tablet Take 1 Tablet by mouth daily (with dinner). 90 Tablet 1    atorvastatin (LIPITOR) 20 mg tablet TAKE 1 TABLET BY MOUTH NIGHTLY 90 Tablet 3    losartan (COZAAR) 50 mg tablet TAKE 1 TABLET DAILY 90 Tablet 3    doxepin (SINEquan) 50 mg capsule Take 1 to 3 tablets by mouth everyday at bedtime as needed 270 Capsule 3    desvenlafaxine succinate (PRISTIQ) 50 mg ER tablet TAKE 1 TABLET BY MOUTH DAILY 90 Tablet 3    dextroamphetamine-amphetamine (ADDERALL) 20 mg tablet Take 1 Tablet by mouth two (2) times a day. Max Daily Amount: 40 mg. 60 Tablet 0    ASPIRIN (RIKY LOW STRENGTH PO) Take 81 mg by mouth daily.  cholecalciferol (VITAMIN D3) 1,000 unit cap Take  by mouth daily. Allergies   Allergen Reactions    Pcn [Penicillins] Swelling       Health Maintenance reviewed       ROS:  Gen: no fatigue, no fever, no chills, no unexplained weight loss or weight gain  Eyes: no excessive tearing, itching, or discharge  Nose: no rhinorrhea, no sinus pain  Mouth: no oral lesions, no sore throat, no difficulty swallowing  Resp: no shortness of breath, no wheezing, no cough  CV: no chest pain, no orthopnea, no paroxysmal nocturnal dyspnea, no lower extremity edema, no palpitations  Abd: no nausea, no heartburn, no diarrhea, no constipation, no abdominal pain  Neuro: no headaches, no syncope or presyncopal episodes  Endo: no polyuria, no polydipsia.     : no hematuria, no dysuria, no frequency, no incontinence  Heme: no lymphadenopathy, no easy bruising or bleeding, no night sweats      PE:  Visit Vitals  /88 (BP 1 Location: Right arm, BP Patient Position: Sitting, BP Cuff Size: Adult)   Pulse 96   Temp 98.2 °F (36.8 °C) (Temporal)   Resp 20   Ht 5' 4\" (1.626 m)   Wt 169 lb (76.7 kg)   LMP  (LMP Unknown)   SpO2 95%   BMI 29.01 kg/m²     Gen: alert, oriented, no acute distress  Head: normocephalic, atraumatic  Eyes: pupils equal round reactive to light, sclera clear, conjunctiva clear  Oral: moist mucus membranes, no oral lesions, no pharyngeal inflammation or exudate  Neck: symmetric normal sized thyroid, no carotid bruits, no jugular vein distention  Resp: no increase work of breathing, lungs clear to ausculation bilaterally, no wheezing, rales or rhonchi  CV: S1, S2 normal.  No murmurs, rubs, or gallops. Abd: soft, not tender, not distended. No hepatosplenomegaly. Normal bowel sounds. No hernias. No abdominal or renal bruits. Neuro: cranial nerves intact, normal strength and movement in all extremities, and sensation intact and symmetric. Skin: no lesion or rash  Extremities: no cyanosis or edema    No results found for this visit on 05/16/22. Assessment/Plan:  Differential diagnosis and treatment options reviewed with patient who is in agreement with treatment plan as outlined below. ICD-10-CM ICD-9-CM    1. Pre-diabetes  R73.03 790.29 metFORMIN ER (GLUCOPHAGE XR) 500 mg tablet      DISCONTINUED: metFORMIN ER (GLUCOPHAGE XR) 500 mg tablet   2. Hyperlipidemia, unspecified hyperlipidemia type  E78.5 272.4 atorvastatin (LIPITOR) 20 mg tablet   3. Essential hypertension  I10 401.9 losartan (COZAAR) 50 mg tablet   4. Depression, unspecified depression type  F32. A 311 doxepin (SINEquan) 50 mg capsule      desvenlafaxine succinate (PRISTIQ) 50 mg ER tablet   5. Insomnia, unspecified type  G47.00 780.52 doxepin (SINEquan) 50 mg capsule   6. Adult ADHD  F90.9 314.01 dextroamphetamine-amphetamine (ADDERALL) 20 mg tablet       BP at goal. Refills sent    Routine labs reviewed. Will repeat in three months. Add metformin, may help with sugar cravings in PM    Doing well on current Adderall dosing. Continue Adderall; reviewed benefits and side effects.    Reinforced behavior and lifestyle modification, good sleep hygiene.   I have reviewed the patient's controlled substance prescription history thru the Prescription Monitoring Program, so that the prescription(s) for a controlled substance can be given. UDS is up to date. Discussed BMI and healthy weight. Encouraged patient to work to implement changes including diet high in raw fruits and vegetables, lean protein and good fats. Limit refined, processed carbohydrates and sugar. Encouraged regular exercise. Recommended regular cardiovascular exercise 3-6 times per week for 30-60 minutes daily. Repeat TSH, lipid, CMP in three months     I have discussed the diagnosis with the patient and the intended plan as seen in the above orders. The patient has received an after-visit summary and questions were answered concerning future plans. I have discussed medication side effects and warnings with the patient as well. The patient verbalizes understanding and agreement with the plan.

## 2022-06-14 ENCOUNTER — VIRTUAL VISIT (OUTPATIENT)
Dept: FAMILY MEDICINE CLINIC | Age: 62
End: 2022-06-14
Payer: COMMERCIAL

## 2022-06-14 DIAGNOSIS — F32.A DEPRESSION, UNSPECIFIED DEPRESSION TYPE: ICD-10-CM

## 2022-06-14 PROCEDURE — 99213 OFFICE O/P EST LOW 20 MIN: CPT | Performed by: NURSE PRACTITIONER

## 2022-06-14 RX ORDER — DESVENLAFAXINE 100 MG/1
100 TABLET, EXTENDED RELEASE ORAL DAILY
Qty: 90 TABLET | Refills: 1
Start: 2022-06-14 | End: 2022-07-25 | Stop reason: SDUPTHER

## 2022-06-14 NOTE — PROGRESS NOTES
Chief Complaint   Patient presents with    Medication Question     1. Have you been to the ER, urgent care clinic since your last visit? Hospitalized since your last visit? No    2. Have you seen or consulted any other health care providers outside of the [address] [place] [address] since your last visit? Include any pap smears or colon screening.  No      Health Maintenance Due   Topic Date Due    Pneumococcal 0-64 years (1 - PCV) Never done    Cervical cancer screen  Never done    Shingrix Vaccine Age 50> (1 of 2) Never done

## 2022-06-14 NOTE — PROGRESS NOTES
Meredith Richardson (: 1960) is a 63 y.o. female, established patient, here for evaluation of the following chief complaint(s):   Medication Question       ASSESSMENT/PLAN:  Below is the assessment and plan developed based on review of pertinent history, labs, studies, and medications. 1. Depression, unspecified depression type  -     desvenlafaxine succinate 100 mg Tb24; Take 1 Tablet by mouth daily. , No Print, Disp-90 Tablet, R-1  will trial increasing dose of pristiq to 100 mg daily, can take 2 pills daily of her current 50 mg dose for now. Encouraged counseling and self care therapy   If no improvement in the next 3 weeks she will let me know  Discussed BMI and healthy weight. Encouraged patient to work to implement changes including diet high in raw fruits and vegetables, lean protein and good fats. Limit refined, processed carbohydrates and sugar. Encouraged regular exercise. Return in about 4 weeks (around 2022), or if symptoms worsen or fail to improve. SUBJECTIVE/OBJECTIVE:  HPI    Depression pills may not be working as well   Has been on Pristiq 50 mg for a long time, has been working well until the past month or so  Says she has hard time getting out of bed most days, crying easily. Increased stressors with her  being diagnosed with diabetes recently and her mother being ill with Dementia. Denies SI/HI  She is sleeping well. She is not having any other symptoms.        Review of Systems   Gen: no fatigue, fever, chills  Eyes: no excessive tearing, itching, or discharge  Nose: no rhinorrhea, no sinus pain  Mouth: no oral lesions, no sore throat  Resp: no shortness of breath, no wheezing, no cough  CV: no chest pain, no paroxysmal nocturnal dyspnea  Abd: no nausea, no heartburn, no diarrhea, no constipation, no abdominal pain  Neuro: no headaches, no syncope or presyncopal episodes  Endo: no polyuria, no polydipsia  Heme: no lymphadenopathy, no easy bruising or bleeding    Patient-Reported Weight: 169       Physical Exam    [INSTRUCTIONS:  \"[x]\" Indicates a positive item  \"[]\" Indicates a negative item  -- DELETE ALL ITEMS NOT EXAMINED]    Constitutional: [x] Appears well-developed and well-nourished [x] No apparent distress      [] Abnormal -     Mental status: [x] Alert and awake  [x] Oriented to person/place/time [x] Able to follow commands    [] Abnormal -     Eyes:   EOM    [x]  Normal    [] Abnormal -   Sclera  [x]  Normal    [] Abnormal -          Discharge [x]  None visible   [] Abnormal -     HENT: [x] Normocephalic, atraumatic  [] Abnormal -   [x] Mouth/Throat: Mucous membranes are moist    External Ears [x] Normal  [] Abnormal -    Neck: [x] No visualized mass [] Abnormal -     Pulmonary/Chest: [x] Respiratory effort normal   [x] No visualized signs of difficulty breathing or respiratory distress        [] Abnormal -      Musculoskeletal:   [x] Normal gait with no signs of ataxia         [x] Normal range of motion of neck        [] Abnormal -     Neurological:        [x] No Facial Asymmetry (Cranial nerve 7 motor function) (limited exam due to video visit)          [x] No gaze palsy        [] Abnormal -          Skin:        [x] No significant exanthematous lesions or discoloration noted on facial skin         [] Abnormal -            Psychiatric:       [x] Normal Affect [] Abnormal -        [x] No Hallucinations              Marly Butt, was evaluated through a synchronous (real-time) audio-video encounter. The patient (or guardian if applicable) is aware that this is a billable service, which includes applicable co-pays. This Virtual Visit was conducted with patient's (and/or legal guardian's) consent. The visit was conducted pursuant to the emergency declaration under the [facility], [address] [place] [address] authority and the [employer] and [employer] General Act.   Patient identification was verified, and a caregiver was present when appropriate. The patient was located at: Home: [facility] [id number]  The provider was located at: Facility ([facility]): Johan Zepeda [address] [place] [address]       An electronic signature was used to authenticate this note.   -- Sharla De Souza NP

## 2022-07-25 DIAGNOSIS — F90.9 ADULT ADHD: ICD-10-CM

## 2022-07-25 DIAGNOSIS — F32.A DEPRESSION, UNSPECIFIED DEPRESSION TYPE: ICD-10-CM

## 2022-07-25 RX ORDER — DESVENLAFAXINE 100 MG/1
100 TABLET, EXTENDED RELEASE ORAL DAILY
Qty: 90 TABLET | Refills: 1 | Status: SHIPPED | OUTPATIENT
Start: 2022-07-25 | End: 2022-08-08 | Stop reason: DRUGHIGH

## 2022-07-26 RX ORDER — DEXTROAMPHETAMINE SACCHARATE, AMPHETAMINE ASPARTATE, DEXTROAMPHETAMINE SULFATE AND AMPHETAMINE SULFATE 5; 5; 5; 5 MG/1; MG/1; MG/1; MG/1
20 TABLET ORAL 2 TIMES DAILY
Qty: 60 TABLET | Refills: 0 | Status: SHIPPED | OUTPATIENT
Start: 2022-07-26 | End: 2022-08-31 | Stop reason: SDUPTHER

## 2022-08-08 ENCOUNTER — TELEPHONE (OUTPATIENT)
Dept: FAMILY MEDICINE CLINIC | Age: 62
End: 2022-08-08

## 2022-08-08 DIAGNOSIS — F32.A DEPRESSION, UNSPECIFIED DEPRESSION TYPE: ICD-10-CM

## 2022-08-08 RX ORDER — DESVENLAFAXINE 50 MG/1
50 TABLET, EXTENDED RELEASE ORAL DAILY
Qty: 90 TABLET | Refills: 1 | Status: SHIPPED | OUTPATIENT
Start: 2022-08-08 | End: 2022-08-09 | Stop reason: SDUPTHER

## 2022-08-08 NOTE — TELEPHONE ENCOUNTER
Decrease dose sent to [facility].   No she cannot cut in half because it is extended release

## 2022-08-08 NOTE — TELEPHONE ENCOUNTER
Pt started getting dizzy after changing the dosage on Desvenlafaxine to 100mg. Can you bring it back down to 50mg? Also, is it okay if the pt cuts the tablets in half until it gets fixed? The directions say not to chew or crush.     Please send an Rx of Desvenlafaxine 50mg to [address]    Please update on [facility]

## 2022-08-09 DIAGNOSIS — F32.A DEPRESSION, UNSPECIFIED DEPRESSION TYPE: ICD-10-CM

## 2022-08-09 RX ORDER — DESVENLAFAXINE 50 MG/1
50 TABLET, EXTENDED RELEASE ORAL DAILY
Qty: 90 TABLET | Refills: 1 | Status: SHIPPED | OUTPATIENT
Start: 2022-08-09 | End: 2022-10-20 | Stop reason: SDUPTHER

## 2022-08-09 NOTE — TELEPHONE ENCOUNTER
verified. Informed patient of message from provider. Patient said she was already informed and had no further questions. Referred To Otolaryngology For Closure Text (Leave Blank If You Do Not Want): After obtaining clear surgical margins the patient was sent to otolaryngology for surgical repair.  The patient understands they will receive post-surgical care and follow-up from the referring physician's office.

## 2022-08-09 NOTE — TELEPHONE ENCOUNTER
Pt wanted the #90 supply sent to [facility] and the #30 day to supply to [facility]. I already spoke to the pharmacist and the #30 is taken care of.

## 2022-08-31 DIAGNOSIS — F90.9 ADULT ADHD: ICD-10-CM

## 2022-08-31 RX ORDER — DEXTROAMPHETAMINE SACCHARATE, AMPHETAMINE ASPARTATE, DEXTROAMPHETAMINE SULFATE AND AMPHETAMINE SULFATE 5; 5; 5; 5 MG/1; MG/1; MG/1; MG/1
20 TABLET ORAL 2 TIMES DAILY
Qty: 60 TABLET | Refills: 0 | Status: SHIPPED | OUTPATIENT
Start: 2022-08-31 | End: 2022-10-31 | Stop reason: SDUPTHER

## 2022-08-31 NOTE — TELEPHONE ENCOUNTER
Last appointment on 06/14/22.  Refills have been requested for the following medications:         dextroamphetamine-amphetamine (ADDERALL) 20 mg tablet     [facility] pharmacy: [facility]  [id number] - [address], [address] [place] [address]

## 2022-09-22 ENCOUNTER — TELEPHONE (OUTPATIENT)
Dept: FAMILY MEDICINE CLINIC | Age: 62
End: 2022-09-22

## 2022-09-22 DIAGNOSIS — F51.01 PRIMARY INSOMNIA: ICD-10-CM

## 2022-09-22 RX ORDER — ZOLPIDEM TARTRATE 5 MG/1
5 TABLET ORAL
Qty: 7 TABLET | Refills: 0 | Status: SHIPPED | OUTPATIENT
Start: 2022-09-22

## 2022-09-22 NOTE — TELEPHONE ENCOUNTER
2 identifiers verified. Pt states she is about to go to [place] to stay with her brother and she never gets any sleep there, she states she usually takes 2 tabs of the doxepin, but when she wakes up it makes her feel drunk. Pt is requesting low dose ambien for her trip.

## 2022-09-23 NOTE — TELEPHONE ENCOUNTER
verified. Informed patient of medication sent in to the pharmacy on file. Patient verified understanding and had no further questions or concerns.

## 2022-10-20 DIAGNOSIS — F32.A DEPRESSION, UNSPECIFIED DEPRESSION TYPE: ICD-10-CM

## 2022-10-20 NOTE — TELEPHONE ENCOUNTER
Refill Request ([facility] sent[facility] a fax)     Requested Prescriptions     Pending Prescriptions Disp Refills    desvenlafaxine ER 50 mg Tb24 ER tablet 90 Tablet 1     Sig: Take 1 Tablet by mouth daily.        Thank you

## 2022-10-21 RX ORDER — DESVENLAFAXINE 50 MG/1
50 TABLET, EXTENDED RELEASE ORAL DAILY
Qty: 90 TABLET | Refills: 1 | Status: SHIPPED | OUTPATIENT
Start: 2022-10-21

## 2022-10-31 DIAGNOSIS — F90.9 ADULT ADHD: ICD-10-CM

## 2022-10-31 NOTE — TELEPHONE ENCOUNTER
Last OV: 6/14    Refills have been requested for the following medications:         dextroamphetamine-amphetamine (ADDERALL) 20 mg tablet [Juanita Bennett]     Preferred pharmacy: [facility] [id number] - [address], [address] [place] [address] [place]

## 2022-11-01 RX ORDER — DEXTROAMPHETAMINE SACCHARATE, AMPHETAMINE ASPARTATE, DEXTROAMPHETAMINE SULFATE AND AMPHETAMINE SULFATE 5; 5; 5; 5 MG/1; MG/1; MG/1; MG/1
20 TABLET ORAL 2 TIMES DAILY
Qty: 60 TABLET | Refills: 0 | Status: SHIPPED | OUTPATIENT
Start: 2022-11-01

## 2022-11-14 ENCOUNTER — OFFICE VISIT (OUTPATIENT)
Dept: FAMILY MEDICINE CLINIC | Age: 62
End: 2022-11-14
Payer: COMMERCIAL

## 2022-11-14 VITALS
HEIGHT: 64 IN | TEMPERATURE: 98.3 F | DIASTOLIC BLOOD PRESSURE: 67 MMHG | RESPIRATION RATE: 20 BRPM | OXYGEN SATURATION: 98 % | WEIGHT: 164 LBS | HEART RATE: 105 BPM | BODY MASS INDEX: 28 KG/M2 | SYSTOLIC BLOOD PRESSURE: 103 MMHG

## 2022-11-14 DIAGNOSIS — Z12.39 SPECIAL SCREENING EXAMINATION FOR NEOPLASM OF BREAST: ICD-10-CM

## 2022-11-14 DIAGNOSIS — F51.01 PRIMARY INSOMNIA: ICD-10-CM

## 2022-11-14 DIAGNOSIS — R73.03 PRE-DIABETES: ICD-10-CM

## 2022-11-14 DIAGNOSIS — L65.9 ALOPECIA: ICD-10-CM

## 2022-11-14 DIAGNOSIS — F90.9 ADULT ADHD: Primary | ICD-10-CM

## 2022-11-14 DIAGNOSIS — E78.5 HYPERLIPIDEMIA, UNSPECIFIED HYPERLIPIDEMIA TYPE: ICD-10-CM

## 2022-11-14 DIAGNOSIS — F32.A DEPRESSION, UNSPECIFIED DEPRESSION TYPE: ICD-10-CM

## 2022-11-14 PROCEDURE — 3074F SYST BP LT 130 MM HG: CPT | Performed by: NURSE PRACTITIONER

## 2022-11-14 PROCEDURE — 3078F DIAST BP <80 MM HG: CPT | Performed by: NURSE PRACTITIONER

## 2022-11-14 PROCEDURE — 99214 OFFICE O/P EST MOD 30 MIN: CPT | Performed by: NURSE PRACTITIONER

## 2022-11-14 RX ORDER — CLOBETASOL PROPIONATE 0.5 MG/G
AEROSOL, FOAM TOPICAL 2 TIMES DAILY
Qty: 200 G | Refills: 3 | Status: SHIPPED | OUTPATIENT
Start: 2022-11-14 | End: 2022-11-14 | Stop reason: SDUPTHER

## 2022-11-14 RX ORDER — CLOBETASOL PROPIONATE 0.5 MG/G
AEROSOL, FOAM TOPICAL 2 TIMES DAILY
Qty: 100 G | Refills: 0 | Status: SHIPPED | OUTPATIENT
Start: 2022-11-14

## 2022-11-14 NOTE — PROGRESS NOTES
Chief Complaint   Patient presents with    Follow-up     6 month routine check     1. Have you been to the ER, urgent care clinic since your last visit? Hospitalized since your last visit? [facility] 2. Have you seen or consulted any other health care providers outside of the [address] [place] [address] since your last visit? Include any pap smears or colon screening.  No        Health Maintenance Due   Topic Date Due    Pneumococcal 0-64 years (1 - PCV) Never done    Cervical cancer screen  Never done    Shingrix Vaccine Age 50> (1 of 2) Never done    COVID-19 Vaccine (4 - Booster for Pfizer series) 01/23/2022    Flu Vaccine (1) Never done

## 2022-11-14 NOTE — PROGRESS NOTES
Subjective:     Chief Complaint   Patient presents with    Follow-up     6 month routine check        HPI:  63 y.o.  presents for follow up appointment. Adderall 20mg 1-2 times daily, most of the time just needs once per day. No CP, dizziness, HA, no palpitations. Feels ADHD symptoms controlled on current dose/medication. At last virtual visit tried to increase pristiq dose to 100 mg but caused severe dizziness so she returned to taking 50 mg and is doing okay on this dose again. No SI/HI  Depression controlled per patient. HTN/hyperlipidemia  Taking medications daily. No side effects per patient. She is not checking BP at home. No palpitations, dizziness, headache, SOB/FUENTES, vision changes, CP or swelling in the legs. States she tries to watch her salt intake. Asks for alopecia refill that dermatology gave her a year or so ago. Has some alopecia in her scalp    No hospital, ER or specialist visits since last primary care visit except as noted above. Past Medical History:   Diagnosis Date    ADHD (attention deficit hyperactivity disorder)     Adverse effect of anesthesia     patient states she was in the ICU s/p ankle surgery on a ventilator;1/ 2017    Depression     Hyperlipidemia     Hypertension     Ill-defined condition     high cholesterol    Ill-defined condition     genital herpes    Menopause     Psychiatric disorder     depression, ADHD       Social History     Tobacco Use    Smoking status: Every Day     Packs/day: 0.25     Types: Cigarettes    Smokeless tobacco: Never   Vaping Use    Vaping Use: Never used   Substance Use Topics    Alcohol use:  Yes     Alcohol/week: 2.0 standard drinks     Types: 2 Shots of liquor per week     Comment: on occasion    Drug use: Yes     Types: Marijuana     Comment: last used 11/28/17           Allergies   Allergen Reactions    Pcn [Penicillins] Swelling       Health Maintenance reviewed - declines flu shot      ROS:  Gen: no fatigue, no fever, no chills, no unexplained weight loss or weight gain  Eyes: no excessive tearing, itching, or discharge  Nose: no rhinorrhea, no sinus pain  Mouth: no oral lesions, no sore throat, no difficulty swallowing  Resp: no shortness of breath, no wheezing, no cough  CV: no chest pain, no orthopnea, no paroxysmal nocturnal dyspnea, no lower extremity edema, no palpitations  Abd: no nausea, no heartburn, no diarrhea, no constipation, no abdominal pain  Neuro: no headaches, no syncope or presyncopal episodes  Endo: no polyuria, no polydipsia. : no hematuria, no dysuria, no frequency, no incontinence  Heme: no lymphadenopathy, no easy bruising or bleeding, no night sweats  MSK: no joint pain or swelling    PE:  Visit Vitals  /67 (BP 1 Location: Right upper arm, BP Patient Position: Sitting, BP Cuff Size: Adult)   Pulse (!) 105   Temp 98.3 °F (36.8 °C) (Temporal)   Resp 20   Ht 5' 4\" (1.626 m)   Wt 164 lb (74.4 kg)   LMP  (LMP Unknown)   SpO2 98%   BMI 28.15 kg/m²     Gen: alert, oriented, no acute distress  Head: normocephalic, atraumatic  Ears: external auditory canals clear, TMs without erythema or effusion  Eyes: pupils equal round reactive to light, sclera clear, conjunctiva clear  Oral: moist mucus membranes, no oral lesions, no pharyngeal inflammation or exudate  Neck: symmetric normal sized thyroid, no carotid bruits, no jugular vein distention  Resp: no increase work of breathing, lungs clear to ausculation bilaterally, no wheezing, rales or rhonchi  CV: S1, S2 normal.  No murmurs, rubs, or gallops. Abd: soft, not tender, not distended. No hepatosplenomegaly. Normal bowel sounds. No hernias. No abdominal or renal bruits. Neuro: cranial nerves intact, normal strength and movement in all extremities, and sensation intact and symmetric. Skin: no lesion or rash  Extremities: no cyanosis or edema    No results found for this visit on 11/14/22.     Assessment/Plan:  Differential diagnosis and treatment options reviewed with patient who is in agreement with treatment plan as outlined below. ICD-10-CM ICD-9-CM    1. Adult ADHD  F90.9 314.01       2. Alopecia  L65.9 704.00 clobetasoL-emollient (OLUX-E) 0.05 % topical foam      DISCONTINUED: clobetasoL-emollient (OLUX-E) 0.05 % topical foam      3. Primary insomnia  F51.01 307.42       4. Pre-diabetes  N51.45 751.65 METABOLIC PANEL, COMPREHENSIVE      HEMOGLOBIN A1C WITH EAG      METABOLIC PANEL, COMPREHENSIVE      HEMOGLOBIN A1C WITH EAG      5. Hyperlipidemia, unspecified hyperlipidemia type  K19.2 839.5 METABOLIC PANEL, COMPREHENSIVE      LIPID PANEL      METABOLIC PANEL, COMPREHENSIVE      LIPID PANEL      6. Special screening examination for neoplasm of breast  Z12.39 V76.10 ASHLY MAMMO BI SCREENING INCL CAD      7. Depression, unspecified depression type  F32. A 311         BP at goal. DASH diet encouraged      Routine labs  Taking metformin. Still has some sugar cravings     Doing well on current Adderall dosing. Continue Adderall; reviewed benefits and side effects. Reinforced behavior and lifestyle modification, good sleep hygiene. I have reviewed the patient's controlled substance prescription history thru the Prescription Monitoring Program, so that the prescription(s) for a controlled substance can be given. UDS is up to date. Discussed BMI and healthy weight. Encouraged patient to work to implement changes including diet high in raw fruits and vegetables, lean protein and good fats. Limit refined, processed carbohydrates and sugar. Encouraged regular exercise. Recommended regular cardiovascular exercise 3-6 times per week for 30-60 minutes daily. I have discussed the diagnosis with the patient and the intended plan as seen in the above orders. The patient has received an after-visit summary and questions were answered concerning future plans. I have discussed medication side effects and warnings with the patient as well. The patient verbalizes understanding and agreement with the plan.

## 2022-11-15 LAB
ALBUMIN SERPL-MCNC: 4.6 G/DL (ref 3.8–4.9)
ALBUMIN/GLOB SERPL: 2.3 {RATIO} (ref 1.2–2.2)
ALP SERPL-CCNC: 89 IU/L (ref 44–121)
ALT SERPL-CCNC: 18 IU/L (ref 0–32)
AST SERPL-CCNC: 16 IU/L (ref 0–40)
BILIRUB SERPL-MCNC: 1 MG/DL (ref 0–1.2)
BUN SERPL-MCNC: 11 MG/DL (ref 8–27)
BUN/CREAT SERPL: 15 (ref 12–28)
CALCIUM SERPL-MCNC: 9.5 MG/DL (ref 8.7–10.3)
CHLORIDE SERPL-SCNC: 101 MMOL/L (ref 96–106)
CHOLEST SERPL-MCNC: 154 MG/DL (ref 100–199)
CO2 SERPL-SCNC: 23 MMOL/L (ref 20–29)
CREAT SERPL-MCNC: 0.73 MG/DL (ref 0.57–1)
EGFR: 94 ML/MIN/1.73
EST. AVERAGE GLUCOSE BLD GHB EST-MCNC: 140 MG/DL
GLOBULIN SER CALC-MCNC: 2 G/DL (ref 1.5–4.5)
GLUCOSE SERPL-MCNC: 146 MG/DL (ref 70–99)
HBA1C MFR BLD: 6.5 % (ref 4.8–5.6)
HDLC SERPL-MCNC: 38 MG/DL
IMP & REVIEW OF LAB RESULTS: NORMAL
LDLC SERPL CALC-MCNC: 85 MG/DL (ref 0–99)
POTASSIUM SERPL-SCNC: 4.3 MMOL/L (ref 3.5–5.2)
PROT SERPL-MCNC: 6.6 G/DL (ref 6–8.5)
SODIUM SERPL-SCNC: 139 MMOL/L (ref 134–144)
TRIGL SERPL-MCNC: 181 MG/DL (ref 0–149)
VLDLC SERPL CALC-MCNC: 31 MG/DL (ref 5–40)

## 2022-11-29 DIAGNOSIS — R73.03 PRE-DIABETES: ICD-10-CM

## 2022-11-29 RX ORDER — METFORMIN HYDROCHLORIDE 750 MG/1
750 TABLET, EXTENDED RELEASE ORAL
Qty: 90 TABLET | Refills: 3 | Status: SHIPPED | OUTPATIENT
Start: 2022-11-29

## 2022-11-29 NOTE — PROGRESS NOTES
Hi there! Sorry for the delay in results to you but was out of town since 11/17/22 and trying to catch up since back yesterday. Hope you had a great Thanksgiving. Labs show that your blood sugar is a little elevated at 146 (but better than last check at 161)and your HGbA1c is now 6.5 (which technically is now diagnostic for diabetes if hgbA1c is 6.5 or higher). Remember the hgbA1c is a 3 month average of your blood sugars. I would like to increase your metformin a little to 750mg with dinner daily. Recheck in three months. Your cholesterol is okay and your triglycerides are improving so that is good!   Let me know if you have any questions   Best   Moises Huerta NP

## 2022-12-04 DIAGNOSIS — F90.9 ADULT ADHD: ICD-10-CM

## 2022-12-04 DIAGNOSIS — F51.01 PRIMARY INSOMNIA: ICD-10-CM

## 2022-12-05 RX ORDER — ZOLPIDEM TARTRATE 5 MG/1
5 TABLET ORAL
Qty: 7 TABLET | Refills: 0 | Status: SHIPPED | OUTPATIENT
Start: 2022-12-05

## 2022-12-05 RX ORDER — DEXTROAMPHETAMINE SACCHARATE, AMPHETAMINE ASPARTATE, DEXTROAMPHETAMINE SULFATE AND AMPHETAMINE SULFATE 5; 5; 5; 5 MG/1; MG/1; MG/1; MG/1
20 TABLET ORAL 2 TIMES DAILY
Qty: 60 TABLET | Refills: 0 | Status: SHIPPED | OUTPATIENT
Start: 2022-12-05

## 2022-12-05 NOTE — TELEPHONE ENCOUNTER
Last OV: 11/14/2022    Refills have been requested for the following medications:         zolpidem (AMBIEN) 5 mg tablet [Juanita E Polo]         dextroamphetamine-amphetamine (ADDERALL) 20 mg tablet [Juanita E Polo]     Preferred pharmacy: [facility] [id number] - [address], [address] [place] [address]

## 2023-01-16 DIAGNOSIS — F90.9 ADULT ADHD: ICD-10-CM

## 2023-01-16 RX ORDER — DEXTROAMPHETAMINE SACCHARATE, AMPHETAMINE ASPARTATE, DEXTROAMPHETAMINE SULFATE AND AMPHETAMINE SULFATE 5; 5; 5; 5 MG/1; MG/1; MG/1; MG/1
20 TABLET ORAL 2 TIMES DAILY
Qty: 60 TABLET | Refills: 0 | Status: SHIPPED | OUTPATIENT
Start: 2023-01-16 | End: 2023-01-18 | Stop reason: SDUPTHER

## 2023-01-18 DIAGNOSIS — F90.9 ADULT ADHD: ICD-10-CM

## 2023-01-18 NOTE — TELEPHONE ENCOUNTER
Pt is calling needing her med transferred to [facility] it went to [place] and they don't take her ins anymore       Requested Prescriptions     Pending Prescriptions Disp Refills    dextroamphetamine-amphetamine (ADDERALL) 20 mg tablet 60 Tablet 0     Sig: Take 1 Tablet by mouth two (2) times a day. Max Daily Amount: 40 mg.

## 2023-01-19 RX ORDER — DEXTROAMPHETAMINE SACCHARATE, AMPHETAMINE ASPARTATE, DEXTROAMPHETAMINE SULFATE AND AMPHETAMINE SULFATE 5; 5; 5; 5 MG/1; MG/1; MG/1; MG/1
20 TABLET ORAL 2 TIMES DAILY
Qty: 60 TABLET | Refills: 0 | Status: SHIPPED | OUTPATIENT
Start: 2023-01-19

## 2023-02-27 ENCOUNTER — HOSPITAL ENCOUNTER (OUTPATIENT)
Dept: MAMMOGRAPHY | Age: 63
Discharge: HOME | End: 2023-02-27
Attending: NURSE PRACTITIONER
Payer: COMMERCIAL

## 2023-02-27 DIAGNOSIS — Z12.39 SPECIAL SCREENING EXAMINATION FOR NEOPLASM OF BREAST: ICD-10-CM

## 2023-02-27 PROCEDURE — 77067 SCR MAMMO BI INCL CAD: CPT

## 2023-03-13 DIAGNOSIS — R73.03 PRE-DIABETES: ICD-10-CM

## 2023-03-13 DIAGNOSIS — F90.9 ADULT ADHD: ICD-10-CM

## 2023-03-13 RX ORDER — DEXTROAMPHETAMINE SACCHARATE, AMPHETAMINE ASPARTATE, DEXTROAMPHETAMINE SULFATE AND AMPHETAMINE SULFATE 5; 5; 5; 5 MG/1; MG/1; MG/1; MG/1
20 TABLET ORAL 2 TIMES DAILY
Qty: 60 TABLET | Refills: 0 | Status: SHIPPED | OUTPATIENT
Start: 2023-03-13

## 2023-03-13 RX ORDER — METFORMIN HYDROCHLORIDE 750 MG/1
750 TABLET, EXTENDED RELEASE ORAL
Qty: 90 TABLET | Refills: 3 | Status: SHIPPED | OUTPATIENT
Start: 2023-03-13

## 2023-03-13 NOTE — TELEPHONE ENCOUNTER
Refill request (pt calling)    PT is out    Requested Prescriptions     Pending Prescriptions Disp Refills    metFORMIN ER (GLUCOPHAGE XR) 750 mg tablet 90 Tablet 3     Sig: Take 1 Tablet by mouth daily (with dinner). dextroamphetamine-amphetamine (ADDERALL) 20 mg tablet 60 Tablet 0     Sig: Take 1 Tablet by mouth two (2) times a day. Max Daily Amount: 40 mg.      Pt has apt 03/29 at 11:30am with EDWARD Bennett

## 2023-03-16 ENCOUNTER — TELEPHONE (OUTPATIENT)
Dept: FAMILY MEDICINE CLINIC | Age: 63
End: 2023-03-16

## 2023-03-16 ENCOUNTER — VIRTUAL VISIT (OUTPATIENT)
Dept: FAMILY MEDICINE CLINIC | Age: 63
End: 2023-03-16
Payer: COMMERCIAL

## 2023-03-16 DIAGNOSIS — S22.32XD CLOSED FRACTURE OF ONE RIB OF LEFT SIDE WITH ROUTINE HEALING, SUBSEQUENT ENCOUNTER: Primary | ICD-10-CM

## 2023-03-16 PROCEDURE — 99213 OFFICE O/P EST LOW 20 MIN: CPT | Performed by: NURSE PRACTITIONER

## 2023-03-16 RX ORDER — HYDROCODONE BITARTRATE AND ACETAMINOPHEN 5; 325 MG/1; MG/1
1 TABLET ORAL
Qty: 6 TABLET | Refills: 0 | Status: SHIPPED | OUTPATIENT
Start: 2023-03-16 | End: 2023-03-19

## 2023-03-16 NOTE — PROGRESS NOTES
Chief Complaint   Patient presents with    Rib Pain     1. Have you been to the ER, urgent care clinic since your last visit? Hospitalized since your last visit? Urgent care     2. Have you seen or consulted any other health care providers outside of the [address] [place] [address] since your last visit? Include any pap smears or colon screening.  No    Health Maintenance Due   Topic Date Due    Pneumococcal 0-64 years (1 - PCV) Never done    Cervical cancer screen  Never done    Shingles Vaccine (1 of 2) Never done    COVID-19 Vaccine (4 - Booster for Pfizer series) 01/23/2022

## 2023-03-16 NOTE — TELEPHONE ENCOUNTER
Lianet called to notify Ying Woods that the pt has had a prior Hydrocodone filled * 2 days ago and requesting permission. Handed headset to Ying Woods and she handled call.

## 2023-03-16 NOTE — PROGRESS NOTES
Rebecca Toribio (: 1960) is a 63 y.o. female, established patient, here for evaluation of the following chief complaint(s):   Rib Pain       ASSESSMENT/PLAN:  Below is the assessment and plan developed based on review of pertinent history, labs, studies, and medications. 1. Closed fracture of one rib of left side with routine healing, subsequent encounter  -     HYDROcodone-acetaminophen (NORCO) 5-325 mg per tablet; Take 1 Tablet by mouth every twelve (12) hours as needed for Pain for up to 3 days. Max Daily Amount: 2 Tablets., Normal, Disp-6 Tablet, R-0      Patient showed me her bottle of her pain medicine prescribed by [facility], this did not show up in her  because they dispensed from their pharmacy. I agreed to refill small quantity to get her through the weekend if pain spiked again. She should work on taking the pain medicine only when needed if pain persists after taking aleve. She can take aleve BID with food. Continue incentive spirometer. I have reviewed the patient's controlled substance prescription history thru the Prescription Monitoring Program, so that the prescription(s) for a controlled substance can be given. Has follow up with me next month but will call if pain worsens of does not continue to improve. Do not take adderall while taking this medicine. Return if symptoms worsen or fail to improve. SUBJECTIVE/OBJECTIVE:  HPI  Patient says she tripped over dog in garage and fell into the generator and landed on her left side, went to [facility] because she was in so much pain and broke her rib on left side, \"the one on top on the left side\". She fell on Friday and went to [facility] on Tuesday morning. Was taking aleve but was not helping much and pain was worsening so that is what prompted her to go to .    Told her to sleep in recliner propped up and given incentive spirometer and told to use that every 4-6  hours and prescribed hydrocodone bitartrate acetaminophen 5mg /325mg   They gave her 15 pills and she has 6 left, afraid that she will have pain exacerbation over the weekend and not have any pain medicine, asking for small quantity to get her through the weekend. She is taking aleve BID as well which is helping. Not having any SOB, does hurt to take deep breath or cough or sneeze. Review of Systems   Gen: no fatigue, fever, chills  Eyes: no excessive tearing, itching, or discharge  Nose: no rhinorrhea, no sinus pain  Mouth: no oral lesions, no sore throat  Resp: no shortness of breath, no wheezing, no cough  CV: no chest pain, no paroxysmal nocturnal dyspnea  Abd: no nausea, no heartburn, no diarrhea, no constipation, no abdominal pain          No data recorded     Physical Exam    [INSTRUCTIONS:  \"[x]\" Indicates a positive item  \"[]\" Indicates a negative item  -- DELETE ALL ITEMS NOT EXAMINED]    Constitutional: [x] Appears well-developed and well-nourished [x] No apparent distress      [] Abnormal -     Mental status: [x] Alert and awake  [x] Oriented to person/place/time [x] Able to follow commands    [] Abnormal -     Eyes:   EOM    [x]  Normal    [] Abnormal -   Sclera  [x]  Normal    [] Abnormal -          Discharge [x]  None visible   [] Abnormal -     HENT: [x] Normocephalic, atraumatic  [] Abnormal -   [x] Mouth/Throat: Mucous membranes are moist    External Ears [x] Normal  [] Abnormal -    Neck: [x] No visualized mass [] Abnormal -     Pulmonary/Chest: [x] Respiratory effort normal   [x] No visualized signs of difficulty breathing or respiratory distress        [] Abnormal -      Musculoskeletal:   [x] Normal gait with no signs of ataxia         [x] Normal range of motion of neck        [x] Abnormal - left arm in sling to protect her rib per patient.     Neurological:        [x] No Facial Asymmetry (Cranial nerve 7 motor function) (limited exam due to video visit)          [x] No gaze palsy        [] Abnormal -          Skin:        [x] No significant exanthematous lesions or discoloration noted on facial skin         [] Abnormal -            Psychiatric:       [x] Normal Affect [] Abnormal -        [x] No Hallucinations    Other pertinent observable physical exam findings:-        Aracely Valdez, was evaluated through a synchronous (real-time) audio-video encounter. The patient (or guardian if applicable) is aware that this is a billable service, which includes applicable co-pays. This Virtual Visit was conducted with patient's (and/or legal guardian's) consent. The visit was conducted pursuant to the emergency declaration under the [address] [place] [address], [address] [place] [address] authority and the [employer] and [employer] General Act. Patient identification was verified, and a caregiver was present when appropriate. The patient was located at: Home: [address] [address]  The provider was located at: Facility (Appt Department): [facility]  [address]       An electronic signature was used to authenticate this note.   -- Rubi Cortes NP

## 2023-03-23 ENCOUNTER — TELEPHONE (OUTPATIENT)
Dept: FAMILY MEDICINE CLINIC | Age: 63
End: 2023-03-23

## 2023-03-23 NOTE — TELEPHONE ENCOUNTER
Not appropriate to continue narcotic medication beyond one week. If pain is still significant will need repeat xray. Lidoderm patches, rotate tylenol and ibuprofen.   Could try muscle relaxer as well

## 2023-03-23 NOTE — TELEPHONE ENCOUNTER
Pt is calling to get pain medication. The rib pain has gotten worse. Pt is requesting something to help with the pain she is in.  Aleve is not touching it

## 2023-03-29 ENCOUNTER — OFFICE VISIT (OUTPATIENT)
Dept: FAMILY MEDICINE CLINIC | Age: 63
End: 2023-03-29
Payer: COMMERCIAL

## 2023-03-29 VITALS
RESPIRATION RATE: 16 BRPM | OXYGEN SATURATION: 94 % | HEIGHT: 64 IN | DIASTOLIC BLOOD PRESSURE: 82 MMHG | TEMPERATURE: 98.3 F | WEIGHT: 167.4 LBS | HEART RATE: 92 BPM | BODY MASS INDEX: 28.58 KG/M2 | SYSTOLIC BLOOD PRESSURE: 125 MMHG

## 2023-03-29 DIAGNOSIS — E55.9 VITAMIN D DEFICIENCY: ICD-10-CM

## 2023-03-29 DIAGNOSIS — F90.9 ADULT ADHD: Primary | ICD-10-CM

## 2023-03-29 DIAGNOSIS — R73.03 PRE-DIABETES: ICD-10-CM

## 2023-03-29 DIAGNOSIS — S22.32XD CLOSED FRACTURE OF ONE RIB OF LEFT SIDE WITH ROUTINE HEALING, SUBSEQUENT ENCOUNTER: ICD-10-CM

## 2023-03-29 DIAGNOSIS — I10 ESSENTIAL HYPERTENSION: ICD-10-CM

## 2023-03-29 DIAGNOSIS — E78.5 HYPERLIPIDEMIA, UNSPECIFIED HYPERLIPIDEMIA TYPE: ICD-10-CM

## 2023-03-29 PROBLEM — E11.9 TYPE 2 DIABETES MELLITUS (HCC): Status: ACTIVE | Noted: 2023-03-29

## 2023-03-29 PROCEDURE — 3079F DIAST BP 80-89 MM HG: CPT | Performed by: NURSE PRACTITIONER

## 2023-03-29 PROCEDURE — 99214 OFFICE O/P EST MOD 30 MIN: CPT | Performed by: NURSE PRACTITIONER

## 2023-03-29 PROCEDURE — 3074F SYST BP LT 130 MM HG: CPT | Performed by: NURSE PRACTITIONER

## 2023-03-29 RX ORDER — DEXTROAMPHETAMINE SACCHARATE, AMPHETAMINE ASPARTATE, DEXTROAMPHETAMINE SULFATE AND AMPHETAMINE SULFATE 5; 5; 5; 5 MG/1; MG/1; MG/1; MG/1
20 TABLET ORAL 2 TIMES DAILY
Qty: 60 TABLET | Refills: 0 | Status: SHIPPED | OUTPATIENT
Start: 2023-03-29

## 2023-03-29 NOTE — PROGRESS NOTES
Chief Complaint   Patient presents with    Follow-up     Medication re-check       1. Have you been to the ER, urgent care clinic since your last visit? Hospitalized since your last visit? [facility]      2. Have you seen or consulted any other health care providers outside of the [address] [place] [address] since your last visit? Include any pap smears or colon screening.  No    Health Maintenance Due   Topic Date Due    Pneumococcal 0-64 years (1 - PCV) Never done    Cervical cancer screen  Never done    Shingles Vaccine (1 of 2) Never done    COVID-19 Vaccine (4 - Booster for Pfizer series) 01/23/2022

## 2023-03-29 NOTE — PROGRESS NOTES
Subjective:     Chief Complaint   Patient presents with    Follow-up     Medication re-check          HPI:  66 y.o.  presents for follow up appointment. Left sided rib fracture couple weeks ago. Feeling better now. Hurts if she coughs or sneezes. ADHD  She is taking Adderall 20mg 1-2 times daily, most of the time just needs once per day. No CP, dizziness, HA, no palpitations. Feels ADHD symptoms controlled on current dose/medication      Anxiety/depression  Has done well on current medicines. Insurance may not cover pristiq but she will let me know. HTN/hyperlipidemia  Taking medications daily. No side effects per patient. She is not checking BP at home. No palpitations, dizziness, headache, SOB/FUENTES, vision changes, CP or swelling in the legs. States she tries to watch her salt intake. No hospital, ER or specialist visits since last primary care visit except as noted above. Past Medical History:   Diagnosis Date    ADHD (attention deficit hyperactivity disorder)     Adverse effect of anesthesia     patient states she was in the ICU s/p ankle surgery on a ventilator;1/ 2017    Depression     Hyperlipidemia     Hypertension     Ill-defined condition     high cholesterol    Ill-defined condition     genital herpes    Menopause     Psychiatric disorder     depression, ADHD       Social History     Tobacco Use    Smoking status: Every Day     Packs/day: 0.25     Types: Cigarettes    Smokeless tobacco: Never   Vaping Use    Vaping Use: Never used   Substance Use Topics    Alcohol use: Yes     Alcohol/week: 2.0 standard drinks     Types: 2 Shots of liquor per week     Comment: on occasion    Drug use: Yes     Types: Marijuana     Comment: last used 11/28/17       Outpatient Medications Marked as Taking for the 3/29/23 encounter (Office Visit) with Juanita Bennett NP   Medication Sig Dispense Refill    metFORMIN ER (GLUCOPHAGE XR) 750 mg tablet Take 1 Tablet by mouth daily (with dinner).  80 Tablet 3    dextroamphetamine-amphetamine (ADDERALL) 20 mg tablet Take 1 Tablet by mouth two (2) times a day. Max Daily Amount: 40 mg. 60 Tablet 0    zolpidem (AMBIEN) 5 mg tablet Take 1 Tablet by mouth nightly as needed for Sleep. Max Daily Amount: 5 mg. 7 Tablet 0    clobetasoL-emollient (OLUX-E) 0.05 % topical foam Apply  to affected area two (2) times a day. 100 g 0    desvenlafaxine ER 50 mg Tb24 ER tablet Take 1 Tablet by mouth daily. 90 Tablet 1    atorvastatin (LIPITOR) 20 mg tablet TAKE 1 TABLET BY MOUTH NIGHTLY 90 Tablet 3    losartan (COZAAR) 50 mg tablet TAKE 1 TABLET DAILY 90 Tablet 3    doxepin (SINEquan) 50 mg capsule Take 1 to 3 tablets by mouth everyday at bedtime as needed 270 Capsule 3    ASPIRIN (RIKY LOW STRENGTH PO) Take 81 mg by mouth daily. cholecalciferol (VITAMIN D3) 25 mcg (1,000 unit) cap Take  by mouth daily. Allergies   Allergen Reactions    Pcn [Penicillins] Swelling       Health Maintenance reviewed       ROS:  Gen: no fatigue, no fever, no chills, no unexplained weight loss or weight gain  Eyes: no excessive tearing, itching, or discharge  Nose: no rhinorrhea, no sinus pain  Mouth: no oral lesions, no sore throat, no difficulty swallowing  Resp: no shortness of breath, no wheezing, no cough  CV: no chest pain, no orthopnea, no paroxysmal nocturnal dyspnea, no lower extremity edema, no palpitations  Abd: no nausea, no heartburn, no diarrhea, no constipation, no abdominal pain  Neuro: no headaches, no syncope or presyncopal episodes  Endo: no polyuria, no polydipsia.     : no hematuria, no dysuria, no frequency, no incontinence  Heme: no lymphadenopathy, no easy bruising or bleeding, no night sweats      PE:  Visit Vitals  /82 (BP 1 Location: Right upper arm, BP Patient Position: Sitting, BP Cuff Size: Adult)   Pulse 92   Temp 98.3 °F (36.8 °C) (Temporal)   Resp 16   Ht 5' 4\" (1.626 m)   Wt 167 lb 6.4 oz (75.9 kg)   LMP  (LMP Unknown)   SpO2 94%   BMI 28.73 kg/m² Gen: alert, oriented, no acute distress  Head: normocephalic, atraumatic  Ears: external auditory canals clear, TMs without erythema or effusion  Eyes: pupils equal round reactive to light, sclera clear, conjunctiva clear  Oral: moist mucus membranes, no oral lesions, no pharyngeal inflammation or exudate  Neck: symmetric normal sized thyroid, no carotid bruits, no jugular vein distention  Resp: no increase work of breathing, lungs clear to ausculation bilaterally, no wheezing, rales or rhonchi  CV: S1, S2 normal.  No murmurs, rubs, or gallops. Abd: soft, not tender, not distended. No hepatosplenomegaly. Normal bowel sounds. No hernias. No abdominal or renal bruits. Neuro: cranial nerves intact, normal strength and movement in all extremities, and sensation intact and symmetric. Skin: no lesion or rash  Extremities: no cyanosis or edema    No results found for this visit on 03/29/23. Assessment/Plan:  Differential diagnosis and treatment options reviewed with patient who is in agreement with treatment plan as outlined below. ICD-10-CM ICD-9-CM    1. Adult ADHD  F90.9 314.01 COMPLIANCE DRUG SCREEN/PRESCRIPTION MONITORING      CBC WITH AUTOMATED DIFF      COMPLIANCE DRUG SCREEN/PRESCRIPTION MONITORING      CBC WITH AUTOMATED DIFF      dextroamphetamine-amphetamine (ADDERALL) 20 mg tablet      2. Closed fracture of one rib of left side with routine healing, subsequent encounter  S22.32XD V54.19       3. Pre-diabetes  R73.03 790.29 CBC WITH AUTOMATED DIFF      METABOLIC PANEL, COMPREHENSIVE      HEMOGLOBIN A1C WITH EAG      CBC WITH AUTOMATED DIFF      METABOLIC PANEL, COMPREHENSIVE      HEMOGLOBIN A1C WITH EAG      4. Hyperlipidemia, unspecified hyperlipidemia type  I31.6 833.1 METABOLIC PANEL, COMPREHENSIVE      LIPID PANEL      METABOLIC PANEL, COMPREHENSIVE      LIPID PANEL      5. Essential hypertension  I10 401.9       6.  Vitamin D deficiency  E55.9 268.9 VITAMIN D, 25 HYDROXY      VITAMIN D, 25 HYDROXY        BP at goal  Will return for fasting labs and UDS  I have reviewed the patient's controlled substance prescription history thru the Prescription Monitoring Program, so that the prescription(s) for a controlled substance can be given. Continue Adderall; reviewed benefits and side effects. Reinforced behavior and lifestyle modification, good sleep hygiene. Discussed BMI and healthy weight. Encouraged patient to work to implement changes including diet high in raw fruits and vegetables, lean protein and good fats. Limit refined, processed carbohydrates and sugar. Encouraged regular exercise. Recommended regular cardiovascular exercise 3-6 times per week for 30-60 minutes daily. I have discussed the diagnosis with the patient and the intended plan as seen in the above orders. The patient has received an after-visit summary and questions were answered concerning future plans. I have discussed medication side effects and warnings with the patient as well. The patient verbalizes understanding and agreement with the plan.

## 2023-04-18 LAB
25(OH)D3+25(OH)D2 SERPL-MCNC: 61.6 NG/ML (ref 30–100)
ALBUMIN SERPL-MCNC: 4.6 G/DL (ref 3.8–4.8)
ALBUMIN/GLOB SERPL: 1.8 {RATIO} (ref 1.2–2.2)
ALP SERPL-CCNC: 102 IU/L (ref 44–121)
ALT SERPL-CCNC: 16 IU/L (ref 0–32)
AST SERPL-CCNC: 16 IU/L (ref 0–40)
BASOPHILS # BLD AUTO: 0.1 X10E3/UL (ref 0–0.2)
BASOPHILS NFR BLD AUTO: 1 %
BILIRUB SERPL-MCNC: <0.2 MG/DL (ref 0–1.2)
BUN SERPL-MCNC: 12 MG/DL (ref 8–27)
BUN/CREAT SERPL: 13 (ref 12–28)
CALCIUM SERPL-MCNC: 9.8 MG/DL (ref 8.7–10.3)
CHLORIDE SERPL-SCNC: 100 MMOL/L (ref 96–106)
CHOLEST SERPL-MCNC: 160 MG/DL (ref 100–199)
CO2 SERPL-SCNC: 15 MMOL/L (ref 20–29)
CREAT SERPL-MCNC: 0.9 MG/DL (ref 0.57–1)
EGFRCR SERPLBLD CKD-EPI 2021: 73 ML/MIN/1.73
EOSINOPHIL # BLD AUTO: 0.2 X10E3/UL (ref 0–0.4)
EOSINOPHIL NFR BLD AUTO: 3 %
ERYTHROCYTE [DISTWIDTH] IN BLOOD BY AUTOMATED COUNT: 13.1 % (ref 11.7–15.4)
EST. AVERAGE GLUCOSE BLD GHB EST-MCNC: 128 MG/DL
GLOBULIN SER CALC-MCNC: 2.5 G/DL (ref 1.5–4.5)
GLUCOSE SERPL-MCNC: 168 MG/DL (ref 70–99)
HBA1C MFR BLD: 6.1 % (ref 4.8–5.6)
HCT VFR BLD AUTO: 45.6 % (ref 34–46.6)
HDLC SERPL-MCNC: 42 MG/DL
HGB BLD-MCNC: 15.2 G/DL (ref 11.1–15.9)
IMM GRANULOCYTES # BLD AUTO: 0.1 X10E3/UL (ref 0–0.1)
IMM GRANULOCYTES NFR BLD AUTO: 1 %
IMP & REVIEW OF LAB RESULTS: NORMAL
LDLC SERPL CALC-MCNC: 85 MG/DL (ref 0–99)
LYMPHOCYTES # BLD AUTO: 2.5 X10E3/UL (ref 0.7–3.1)
LYMPHOCYTES NFR BLD AUTO: 27 %
MCH RBC QN AUTO: 29.5 PG (ref 26.6–33)
MCHC RBC AUTO-ENTMCNC: 33.3 G/DL (ref 31.5–35.7)
MCV RBC AUTO: 88 FL (ref 79–97)
MONOCYTES # BLD AUTO: 0.7 X10E3/UL (ref 0.1–0.9)
MONOCYTES NFR BLD AUTO: 8 %
NEUTROPHILS # BLD AUTO: 5.9 X10E3/UL (ref 1.4–7)
NEUTROPHILS NFR BLD AUTO: 60 %
PLATELET # BLD AUTO: 268 X10E3/UL (ref 150–450)
POTASSIUM SERPL-SCNC: 4.1 MMOL/L (ref 3.5–5.2)
PROT SERPL-MCNC: 7.1 G/DL (ref 6–8.5)
RBC # BLD AUTO: 5.16 X10E6/UL (ref 3.77–5.28)
SODIUM SERPL-SCNC: 142 MMOL/L (ref 134–144)
TRIGL SERPL-MCNC: 194 MG/DL (ref 0–149)
VLDLC SERPL CALC-MCNC: 33 MG/DL (ref 5–40)
WBC # BLD AUTO: 9.5 X10E3/UL (ref 3.4–10.8)

## 2023-04-19 LAB
DRUGS UR: NORMAL
SPECIMEN STATUS REPORT, ROLRST: NORMAL

## 2023-04-26 DIAGNOSIS — F90.9 ADULT ADHD: ICD-10-CM

## 2023-04-26 RX ORDER — DEXTROAMPHETAMINE SACCHARATE, AMPHETAMINE ASPARTATE, DEXTROAMPHETAMINE SULFATE AND AMPHETAMINE SULFATE 5; 5; 5; 5 MG/1; MG/1; MG/1; MG/1
20 TABLET ORAL 2 TIMES DAILY
Qty: 60 TABLET | Refills: 0 | Status: SHIPPED | OUTPATIENT
Start: 2023-04-26

## 2023-04-26 NOTE — TELEPHONE ENCOUNTER
Refill Request: Pt calling    -Pt requesting That a refill sent to [facility] the second time   -Please call pt when RX has been sent     Requested Prescriptions     Pending Prescriptions Disp Refills    dextroamphetamine-amphetamine (ADDERALL) 20 mg tablet 60 Tablet 0     Sig: Take 1 Tablet by mouth two (2) times a day. Max Daily Amount: 40 mg.        Thank You

## 2023-05-11 RX ORDER — CLOBETASOL PROPIONATE 0.5 MG/G
AEROSOL, FOAM TOPICAL AS NEEDED
COMMUNITY

## 2023-05-17 ENCOUNTER — OFFICE VISIT (OUTPATIENT)
Age: 63
End: 2023-05-17
Payer: COMMERCIAL

## 2023-05-17 VITALS
DIASTOLIC BLOOD PRESSURE: 85 MMHG | OXYGEN SATURATION: 97 % | HEIGHT: 64 IN | TEMPERATURE: 98.5 F | RESPIRATION RATE: 16 BRPM | BODY MASS INDEX: 28.65 KG/M2 | HEART RATE: 88 BPM | WEIGHT: 167.8 LBS | SYSTOLIC BLOOD PRESSURE: 113 MMHG

## 2023-05-17 DIAGNOSIS — R94.31 ABNORMAL EKG: ICD-10-CM

## 2023-05-17 DIAGNOSIS — R42 DIZZINESS: Primary | ICD-10-CM

## 2023-05-17 DIAGNOSIS — F17.200 SMOKER: ICD-10-CM

## 2023-05-17 DIAGNOSIS — E11.9 TYPE 2 DIABETES MELLITUS WITHOUT COMPLICATION, WITHOUT LONG-TERM CURRENT USE OF INSULIN (HCC): ICD-10-CM

## 2023-05-17 PROCEDURE — 99214 OFFICE O/P EST MOD 30 MIN: CPT | Performed by: NURSE PRACTITIONER

## 2023-05-17 PROCEDURE — 3079F DIAST BP 80-89 MM HG: CPT | Performed by: NURSE PRACTITIONER

## 2023-05-17 PROCEDURE — 3074F SYST BP LT 130 MM HG: CPT | Performed by: NURSE PRACTITIONER

## 2023-05-17 PROCEDURE — 3044F HG A1C LEVEL LT 7.0%: CPT | Performed by: NURSE PRACTITIONER

## 2023-05-17 PROCEDURE — 93000 ELECTROCARDIOGRAM COMPLETE: CPT | Performed by: NURSE PRACTITIONER

## 2023-05-17 RX ORDER — METFORMIN HYDROCHLORIDE 750 MG/1
750 TABLET, EXTENDED RELEASE ORAL 2 TIMES DAILY WITH MEALS
Qty: 180 TABLET | Refills: 3 | Status: SHIPPED | OUTPATIENT
Start: 2023-05-17

## 2023-05-17 RX ORDER — TROPICAMIDE 10 MG/ML
1 SOLUTION/ DROPS OPHTHALMIC ONCE
COMMUNITY

## 2023-05-17 RX ORDER — METFORMIN HYDROCHLORIDE 750 MG/1
750 TABLET, EXTENDED RELEASE ORAL 2 TIMES DAILY WITH MEALS
Qty: 180 TABLET | Refills: 3 | Status: SHIPPED | OUTPATIENT
Start: 2023-05-17 | End: 2023-05-17 | Stop reason: SDUPTHER

## 2023-05-17 RX ORDER — CYCLOPENTOLATE HYDROCHLORIDE 20 MG/ML
2 SOLUTION/ DROPS OPHTHALMIC ONCE
COMMUNITY

## 2023-05-17 RX ORDER — PHENYLEPHRINE HYDROCHLORIDE 100 MG/ML
1 SOLUTION/ DROPS OPHTHALMIC ONCE
COMMUNITY

## 2023-05-17 ASSESSMENT — PATIENT HEALTH QUESTIONNAIRE - PHQ9
1. LITTLE INTEREST OR PLEASURE IN DOING THINGS: 0
SUM OF ALL RESPONSES TO PHQ QUESTIONS 1-9: 0
SUM OF ALL RESPONSES TO PHQ QUESTIONS 1-9: 0
2. FEELING DOWN, DEPRESSED OR HOPELESS: 0
SUM OF ALL RESPONSES TO PHQ9 QUESTIONS 1 & 2: 0
SUM OF ALL RESPONSES TO PHQ QUESTIONS 1-9: 0
SUM OF ALL RESPONSES TO PHQ QUESTIONS 1-9: 0

## 2023-05-17 NOTE — PROGRESS NOTES
Chief Complaint   Patient presents with    Dizziness         Health Maintenance Due   Topic Date Due    Pneumococcal 0-64 years Vaccine (1 - PCV) Never done    Diabetic foot exam  Never done    HIV screen  Never done    Diabetic Alb to Cr ratio (uACR) test  Never done    Diabetic retinal exam  Never done    Cervical cancer screen  Never done    Shingles vaccine (1 of 2) Never done    DTaP/Tdap/Td vaccine (2 - Td or Tdap) 07/01/2020    COVID-19 Vaccine (4 - Booster for Pfizer series) 01/23/2022           1. \"Have you been to the ER, urgent care clinic since your last visit? Hospitalized since your last visit? \" No    2. \"Have you seen or consulted any other health care providers outside of the [address] [place] [address] since your last visit? \" No     3. For patients aged 39-70: Has the patient had a colonoscopy / FIT/ Cologuard? Yes - Care Gap present. Most recent result on file      If the patient is female:    4. For patients aged 41-77: Has the patient had a mammogram within the past 2 years? Yes - Care Gap present. Most recent result on file      5. For patients aged 21-65: Has the patient had a pap smear?  No
diagnosis and treatment options reviewed with patient who is in agreement with treatment plan as outlined below. ICD-10-CM    1. Dizziness  R42 EKG 12 Lead     GAVI Herrera MD, CardiologyJorge      2. Type 2 diabetes mellitus without complication, without long-term current use of insulin (HCC)  E11.9 metFORMIN (GLUCOPHAGE-XR) 750 MG extended release tablet     DISCONTINUED: metFORMIN (GLUCOPHAGE-XR) 750 MG extended release tablet      3. Abnormal EKG  R94.31 GAVI Herrera MD, Cardiology, [place]      4. Smoker  I02.162 GAVI Herrera MD, Cardiology, [place]          Vertigo vs elevated BS vs orthostatic hypotension vs lack of hydration. Asked her to monitor when her episodes occur, did she eat or drink enough water? Check her BP and BS. Consider ENT referral again if symptoms persist.    Refer to cardiology, no acute changes seen but has some non specific changes and given her smoking history and her family history along with some changes on EKG, would recommend seeing cardiology and having stress test.  BS are elevated, increase metformin to BID. Increase water intake. Follow up in 2 months for repeat labs. Discussed BMI and healthy weight. Encouraged patient to work to implement changes including diet high in raw fruits and vegetables, lean protein and good fats. Limit refined, processed carbohydrates and sugar. Encouraged regular exercise. I have discussed the diagnosis with the patient and the intended plan as seen in the above orders. The patient has received an after-visit summary and questions were answered concerning future plans. I have discussed medication side effects and warnings with the patient as well. The patient verbalizes understanding and agreement with the plan.

## 2023-05-19 ENCOUNTER — HOSPITAL ENCOUNTER (OUTPATIENT)
Facility: HOSPITAL | Age: 63
Discharge: HOME | End: 2023-05-19
Attending: SPECIALIST | Admitting: SPECIALIST
Payer: COMMERCIAL

## 2023-05-19 ENCOUNTER — ANESTHESIA (OUTPATIENT)
Facility: HOSPITAL | Age: 63
End: 2023-05-19
Payer: COMMERCIAL

## 2023-05-19 ENCOUNTER — ANESTHESIA EVENT (OUTPATIENT)
Facility: HOSPITAL | Age: 63
End: 2023-05-19
Payer: COMMERCIAL

## 2023-05-19 VITALS
TEMPERATURE: 97.5 F | RESPIRATION RATE: 13 BRPM | DIASTOLIC BLOOD PRESSURE: 86 MMHG | SYSTOLIC BLOOD PRESSURE: 116 MMHG | HEIGHT: 63 IN | HEART RATE: 86 BPM | BODY MASS INDEX: 29.38 KG/M2 | WEIGHT: 165.8 LBS | OXYGEN SATURATION: 95 %

## 2023-05-19 LAB
GLUCOSE BLD STRIP.AUTO-MCNC: 159 MG/DL (ref 65–117)
SERVICE CMNT-IMP: ABNORMAL

## 2023-05-19 PROCEDURE — 3600000013 HC SURGERY LEVEL 3 ADDTL 15MIN: Performed by: SPECIALIST

## 2023-05-19 PROCEDURE — 6370000000 HC RX 637 (ALT 250 FOR IP): Performed by: SPECIALIST

## 2023-05-19 PROCEDURE — 2500000003 HC RX 250 WO HCPCS: Performed by: SPECIALIST

## 2023-05-19 PROCEDURE — 3700000000 HC ANESTHESIA ATTENDED CARE: Performed by: SPECIALIST

## 2023-05-19 PROCEDURE — 2580000003 HC RX 258: Performed by: REGISTERED NURSE

## 2023-05-19 PROCEDURE — 7100000010 HC PHASE II RECOVERY - FIRST 15 MIN: Performed by: SPECIALIST

## 2023-05-19 PROCEDURE — V2632 POST CHMBR INTRAOCULAR LENS: HCPCS | Performed by: SPECIALIST

## 2023-05-19 PROCEDURE — 7100000000 HC PACU RECOVERY - FIRST 15 MIN: Performed by: SPECIALIST

## 2023-05-19 PROCEDURE — 3700000001 HC ADD 15 MINUTES (ANESTHESIA): Performed by: SPECIALIST

## 2023-05-19 PROCEDURE — 6360000002 HC RX W HCPCS: Performed by: REGISTERED NURSE

## 2023-05-19 PROCEDURE — 2709999900 HC NON-CHARGEABLE SUPPLY: Performed by: SPECIALIST

## 2023-05-19 PROCEDURE — 7100000011 HC PHASE II RECOVERY - ADDTL 15 MIN: Performed by: SPECIALIST

## 2023-05-19 PROCEDURE — 82962 GLUCOSE BLOOD TEST: CPT

## 2023-05-19 PROCEDURE — 6360000002 HC RX W HCPCS: Performed by: SPECIALIST

## 2023-05-19 PROCEDURE — 3600000003 HC SURGERY LEVEL 3 BASE: Performed by: SPECIALIST

## 2023-05-19 DEVICE — ACRYSOF(R) IQ ASPHERIC NATURAL IOL, SINGLE-PIECE ACRYLIC FOLDABLE PCL, UV WITH BLUE LIGHTFILTER, 13.0MM LENGTH, 6.0MM ANTERIORASYMMETRIC BICONVEX OPTIC, PLANAR HAPTICS.
Type: IMPLANTABLE DEVICE | Site: EYE | Status: FUNCTIONAL
Brand: ACRYSOF®

## 2023-05-19 RX ORDER — DIPHENHYDRAMINE HYDROCHLORIDE 50 MG/ML
12.5 INJECTION INTRAMUSCULAR; INTRAVENOUS
Status: DISCONTINUED | OUTPATIENT
Start: 2023-05-19 | End: 2023-05-19 | Stop reason: HOSPADM

## 2023-05-19 RX ORDER — PHENYLEPHRINE HYDROCHLORIDE 100 MG/ML
1 SOLUTION/ DROPS OPHTHALMIC SEE ADMIN INSTRUCTIONS
Status: COMPLETED | OUTPATIENT
Start: 2023-05-19 | End: 2023-05-19

## 2023-05-19 RX ORDER — SODIUM CHLORIDE 0.9 % (FLUSH) 0.9 %
5-40 SYRINGE (ML) INJECTION PRN
Status: DISCONTINUED | OUTPATIENT
Start: 2023-05-19 | End: 2023-05-19 | Stop reason: HOSPADM

## 2023-05-19 RX ORDER — DICLOFENAC SODIUM 1 MG/ML
1 SOLUTION/ DROPS OPHTHALMIC ONCE
Status: COMPLETED | OUTPATIENT
Start: 2023-05-19 | End: 2023-05-19

## 2023-05-19 RX ORDER — KETOROLAC TROMETHAMINE 30 MG/ML
INJECTION, SOLUTION INTRAMUSCULAR; INTRAVENOUS PRN
Status: DISCONTINUED | OUTPATIENT
Start: 2023-05-19 | End: 2023-05-19 | Stop reason: SDUPTHER

## 2023-05-19 RX ORDER — CYCLOPENTOLATE HYDROCHLORIDE 20 MG/ML
1 SOLUTION/ DROPS OPHTHALMIC SEE ADMIN INSTRUCTIONS
Status: COMPLETED | OUTPATIENT
Start: 2023-05-19 | End: 2023-05-19

## 2023-05-19 RX ORDER — BALANCED SALT SOLUTION 6.4; .75; .48; .3; 3.9; 1.7 MG/ML; MG/ML; MG/ML; MG/ML; MG/ML; MG/ML
SOLUTION OPHTHALMIC
Status: DISCONTINUED
Start: 2023-05-19 | End: 2023-05-19 | Stop reason: HOSPADM

## 2023-05-19 RX ORDER — ONDANSETRON 2 MG/ML
INJECTION INTRAMUSCULAR; INTRAVENOUS PRN
Status: DISCONTINUED | OUTPATIENT
Start: 2023-05-19 | End: 2023-05-19 | Stop reason: SDUPTHER

## 2023-05-19 RX ORDER — LIDOCAINE HYDROCHLORIDE 10 MG/ML
1 INJECTION, SOLUTION EPIDURAL; INFILTRATION; INTRACAUDAL; PERINEURAL
Status: DISCONTINUED | OUTPATIENT
Start: 2023-05-19 | End: 2023-05-19 | Stop reason: HOSPADM

## 2023-05-19 RX ORDER — LIDOCAINE HYDROCHLORIDE 40 MG/ML
INJECTION, SOLUTION RETROBULBAR; TOPICAL PRN
Status: DISCONTINUED | OUTPATIENT
Start: 2023-05-19 | End: 2023-05-19 | Stop reason: ALTCHOICE

## 2023-05-19 RX ORDER — FENTANYL CITRATE 50 UG/ML
25 INJECTION, SOLUTION INTRAMUSCULAR; INTRAVENOUS EVERY 5 MIN PRN
Status: DISCONTINUED | OUTPATIENT
Start: 2023-05-19 | End: 2023-05-19 | Stop reason: HOSPADM

## 2023-05-19 RX ORDER — SODIUM CHLORIDE 9 MG/ML
INJECTION, SOLUTION INTRAVENOUS PRN
Status: DISCONTINUED | OUTPATIENT
Start: 2023-05-19 | End: 2023-05-19 | Stop reason: HOSPADM

## 2023-05-19 RX ORDER — MEPERIDINE HYDROCHLORIDE 25 MG/ML
12.5 INJECTION INTRAMUSCULAR; INTRAVENOUS; SUBCUTANEOUS EVERY 5 MIN PRN
Status: DISCONTINUED | OUTPATIENT
Start: 2023-05-19 | End: 2023-05-19 | Stop reason: HOSPADM

## 2023-05-19 RX ORDER — TETRACAINE HYDROCHLORIDE 5 MG/ML
1 SOLUTION OPHTHALMIC ONCE
Status: COMPLETED | OUTPATIENT
Start: 2023-05-19 | End: 2023-05-19

## 2023-05-19 RX ORDER — SODIUM CHLORIDE 9 MG/ML
INJECTION, SOLUTION INTRAVENOUS CONTINUOUS PRN
Status: DISCONTINUED | OUTPATIENT
Start: 2023-05-19 | End: 2023-05-19 | Stop reason: SDUPTHER

## 2023-05-19 RX ORDER — SODIUM CHLORIDE, SODIUM LACTATE, POTASSIUM CHLORIDE, CALCIUM CHLORIDE 600; 310; 30; 20 MG/100ML; MG/100ML; MG/100ML; MG/100ML
INJECTION, SOLUTION INTRAVENOUS CONTINUOUS
Status: DISCONTINUED | OUTPATIENT
Start: 2023-05-19 | End: 2023-05-19 | Stop reason: HOSPADM

## 2023-05-19 RX ORDER — MIDAZOLAM HYDROCHLORIDE 1 MG/ML
INJECTION INTRAMUSCULAR; INTRAVENOUS PRN
Status: DISCONTINUED | OUTPATIENT
Start: 2023-05-19 | End: 2023-05-19 | Stop reason: SDUPTHER

## 2023-05-19 RX ORDER — NEOMYCIN SULFATE, POLYMYXIN B SULFATE AND DEXAMETHASONE 3.5; 10000; 1 MG/ML; [USP'U]/ML; MG/ML
1 SUSPENSION/ DROPS OPHTHALMIC ONCE
Status: COMPLETED | OUTPATIENT
Start: 2023-05-19 | End: 2023-05-19

## 2023-05-19 RX ORDER — TROPICAMIDE 10 MG/ML
1 SOLUTION/ DROPS OPHTHALMIC SEE ADMIN INSTRUCTIONS
Status: COMPLETED | OUTPATIENT
Start: 2023-05-19 | End: 2023-05-19

## 2023-05-19 RX ORDER — FENTANYL CITRATE 50 UG/ML
INJECTION, SOLUTION INTRAMUSCULAR; INTRAVENOUS PRN
Status: DISCONTINUED | OUTPATIENT
Start: 2023-05-19 | End: 2023-05-19 | Stop reason: SDUPTHER

## 2023-05-19 RX ORDER — PROPARACAINE HYDROCHLORIDE 5 MG/ML
1 SOLUTION/ DROPS OPHTHALMIC SEE ADMIN INSTRUCTIONS
Status: COMPLETED | OUTPATIENT
Start: 2023-05-19 | End: 2023-05-19

## 2023-05-19 RX ORDER — DROPERIDOL 2.5 MG/ML
0.62 INJECTION, SOLUTION INTRAMUSCULAR; INTRAVENOUS
Status: DISCONTINUED | OUTPATIENT
Start: 2023-05-19 | End: 2023-05-19 | Stop reason: HOSPADM

## 2023-05-19 RX ORDER — PILOCARPINE HYDROCHLORIDE 20 MG/ML
1 SOLUTION/ DROPS OPHTHALMIC ONCE
Status: DISCONTINUED | OUTPATIENT
Start: 2023-05-19 | End: 2023-05-19 | Stop reason: HOSPADM

## 2023-05-19 RX ADMIN — DICLOFENAC SODIUM 1 DROP: 1 SOLUTION/ DROPS OPHTHALMIC at 07:37

## 2023-05-19 RX ADMIN — CYCLOPENTOLATE HYDROCHLORIDE 1 DROP: 20 SOLUTION/ DROPS OPHTHALMIC at 07:53

## 2023-05-19 RX ADMIN — CYCLOPENTOLATE HYDROCHLORIDE 1 DROP: 20 SOLUTION/ DROPS OPHTHALMIC at 07:37

## 2023-05-19 RX ADMIN — CYCLOPENTOLATE HYDROCHLORIDE 1 DROP: 20 SOLUTION/ DROPS OPHTHALMIC at 07:42

## 2023-05-19 RX ADMIN — PHENYLEPHRINE HYDROCHLORIDE 1 DROP: 100 SOLUTION/ DROPS OPHTHALMIC at 07:42

## 2023-05-19 RX ADMIN — PHENYLEPHRINE HYDROCHLORIDE 1 DROP: 100 SOLUTION/ DROPS OPHTHALMIC at 07:53

## 2023-05-19 RX ADMIN — PROPARACAINE HYDROCHLORIDE 1 DROP: 5 SOLUTION/ DROPS OPHTHALMIC at 07:53

## 2023-05-19 RX ADMIN — PROPARACAINE HYDROCHLORIDE 1 DROP: 5 SOLUTION/ DROPS OPHTHALMIC at 07:36

## 2023-05-19 RX ADMIN — FENTANYL CITRATE 50 MCG: 50 INJECTION, SOLUTION INTRAMUSCULAR; INTRAVENOUS at 08:55

## 2023-05-19 RX ADMIN — PHENYLEPHRINE HYDROCHLORIDE 1 DROP: 100 SOLUTION/ DROPS OPHTHALMIC at 07:36

## 2023-05-19 RX ADMIN — ONDANSETRON HYDROCHLORIDE 4 MG: 2 INJECTION, SOLUTION INTRAMUSCULAR; INTRAVENOUS at 09:04

## 2023-05-19 RX ADMIN — SODIUM CHLORIDE: 9 INJECTION, SOLUTION INTRAVENOUS at 08:42

## 2023-05-19 RX ADMIN — MIDAZOLAM HYDROCHLORIDE 1 MG: 1 INJECTION, SOLUTION INTRAMUSCULAR; INTRAVENOUS at 08:57

## 2023-05-19 RX ADMIN — CYCLOPENTOLATE HYDROCHLORIDE 1 DROP: 20 SOLUTION/ DROPS OPHTHALMIC at 08:37

## 2023-05-19 RX ADMIN — TROPICAMIDE 1 DROP: 10 SOLUTION/ DROPS OPHTHALMIC at 08:37

## 2023-05-19 RX ADMIN — PHENYLEPHRINE HYDROCHLORIDE 1 DROP: 100 SOLUTION/ DROPS OPHTHALMIC at 08:37

## 2023-05-19 RX ADMIN — TROPICAMIDE 1 DROP: 10 SOLUTION/ DROPS OPHTHALMIC at 07:53

## 2023-05-19 RX ADMIN — TROPICAMIDE 1 DROP: 10 SOLUTION/ DROPS OPHTHALMIC at 07:36

## 2023-05-19 RX ADMIN — TROPICAMIDE 1 DROP: 10 SOLUTION/ DROPS OPHTHALMIC at 07:42

## 2023-05-19 RX ADMIN — MIDAZOLAM HYDROCHLORIDE 2 MG: 1 INJECTION, SOLUTION INTRAMUSCULAR; INTRAVENOUS at 08:42

## 2023-05-19 RX ADMIN — MIDAZOLAM HYDROCHLORIDE 1 MG: 1 INJECTION, SOLUTION INTRAMUSCULAR; INTRAVENOUS at 09:01

## 2023-05-19 RX ADMIN — KETOROLAC TROMETHAMINE 30 MG: 30 INJECTION, SOLUTION INTRAMUSCULAR; INTRAVENOUS at 09:04

## 2023-05-19 ASSESSMENT — LIFESTYLE VARIABLES: SMOKING_STATUS: 1

## 2023-05-19 ASSESSMENT — PAIN - FUNCTIONAL ASSESSMENT
PAIN_FUNCTIONAL_ASSESSMENT: NONE - DENIES PAIN
PAIN_FUNCTIONAL_ASSESSMENT: NONE - DENIES PAIN

## 2023-05-19 NOTE — PERIOP NOTE
Patient received to PACU, VSS. Patient awake and alert with no complaints of pain. Dressing to left eye intact. 4471: Patient tolerating liquids. 5839: Discharge instructions given. Patient and  verbalize understanding of instructions and follow up appointment. 1011: Dr. Keila Merino at bedside. Patient states ready for discharge, IV removed. Patient discharged by wheelchair with all belongings.  to provide transportation home.

## 2023-05-19 NOTE — ANESTHESIA PRE PROCEDURE
Department of Anesthesiology  Preprocedure Note       Name:  Lynn Huerta   Age:  66 y.o.  :  1960                                          MRN:  [id number]         Date:  2023      Surgeon: Hank Sawant):  Petty Hatch MD    Procedure: Procedure(s):  LEFT EYE CATARACT EXTRACTION W/INTRAOCULAR LENS IMPLANT    Medications prior to admission:   Prior to Admission medications    Medication Sig Start Date End Date Taking? Authorizing Provider   clobetasol (OLUX) 0.05 % foam Apply topically as needed Apply topically 2 times daily. Yes Historical Provider, MD   Aspirin 81 MG CAPS Take 81 mg by mouth    Historical Provider, MD   tropicamide (MYDRIACYL) 1 % ophthalmic solution Place 1 drop into the left eye once    Historical Provider, MD   cyclopentolate (CYCLOGYL) 2 % ophthalmic solution Place 2 drops into the left eye once    Historical Provider, MD   phenylephrine (JOE-SYNEPHRINE) 10 % ophthalmic solution Place 1 drop into the left eye once    Historical Provider, MD   metFORMIN (GLUCOPHAGE-XR) 750 MG extended release tablet Take 1 tablet by mouth with breakfast and with evening meal 23   Marcela De Paz, APRN - NP   amphetamine-dextroamphetamine (ADDERALL) 20 MG tablet Take 1 tablet by mouth 2 times daily.  3/13/23   Ar Automatic Reconciliation   atorvastatin (LIPITOR) 20 MG tablet Take 1 tablet by mouth nightly 22   Ar Automatic Reconciliation   vitamin D 25 MCG (1000 UT) CAPS Take by mouth daily    Ar Automatic Reconciliation   desvenlafaxine succinate (PRISTIQ) 50 MG TB24 extended release tablet Take 1 tablet by mouth daily 10/21/22   Ar Automatic Reconciliation   doxepin (SINEQUAN) 50 MG capsule Take 1 to 3 tablets by mouth everyday at bedtime as needed 22   Ar Automatic Reconciliation   losartan (COZAAR) 50 MG tablet Take 1 tablet by mouth daily 22   Ar Automatic Reconciliation       Current medications:    Current Facility-Administered Medications   Medication Dose Route

## 2023-05-19 NOTE — DISCHARGE INSTRUCTIONS
Discharge Instructions Post Cataract Surgery  [facility]/ Dr. Laxmi Sinha  [facility], [address] , [address]  Phone: [phone]  Fax: [phone]  Call for Emergency ONLY: [phone]    1. Limited activity until seen by Dr. Long Arellano tomorrow. 2.   Be careful walking, especially on steps or curbs, the eye patch may effect your depth perception. 3.   Keep the [facility] in place until seen by Dr. Long Arellano tomorrow. 4.   When sleeping please sleep on right side. 5.   NO shower or bath until seen by Dr. Long Arellano. 6.   You will receive further instructions at your post-op appointment tomorrow   regarding removal of eye shield & beginning post-op eye drops as well as returning to normal activities and showering. 7.   Please have someone drive you to your postop appointment. 8.   Please take Tylenol, Ibuprofen, or Aleve for discomfort. 9.   Call Dr. Quinton Duarte office for severe pain unrelieved by the Tylenol, Ibuprofen, or Aleve or nausea, or call his cell phone at [phone] if after hours. 10. If you received a dose of Toradol (NSAID) during surgery, then you may take Ibuprofen, Advil, Motrin or Aleve at 3:00pm.    DO NOT TAKE SLEEPING MEDICATIONS OR ANTIANXIETY MEDICATIONS WHILE TAKING NARCOTIC PAIN MEDICATIONS,  ESPECIALLY THE NIGHT OF ANESTHESIA. CPAP PATIENTS BE SURE TO WEAR MACHINE WHENEVER NAPPING OR SLEEPING. DISCHARGE SUMMARY from Nurse    The following personal items collected during your admission are returned to you:   Dental Appliance:    Vision:    Hearing Aid:    Jewelry:    Clothing:    Other Valuables:    Valuables sent to safe:      PATIENT INSTRUCTIONS:    After general anesthesia or intravenous sedation, for 24 hours or while taking prescription Narcotics:  Someone should be with you for the next 24 hours. For your own safety, a responsible adult must drive you home. Limit your activities  Recommended activity: rest today, up with assistance today.  Do not climb stairs or

## 2023-05-19 NOTE — OP NOTE
[address]  [phone]      REPORT OF OPERATION    Patient: Cony Flowers    Date: 5/19/2023      PREOPERATIVE DIAGNOSIS:    HYPERMATURE, DENSE CATARACT, LEFT EYE. POSTOPERATIVE DIAGNOSIS:   SAME. OPERATION:  COMPLICATED PHACOEMULSIFICATION OF CATARACT THROUGH A CLEAR CORNEAL INCISION USING A TEMPORAL APPROACH AND IMPLANTATION OF DIONICIO NATURAL LENS, WITH USE OF TRYPAN BLUE, left eye. SURGEON:  Evelyn Parsons M.D. ANESTHESIA:  TOPICAL/IV SEDATION       FINDINGS AND PROCEDURE:  The patient was taken to the Operating Room and correctly identified, whereupon intravenous lines and cardiac monitor were established. In the Operative Area, the patient was given a series of topical anesthetic drops consisting of Marcaine 0.75%, Tetracaine, and Lidocaine 1%, preservative free to the operative eye. The eye was prepared and draped in the usual sterile ophthalmic fashion. A wire lid speculum was inserted between the conjunctival fornices of the eye. Using the microscope, a temporal approach bed was developed. Using an angled Pathfinder clear corneal blade measuring 2.5 millimeters, a shelved corneal incision was made at the level of the corneal margin slightly anterior to the conjunctival peripherovascular arcade. This was followed by a paracentesis at the 5 o'clock position with intracameral Lidocaine 1% administered within the anterior chamber. The patient was noted to have a dense, hypermature lens. Therefore, the approach for surgical treatment of this complicated cataract was initiated. Using a 30-gauge cannula, air was injected into the anterior chamber to protect the corneal endothelium. Following this, Trypan blue was infused within the anterior chamber to visualize the anterior capsule to perform the capsulorrhexis. After the appropriate amount of time, the Trypan blue was now irrigated out with BSS.   Now,

## 2023-05-19 NOTE — PERIOP NOTE
Darrel Chris  1960  [id number]    Situation:  Verbal report given from: Oscar Milner RN and Hood Cisse CRNA  Procedure: Procedure(s):  LEFT EYE CATARACT EXTRACTION W/INTRAOCULAR LENS IMPLANT    Background:    Preoperative diagnosis: Age-related nuclear cataract, left [H25.12]    Postoperative diagnosis: * No post-op diagnosis entered *    :  Dr. Erick Sage    Assistant(s): Circulator: Rhiannon Rawls RN  Scrub Person First: Mini Moore    Specimens: * No specimens in log *    Assessment:  Intra-procedure medications         Anesthesia gave intra-procedure sedation and medications, see anesthesia flow sheet     Intravenous fluids: LR@ KVO     Vital signs stable       Recommendation:    Permission to share finding with

## 2023-05-19 NOTE — PERIOP NOTE
Permission received to review discharge instructions and discuss private health information with  and will have someone with them after discharge

## 2023-05-19 NOTE — ANESTHESIA POSTPROCEDURE EVALUATION
Department of Anesthesiology  Postprocedure Note    Patient: Bernardino Telles  MRN: [id number]  YOB: 1960  Date of evaluation: 5/19/2023      Procedure Summary     Date: 05/19/23 Room / Location: [facility] ASU B2 / [facility] AMBULATORY OR    Anesthesia Start: 0842 Anesthesia Stop: 3313    Procedure: LEFT EYE CATARACT EXTRACTION W/INTRAOCULAR LENS IMPLANT (Left: Eye) Diagnosis:       Age-related nuclear cataract, left      (Age-related nuclear cataract, left [H25.12])    Surgeons: Claudia Burton MD Responsible Provider: Josiah Kawasaki, MD    Anesthesia Type: MAC ASA Status: 2          Anesthesia Type: MAC    Gilberto Phase I: Gilberto Score: 10    Gilberto Phase II: Gilberto Score: 10      Anesthesia Post Evaluation    Patient location during evaluation: PACU  Patient participation: complete - patient participated  Level of consciousness: awake and alert  Pain score: 0  Airway patency: patent  Nausea & Vomiting: no nausea and no vomiting  Complications: no  Cardiovascular status: hemodynamically stable  Respiratory status: acceptable  Hydration status: euvolemic  Multimodal analgesia pain management approach

## 2023-06-09 RX ORDER — ATORVASTATIN CALCIUM 20 MG/1
TABLET, FILM COATED ORAL
Qty: 90 TABLET | Refills: 3 | Status: SHIPPED | OUTPATIENT
Start: 2023-06-09

## 2023-06-13 NOTE — PERIOP NOTE
[facility]  Ambulatory Surgery Unit  Pre-operative Instructions    Surgery/Procedure Date  6/16/2023            Tentative Arrival Time TBD      1. On the day of your surgery/procedure, please report to the Ambulatory Surgery Unit Registration Desk and sign in at your designated time. The Ambulatory Surgery Unit is located in [facility] on the [facility] side of the [facility] across from the [address] [place] [address]. Please have all of your health insurance cards, co-payment, and a photo ID.    **TWO adults may accompany you the day of the procedure. We have limited seating available. If our waiting room is at capacity, your ride may be asked to remain in their vehicle. No one under 15 is allowed in the waiting room. 2. You must have someone with you to drive you home, as you should not drive a car for 24 hours following anesthesia. Please make arrangements for a responsible adult friend or family member to stay with you for at least the first 24 hours after your surgery. 3. Do not have anything to eat or drink (including water, gum, mints, coffee, juice) after 11:59 PM  6/15/2023. This may not apply to medications prescribed by your physician. (Please note below the special instructions with medications to take the morning of surgery, if applicable.)    4. We recommend you do not drink any alcoholic beverages for 24 hours before and after your surgery. 5. Contact your surgeons office for instructions on the following medications: non-steroidal anti-inflammatory drugs (i.e. Advil, Aleve), vitamins, and supplements. (Some surgeons will want you to stop these medications prior to surgery and others may allow you to take them)   **If you are currently taking Plavix, Coumadin, Aspirin and/or other blood-thinning agents, contact your surgeon for instructions. ** Your surgeon will partner with the physician prescribing these medications to determine if it is safe to stop or if you need

## 2023-06-15 ENCOUNTER — ANESTHESIA EVENT (OUTPATIENT)
Facility: HOSPITAL | Age: 63
End: 2023-06-15
Payer: COMMERCIAL

## 2023-06-16 ENCOUNTER — ANESTHESIA (OUTPATIENT)
Facility: HOSPITAL | Age: 63
End: 2023-06-16
Payer: COMMERCIAL

## 2023-06-16 ENCOUNTER — HOSPITAL ENCOUNTER (OUTPATIENT)
Facility: HOSPITAL | Age: 63
Discharge: HOME | End: 2023-06-16
Attending: SPECIALIST | Admitting: SPECIALIST
Payer: COMMERCIAL

## 2023-06-16 VITALS
HEIGHT: 63 IN | TEMPERATURE: 97.9 F | OXYGEN SATURATION: 95 % | RESPIRATION RATE: 16 BRPM | HEART RATE: 79 BPM | DIASTOLIC BLOOD PRESSURE: 72 MMHG | WEIGHT: 168 LBS | SYSTOLIC BLOOD PRESSURE: 115 MMHG | BODY MASS INDEX: 29.77 KG/M2

## 2023-06-16 PROBLEM — H25.811 COMBINED FORMS OF AGE-RELATED CATARACT OF RIGHT EYE: Status: ACTIVE | Noted: 2023-06-16

## 2023-06-16 PROBLEM — H25.811 COMBINED FORMS OF AGE-RELATED CATARACT OF RIGHT EYE: Status: RESOLVED | Noted: 2023-06-16 | Resolved: 2023-06-16

## 2023-06-16 LAB
GLUCOSE BLD STRIP.AUTO-MCNC: 167 MG/DL (ref 65–117)
SERVICE CMNT-IMP: ABNORMAL

## 2023-06-16 PROCEDURE — 6370000000 HC RX 637 (ALT 250 FOR IP): Performed by: SPECIALIST

## 2023-06-16 PROCEDURE — 3600000013 HC SURGERY LEVEL 3 ADDTL 15MIN: Performed by: SPECIALIST

## 2023-06-16 PROCEDURE — 6360000002 HC RX W HCPCS: Performed by: NURSE ANESTHETIST, CERTIFIED REGISTERED

## 2023-06-16 PROCEDURE — 3700000001 HC ADD 15 MINUTES (ANESTHESIA): Performed by: SPECIALIST

## 2023-06-16 PROCEDURE — 2500000003 HC RX 250 WO HCPCS: Performed by: SPECIALIST

## 2023-06-16 PROCEDURE — 6360000002 HC RX W HCPCS: Performed by: SPECIALIST

## 2023-06-16 PROCEDURE — 7100000000 HC PACU RECOVERY - FIRST 15 MIN: Performed by: SPECIALIST

## 2023-06-16 PROCEDURE — 7100000010 HC PHASE II RECOVERY - FIRST 15 MIN: Performed by: SPECIALIST

## 2023-06-16 PROCEDURE — 82962 GLUCOSE BLOOD TEST: CPT

## 2023-06-16 PROCEDURE — 2709999900 HC NON-CHARGEABLE SUPPLY: Performed by: SPECIALIST

## 2023-06-16 PROCEDURE — 2500000003 HC RX 250 WO HCPCS: Performed by: NURSE ANESTHETIST, CERTIFIED REGISTERED

## 2023-06-16 PROCEDURE — 3700000000 HC ANESTHESIA ATTENDED CARE: Performed by: SPECIALIST

## 2023-06-16 PROCEDURE — 2580000003 HC RX 258: Performed by: ANESTHESIOLOGY

## 2023-06-16 PROCEDURE — 3600000003 HC SURGERY LEVEL 3 BASE: Performed by: SPECIALIST

## 2023-06-16 PROCEDURE — V2632 POST CHMBR INTRAOCULAR LENS: HCPCS | Performed by: SPECIALIST

## 2023-06-16 DEVICE — IMPLANTABLE DEVICE: Type: IMPLANTABLE DEVICE | Site: EYE | Status: FUNCTIONAL

## 2023-06-16 RX ORDER — SODIUM CHLORIDE 9 MG/ML
INJECTION, SOLUTION INTRAVENOUS PRN
Status: DISCONTINUED | OUTPATIENT
Start: 2023-06-16 | End: 2023-06-16 | Stop reason: HOSPADM

## 2023-06-16 RX ORDER — KETOROLAC TROMETHAMINE 30 MG/ML
INJECTION, SOLUTION INTRAMUSCULAR; INTRAVENOUS
Status: DISCONTINUED
Start: 2023-06-16 | End: 2023-06-16 | Stop reason: HOSPADM

## 2023-06-16 RX ORDER — DICLOFENAC SODIUM 1 MG/ML
1 SOLUTION/ DROPS OPHTHALMIC
Status: DISCONTINUED | OUTPATIENT
Start: 2023-06-16 | End: 2023-06-16 | Stop reason: HOSPADM

## 2023-06-16 RX ORDER — DIPHENHYDRAMINE HYDROCHLORIDE 50 MG/ML
12.5 INJECTION INTRAMUSCULAR; INTRAVENOUS
Status: DISCONTINUED | OUTPATIENT
Start: 2023-06-16 | End: 2023-06-16 | Stop reason: HOSPADM

## 2023-06-16 RX ORDER — MEPERIDINE HYDROCHLORIDE 25 MG/ML
12.5 INJECTION INTRAMUSCULAR; INTRAVENOUS; SUBCUTANEOUS EVERY 5 MIN PRN
Status: DISCONTINUED | OUTPATIENT
Start: 2023-06-16 | End: 2023-06-16 | Stop reason: HOSPADM

## 2023-06-16 RX ORDER — PROPARACAINE HYDROCHLORIDE 5 MG/ML
1 SOLUTION/ DROPS OPHTHALMIC
Status: DISCONTINUED | OUTPATIENT
Start: 2023-06-16 | End: 2023-06-16 | Stop reason: HOSPADM

## 2023-06-16 RX ORDER — SODIUM CHLORIDE, SODIUM LACTATE, POTASSIUM CHLORIDE, CALCIUM CHLORIDE 600; 310; 30; 20 MG/100ML; MG/100ML; MG/100ML; MG/100ML
INJECTION, SOLUTION INTRAVENOUS CONTINUOUS
Status: DISCONTINUED | OUTPATIENT
Start: 2023-06-16 | End: 2023-06-16 | Stop reason: HOSPADM

## 2023-06-16 RX ORDER — MIDAZOLAM HYDROCHLORIDE 1 MG/ML
INJECTION INTRAMUSCULAR; INTRAVENOUS PRN
Status: DISCONTINUED | OUTPATIENT
Start: 2023-06-16 | End: 2023-06-16 | Stop reason: SDUPTHER

## 2023-06-16 RX ORDER — LIDOCAINE HYDROCHLORIDE 20 MG/ML
INJECTION, SOLUTION EPIDURAL; INFILTRATION; INTRACAUDAL; PERINEURAL PRN
Status: DISCONTINUED | OUTPATIENT
Start: 2023-06-16 | End: 2023-06-16 | Stop reason: SDUPTHER

## 2023-06-16 RX ORDER — PHENYLEPHRINE HYDROCHLORIDE 100 MG/ML
1 SOLUTION/ DROPS OPHTHALMIC
Status: DISCONTINUED | OUTPATIENT
Start: 2023-06-16 | End: 2023-06-16 | Stop reason: HOSPADM

## 2023-06-16 RX ORDER — CYCLOPENTOLATE HYDROCHLORIDE 20 MG/ML
1 SOLUTION/ DROPS OPHTHALMIC
Status: COMPLETED | OUTPATIENT
Start: 2023-06-16 | End: 2023-06-16

## 2023-06-16 RX ORDER — FENTANYL CITRATE 50 UG/ML
25 INJECTION, SOLUTION INTRAMUSCULAR; INTRAVENOUS EVERY 5 MIN PRN
Status: DISCONTINUED | OUTPATIENT
Start: 2023-06-16 | End: 2023-06-16 | Stop reason: HOSPADM

## 2023-06-16 RX ORDER — SODIUM CHLORIDE 0.9 % (FLUSH) 0.9 %
5-40 SYRINGE (ML) INJECTION PRN
Status: DISCONTINUED | OUTPATIENT
Start: 2023-06-16 | End: 2023-06-16 | Stop reason: HOSPADM

## 2023-06-16 RX ORDER — TROPICAMIDE 10 MG/ML
1 SOLUTION/ DROPS OPHTHALMIC
Status: COMPLETED | OUTPATIENT
Start: 2023-06-16 | End: 2023-06-16

## 2023-06-16 RX ORDER — TETRACAINE HYDROCHLORIDE 5 MG/ML
SOLUTION OPHTHALMIC PRN
Status: DISCONTINUED | OUTPATIENT
Start: 2023-06-16 | End: 2023-06-16 | Stop reason: ALTCHOICE

## 2023-06-16 RX ORDER — ONDANSETRON 2 MG/ML
INJECTION INTRAMUSCULAR; INTRAVENOUS PRN
Status: DISCONTINUED | OUTPATIENT
Start: 2023-06-16 | End: 2023-06-16 | Stop reason: SDUPTHER

## 2023-06-16 RX ORDER — DEXMEDETOMIDINE HYDROCHLORIDE 100 UG/ML
INJECTION, SOLUTION INTRAVENOUS PRN
Status: DISCONTINUED | OUTPATIENT
Start: 2023-06-16 | End: 2023-06-16 | Stop reason: SDUPTHER

## 2023-06-16 RX ORDER — PILOCARPINE HYDROCHLORIDE 10 MG/ML
SOLUTION/ DROPS OPHTHALMIC PRN
Status: DISCONTINUED | OUTPATIENT
Start: 2023-06-16 | End: 2023-06-16 | Stop reason: ALTCHOICE

## 2023-06-16 RX ORDER — LIDOCAINE HYDROCHLORIDE 10 MG/ML
1 INJECTION, SOLUTION EPIDURAL; INFILTRATION; INTRACAUDAL; PERINEURAL
Status: DISCONTINUED | OUTPATIENT
Start: 2023-06-16 | End: 2023-06-16 | Stop reason: HOSPADM

## 2023-06-16 RX ORDER — ONDANSETRON 2 MG/ML
4 INJECTION INTRAMUSCULAR; INTRAVENOUS
Status: DISCONTINUED | OUTPATIENT
Start: 2023-06-16 | End: 2023-06-16 | Stop reason: HOSPADM

## 2023-06-16 RX ORDER — NEOMYCIN SULFATE, POLYMYXIN B SULFATE AND DEXAMETHASONE 3.5; 10000; 1 MG/ML; [USP'U]/ML; MG/ML
SUSPENSION/ DROPS OPHTHALMIC PRN
Status: DISCONTINUED | OUTPATIENT
Start: 2023-06-16 | End: 2023-06-16 | Stop reason: ALTCHOICE

## 2023-06-16 RX ORDER — KETOROLAC TROMETHAMINE 30 MG/ML
15 INJECTION, SOLUTION INTRAMUSCULAR; INTRAVENOUS ONCE
Status: COMPLETED | OUTPATIENT
Start: 2023-06-16 | End: 2023-06-16

## 2023-06-16 RX ORDER — DROPERIDOL 2.5 MG/ML
0.62 INJECTION, SOLUTION INTRAMUSCULAR; INTRAVENOUS
Status: DISCONTINUED | OUTPATIENT
Start: 2023-06-16 | End: 2023-06-16 | Stop reason: HOSPADM

## 2023-06-16 RX ORDER — FENTANYL CITRATE 50 UG/ML
INJECTION, SOLUTION INTRAMUSCULAR; INTRAVENOUS PRN
Status: DISCONTINUED | OUTPATIENT
Start: 2023-06-16 | End: 2023-06-16 | Stop reason: SDUPTHER

## 2023-06-16 RX ADMIN — FENTANYL CITRATE 25 MCG: 50 INJECTION, SOLUTION INTRAMUSCULAR; INTRAVENOUS at 08:11

## 2023-06-16 RX ADMIN — MIDAZOLAM HYDROCHLORIDE 2 MG: 1 INJECTION, SOLUTION INTRAMUSCULAR; INTRAVENOUS at 07:35

## 2023-06-16 RX ADMIN — PHENYLEPHRINE HYDROCHLORIDE 1 DROP: 100 SOLUTION/ DROPS OPHTHALMIC at 06:31

## 2023-06-16 RX ADMIN — TROPICAMIDE 1 DROP: 10 SOLUTION/ DROPS OPHTHALMIC at 06:44

## 2023-06-16 RX ADMIN — PROPARACAINE HYDROCHLORIDE 1 DROP: 5 SOLUTION/ DROPS OPHTHALMIC at 06:44

## 2023-06-16 RX ADMIN — KETOROLAC TROMETHAMINE 15 MG: 30 INJECTION, SOLUTION INTRAMUSCULAR; INTRAVENOUS at 08:31

## 2023-06-16 RX ADMIN — FENTANYL CITRATE 25 MCG: 50 INJECTION, SOLUTION INTRAMUSCULAR; INTRAVENOUS at 07:51

## 2023-06-16 RX ADMIN — CYCLOPENTOLATE HYDROCHLORIDE 1 DROP: 20 SOLUTION/ DROPS OPHTHALMIC at 06:44

## 2023-06-16 RX ADMIN — FENTANYL CITRATE 25 MCG: 50 INJECTION, SOLUTION INTRAMUSCULAR; INTRAVENOUS at 08:02

## 2023-06-16 RX ADMIN — ONDANSETRON HYDROCHLORIDE 4 MG: 2 INJECTION, SOLUTION INTRAMUSCULAR; INTRAVENOUS at 07:51

## 2023-06-16 RX ADMIN — CYCLOPENTOLATE HYDROCHLORIDE 1 DROP: 20 SOLUTION/ DROPS OPHTHALMIC at 06:36

## 2023-06-16 RX ADMIN — LIDOCAINE HYDROCHLORIDE 40 MG: 20 INJECTION, SOLUTION EPIDURAL; INFILTRATION; INTRACAUDAL; PERINEURAL at 07:49

## 2023-06-16 RX ADMIN — PROPARACAINE HYDROCHLORIDE 1 DROP: 5 SOLUTION/ DROPS OPHTHALMIC at 06:31

## 2023-06-16 RX ADMIN — FENTANYL CITRATE 25 MCG: 50 INJECTION, SOLUTION INTRAMUSCULAR; INTRAVENOUS at 07:55

## 2023-06-16 RX ADMIN — PHENYLEPHRINE HYDROCHLORIDE 1 DROP: 100 SOLUTION/ DROPS OPHTHALMIC at 06:44

## 2023-06-16 RX ADMIN — DICLOFENAC SODIUM 1 DROP: 1 SOLUTION/ DROPS OPHTHALMIC at 06:31

## 2023-06-16 RX ADMIN — DEXMEDETOMIDINE HYDROCHLORIDE 2 MCG: 100 INJECTION, SOLUTION, CONCENTRATE INTRAVENOUS at 08:11

## 2023-06-16 RX ADMIN — MIDAZOLAM HYDROCHLORIDE 2 MG: 1 INJECTION, SOLUTION INTRAMUSCULAR; INTRAVENOUS at 07:39

## 2023-06-16 RX ADMIN — SODIUM CHLORIDE: 9 INJECTION, SOLUTION INTRAVENOUS at 06:30

## 2023-06-16 RX ADMIN — PHENYLEPHRINE HYDROCHLORIDE 1 DROP: 100 SOLUTION/ DROPS OPHTHALMIC at 06:36

## 2023-06-16 RX ADMIN — DEXMEDETOMIDINE HYDROCHLORIDE 4 MCG: 100 INJECTION, SOLUTION, CONCENTRATE INTRAVENOUS at 07:56

## 2023-06-16 RX ADMIN — TROPICAMIDE 1 DROP: 10 SOLUTION/ DROPS OPHTHALMIC at 06:36

## 2023-06-16 RX ADMIN — LIDOCAINE HYDROCHLORIDE 40 MG: 20 INJECTION, SOLUTION EPIDURAL; INFILTRATION; INTRACAUDAL; PERINEURAL at 07:47

## 2023-06-16 RX ADMIN — TROPICAMIDE 1 DROP: 10 SOLUTION/ DROPS OPHTHALMIC at 06:31

## 2023-06-16 RX ADMIN — CYCLOPENTOLATE HYDROCHLORIDE 1 DROP: 20 SOLUTION/ DROPS OPHTHALMIC at 06:31

## 2023-06-16 RX ADMIN — DEXMEDETOMIDINE HYDROCHLORIDE 4 MCG: 100 INJECTION, SOLUTION, CONCENTRATE INTRAVENOUS at 07:59

## 2023-06-16 ASSESSMENT — PAIN SCALES - GENERAL
PAINLEVEL_OUTOF10: 0

## 2023-06-16 ASSESSMENT — LIFESTYLE VARIABLES: SMOKING_STATUS: 1

## 2023-06-16 ASSESSMENT — PAIN - FUNCTIONAL ASSESSMENT: PAIN_FUNCTIONAL_ASSESSMENT: 0-10

## 2023-06-16 NOTE — ANESTHESIA POSTPROCEDURE EVALUATION
Department of Anesthesiology  Postprocedure Note    Patient: Zay Johnston  MRN: [id number]  YOB: 1960  Date of evaluation: 6/16/2023      Procedure Summary     Date: 06/16/23 Room / Location: [facility] ASU B3 / [facility] AMBULATORY OR    Anesthesia Start: 1315 Anesthesia Stop: 0944    Procedure: RIGHT EYE CATRACT EXTRACTION WITH INTRAOCULAR LENS IMPLANT (Right: Eye) Diagnosis:       Combined forms of age-related cataract of right eye      (Combined forms of age-related cataract of right eye [H25.811])    Surgeons: Cristi Costa MD Responsible Provider: Olamide Smallwood MD    Anesthesia Type: MAC ASA Status: 2          Anesthesia Type: MAC    Gilberto Phase I: Gilberto Score: 10    Gilberto Phase II: Gilberto Score: 10      Anesthesia Post Evaluation    Patient location during evaluation: PACU  Patient participation: complete - patient participated  Level of consciousness: awake and alert  Pain score: 0  Airway patency: patent  Nausea & Vomiting: no nausea and no vomiting  Complications: no  Cardiovascular status: hemodynamically stable  Respiratory status: acceptable  Hydration status: euvolemic

## 2023-06-16 NOTE — DISCHARGE INSTRUCTIONS
Discharge Instructions Post Cataract Surgery  [facility]/ Dr. Mesha Allen  [facility], [address] , [address]  Phone: [phone]  Fax: [phone]  Call for Emergency ONLY: [phone]    1. Limited activity until seen by Dr. Chalino Morrison tomorrow. 2.   Be careful walking, especially on steps or curbs, the eye patch may effect your depth perception. 3.   Keep the [facility] in place until seen by Dr. Chalino Morrison tomorrow. 4.   When sleeping please sleep on left side. 5.   NO shower or bath until seen by Dr. Chalino Morrison. 6.   You will receive further instructions at your post-op appointment tomorrow   regarding removal of eye shield & beginning post-op eye drops as well as returning to normal activities and showering. 7.   Please have someone drive you to your postop appointment. 8.   Please take Tylenol, Ibuprofen, or Aleve for discomfort. 9.   Call Dr. Abigail Lott office for severe pain unrelieved by the Tylenol, Ibuprofen, or Aleve or nausea, or call his cell phone at [phone] if after hours. 10. If you received a dose of Toradol (NSAID) during surgery, then you may take Ibuprofen, Advil, Motrin or Aleve at 2:30pm.    DO NOT TAKE SLEEPING MEDICATIONS OR ANTIANXIETY MEDICATIONS WHILE TAKING NARCOTIC PAIN MEDICATIONS,  ESPECIALLY THE NIGHT OF ANESTHESIA. CPAP PATIENTS BE SURE TO WEAR MACHINE WHENEVER NAPPING OR SLEEPING. DISCHARGE SUMMARY from Nurse    The following personal items collected during your admission are returned to you:   Dental Appliance:    Vision:    Hearing Aid:    Jewelry:    Clothing:    Other Valuables:    Valuables sent to safe:      PATIENT INSTRUCTIONS:    After general anesthesia or intravenous sedation, for 24 hours or while taking prescription Narcotics:  Someone should be with you for the next 24 hours. For your own safety, a responsible adult must drive you home. Limit your activities  Recommended activity: rest today, up with assistance today.  Do not climb stairs or

## 2023-06-16 NOTE — PERIOP NOTE
0830 Pt arrived to PACU. Pt alert and oriented. Patch over right eye CDI. Order from Dr. Irene Luna to give toradol 15mg Iv in PACU. Pt denies pain. VSS.  0850 Pt alert and oriented. VSS. Denies pain. Pt meets discharge criteria.

## 2023-06-16 NOTE — ANESTHESIA PRE PROCEDURE
Hypertension     Ill-defined condition     high cholesterol    Ill-defined condition     genital herpes    Menopause     Pre-diabetes     Psychiatric disorder     depression, ADHD       Past Surgical History:        Procedure Laterality Date    ANKLE FRACTURE SURGERY Right 01/03/2017    Shattered her ankle and was hospitalized     CATARACT EXTRACTION Left 05/19/2023    EYE SURGERY Left 05/19/2023    LEFT EYE CATARACT EXTRACTION W/INTRAOCULAR LENS IMPLANT performed by Matilde Zepeda MD at [facility] AMBULATORY OR    GYN      D&C X2    OTHER SURGICAL HISTORY      colonoscopy       Social History:    Social History     Tobacco Use    Smoking status: Every Day     Packs/day: 0.50     Years: 44.00     Pack years: 22.00     Types: Cigarettes    Smokeless tobacco: Never   Substance Use Topics    Alcohol use: Yes     Alcohol/week: 2.0 standard drinks     Comment: ocasionally once a month shots of liquor                                Ready to quit: Not Answered  Counseling given: Not Answered      Vital Signs (Current): There were no vitals filed for this visit.                                            BP Readings from Last 3 Encounters:   06/16/23 101/78   05/19/23 116/86   05/17/23 113/85       NPO Status:                                                                                 BMI:   Wt Readings from Last 3 Encounters:   06/16/23 76.2 kg (168 lb)   05/19/23 75.2 kg (165 lb 12.8 oz)   05/17/23 76.1 kg (167 lb 12.8 oz)     There is no height or weight on file to calculate BMI.    CBC:   Lab Results   Component Value Date/Time    WBC 9.5 04/13/2023 12:00 AM    RBC 5.16 04/13/2023 12:00 AM    HGB 15.2 04/13/2023 12:00 AM    HCT 45.6 04/13/2023 12:00 AM    MCV 88 04/13/2023 12:00 AM    RDW 13.1 04/13/2023 12:00 AM     04/13/2023 12:00 AM       CMP:   Lab Results   Component Value Date/Time     04/13/2023 12:00 AM    K 4.1 04/13/2023 12:00 AM     04/13/2023 12:00 AM    CO2 15 04/13/2023

## 2023-06-16 NOTE — PROGRESS NOTES
Permission received to review discharge instructions and discuss private health information with , Scotty Church. Patient states that family/friend will be with them for at least 24 hours following today's procedure.

## 2023-06-16 NOTE — PERIOP NOTE
Maris Decent  1960  [id number]    Situation:  Verbal report given from: Nhan CHAO  Procedure: Procedure(s):  RIGHT EYE CATRACT EXTRACTION WITH INTRAOCULAR LENS IMPLANT    Background:    Preoperative diagnosis: Combined forms of age-related cataract of right eye [H25.811]    Postoperative diagnosis: * No post-op diagnosis entered *    :  Dr. Amauri Duke    Assistant(s): Circulator: Karine Rivera RN  Scrub Person First: Jessica Sandoval    Specimens: * No specimens in log *    Assessment:  Intra-procedure medications         Anesthesia gave intra-procedure sedation and medications, see anesthesia flow sheet     Intravenous fluids: LR@ KVO     Vital signs stable       Recommendation:    Permission to share finding with yes :

## 2023-06-16 NOTE — OP NOTE
[facility]  [address] [place] [address]  [phone]      REPORT OF OPERATION    Patient: Denver Hooks    Date: 6/16/2023      PREOPERATIVE DIAGNOSIS:    HYPERMATURE, DENSE CATARACT, RIGHT EYE. POSTOPERATIVE DIAGNOSIS:   SAME. OPERATION:  COMPLICATED PHACOEMULSIFICATION OF CATARACT THROUGH A CLEAR CORNEAL INCISION USING A TEMPORAL APPROACH AND IMPLANTATION OF DIONICIO NATURAL LENS, WITH USE OF TRYPAN BLUE, right eye. SURGEON:  Annabel Alexis M.D. ANESTHESIA:  TOPICAL/IV SEDATION       FINDINGS AND PROCEDURE:  The patient was taken to the Operating Room and correctly identified, whereupon intravenous lines and cardiac monitor were established. In the Holding Area, the patient was given a series of topical anesthetic drops consisting of Marcaine 0.75%, Tetracaine, and Lidocaine 1%, preservative free, including a periorbital block to the operative eye. The eye was prepared and draped in the usual sterile ophthalmic fashion. A wire lid speculum was inserted between the conjunctival fornices of the eye. Using the microscope, a temporal approach bed was developed. Using an angled Pathfinder clear corneal blade measuring 2.5 millimeters, a shelved corneal incision was made at the level of the corneal margin slightly anterior to the conjunctival peripherovascular arcade. This was followed by a paracentesis at the 5 o'clock position. The patient was noted to have a dense, hypermature lens. Therefore,the approach for surgical treatment of this complicated cataract was initiated. Using a 30-gauge cannula, air was injected into the anterior chamber to protect the corneal endothelium. Following this, Trypan blue was infused within the anterior chamber to visualize the anterior capsule to perform the capsulorrhexis. After the appropriate amount of time, the Trypan blue was now irrigated out with BSS.   Now, Viscoelastic was infused within the anterior

## 2023-06-26 ENCOUNTER — TELEPHONE (OUTPATIENT)
Age: 63
End: 2023-06-26

## 2023-06-26 DIAGNOSIS — F90.9 ATTENTION DEFICIT HYPERACTIVITY DISORDER (ADHD), UNSPECIFIED ADHD TYPE: Primary | ICD-10-CM

## 2023-06-26 RX ORDER — LOSARTAN POTASSIUM 50 MG/1
TABLET ORAL
Qty: 90 TABLET | Refills: 3 | Status: SHIPPED | OUTPATIENT
Start: 2023-06-26

## 2023-06-26 RX ORDER — DEXTROAMPHETAMINE SACCHARATE, AMPHETAMINE ASPARTATE, DEXTROAMPHETAMINE SULFATE AND AMPHETAMINE SULFATE 5; 5; 5; 5 MG/1; MG/1; MG/1; MG/1
20 TABLET ORAL 2 TIMES DAILY
Qty: 60 TABLET | Refills: 0 | Status: SHIPPED | OUTPATIENT
Start: 2023-06-26 | End: 2023-07-26

## 2023-07-27 RX ORDER — DESVENLAFAXINE SUCCINATE 50 MG/1
TABLET, EXTENDED RELEASE ORAL
Qty: 90 TABLET | Refills: 3 | Status: SHIPPED | OUTPATIENT
Start: 2023-07-27

## 2023-07-27 RX ORDER — DOXEPIN HYDROCHLORIDE 50 MG/1
CAPSULE ORAL
Qty: 270 CAPSULE | Refills: 3 | Status: SHIPPED | OUTPATIENT
Start: 2023-07-27

## 2023-09-01 ENCOUNTER — PATIENT MESSAGE (OUTPATIENT)
Age: 63
End: 2023-09-01

## 2023-09-01 DIAGNOSIS — F90.9 ATTENTION DEFICIT HYPERACTIVITY DISORDER (ADHD), UNSPECIFIED ADHD TYPE: ICD-10-CM

## 2023-09-01 RX ORDER — DEXTROAMPHETAMINE SACCHARATE, AMPHETAMINE ASPARTATE, DEXTROAMPHETAMINE SULFATE AND AMPHETAMINE SULFATE 5; 5; 5; 5 MG/1; MG/1; MG/1; MG/1
20 TABLET ORAL 2 TIMES DAILY
Qty: 60 TABLET | Refills: 0 | Status: SHIPPED | OUTPATIENT
Start: 2023-09-01 | End: 2023-10-01

## 2023-09-07 DIAGNOSIS — F90.9 ATTENTION DEFICIT HYPERACTIVITY DISORDER (ADHD), UNSPECIFIED ADHD TYPE: ICD-10-CM

## 2023-09-07 RX ORDER — DEXTROAMPHETAMINE SACCHARATE, AMPHETAMINE ASPARTATE, DEXTROAMPHETAMINE SULFATE AND AMPHETAMINE SULFATE 5; 5; 5; 5 MG/1; MG/1; MG/1; MG/1
20 TABLET ORAL 2 TIMES DAILY
Qty: 60 TABLET | Refills: 0 | Status: CANCELLED | OUTPATIENT
Start: 2023-09-07 | End: 2023-10-07

## 2023-09-07 NOTE — TELEPHONE ENCOUNTER
Spoke with pharmacy. They stated pt has not picked up adderall medication but it is ready for .  verified with pt. Informed pt medication had already been refilled on 23 and it is ready for  at the pharmacy. Pt verified understanding and had no further questions.

## 2023-10-10 ENCOUNTER — OFFICE VISIT (OUTPATIENT)
Age: 63
End: 2023-10-10
Payer: COMMERCIAL

## 2023-10-10 VITALS
RESPIRATION RATE: 18 BRPM | DIASTOLIC BLOOD PRESSURE: 93 MMHG | BODY MASS INDEX: 30.05 KG/M2 | TEMPERATURE: 98.7 F | HEART RATE: 100 BPM | SYSTOLIC BLOOD PRESSURE: 136 MMHG | WEIGHT: 169.6 LBS | HEIGHT: 63 IN | OXYGEN SATURATION: 97 %

## 2023-10-10 DIAGNOSIS — F51.02 ADJUSTMENT INSOMNIA: ICD-10-CM

## 2023-10-10 DIAGNOSIS — E11.9 TYPE 2 DIABETES MELLITUS WITHOUT COMPLICATION, WITHOUT LONG-TERM CURRENT USE OF INSULIN (HCC): ICD-10-CM

## 2023-10-10 DIAGNOSIS — F90.9 ATTENTION DEFICIT HYPERACTIVITY DISORDER (ADHD), UNSPECIFIED ADHD TYPE: ICD-10-CM

## 2023-10-10 DIAGNOSIS — I10 ESSENTIAL (PRIMARY) HYPERTENSION: ICD-10-CM

## 2023-10-10 DIAGNOSIS — M67.441 GANGLION CYST OF TENDON SHEATH OF RIGHT HAND: Primary | ICD-10-CM

## 2023-10-10 DIAGNOSIS — Z12.11 COLON CANCER SCREENING: ICD-10-CM

## 2023-10-10 PROCEDURE — 3078F DIAST BP <80 MM HG: CPT | Performed by: NURSE PRACTITIONER

## 2023-10-10 PROCEDURE — 99214 OFFICE O/P EST MOD 30 MIN: CPT | Performed by: NURSE PRACTITIONER

## 2023-10-10 PROCEDURE — 3074F SYST BP LT 130 MM HG: CPT | Performed by: NURSE PRACTITIONER

## 2023-10-10 PROCEDURE — 3044F HG A1C LEVEL LT 7.0%: CPT | Performed by: NURSE PRACTITIONER

## 2023-10-10 RX ORDER — SEMAGLUTIDE 1.34 MG/ML
INJECTION, SOLUTION SUBCUTANEOUS
Qty: 1 ADJUSTABLE DOSE PRE-FILLED PEN SYRINGE | Refills: 11 | Status: SHIPPED | OUTPATIENT
Start: 2023-10-10 | End: 2024-11-01

## 2023-10-10 RX ORDER — ZOLPIDEM TARTRATE 5 MG/1
5 TABLET ORAL NIGHTLY PRN
Qty: 7 TABLET | Refills: 0 | Status: SHIPPED | OUTPATIENT
Start: 2023-10-10 | End: 2023-10-17

## 2023-10-10 ASSESSMENT — PATIENT HEALTH QUESTIONNAIRE - PHQ9
SUM OF ALL RESPONSES TO PHQ QUESTIONS 1-9: 0
1. LITTLE INTEREST OR PLEASURE IN DOING THINGS: 0
SUM OF ALL RESPONSES TO PHQ QUESTIONS 1-9: 0
2. FEELING DOWN, DEPRESSED OR HOPELESS: 0
SUM OF ALL RESPONSES TO PHQ QUESTIONS 1-9: 0
SUM OF ALL RESPONSES TO PHQ QUESTIONS 1-9: 0
SUM OF ALL RESPONSES TO PHQ9 QUESTIONS 1 & 2: 0

## 2023-10-11 ENCOUNTER — TELEPHONE (OUTPATIENT)
Age: 63
End: 2023-10-11

## 2023-10-11 NOTE — TELEPHONE ENCOUNTER
(Key: [address] [place] [id number])  Rx #: [id number]  Ozempic (0.25 or 0.5 MG/DOSE) 2MG/3ML pen-injectors   [facility] Electronic PA Form (2017   Determination  Message from Plan  This request has been approved using information available on the patient's profile. [id number];GJJLIA:[id number]; Review Type:Prior Auth; Coverage Start Date:09/11/2023; Coverage End Date:10/10/2024;[facility] called,Shade stated it went thru with no cost.

## 2023-11-08 ENCOUNTER — PATIENT MESSAGE (OUTPATIENT)
Age: 63
End: 2023-11-08

## 2023-11-08 DIAGNOSIS — F90.9 ATTENTION DEFICIT HYPERACTIVITY DISORDER (ADHD), UNSPECIFIED ADHD TYPE: ICD-10-CM

## 2023-11-09 DIAGNOSIS — E11.9 TYPE 2 DIABETES MELLITUS WITHOUT COMPLICATION, WITHOUT LONG-TERM CURRENT USE OF INSULIN (HCC): ICD-10-CM

## 2023-11-09 RX ORDER — DEXTROAMPHETAMINE SACCHARATE, AMPHETAMINE ASPARTATE, DEXTROAMPHETAMINE SULFATE AND AMPHETAMINE SULFATE 5; 5; 5; 5 MG/1; MG/1; MG/1; MG/1
20 TABLET ORAL 2 TIMES DAILY
Qty: 60 TABLET | Refills: 0 | Status: SHIPPED | OUTPATIENT
Start: 2023-11-09 | End: 2023-12-09

## 2023-11-09 RX ORDER — SEMAGLUTIDE 1.34 MG/ML
0.5 INJECTION, SOLUTION SUBCUTANEOUS WEEKLY
Qty: 1 ADJUSTABLE DOSE PRE-FILLED PEN SYRINGE | Refills: 11 | Status: SHIPPED | OUTPATIENT
Start: 2023-11-09

## 2023-11-09 NOTE — TELEPHONE ENCOUNTER
From: Thomas Cook  To:  Meryle Cai Polo  Sent: 11/8/2023 2:35 PM EST  Subject: Refill    Adderall to [facility]

## 2023-11-14 RX ORDER — SEMAGLUTIDE 0.68 MG/ML
0.5 INJECTION, SOLUTION SUBCUTANEOUS WEEKLY
Qty: 9 ML | Refills: 6 | OUTPATIENT
Start: 2023-11-14

## 2023-12-12 SDOH — ECONOMIC STABILITY: HOUSING INSECURITY
IN THE LAST 12 MONTHS, WAS THERE A TIME WHEN YOU DID NOT HAVE A STEADY PLACE TO SLEEP OR SLEPT IN A SHELTER (INCLUDING NOW)?: NO

## 2023-12-12 SDOH — ECONOMIC STABILITY: FOOD INSECURITY: WITHIN THE PAST 12 MONTHS, THE FOOD YOU BOUGHT JUST DIDN'T LAST AND YOU DIDN'T HAVE MONEY TO GET MORE.: NEVER TRUE

## 2023-12-12 SDOH — ECONOMIC STABILITY: TRANSPORTATION INSECURITY
IN THE PAST 12 MONTHS, HAS LACK OF TRANSPORTATION KEPT YOU FROM MEETINGS, WORK, OR FROM GETTING THINGS NEEDED FOR DAILY LIVING?: NO

## 2023-12-12 SDOH — ECONOMIC STABILITY: INCOME INSECURITY: HOW HARD IS IT FOR YOU TO PAY FOR THE VERY BASICS LIKE FOOD, HOUSING, MEDICAL CARE, AND HEATING?: NOT HARD AT ALL

## 2023-12-12 SDOH — ECONOMIC STABILITY: FOOD INSECURITY: WITHIN THE PAST 12 MONTHS, YOU WORRIED THAT YOUR FOOD WOULD RUN OUT BEFORE YOU GOT MONEY TO BUY MORE.: NEVER TRUE

## 2023-12-13 ENCOUNTER — OFFICE VISIT (OUTPATIENT)
Age: 63
End: 2023-12-13
Payer: COMMERCIAL

## 2023-12-13 VITALS
DIASTOLIC BLOOD PRESSURE: 89 MMHG | RESPIRATION RATE: 18 BRPM | HEIGHT: 63 IN | WEIGHT: 162 LBS | BODY MASS INDEX: 28.7 KG/M2 | OXYGEN SATURATION: 99 % | HEART RATE: 102 BPM | SYSTOLIC BLOOD PRESSURE: 138 MMHG | TEMPERATURE: 98.3 F

## 2023-12-13 DIAGNOSIS — F51.02 ADJUSTMENT INSOMNIA: ICD-10-CM

## 2023-12-13 DIAGNOSIS — F90.9 ATTENTION DEFICIT HYPERACTIVITY DISORDER (ADHD), UNSPECIFIED ADHD TYPE: ICD-10-CM

## 2023-12-13 DIAGNOSIS — E11.9 TYPE 2 DIABETES MELLITUS WITHOUT COMPLICATION, WITHOUT LONG-TERM CURRENT USE OF INSULIN (HCC): Primary | ICD-10-CM

## 2023-12-13 DIAGNOSIS — E78.2 MIXED HYPERLIPIDEMIA: ICD-10-CM

## 2023-12-13 PROCEDURE — 99214 OFFICE O/P EST MOD 30 MIN: CPT | Performed by: NURSE PRACTITIONER

## 2023-12-13 PROCEDURE — 3044F HG A1C LEVEL LT 7.0%: CPT | Performed by: NURSE PRACTITIONER

## 2023-12-13 PROCEDURE — 3075F SYST BP GE 130 - 139MM HG: CPT | Performed by: NURSE PRACTITIONER

## 2023-12-13 PROCEDURE — 3079F DIAST BP 80-89 MM HG: CPT | Performed by: NURSE PRACTITIONER

## 2023-12-13 RX ORDER — ZOLPIDEM TARTRATE 5 MG/1
5 TABLET ORAL NIGHTLY PRN
Qty: 10 TABLET | Refills: 0 | Status: SHIPPED | OUTPATIENT
Start: 2023-12-13 | End: 2023-12-23

## 2023-12-13 RX ORDER — DEXTROAMPHETAMINE SACCHARATE, AMPHETAMINE ASPARTATE, DEXTROAMPHETAMINE SULFATE AND AMPHETAMINE SULFATE 5; 5; 5; 5 MG/1; MG/1; MG/1; MG/1
20 TABLET ORAL 2 TIMES DAILY
Qty: 60 TABLET | Refills: 0 | Status: SHIPPED | OUTPATIENT
Start: 2023-12-13 | End: 2024-01-12

## 2023-12-13 SDOH — ECONOMIC STABILITY: FOOD INSECURITY: WITHIN THE PAST 12 MONTHS, YOU WORRIED THAT YOUR FOOD WOULD RUN OUT BEFORE YOU GOT MONEY TO BUY MORE.: NEVER TRUE

## 2023-12-13 SDOH — ECONOMIC STABILITY: FOOD INSECURITY: WITHIN THE PAST 12 MONTHS, THE FOOD YOU BOUGHT JUST DIDN'T LAST AND YOU DIDN'T HAVE MONEY TO GET MORE.: NEVER TRUE

## 2023-12-13 SDOH — ECONOMIC STABILITY: INCOME INSECURITY: HOW HARD IS IT FOR YOU TO PAY FOR THE VERY BASICS LIKE FOOD, HOUSING, MEDICAL CARE, AND HEATING?: NOT HARD AT ALL

## 2023-12-13 ASSESSMENT — PATIENT HEALTH QUESTIONNAIRE - PHQ9
1. LITTLE INTEREST OR PLEASURE IN DOING THINGS: 0
SUM OF ALL RESPONSES TO PHQ QUESTIONS 1-9: 0
2. FEELING DOWN, DEPRESSED OR HOPELESS: 0
SUM OF ALL RESPONSES TO PHQ9 QUESTIONS 1 & 2: 0
SUM OF ALL RESPONSES TO PHQ QUESTIONS 1-9: 0

## 2023-12-13 NOTE — PROGRESS NOTES
Subjective:     Chief Complaint   Patient presents with    Results     Nuclear stress test        HPI:  66 y.o.  presents for follow up appointment. ADHD  She is taking Adderall 20mg 1-2 times daily, most of the time just needs once per day. No CP, dizziness, HA, no palpitations. Feels ADHD symptoms controlled on current dose/medication     Uses ambien PRN when out of town, says she is going to [place] for [employer] and to [place] for [employer]. DM  Taking ozempic and metformin  Blood sugars are doing well, usually   Doing well on ozempic. Walking again, started yesterday       Did see cardiology  Had nuclear stress test, told looked okay (there was something seen initially but thinks okay) but they are going to check echo and AAA US and ISAURA check for PVD. Had pap smear done, waiting results. No hospital, ER or specialist visits since last primary care visit except as noted above. Past Medical History:   Diagnosis Date    ADHD (attention deficit hyperactivity disorder)     Adverse effect of anesthesia     patient states she was in the ICU s/p ankle surgery on a ventilator;1/ 2017    Depression     Diabetes mellitus ([address])     Hyperlipidemia     Hypertension     Ill-defined condition     high cholesterol    Ill-defined condition     genital herpes    Menopause     Pre-diabetes     Psychiatric disorder     depression, ADHD       Social History     Tobacco Use    Smoking status: Every Day     Packs/day: 0.50     Years: 44.00     Additional pack years: 0.00     Total pack years: 22.00     Types: Cigarettes    Smokeless tobacco: Never   Substance Use Topics    Alcohol use:  Yes     Alcohol/week: 2.0 standard drinks of alcohol     Comment: ocasionally once a month shots of liquor    Drug use: Not Currently     Types: Marijuana Дмитрий Malcolm)     Comment: as of 5/11/2023 last time used was a month ago         Allergies   Allergen Reactions    Penicillins Swelling       Health Maintenance reviewed

## 2023-12-14 LAB
ALBUMIN SERPL-MCNC: 4.6 G/DL (ref 3.9–4.9)
ALBUMIN/GLOB SERPL: 2.2 {RATIO} (ref 1.2–2.2)
ALP SERPL-CCNC: 90 IU/L (ref 44–121)
ALT SERPL-CCNC: 19 IU/L (ref 0–32)
AST SERPL-CCNC: 17 IU/L (ref 0–40)
BASOPHILS # BLD AUTO: 0 X10E3/UL (ref 0–0.2)
BASOPHILS NFR BLD AUTO: 0 %
BILIRUB SERPL-MCNC: 0.8 MG/DL (ref 0–1.2)
BUN SERPL-MCNC: 7 MG/DL (ref 8–27)
BUN/CREAT SERPL: 8 (ref 12–28)
CALCIUM SERPL-MCNC: 9.3 MG/DL (ref 8.7–10.3)
CHLORIDE SERPL-SCNC: 104 MMOL/L (ref 96–106)
CHOLEST SERPL-MCNC: 144 MG/DL (ref 100–199)
CO2 SERPL-SCNC: 19 MMOL/L (ref 20–29)
CREAT SERPL-MCNC: 0.84 MG/DL (ref 0.57–1)
EGFRCR SERPLBLD CKD-EPI 2021: 79 ML/MIN/1.73
EOSINOPHIL # BLD AUTO: 0 X10E3/UL (ref 0–0.4)
EOSINOPHIL NFR BLD AUTO: 0 %
ERYTHROCYTE [DISTWIDTH] IN BLOOD BY AUTOMATED COUNT: 13.4 % (ref 11.7–15.4)
GLOBULIN SER CALC-MCNC: 2.1 G/DL (ref 1.5–4.5)
GLUCOSE SERPL-MCNC: 107 MG/DL (ref 70–99)
HBA1C MFR BLD: 5.8 % (ref 4.8–5.6)
HCT VFR BLD AUTO: 44.6 % (ref 34–46.6)
HDLC SERPL-MCNC: 36 MG/DL
HGB BLD-MCNC: 14.5 G/DL (ref 11.1–15.9)
IMM GRANULOCYTES # BLD AUTO: 0 X10E3/UL (ref 0–0.1)
IMM GRANULOCYTES NFR BLD AUTO: 1 %
IMP & REVIEW OF LAB RESULTS: NORMAL
LDLC SERPL CALC-MCNC: 68 MG/DL (ref 0–99)
LYMPHOCYTES # BLD AUTO: 1.8 X10E3/UL (ref 0.7–3.1)
LYMPHOCYTES NFR BLD AUTO: 22 %
Lab: NORMAL
MCH RBC QN AUTO: 29.2 PG (ref 26.6–33)
MCHC RBC AUTO-ENTMCNC: 32.5 G/DL (ref 31.5–35.7)
MCV RBC AUTO: 90 FL (ref 79–97)
MONOCYTES # BLD AUTO: 0.6 X10E3/UL (ref 0.1–0.9)
MONOCYTES NFR BLD AUTO: 8 %
NEUTROPHILS # BLD AUTO: 5.9 X10E3/UL (ref 1.4–7)
NEUTROPHILS NFR BLD AUTO: 69 %
PLATELET # BLD AUTO: 274 X10E3/UL (ref 150–450)
POTASSIUM SERPL-SCNC: 4.1 MMOL/L (ref 3.5–5.2)
PROT SERPL-MCNC: 6.7 G/DL (ref 6–8.5)
RBC # BLD AUTO: 4.97 X10E6/UL (ref 3.77–5.28)
SODIUM SERPL-SCNC: 140 MMOL/L (ref 134–144)
TRIGL SERPL-MCNC: 247 MG/DL (ref 0–149)
VLDLC SERPL CALC-MCNC: 40 MG/DL (ref 5–40)
WBC # BLD AUTO: 8.5 X10E3/UL (ref 3.4–10.8)

## 2024-01-04 DIAGNOSIS — F90.9 ATTENTION DEFICIT HYPERACTIVITY DISORDER (ADHD), UNSPECIFIED ADHD TYPE: ICD-10-CM

## 2024-01-04 RX ORDER — DEXTROAMPHETAMINE SACCHARATE, AMPHETAMINE ASPARTATE, DEXTROAMPHETAMINE SULFATE AND AMPHETAMINE SULFATE 5; 5; 5; 5 MG/1; MG/1; MG/1; MG/1
20 TABLET ORAL 2 TIMES DAILY
Qty: 60 TABLET | Refills: 0 | Status: SHIPPED | OUTPATIENT
Start: 2024-01-09 | End: 2024-02-08

## 2024-01-04 RX ORDER — DEXTROAMPHETAMINE SACCHARATE, AMPHETAMINE ASPARTATE, DEXTROAMPHETAMINE SULFATE AND AMPHETAMINE SULFATE 5; 5; 5; 5 MG/1; MG/1; MG/1; MG/1
20 TABLET ORAL 2 TIMES DAILY
Qty: 60 TABLET | Refills: 0 | Status: SHIPPED | OUTPATIENT
Start: 2024-03-03 | End: 2024-04-02

## 2024-01-04 RX ORDER — DEXTROAMPHETAMINE SACCHARATE, AMPHETAMINE ASPARTATE, DEXTROAMPHETAMINE SULFATE AND AMPHETAMINE SULFATE 5; 5; 5; 5 MG/1; MG/1; MG/1; MG/1
20 TABLET ORAL 2 TIMES DAILY
Qty: 60 TABLET | Refills: 0 | Status: SHIPPED | OUTPATIENT
Start: 2024-02-05 | End: 2024-03-06

## 2024-05-02 ENCOUNTER — HOSPITAL ENCOUNTER (OUTPATIENT)
Facility: HOSPITAL | Age: 64
Discharge: HOME | End: 2024-05-02
Payer: COMMERCIAL

## 2024-05-02 VITALS — WEIGHT: 162.04 LBS | BODY MASS INDEX: 28.71 KG/M2 | HEIGHT: 63 IN

## 2024-05-02 DIAGNOSIS — Z12.31 VISIT FOR SCREENING MAMMOGRAM: ICD-10-CM

## 2024-05-02 PROCEDURE — 77063 BREAST TOMOSYNTHESIS BI: CPT

## 2024-05-21 RX ORDER — ATORVASTATIN CALCIUM 20 MG/1
20 TABLET, FILM COATED ORAL NIGHTLY
Qty: 90 TABLET | Refills: 3 | Status: SHIPPED | OUTPATIENT
Start: 2024-05-21

## 2025-03-02 ENCOUNTER — HOSPITAL ENCOUNTER (EMERGENCY)
Facility: HOSPITAL | Age: 65
Discharge: HOME | End: 2025-03-02
Attending: EMERGENCY MEDICINE
Payer: COMMERCIAL

## 2025-03-02 ENCOUNTER — APPOINTMENT (OUTPATIENT)
Facility: HOSPITAL | Age: 65
End: 2025-03-02
Payer: COMMERCIAL

## 2025-03-02 VITALS
SYSTOLIC BLOOD PRESSURE: 129 MMHG | HEART RATE: 95 BPM | BODY MASS INDEX: 25.48 KG/M2 | DIASTOLIC BLOOD PRESSURE: 88 MMHG | OXYGEN SATURATION: 98 % | WEIGHT: 149.25 LBS | HEIGHT: 64 IN | TEMPERATURE: 98.3 F | RESPIRATION RATE: 16 BRPM

## 2025-03-02 DIAGNOSIS — R42 DIZZINESS: Primary | ICD-10-CM

## 2025-03-02 DIAGNOSIS — R91.1 PULMONARY NODULE: ICD-10-CM

## 2025-03-02 DIAGNOSIS — E27.9 ADRENAL NODULE: ICD-10-CM

## 2025-03-02 LAB
ALBUMIN SERPL-MCNC: 3.8 G/DL (ref 3.5–5)
ALBUMIN/GLOB SERPL: 1.1 (ref 1.1–2.2)
ALP SERPL-CCNC: 104 U/L (ref 45–117)
ALT SERPL-CCNC: 28 U/L (ref 12–78)
ANION GAP SERPL CALC-SCNC: 6 MMOL/L (ref 2–12)
AST SERPL-CCNC: 14 U/L (ref 15–37)
BASOPHILS # BLD: 0.04 K/UL (ref 0–0.1)
BASOPHILS NFR BLD: 0.4 % (ref 0–1)
BILIRUB SERPL-MCNC: 0.9 MG/DL (ref 0.2–1)
BUN SERPL-MCNC: 7 MG/DL (ref 6–20)
BUN/CREAT SERPL: 8 (ref 12–20)
CALCIUM SERPL-MCNC: 10 MG/DL (ref 8.5–10.1)
CHLORIDE SERPL-SCNC: 106 MMOL/L (ref 97–108)
CO2 SERPL-SCNC: 26 MMOL/L (ref 21–32)
CREAT SERPL-MCNC: 0.84 MG/DL (ref 0.55–1.02)
DIFFERENTIAL METHOD BLD: ABNORMAL
EKG ATRIAL RATE: 113 BPM
EKG DIAGNOSIS: NORMAL
EKG P AXIS: 80 DEGREES
EKG P-R INTERVAL: 146 MS
EKG Q-T INTERVAL: 342 MS
EKG QRS DURATION: 64 MS
EKG QTC CALCULATION (BAZETT): 469 MS
EKG R AXIS: 76 DEGREES
EKG T AXIS: 92 DEGREES
EKG VENTRICULAR RATE: 113 BPM
EOSINOPHIL # BLD: 0.1 K/UL (ref 0–0.4)
EOSINOPHIL NFR BLD: 1 % (ref 0–7)
ERYTHROCYTE [DISTWIDTH] IN BLOOD BY AUTOMATED COUNT: 13.2 % (ref 11.5–14.5)
GLOBULIN SER CALC-MCNC: 3.4 G/DL (ref 2–4)
GLUCOSE SERPL-MCNC: 113 MG/DL (ref 65–100)
HCT VFR BLD AUTO: 43.1 % (ref 35–47)
HGB BLD-MCNC: 14.1 G/DL (ref 11.5–16)
IMM GRANULOCYTES # BLD AUTO: 0.05 K/UL (ref 0–0.04)
IMM GRANULOCYTES NFR BLD AUTO: 0.5 % (ref 0–0.5)
LYMPHOCYTES # BLD: 2.12 K/UL (ref 0.8–3.5)
LYMPHOCYTES NFR BLD: 20.9 % (ref 12–49)
MAGNESIUM SERPL-MCNC: 2 MG/DL (ref 1.6–2.4)
MCH RBC QN AUTO: 28.5 PG (ref 26–34)
MCHC RBC AUTO-ENTMCNC: 32.7 G/DL (ref 30–36.5)
MCV RBC AUTO: 87.1 FL (ref 80–99)
MONOCYTES # BLD: 0.9 K/UL (ref 0–1)
MONOCYTES NFR BLD: 8.9 % (ref 5–13)
NEUTS SEG # BLD: 6.94 K/UL (ref 1.8–8)
NEUTS SEG NFR BLD: 68.3 % (ref 32–75)
NRBC # BLD: 0 K/UL (ref 0–0.01)
NRBC BLD-RTO: 0 PER 100 WBC
PLATELET # BLD AUTO: 269 K/UL (ref 150–400)
PMV BLD AUTO: 9.8 FL (ref 8.9–12.9)
POTASSIUM SERPL-SCNC: 3.4 MMOL/L (ref 3.5–5.1)
PROT SERPL-MCNC: 7.2 G/DL (ref 6.4–8.2)
RBC # BLD AUTO: 4.95 M/UL (ref 3.8–5.2)
SODIUM SERPL-SCNC: 138 MMOL/L (ref 136–145)
TROPONIN I SERPL HS-MCNC: 6 NG/L (ref 0–51)
WBC # BLD AUTO: 10.2 K/UL (ref 3.6–11)

## 2025-03-02 PROCEDURE — 2580000003 HC RX 258: Performed by: EMERGENCY MEDICINE

## 2025-03-02 PROCEDURE — 71275 CT ANGIOGRAPHY CHEST: CPT

## 2025-03-02 PROCEDURE — 84484 ASSAY OF TROPONIN QUANT: CPT

## 2025-03-02 PROCEDURE — 80053 COMPREHEN METABOLIC PANEL: CPT

## 2025-03-02 PROCEDURE — 99285 EMERGENCY DEPT VISIT HI MDM: CPT

## 2025-03-02 PROCEDURE — 70450 CT HEAD/BRAIN W/O DYE: CPT

## 2025-03-02 PROCEDURE — 93005 ELECTROCARDIOGRAM TRACING: CPT | Performed by: EMERGENCY MEDICINE

## 2025-03-02 PROCEDURE — 85025 COMPLETE CBC W/AUTO DIFF WBC: CPT

## 2025-03-02 PROCEDURE — 96360 HYDRATION IV INFUSION INIT: CPT

## 2025-03-02 PROCEDURE — 83735 ASSAY OF MAGNESIUM: CPT

## 2025-03-02 PROCEDURE — 6360000004 HC RX CONTRAST MEDICATION: Performed by: EMERGENCY MEDICINE

## 2025-03-02 RX ORDER — IOPAMIDOL 755 MG/ML
100 INJECTION, SOLUTION INTRAVASCULAR
Status: COMPLETED | OUTPATIENT
Start: 2025-03-02 | End: 2025-03-02

## 2025-03-02 RX ORDER — SODIUM CHLORIDE, SODIUM LACTATE, POTASSIUM CHLORIDE, AND CALCIUM CHLORIDE .6; .31; .03; .02 G/100ML; G/100ML; G/100ML; G/100ML
1000 INJECTION, SOLUTION INTRAVENOUS ONCE
Status: COMPLETED | OUTPATIENT
Start: 2025-03-02 | End: 2025-03-02

## 2025-03-02 RX ADMIN — IOPAMIDOL 100 ML: 755 INJECTION, SOLUTION INTRAVENOUS at 16:18

## 2025-03-02 RX ADMIN — SODIUM CHLORIDE, POTASSIUM CHLORIDE, SODIUM LACTATE AND CALCIUM CHLORIDE 1000 ML: 600; 310; 30; 20 INJECTION, SOLUTION INTRAVENOUS at 16:20

## 2025-03-02 ASSESSMENT — PAIN DESCRIPTION - ORIENTATION: ORIENTATION: LEFT

## 2025-03-02 ASSESSMENT — PAIN - FUNCTIONAL ASSESSMENT
PAIN_FUNCTIONAL_ASSESSMENT: PREVENTS OR INTERFERES SOME ACTIVE ACTIVITIES AND ADLS
PAIN_FUNCTIONAL_ASSESSMENT: 0-10

## 2025-03-02 ASSESSMENT — PAIN DESCRIPTION - LOCATION: LOCATION: HEAD

## 2025-03-02 ASSESSMENT — PAIN SCALES - GENERAL
PAINLEVEL_OUTOF10: 3
PAINLEVEL_OUTOF10: 0

## 2025-03-02 ASSESSMENT — PAIN DESCRIPTION - DESCRIPTORS: DESCRIPTORS: ACHING

## 2025-03-02 NOTE — ED NOTES
1:52 PM  The patient is 64 with dizziness since August, episodic lasting 30-60 minutes,also with diaphoresis and light headed.Seen with ENT, and had vertigo specialist see her and stated not vertigo. She was referred to neurology, and unable to get an appointment until July. She states they are coming more frequently, and was advised by ENT to come to ED if episodes continue to occur pr worsen.     I have evaluated the patient as the Provider in Rapid Medical Evaluation (RME). I have reviewed her vital signs and the triage nurse assessment. I have talked with the patient and any available family and advised that I am the provider in triage and have ordered the appropriate study to initiate their work up based on the clinical presentation during my assessment. I have advised that the patient will be accommodated in the Main ED as soon as possible. I have also requested to contact the triage nurse or myself immediately if the patient experiences any changes in their condition during this brief waiting period.  STEPHEN Abbasi Heidi J, PA-C  03/02/25 2220

## 2025-03-02 NOTE — DISCHARGE INSTRUCTIONS
Routine appointments for health maintenance with a primary care provider are very important and emergency department visits are no substitute.  You should review all findings and test results from your visit today with your primary care physician.        Drink lots of water.        Return to the emergency department for any new or concerning signs/symptoms or failure to improve.

## 2025-03-02 NOTE — ED PROVIDER NOTES
Determinants of Health     Financial Resource Strain: Low Risk  (12/13/2023)    Overall Financial Resource Strain (CARDIA)     Difficulty of Paying Living Expenses: Not hard at all   Food Insecurity: No Food Insecurity (12/13/2023)    Hunger Vital Sign     Worried About Running Out of Food in the Last Year: Never true     Ran Out of Food in the Last Year: Never true   Transportation Needs: Unknown (12/13/2023)    PRAPARE - Transportation     Lack of Transportation (Non-Medical): No   Housing Stability: Unknown (12/13/2023)    Housing Stability Vital Sign     Unstable Housing in the Last Year: No        Current Medications: Reviewed in chart.    Allergies:   Allergies   Allergen Reactions    Penicillins Swelling          REVIEW OF SYSTEMS: See HPI for pertinent positives and negatives.      PHYSICAL EXAM:  /88   Pulse 95   Temp 98.3 °F (36.8 °C) (Oral)   Resp 16   Ht 1.626 m (5' 4\")   Wt 67.7 kg (149 lb 4 oz)   SpO2 98%   BMI 25.62 kg/m²    Physical Exam      ED Course:    ED Course as of 03/02/25 1700   Sun Mar 02, 2025   1411 EKG 1411 shows sinus tachycardia rate of 113 bpm.  No acute ST elevations noted.  Nonspecific ST wave abnormality noted.  Abnormal EKG. [JR]   1635 CT chest:  1. No evidence for acute pulmonary embolism.  2. Mild emphysema with nonspecific bibasilar groundglass opacities. 3 mm right  lung nodule.  3. Ectasia of the ascending aorta.  4. Indeterminate 2.3 cm left adrenal nodule.   [JR]      ED Course User Index  [JR] Davin Schmidt DO         ED physician interpretation of EKG: No STEMI.  See my interpretation of EKG in ED course above.    Laboratory Results:  Labs Reviewed   CBC WITH AUTO DIFFERENTIAL - Abnormal; Notable for the following components:       Result Value    Immature Granulocytes Absolute 0.05 (*)     All other components within normal limits   COMPREHENSIVE METABOLIC PANEL - Abnormal; Notable for the following components:    Potassium 3.4 (*)     Glucose 113 (*)

## 2025-03-02 NOTE — ED TRIAGE NOTES
Patient arrives to ED reports dizziness starting in August, states she was seen by ENT and sent to \"vertigo specialist\".  Patient reports she was directed to see neurology, but unable to get follow up until July.      Patient reports more frequent intermittent episodes within the last 2 weeks, states she \"feels hot and gets very dizzy\", most recent episode was last week.

## 2025-03-03 ENCOUNTER — TELEPHONE (OUTPATIENT)
Age: 65
End: 2025-03-03

## 2025-03-03 NOTE — TELEPHONE ENCOUNTER
Patient's  is calling to state his wife was told at the ER to see the doctor in the next two days.    Please advise what can be done.    Patient has been scheduled for July and placed on the wait list.

## 2025-04-08 ENCOUNTER — TELEPHONE (OUTPATIENT)
Age: 65
End: 2025-04-08

## (undated) DEVICE — Z DISCONTINUED NO SUB IDED BUCKET CAST INF CLAV STRP WHT BCKL CLSR PREM DISPOSABLE

## (undated) DEVICE — AGENT OPHTH SURG DUOVISC 30MG/ML NAHA 0.35ML OR 0.5ML

## (undated) DEVICE — SYRINGE MED 30ML STD CLR PLAS LUERLOCK TIP N CTRL DISP

## (undated) DEVICE — (D)PREP SKN CHLRAPRP APPL 26ML -- CONVERT TO ITEM 371833

## (undated) DEVICE — STERILE POLYISOPRENE POWDER-FREE SURGICAL GLOVES: Brand: PROTEXIS

## (undated) DEVICE — SUTURE MCRYL 3-0 L27IN ABSRB VLT SH L26MM 1/2 CIR Y316H

## (undated) DEVICE — TRAY PREP DRY W/ PREM GLV 2 APPL 6 SPNG 2 UNDPD 1 OVERWRAP

## (undated) DEVICE — SOLUTION IRRIG 500ML STRL H2O NONPYROGENIC

## (undated) DEVICE — ASTOUND STANDARD SURGICAL GOWN, XL: Brand: CONVERTORS

## (undated) DEVICE — Z INACTIVE NO USAGE TURNOVER KIT RM CLEANOP

## (undated) DEVICE — Device

## (undated) DEVICE — DRAPE,EXTREMITY,89X128,STERILE: Brand: MEDLINE

## (undated) DEVICE — CONTINU-FLO SOLUTION SET, 2 INJECTION SITES, MALE LUER LOCK ADAPTER WITH RETRACTABLE COLLAR, LARGE BORE STOPCOCK WITH ROTATING MALE LUER LOCK EXTENSION SET, 2 INJECTION SITES, MALE LUER LOCK ADAPTER WITH RETRACTABLE COLLAR: Brand: INTERLINK/CONTINU-FLO

## (undated) DEVICE — BANDAGE,GAUZE,BULKEE II,4.5"X4.1YD,STRL: Brand: MEDLINE

## (undated) DEVICE — VISCOAT 0.50ML 27G<USA: Brand: VISCOAT

## (undated) DEVICE — STERILE SLEEVE: Brand: CONVERTORS

## (undated) DEVICE — SOLUTION IV 1000ML LAC RINGERS PH 6.5 INJ USP VIAFLX PLAS

## (undated) DEVICE — SURGICAL PROCEDURE PACK CATRCT CUST

## (undated) DEVICE — OCCLUSIVE GAUZE STRIP,3% BISMUTH TRIBROMOPHENATE IN PETROLATUM BLEND: Brand: XEROFORM

## (undated) DEVICE — SPLINT MAT XF SPEC 5X30IN --

## (undated) DEVICE — HANDLE LT SNAP ON ULT DURABLE LENS FOR TRUMPF ALC DISPOSABLE

## (undated) DEVICE — SET 2ND L34IN N DEHP THE QUEENS MED CNTR VALUELINK

## (undated) DEVICE — SCREW BNE L22MM DIA2.7MM CORT ANK FT S STL ST NONCANNULATED
Type: IMPLANTABLE DEVICE | Site: ANKLE | Status: NON-FUNCTIONAL
Removed: 2017-01-03

## (undated) DEVICE — SURGICAL PROCEDURE PACK BASIN MAJ SET CUST NO CAUT

## (undated) DEVICE — CATHETER ETER IV 20GA L125IN POLYUR STR RADPQ INTROCAN SFTY

## (undated) DEVICE — SENSOR PLSE OXMTR AD CBL L3FT ADH TRANSMISSIVE

## (undated) DEVICE — FRAZIER SUCTION INSTRUMENT 12 FR W/CONTROL VENT & OBTURATOR: Brand: FRAZIER

## (undated) DEVICE — SUTURE VCRL SZ 3-0 L18IN ABSRB UD L26MM SH 1/2 CIR J864D

## (undated) DEVICE — 3M™ TEGADERM™ TRANSPARENT FILM DRESSING FRAME STYLE, 1626W, 4 IN X 4-3/4 IN (10 CM X 12 CM), 50/CT 4CT/CASE: Brand: 3M™ TEGADERM™

## (undated) DEVICE — DEVON™ KNEE AND BODY STRAP 60" X 3" (1.5 M X 7.6 CM): Brand: DEVON

## (undated) DEVICE — SUTURE PROL SZ 4-0 L36IN NONABSORBABLE BLU L26MM SH 1/2 CIR 8521H

## (undated) DEVICE — 3000CC GUARDIAN II: Brand: GUARDIAN

## (undated) DEVICE — SOLUTION LACTATED RINGERS INJECTION USP

## (undated) DEVICE — DRAPE,REIN 53X77,STERILE: Brand: MEDLINE

## (undated) DEVICE — LIGHT HANDLE: Brand: DEVON

## (undated) DEVICE — 1010 S-DRAPE TOWEL DRAPE 10/BX: Brand: STERI-DRAPE™

## (undated) DEVICE — STERILE LATEX POWDER-FREE SURGICAL GLOVESWITH NITRILE COATING: Brand: PROTEXIS

## (undated) DEVICE — NON-ADHERENT STRIPS,OIL EMULSION: Brand: CURITY

## (undated) DEVICE — 3M™ COBAN™ SELF-ADHERENT WRAP, 1586S, STERILE, 6 IN X 5 YD (15 CM X 4,5 M), 12 ROLLS/CASE: Brand: 3M™ COBAN™

## (undated) DEVICE — CATHETER ETER IV 22GA L1IN POLYUR STR RADPQ INTROCAN SFTY

## (undated) DEVICE — SPONGE LAP 18X18IN STRL -- 5/PK

## (undated) DEVICE — 3M™ STERI-STRIP™ REINFORCED ADHESIVE SKIN CLOSURES, R1546, 1/4 IN X 4 IN (6 MM X 100 MM), 10 STRIPS/ENVELOPE: Brand: 3M™ STERI-STRIP™

## (undated) DEVICE — KENDALL DL ECG CABLE AND LEAD WIRE SYSTEM, 3-LEAD, SINGLE PATIENT USE: Brand: KENDALL

## (undated) DEVICE — TOWEL SURG W17XL27IN STD BLU COT NONFENESTRATED PREWASHED

## (undated) DEVICE — SOL TOP ALC ISO 70% 4OZ --

## (undated) DEVICE — SUTURE ETHLN SZ 4-0 L18IN NONABSORBABLE BLK L16MM PC-3 3/8 1864G

## (undated) DEVICE — COVER,MAYO STAND,STERILE: Brand: MEDLINE

## (undated) DEVICE — BIT DRL L100MM DIA2MM QUIK CONN W/O STP N RADLUC REUSE

## (undated) DEVICE — ANTI-EMBOLISM STOCKINGS,KNEE LENGTH,LARGE,REGULAR,SIZE E-: Brand: T.E.D.

## (undated) DEVICE — BIPOLAR FORCEPS CORD,BANANA LEADS: Brand: VALLEYLAB

## (undated) DEVICE — THE MONARCH® "D" CARTRIDGE IS A SINGLE-USE POLYPROPYLENE CARTRIDGE FOR POSTERIOR CHAMBER IOL DELIVERY: Brand: MONARCH® III

## (undated) DEVICE — DERMACEA GAUZE FLUFF ROLL: Brand: DERMACEA

## (undated) DEVICE — DRAPE,U/ SHT,SPLIT,PLAS,STERIL: Brand: MEDLINE

## (undated) DEVICE — Z CONVERTED USE 2107985 COVER FLROSCP W36XL28IN 4 SIDE ADH

## (undated) DEVICE — HOOK LOCK LATEX FREE ELASTIC BANDAGE 4INX5YD

## (undated) DEVICE — SOLUTION IV 1000ML 0.9% SOD CHL

## (undated) DEVICE — NEEDLE HYPO 21GA L1.5IN INTRAMUSCULAR S STL LATCH BVL UP

## (undated) DEVICE — STERILE POLYISOPRENE POWDER-FREE SURGICAL GLOVES WITH EMOLLIENT COATING: Brand: PROTEXIS

## (undated) DEVICE — KENDALL DL 5 LEADS DUAL CONNECT SYSTEM BLENDED CASE - SINGLE-PATIENT-USE: Brand: SUSTAINABLE TECHNOLOGIES

## (undated) DEVICE — GAUZE SPONGES,12 PLY: Brand: CURITY

## (undated) DEVICE — NEEDLE SUT SZ 4 MAYO CATGUT 1/2 CIR TAPR PNT DISP

## (undated) DEVICE — HAND I-LF: Brand: MEDLINE INDUSTRIES, INC.

## (undated) DEVICE — GOWN,SIRUS,NONRNF,SETINSLV,XL,20/CS: Brand: MEDLINE

## (undated) DEVICE — 3M™ TEGADERM™ TRANSPARENT FILM DRESSING FRAME STYLE, 1624W, 2-3/8 IN X 2-3/4 IN (6 CM X 7 CM), 100/CT 4CT/CASE: Brand: 3M™ TEGADERM™

## (undated) DEVICE — CONVERTORS STOCKINETTE: Brand: CONVERTORS

## (undated) DEVICE — COVER,TABLE,60X90,STERILE: Brand: MEDLINE

## (undated) DEVICE — SOLUTION IV 250ML 0.9% SOD CHL CLR INJ FLX BG CONT PRT CLSR

## (undated) DEVICE — SUTURE PROL SZ 4-0 L18IN NONABSORBABLE BLU L16MM PC-3 3/8 8634G

## (undated) DEVICE — SUTURE MCRYL SZ 4-0 L27IN ABSRB UD L19MM PS-2 1/2 CIR PRIM Y426H

## (undated) DEVICE — (D)PREP SKN SCRB EXDINE 4OZ -- DUPE USE ITEM 324303

## (undated) DEVICE — DRESSING,GAUZE,XEROFORM,CURAD,1"X8",ST: Brand: CURAD

## (undated) DEVICE — DISPOSABLE TOURNIQUET CUFF SINGLE BLADDER, DUAL PORT AND QUICK CONNECT CONNECTOR: Brand: COLOR CUFF

## (undated) DEVICE — SOLUTION IRRIG 1000ML H2O STRL BLT

## (undated) DEVICE — PADDING CST CRMPD 3INX4YD NS --

## (undated) DEVICE — SUTURE VCRL SZ 3-0 L27IN ABSRB VLT L26MM SH 1/2 CIR J316H

## (undated) DEVICE — BIT DRL L100MM DIA2.5MM FOR SM FRAG UNIV LOK SYS

## (undated) DEVICE — C-ARMOR C-ARM EQUIPMENT COVERS CLEAR STERILE UNIVERSAL FIT 12 PER CASE: Brand: C-ARMOR

## (undated) DEVICE — BIT DRL QC MGNA FX CANN 2.7MM -- DISP

## (undated) DEVICE — 3M™ MEDIPORE™ H SOFT CLOTH SURGICAL TAPE 2864, 4 INCH X 10 YARD (10CM X 9,14M), 12 ROLLS/CASE: Brand: 3M™ MEDIPORE™

## (undated) DEVICE — HOOK LOCK LATEX FREE ELASTIC BANDAGE D/L 6INX10YD

## (undated) DEVICE — INTENDED FOR TISSUE SEPARATION, AND OTHER PROCEDURES THAT REQUIRE A SHARP SURGICAL BLADE TO PUNCTURE OR CUT.: Brand: BARD-PARKER ® CARBON RIB-BACK BLADES

## (undated) DEVICE — GOWN,SIRUS,NONRNF,SETINSLV,2XL,18/CS: Brand: MEDLINE

## (undated) DEVICE — ROCKER SWITCH PENCIL BLADE ELECTRODE, HOLSTER: Brand: EDGE

## (undated) DEVICE — WIRE FIX K TRCR 1.6X150MM --

## (undated) DEVICE — 3M™ COBAN™ NL STERILE NON-LATEX SELF-ADHERENT WRAP, 2084S, 4 IN X 5 YD (10 CM X 4,5 M), 18 ROLLS/CASE: Brand: 3M™ COBAN™

## (undated) DEVICE — PADDING CAST SPEC 6INX4YD COT --

## (undated) DEVICE — SUTURE ETHLN SZ 4-0 L18IN NONABSORBABLE BLK L19MM PS-2 3/8 1667H

## (undated) DEVICE — REM POLYHESIVE ADULT PATIENT RETURN ELECTRODE: Brand: VALLEYLAB

## (undated) DEVICE — Z DISCONTINUED PER MEDLINE (LOW STOCK)  USE 2422770 DRAPE C ARM W54XL78IN FOR FLROSCN

## (undated) DEVICE — INFECTION CONTROL KIT SYS

## (undated) DEVICE — GLOVE SURG SZ 8.5 L11.85IN FNGR THK7.5MIL IVRY LTX FREE